# Patient Record
Sex: FEMALE | Race: WHITE | NOT HISPANIC OR LATINO | Employment: OTHER | URBAN - METROPOLITAN AREA
[De-identification: names, ages, dates, MRNs, and addresses within clinical notes are randomized per-mention and may not be internally consistent; named-entity substitution may affect disease eponyms.]

---

## 2017-07-24 ENCOUNTER — TRANSCRIBE ORDERS (OUTPATIENT)
Dept: ADMINISTRATIVE | Facility: HOSPITAL | Age: 62
End: 2017-07-24

## 2017-07-24 DIAGNOSIS — Z12.31 VISIT FOR SCREENING MAMMOGRAM: Primary | ICD-10-CM

## 2017-08-24 ENCOUNTER — APPOINTMENT (OUTPATIENT)
Dept: OTHER | Facility: HOSPITAL | Age: 62
End: 2017-08-24
Attending: FAMILY MEDICINE

## 2017-08-24 ENCOUNTER — HOSPITAL ENCOUNTER (OUTPATIENT)
Dept: MAMMOGRAPHY | Facility: HOSPITAL | Age: 62
Discharge: HOME | End: 2017-08-24
Attending: FAMILY MEDICINE | Admitting: FAMILY MEDICINE

## 2017-08-24 DIAGNOSIS — Z92.89 H/O MAMMOGRAM: ICD-10-CM

## 2017-08-24 DIAGNOSIS — Z12.31 VISIT FOR SCREENING MAMMOGRAM: ICD-10-CM

## 2017-08-24 PROCEDURE — 77063 BREAST TOMOSYNTHESIS BI: CPT

## 2017-08-24 PROCEDURE — 77067 SCR MAMMO BI INCL CAD: CPT | Performed by: RADIOLOGY

## 2017-08-24 PROCEDURE — 77063 BREAST TOMOSYNTHESIS BI: CPT | Performed by: RADIOLOGY

## 2017-08-24 PROCEDURE — G0202 SCR MAMMO BI INCL CAD: HCPCS

## 2017-09-11 ENCOUNTER — HOSPITAL ENCOUNTER (OUTPATIENT)
Dept: MAMMOGRAPHY | Facility: HOSPITAL | Age: 62
Discharge: HOME | End: 2017-09-11
Admitting: FAMILY MEDICINE

## 2017-09-11 ENCOUNTER — HOSPITAL ENCOUNTER (OUTPATIENT)
Dept: ULTRASOUND IMAGING | Facility: HOSPITAL | Age: 62
Discharge: HOME | End: 2017-09-11
Attending: FAMILY MEDICINE

## 2017-09-11 DIAGNOSIS — R92.8 ABNORMAL MAMMOGRAM: ICD-10-CM

## 2017-09-11 PROCEDURE — G0279 TOMOSYNTHESIS, MAMMO: HCPCS

## 2017-09-11 PROCEDURE — 77061 BREAST TOMOSYNTHESIS UNI: CPT | Performed by: RADIOLOGY

## 2017-09-11 PROCEDURE — G0206 DX MAMMO INCL CAD UNI: HCPCS

## 2017-09-11 PROCEDURE — 76642 ULTRASOUND BREAST LIMITED: CPT

## 2017-09-11 PROCEDURE — 77065 DX MAMMO INCL CAD UNI: CPT | Performed by: RADIOLOGY

## 2017-09-11 PROCEDURE — 76642 ULTRASOUND BREAST LIMITED: CPT | Performed by: RADIOLOGY

## 2017-09-21 ENCOUNTER — HOSPITAL ENCOUNTER (OUTPATIENT)
Dept: MAMMOGRAPHY | Facility: HOSPITAL | Age: 62
Discharge: HOME | End: 2017-09-21

## 2017-09-21 ENCOUNTER — HOSPITAL ENCOUNTER (OUTPATIENT)
Dept: ULTRASOUND IMAGING | Facility: HOSPITAL | Age: 62
Discharge: HOME | End: 2017-09-21
Attending: FAMILY MEDICINE | Admitting: FAMILY MEDICINE

## 2017-09-21 DIAGNOSIS — R92.8 ABNORMAL MAMMOGRAM: ICD-10-CM

## 2017-09-21 PROCEDURE — 77065 DX MAMMO INCL CAD UNI: CPT | Performed by: RADIOLOGY

## 2017-09-21 PROCEDURE — A4648 IMPLANTABLE TISSUE MARKER: HCPCS

## 2017-09-21 PROCEDURE — 19083 BX BREAST 1ST LESION US IMAG: CPT | Performed by: RADIOLOGY

## 2017-09-21 PROCEDURE — 88305 TISSUE EXAM BY PATHOLOGIST: CPT | Performed by: RADIOLOGY

## 2017-09-21 RX ORDER — LIDOCAINE HYDROCHLORIDE 10 MG/ML
10 INJECTION, SOLUTION INFILTRATION; PERINEURAL ONCE
Status: COMPLETED | OUTPATIENT
Start: 2017-09-21 | End: 2017-09-21

## 2017-09-21 RX ORDER — LIDOCAINE HYDROCHLORIDE AND EPINEPHRINE 10; 10 MG/ML; UG/ML
20 INJECTION, SOLUTION INFILTRATION; PERINEURAL ONCE
Status: COMPLETED | OUTPATIENT
Start: 2017-09-21 | End: 2017-09-21

## 2017-09-21 RX ADMIN — LIDOCAINE HYDROCHLORIDE AND EPINEPHRINE 3 ML: 10; 10 INJECTION, SOLUTION INFILTRATION; PERINEURAL at 13:35

## 2017-09-21 RX ADMIN — LIDOCAINE HYDROCHLORIDE 5 ML: 10 INJECTION, SOLUTION INFILTRATION; PERINEURAL at 13:30

## 2017-09-22 ENCOUNTER — TELEPHONE (OUTPATIENT)
Dept: MAMMOGRAPHY | Facility: HOSPITAL | Age: 62
End: 2017-09-22

## 2017-09-22 LAB
CYTO UR: NORMAL
LAB AP CASE REPORT: NORMAL
LAB AP CLINICAL INFORMATION: NORMAL
LAB AP DIAGNOSIS COMMENT: NORMAL
Lab: NORMAL
PATH REPORT.FINAL DX SPEC: NORMAL
PATH REPORT.GROSS SPEC: NORMAL

## 2017-09-22 NOTE — TELEPHONE ENCOUNTER
09.22.17 @ 1720: Pt notified of pathology results and recommendations. Verbalizes understanding. Denies discomfort. Denies any signs and symptoms of infection.

## 2017-12-10 ENCOUNTER — ANESTHESIA (OUTPATIENT)
Dept: PERIOP | Facility: HOSPITAL | Age: 62
End: 2017-12-10

## 2017-12-10 ENCOUNTER — ANESTHESIA EVENT (OUTPATIENT)
Dept: PERIOP | Facility: HOSPITAL | Age: 62
End: 2017-12-10

## 2017-12-10 ENCOUNTER — APPOINTMENT (OUTPATIENT)
Dept: GENERAL RADIOLOGY | Facility: HOSPITAL | Age: 62
End: 2017-12-10

## 2017-12-10 ENCOUNTER — HOSPITAL ENCOUNTER (INPATIENT)
Facility: HOSPITAL | Age: 62
LOS: 6 days | Discharge: HOME HEALTH | End: 2017-12-16
Attending: INTERNAL MEDICINE | Admitting: INTERNAL MEDICINE

## 2017-12-10 DIAGNOSIS — Z78.9 IMPAIRED MOBILITY AND ADLS: ICD-10-CM

## 2017-12-10 DIAGNOSIS — Z74.09 IMPAIRED FUNCTIONAL MOBILITY, BALANCE, GAIT, AND ENDURANCE: ICD-10-CM

## 2017-12-10 DIAGNOSIS — A41.9 SEPSIS, DUE TO UNSPECIFIED ORGANISM: Primary | ICD-10-CM

## 2017-12-10 DIAGNOSIS — Z74.09 IMPAIRED MOBILITY AND ADLS: ICD-10-CM

## 2017-12-10 DIAGNOSIS — T84.53XA INFECTION OF TOTAL RIGHT KNEE REPLACEMENT (HCC): ICD-10-CM

## 2017-12-10 LAB
ABO GROUP BLD: NORMAL
ABO GROUP BLD: NORMAL
ANION GAP SERPL CALCULATED.3IONS-SCNC: 6 MMOL/L (ref 3–11)
ANION GAP SERPL CALCULATED.3IONS-SCNC: 9 MMOL/L (ref 3–11)
APTT PPP: 30.6 SECONDS (ref 24–31)
BASOPHILS # BLD AUTO: 0.03 10*3/MM3 (ref 0–0.2)
BASOPHILS NFR BLD AUTO: 0.2 % (ref 0–1)
BLD GP AB SCN SERPL QL: NEGATIVE
BUN BLD-MCNC: 25 MG/DL (ref 9–23)
BUN BLD-MCNC: 30 MG/DL (ref 9–23)
BUN/CREAT SERPL: 17.6 (ref 7–25)
BUN/CREAT SERPL: 22.7 (ref 7–25)
CALCIUM SPEC-SCNC: 7.8 MG/DL (ref 8.7–10.4)
CALCIUM SPEC-SCNC: 8.1 MG/DL (ref 8.7–10.4)
CHLORIDE SERPL-SCNC: 108 MMOL/L (ref 99–109)
CHLORIDE SERPL-SCNC: 110 MMOL/L (ref 99–109)
CO2 SERPL-SCNC: 25 MMOL/L (ref 20–31)
CO2 SERPL-SCNC: 26 MMOL/L (ref 20–31)
CORTIS SERPL-MCNC: 18.8 MCG/DL
CREAT BLD-MCNC: 1.1 MG/DL (ref 0.6–1.3)
CREAT BLD-MCNC: 1.7 MG/DL (ref 0.6–1.3)
D-LACTATE SERPL-SCNC: 0.5 MMOL/L (ref 0.5–2)
DEPRECATED RDW RBC AUTO: 51.3 FL (ref 37–54)
EOSINOPHIL # BLD AUTO: 0.01 10*3/MM3 (ref 0–0.3)
EOSINOPHIL NFR BLD AUTO: 0.1 % (ref 0–3)
ERYTHROCYTE [DISTWIDTH] IN BLOOD BY AUTOMATED COUNT: 14.3 % (ref 11.3–14.5)
GFR SERPL CREATININE-BSD FRML MDRD: 30 ML/MIN/1.73
GFR SERPL CREATININE-BSD FRML MDRD: 50 ML/MIN/1.73
GLUCOSE BLD-MCNC: 105 MG/DL (ref 70–100)
GLUCOSE BLD-MCNC: 123 MG/DL (ref 70–100)
GLUCOSE BLDC GLUCOMTR-MCNC: 89 MG/DL (ref 70–130)
HBA1C MFR BLD: 5.7 % (ref 4.8–5.6)
HCT VFR BLD AUTO: 32.8 % (ref 34.5–44)
HCT VFR BLD AUTO: 33.7 % (ref 34.5–44)
HGB BLD-MCNC: 10.3 G/DL (ref 11.5–15.5)
HGB BLD-MCNC: 10.4 G/DL (ref 11.5–15.5)
IMM GRANULOCYTES # BLD: 0.04 10*3/MM3 (ref 0–0.03)
IMM GRANULOCYTES NFR BLD: 0.2 % (ref 0–0.6)
LYMPHOCYTES # BLD AUTO: 0.79 10*3/MM3 (ref 0.6–4.8)
LYMPHOCYTES NFR BLD AUTO: 4.5 % (ref 24–44)
MAGNESIUM SERPL-MCNC: 2.1 MG/DL (ref 1.3–2.7)
MCH RBC QN AUTO: 30 PG (ref 27–31)
MCHC RBC AUTO-ENTMCNC: 30.9 G/DL (ref 32–36)
MCV RBC AUTO: 97.1 FL (ref 80–99)
MONOCYTES # BLD AUTO: 0.96 10*3/MM3 (ref 0–1)
MONOCYTES NFR BLD AUTO: 5.5 % (ref 0–12)
NEUTROPHILS # BLD AUTO: 15.68 10*3/MM3 (ref 1.5–8.3)
NEUTROPHILS NFR BLD AUTO: 89.5 % (ref 41–71)
PHOSPHATE SERPL-MCNC: 3.3 MG/DL (ref 2.4–5.1)
PLATELET # BLD AUTO: 243 10*3/MM3 (ref 150–450)
PMV BLD AUTO: 9.4 FL (ref 6–12)
POTASSIUM BLD-SCNC: 3.7 MMOL/L (ref 3.5–5.5)
POTASSIUM BLD-SCNC: 3.9 MMOL/L (ref 3.5–5.5)
PROCALCITONIN SERPL-MCNC: 9.86 NG/ML
RBC # BLD AUTO: 3.47 10*6/MM3 (ref 3.89–5.14)
RH BLD: POSITIVE
RH BLD: POSITIVE
SODIUM BLD-SCNC: 142 MMOL/L (ref 132–146)
SODIUM BLD-SCNC: 142 MMOL/L (ref 132–146)
TROPONIN I SERPL-MCNC: 0.01 NG/ML
TSH SERPL DL<=0.05 MIU/L-ACNC: 0.21 MIU/ML (ref 0.35–5.35)
WBC NRBC COR # BLD: 17.51 10*3/MM3 (ref 3.5–10.8)

## 2017-12-10 PROCEDURE — 83735 ASSAY OF MAGNESIUM: CPT | Performed by: INTERNAL MEDICINE

## 2017-12-10 PROCEDURE — C1776 JOINT DEVICE (IMPLANTABLE): HCPCS | Performed by: ORTHOPAEDIC SURGERY

## 2017-12-10 PROCEDURE — 87147 CULTURE TYPE IMMUNOLOGIC: CPT | Performed by: ORTHOPAEDIC SURGERY

## 2017-12-10 PROCEDURE — 25010000002 PROMETHAZINE PER 50 MG: Performed by: ANESTHESIOLOGY

## 2017-12-10 PROCEDURE — 27430 REVISION OF THIGH MUSCLES: CPT | Performed by: ORTHOPAEDIC SURGERY

## 2017-12-10 PROCEDURE — 87077 CULTURE AEROBIC IDENTIFY: CPT | Performed by: ORTHOPAEDIC SURGERY

## 2017-12-10 PROCEDURE — 87116 MYCOBACTERIA CULTURE: CPT | Performed by: ORTHOPAEDIC SURGERY

## 2017-12-10 PROCEDURE — 85014 HEMATOCRIT: CPT | Performed by: ORTHOPAEDIC SURGERY

## 2017-12-10 PROCEDURE — 4A02X4A MEASUREMENT OF CARDIAC ELECTRICAL ACTIVITY, GUIDANCE, EXTERNAL APPROACH: ICD-10-PCS | Performed by: INTERNAL MEDICINE

## 2017-12-10 PROCEDURE — 84145 PROCALCITONIN (PCT): CPT | Performed by: INTERNAL MEDICINE

## 2017-12-10 PROCEDURE — 85025 COMPLETE CBC W/AUTO DIFF WBC: CPT | Performed by: INTERNAL MEDICINE

## 2017-12-10 PROCEDURE — 87206 SMEAR FLUORESCENT/ACID STAI: CPT | Performed by: ORTHOPAEDIC SURGERY

## 2017-12-10 PROCEDURE — 25010000002 HYDROCORTISONE SODIUM SUCCINATE 100 MG RECONSTITUTED SOLUTION: Performed by: INTERNAL MEDICINE

## 2017-12-10 PROCEDURE — 87015 SPECIMEN INFECT AGNT CONCNTJ: CPT | Performed by: ORTHOPAEDIC SURGERY

## 2017-12-10 PROCEDURE — 86901 BLOOD TYPING SEROLOGIC RH(D): CPT | Performed by: ORTHOPAEDIC SURGERY

## 2017-12-10 PROCEDURE — 0KNQ0ZZ RELEASE RIGHT UPPER LEG MUSCLE, OPEN APPROACH: ICD-10-PCS | Performed by: ORTHOPAEDIC SURGERY

## 2017-12-10 PROCEDURE — 82962 GLUCOSE BLOOD TEST: CPT

## 2017-12-10 PROCEDURE — 80048 BASIC METABOLIC PNL TOTAL CA: CPT | Performed by: INTERNAL MEDICINE

## 2017-12-10 PROCEDURE — 73560 X-RAY EXAM OF KNEE 1 OR 2: CPT

## 2017-12-10 PROCEDURE — 85018 HEMOGLOBIN: CPT | Performed by: ORTHOPAEDIC SURGERY

## 2017-12-10 PROCEDURE — 82533 TOTAL CORTISOL: CPT | Performed by: INTERNAL MEDICINE

## 2017-12-10 PROCEDURE — 87040 BLOOD CULTURE FOR BACTERIA: CPT | Performed by: INTERNAL MEDICINE

## 2017-12-10 PROCEDURE — 86901 BLOOD TYPING SEROLOGIC RH(D): CPT

## 2017-12-10 PROCEDURE — 87075 CULTR BACTERIA EXCEPT BLOOD: CPT | Performed by: ORTHOPAEDIC SURGERY

## 2017-12-10 PROCEDURE — 25010000003 CEFAZOLIN IN DEXTROSE 2-4 GM/100ML-% SOLUTION: Performed by: ANESTHESIOLOGY

## 2017-12-10 PROCEDURE — 84443 ASSAY THYROID STIM HORMONE: CPT | Performed by: INTERNAL MEDICINE

## 2017-12-10 PROCEDURE — 25010000002 PROPOFOL 10 MG/ML EMULSION: Performed by: ANESTHESIOLOGY

## 2017-12-10 PROCEDURE — 25010000002 ONDANSETRON PER 1 MG: Performed by: ANESTHESIOLOGY

## 2017-12-10 PROCEDURE — 0SUV09Z SUPPLEMENT RIGHT KNEE JOINT, TIBIAL SURFACE WITH LINER, OPEN APPROACH: ICD-10-PCS | Performed by: ORTHOPAEDIC SURGERY

## 2017-12-10 PROCEDURE — 25010000002 FENTANYL CITRATE (PF) 100 MCG/2ML SOLUTION: Performed by: ANESTHESIOLOGY

## 2017-12-10 PROCEDURE — 71010 HC CHEST PA OR AP: CPT

## 2017-12-10 PROCEDURE — 83036 HEMOGLOBIN GLYCOSYLATED A1C: CPT | Performed by: INTERNAL MEDICINE

## 2017-12-10 PROCEDURE — 25010000002 ALBUMIN HUMAN 5% PER 50 ML: Performed by: ANESTHESIOLOGY

## 2017-12-10 PROCEDURE — 25010000002 DEXAMETHASONE PER 1 MG: Performed by: ANESTHESIOLOGY

## 2017-12-10 PROCEDURE — 85730 THROMBOPLASTIN TIME PARTIAL: CPT | Performed by: INTERNAL MEDICINE

## 2017-12-10 PROCEDURE — 99291 CRITICAL CARE FIRST HOUR: CPT | Performed by: INTERNAL MEDICINE

## 2017-12-10 PROCEDURE — 02HV33Z INSERTION OF INFUSION DEVICE INTO SUPERIOR VENA CAVA, PERCUTANEOUS APPROACH: ICD-10-PCS | Performed by: INTERNAL MEDICINE

## 2017-12-10 PROCEDURE — P9041 ALBUMIN (HUMAN),5%, 50ML: HCPCS | Performed by: ANESTHESIOLOGY

## 2017-12-10 PROCEDURE — 86900 BLOOD TYPING SEROLOGIC ABO: CPT

## 2017-12-10 PROCEDURE — 86900 BLOOD TYPING SEROLOGIC ABO: CPT | Performed by: ORTHOPAEDIC SURGERY

## 2017-12-10 PROCEDURE — 87070 CULTURE OTHR SPECIMN AEROBIC: CPT | Performed by: ORTHOPAEDIC SURGERY

## 2017-12-10 PROCEDURE — 25010000002 HYDROMORPHONE PER 4 MG: Performed by: ORTHOPAEDIC SURGERY

## 2017-12-10 PROCEDURE — 87205 SMEAR GRAM STAIN: CPT | Performed by: ORTHOPAEDIC SURGERY

## 2017-12-10 PROCEDURE — 84100 ASSAY OF PHOSPHORUS: CPT | Performed by: INTERNAL MEDICINE

## 2017-12-10 PROCEDURE — 87102 FUNGUS ISOLATION CULTURE: CPT | Performed by: ORTHOPAEDIC SURGERY

## 2017-12-10 PROCEDURE — 87176 TISSUE HOMOGENIZATION CULTR: CPT | Performed by: ORTHOPAEDIC SURGERY

## 2017-12-10 PROCEDURE — 25010000002 FENTANYL CITRATE (PF) 100 MCG/2ML SOLUTION: Performed by: INTERNAL MEDICINE

## 2017-12-10 PROCEDURE — 80048 BASIC METABOLIC PNL TOTAL CA: CPT | Performed by: ORTHOPAEDIC SURGERY

## 2017-12-10 PROCEDURE — 25010000002 ONDANSETRON PER 1 MG: Performed by: ORTHOPAEDIC SURGERY

## 2017-12-10 PROCEDURE — 87186 SC STD MICRODIL/AGAR DIL: CPT | Performed by: ORTHOPAEDIC SURGERY

## 2017-12-10 PROCEDURE — 25010000002 PHENYLEPHRINE PER 1 ML: Performed by: ANESTHESIOLOGY

## 2017-12-10 PROCEDURE — 83605 ASSAY OF LACTIC ACID: CPT | Performed by: INTERNAL MEDICINE

## 2017-12-10 PROCEDURE — 0SPC09Z REMOVAL OF LINER FROM RIGHT KNEE JOINT, OPEN APPROACH: ICD-10-PCS | Performed by: ORTHOPAEDIC SURGERY

## 2017-12-10 PROCEDURE — 84484 ASSAY OF TROPONIN QUANT: CPT | Performed by: INTERNAL MEDICINE

## 2017-12-10 PROCEDURE — 27486 REVISE/REPLACE KNEE JOINT: CPT | Performed by: ORTHOPAEDIC SURGERY

## 2017-12-10 PROCEDURE — 86850 RBC ANTIBODY SCREEN: CPT | Performed by: ORTHOPAEDIC SURGERY

## 2017-12-10 PROCEDURE — 99254 IP/OBS CNSLTJ NEW/EST MOD 60: CPT | Performed by: ORTHOPAEDIC SURGERY

## 2017-12-10 DEVICE — IMPLANTABLE DEVICE: Type: IMPLANTABLE DEVICE | Site: KNEE | Status: FUNCTIONAL

## 2017-12-10 RX ORDER — BUPRENORPHINE AND NALOXONE 8; 2 MG/1; MG/1
1 FILM, SOLUBLE BUCCAL; SUBLINGUAL 2 TIMES DAILY
COMMUNITY
End: 2018-01-04

## 2017-12-10 RX ORDER — ALPRAZOLAM 1 MG/1
1 TABLET ORAL 3 TIMES DAILY PRN
Status: DISCONTINUED | OUTPATIENT
Start: 2017-12-10 | End: 2017-12-16 | Stop reason: HOSPADM

## 2017-12-10 RX ORDER — MAGNESIUM HYDROXIDE 1200 MG/15ML
LIQUID ORAL AS NEEDED
Status: DISCONTINUED | OUTPATIENT
Start: 2017-12-10 | End: 2017-12-10 | Stop reason: HOSPADM

## 2017-12-10 RX ORDER — PROMETHAZINE HYDROCHLORIDE 25 MG/1
25 SUPPOSITORY RECTAL ONCE AS NEEDED
Status: COMPLETED | OUTPATIENT
Start: 2017-12-10 | End: 2017-12-10

## 2017-12-10 RX ORDER — GABAPENTIN 800 MG/1
800 TABLET ORAL 4 TIMES DAILY
COMMUNITY

## 2017-12-10 RX ORDER — PROMETHAZINE HYDROCHLORIDE 25 MG/ML
6.25 INJECTION, SOLUTION INTRAMUSCULAR; INTRAVENOUS ONCE AS NEEDED
Status: COMPLETED | OUTPATIENT
Start: 2017-12-10 | End: 2017-12-10

## 2017-12-10 RX ORDER — LIDOCAINE HYDROCHLORIDE 10 MG/ML
INJECTION, SOLUTION INFILTRATION; PERINEURAL AS NEEDED
Status: DISCONTINUED | OUTPATIENT
Start: 2017-12-10 | End: 2017-12-10 | Stop reason: SURG

## 2017-12-10 RX ORDER — CHLORZOXAZONE 500 MG/1
500 TABLET ORAL 3 TIMES DAILY PRN
COMMUNITY

## 2017-12-10 RX ORDER — ROCURONIUM BROMIDE 10 MG/ML
INJECTION, SOLUTION INTRAVENOUS AS NEEDED
Status: DISCONTINUED | OUTPATIENT
Start: 2017-12-10 | End: 2017-12-10 | Stop reason: SURG

## 2017-12-10 RX ORDER — SODIUM CHLORIDE 9 MG/ML
100 INJECTION, SOLUTION INTRAVENOUS CONTINUOUS
Status: DISCONTINUED | OUTPATIENT
Start: 2017-12-10 | End: 2017-12-12

## 2017-12-10 RX ORDER — FENTANYL CITRATE 50 UG/ML
25 INJECTION, SOLUTION INTRAMUSCULAR; INTRAVENOUS
Status: DISCONTINUED | OUTPATIENT
Start: 2017-12-10 | End: 2017-12-16 | Stop reason: HOSPADM

## 2017-12-10 RX ORDER — OXYCODONE HYDROCHLORIDE AND ACETAMINOPHEN 5; 325 MG/1; MG/1
2 TABLET ORAL EVERY 4 HOURS PRN
Status: DISCONTINUED | OUTPATIENT
Start: 2017-12-10 | End: 2017-12-16 | Stop reason: HOSPADM

## 2017-12-10 RX ORDER — HYDROMORPHONE HYDROCHLORIDE 1 MG/ML
0.5 INJECTION, SOLUTION INTRAMUSCULAR; INTRAVENOUS; SUBCUTANEOUS
Status: DISCONTINUED | OUTPATIENT
Start: 2017-12-10 | End: 2017-12-10 | Stop reason: HOSPADM

## 2017-12-10 RX ORDER — TRIAMTERENE AND HYDROCHLOROTHIAZIDE 75; 50 MG/1; MG/1
1 TABLET ORAL DAILY
COMMUNITY
End: 2018-11-27

## 2017-12-10 RX ORDER — FENTANYL CITRATE 50 UG/ML
50 INJECTION, SOLUTION INTRAMUSCULAR; INTRAVENOUS
Status: DISCONTINUED | OUTPATIENT
Start: 2017-12-10 | End: 2017-12-10 | Stop reason: HOSPADM

## 2017-12-10 RX ORDER — MELOXICAM 15 MG/1
15 TABLET ORAL DAILY
Status: ON HOLD | COMMUNITY
End: 2018-01-09

## 2017-12-10 RX ORDER — CEFAZOLIN SODIUM 2 G/100ML
INJECTION, SOLUTION INTRAVENOUS AS NEEDED
Status: DISCONTINUED | OUTPATIENT
Start: 2017-12-10 | End: 2017-12-10 | Stop reason: SURG

## 2017-12-10 RX ORDER — ACETAMINOPHEN 325 MG/1
650 TABLET ORAL EVERY 4 HOURS PRN
Status: DISCONTINUED | OUTPATIENT
Start: 2017-12-10 | End: 2017-12-16 | Stop reason: HOSPADM

## 2017-12-10 RX ORDER — LISINOPRIL 20 MG/1
20 TABLET ORAL DAILY
COMMUNITY

## 2017-12-10 RX ORDER — NALOXONE HCL 0.4 MG/ML
0.1 VIAL (ML) INJECTION
Status: DISCONTINUED | OUTPATIENT
Start: 2017-12-10 | End: 2017-12-16 | Stop reason: HOSPADM

## 2017-12-10 RX ORDER — LIDOCAINE HYDROCHLORIDE 10 MG/ML
0.5 INJECTION, SOLUTION EPIDURAL; INFILTRATION; INTRACAUDAL; PERINEURAL ONCE AS NEEDED
Status: CANCELLED | OUTPATIENT
Start: 2017-12-10

## 2017-12-10 RX ORDER — PROPOFOL 10 MG/ML
VIAL (ML) INTRAVENOUS AS NEEDED
Status: DISCONTINUED | OUTPATIENT
Start: 2017-12-10 | End: 2017-12-10 | Stop reason: SURG

## 2017-12-10 RX ORDER — SODIUM CHLORIDE, SODIUM LACTATE, POTASSIUM CHLORIDE, CALCIUM CHLORIDE 600; 310; 30; 20 MG/100ML; MG/100ML; MG/100ML; MG/100ML
9 INJECTION, SOLUTION INTRAVENOUS CONTINUOUS PRN
Status: CANCELLED | OUTPATIENT
Start: 2017-12-10

## 2017-12-10 RX ORDER — BUPROPION HYDROCHLORIDE 150 MG/1
150 TABLET ORAL 2 TIMES DAILY
COMMUNITY
End: 2018-11-27

## 2017-12-10 RX ORDER — ONDANSETRON 2 MG/ML
INJECTION INTRAMUSCULAR; INTRAVENOUS AS NEEDED
Status: DISCONTINUED | OUTPATIENT
Start: 2017-12-10 | End: 2017-12-10 | Stop reason: SURG

## 2017-12-10 RX ORDER — ONDANSETRON 4 MG/1
4 TABLET, FILM COATED ORAL EVERY 6 HOURS PRN
Status: DISCONTINUED | OUTPATIENT
Start: 2017-12-10 | End: 2017-12-16 | Stop reason: HOSPADM

## 2017-12-10 RX ORDER — ALBUMIN, HUMAN INJ 5% 5 %
SOLUTION INTRAVENOUS CONTINUOUS PRN
Status: DISCONTINUED | OUTPATIENT
Start: 2017-12-10 | End: 2017-12-10 | Stop reason: SURG

## 2017-12-10 RX ORDER — DEXAMETHASONE SODIUM PHOSPHATE 4 MG/ML
INJECTION, SOLUTION INTRA-ARTICULAR; INTRALESIONAL; INTRAMUSCULAR; INTRAVENOUS; SOFT TISSUE AS NEEDED
Status: DISCONTINUED | OUTPATIENT
Start: 2017-12-10 | End: 2017-12-10 | Stop reason: SURG

## 2017-12-10 RX ORDER — HYDROMORPHONE HYDROCHLORIDE 1 MG/ML
0.5 INJECTION, SOLUTION INTRAMUSCULAR; INTRAVENOUS; SUBCUTANEOUS
Status: DISCONTINUED | OUTPATIENT
Start: 2017-12-10 | End: 2017-12-16 | Stop reason: HOSPADM

## 2017-12-10 RX ORDER — BISACODYL 5 MG/1
10 TABLET, DELAYED RELEASE ORAL DAILY PRN
Status: DISCONTINUED | OUTPATIENT
Start: 2017-12-10 | End: 2017-12-16 | Stop reason: HOSPADM

## 2017-12-10 RX ORDER — ASPIRIN 325 MG
325 TABLET, DELAYED RELEASE (ENTERIC COATED) ORAL EVERY 12 HOURS SCHEDULED
Status: DISCONTINUED | OUTPATIENT
Start: 2017-12-11 | End: 2017-12-16 | Stop reason: HOSPADM

## 2017-12-10 RX ORDER — SODIUM CHLORIDE, SODIUM LACTATE, POTASSIUM CHLORIDE, CALCIUM CHLORIDE 600; 310; 30; 20 MG/100ML; MG/100ML; MG/100ML; MG/100ML
INJECTION, SOLUTION INTRAVENOUS CONTINUOUS PRN
Status: DISCONTINUED | OUTPATIENT
Start: 2017-12-10 | End: 2017-12-10 | Stop reason: SURG

## 2017-12-10 RX ORDER — BISACODYL 10 MG
10 SUPPOSITORY, RECTAL RECTAL DAILY PRN
Status: DISCONTINUED | OUTPATIENT
Start: 2017-12-10 | End: 2017-12-16 | Stop reason: HOSPADM

## 2017-12-10 RX ORDER — SODIUM HYPOCHLORITE 1.25 MG/ML
SOLUTION TOPICAL AS NEEDED
Status: DISCONTINUED | OUTPATIENT
Start: 2017-12-10 | End: 2017-12-10 | Stop reason: HOSPADM

## 2017-12-10 RX ORDER — SODIUM CHLORIDE 9 MG/ML
50 INJECTION, SOLUTION INTRAVENOUS CONTINUOUS
Status: DISCONTINUED | OUTPATIENT
Start: 2017-12-10 | End: 2017-12-15

## 2017-12-10 RX ORDER — HYDROCODONE BITARTRATE AND ACETAMINOPHEN 5; 325 MG/1; MG/1
1 TABLET ORAL EVERY 6 HOURS PRN
Status: DISCONTINUED | OUTPATIENT
Start: 2017-12-10 | End: 2017-12-14

## 2017-12-10 RX ORDER — PROMETHAZINE HYDROCHLORIDE 25 MG/1
25 TABLET ORAL ONCE AS NEEDED
Status: COMPLETED | OUTPATIENT
Start: 2017-12-10 | End: 2017-12-10

## 2017-12-10 RX ORDER — ONDANSETRON 2 MG/ML
4 INJECTION INTRAMUSCULAR; INTRAVENOUS EVERY 6 HOURS PRN
Status: DISCONTINUED | OUTPATIENT
Start: 2017-12-10 | End: 2017-12-16 | Stop reason: HOSPADM

## 2017-12-10 RX ORDER — SODIUM CHLORIDE 0.9 % (FLUSH) 0.9 %
1-10 SYRINGE (ML) INJECTION AS NEEDED
Status: DISCONTINUED | OUTPATIENT
Start: 2017-12-10 | End: 2017-12-13

## 2017-12-10 RX ORDER — LEVOTHYROXINE SODIUM 0.1 MG/1
100 TABLET ORAL DAILY
COMMUNITY

## 2017-12-10 RX ORDER — ALPRAZOLAM 1 MG/1
1 TABLET ORAL DAILY PRN
COMMUNITY

## 2017-12-10 RX ORDER — OXYCODONE HYDROCHLORIDE AND ACETAMINOPHEN 5; 325 MG/1; MG/1
1 TABLET ORAL EVERY 4 HOURS PRN
Status: DISCONTINUED | OUTPATIENT
Start: 2017-12-10 | End: 2017-12-16 | Stop reason: HOSPADM

## 2017-12-10 RX ORDER — VANCOMYCIN HYDROCHLORIDE 1 G/200ML
15 INJECTION, SOLUTION INTRAVENOUS ONCE
Status: COMPLETED | OUTPATIENT
Start: 2017-12-11 | End: 2017-12-11

## 2017-12-10 RX ORDER — SODIUM CHLORIDE 0.9 % (FLUSH) 0.9 %
1-10 SYRINGE (ML) INJECTION AS NEEDED
Status: CANCELLED | OUTPATIENT
Start: 2017-12-10

## 2017-12-10 RX ORDER — SODIUM CHLORIDE 0.9 % (FLUSH) 0.9 %
1-10 SYRINGE (ML) INJECTION AS NEEDED
Status: DISCONTINUED | OUTPATIENT
Start: 2017-12-10 | End: 2017-12-10

## 2017-12-10 RX ORDER — DOCUSATE SODIUM 100 MG/1
100 CAPSULE, LIQUID FILLED ORAL 2 TIMES DAILY PRN
Status: DISCONTINUED | OUTPATIENT
Start: 2017-12-10 | End: 2017-12-16 | Stop reason: HOSPADM

## 2017-12-10 RX ORDER — CEFTRIAXONE SODIUM 2 G/50ML
2 INJECTION, SOLUTION INTRAVENOUS EVERY 24 HOURS
Status: DISCONTINUED | OUTPATIENT
Start: 2017-12-11 | End: 2017-12-16 | Stop reason: HOSPADM

## 2017-12-10 RX ADMIN — SODIUM CHLORIDE 100 ML/HR: 9 INJECTION, SOLUTION INTRAVENOUS at 16:43

## 2017-12-10 RX ADMIN — FENTANYL CITRATE 50 MCG: 50 INJECTION INTRAMUSCULAR; INTRAVENOUS at 21:07

## 2017-12-10 RX ADMIN — SODIUM CHLORIDE, POTASSIUM CHLORIDE, SODIUM LACTATE AND CALCIUM CHLORIDE: 600; 310; 30; 20 INJECTION, SOLUTION INTRAVENOUS at 19:00

## 2017-12-10 RX ADMIN — ALBUMIN HUMAN: 0.05 INJECTION, SOLUTION INTRAVENOUS at 18:18

## 2017-12-10 RX ADMIN — OXYCODONE AND ACETAMINOPHEN 2 TABLET: 5; 325 TABLET ORAL at 21:53

## 2017-12-10 RX ADMIN — PROMETHAZINE HYDROCHLORIDE 6.25 MG: 25 INJECTION INTRAMUSCULAR; INTRAVENOUS at 20:53

## 2017-12-10 RX ADMIN — FENTANYL CITRATE 50 MCG: 50 INJECTION INTRAMUSCULAR; INTRAVENOUS at 20:47

## 2017-12-10 RX ADMIN — ROCURONIUM BROMIDE 40 MG: 10 INJECTION, SOLUTION INTRAVENOUS at 18:06

## 2017-12-10 RX ADMIN — HYDROMORPHONE HYDROCHLORIDE 0.5 MG: 10 INJECTION INTRAMUSCULAR; INTRAVENOUS; SUBCUTANEOUS at 22:22

## 2017-12-10 RX ADMIN — FENTANYL CITRATE 100 MCG: 50 INJECTION, SOLUTION INTRAMUSCULAR; INTRAVENOUS at 18:06

## 2017-12-10 RX ADMIN — SODIUM CHLORIDE 100 ML/HR: 9 INJECTION, SOLUTION INTRAVENOUS at 21:56

## 2017-12-10 RX ADMIN — FENTANYL CITRATE 100 MCG: 50 INJECTION, SOLUTION INTRAMUSCULAR; INTRAVENOUS at 18:47

## 2017-12-10 RX ADMIN — CEFAZOLIN SODIUM 2 G: 2 INJECTION, SOLUTION INTRAVENOUS at 18:40

## 2017-12-10 RX ADMIN — FENTANYL CITRATE 50 MCG: 50 INJECTION INTRAMUSCULAR; INTRAVENOUS at 20:58

## 2017-12-10 RX ADMIN — SODIUM CHLORIDE: 9 INJECTION, SOLUTION INTRAVENOUS at 18:00

## 2017-12-10 RX ADMIN — PROPOFOL 160 MG: 10 INJECTION, EMULSION INTRAVENOUS at 18:06

## 2017-12-10 RX ADMIN — PHENYLEPHRINE HYDROCHLORIDE 0.8 MCG/KG/MIN: 10 INJECTION INTRAVENOUS at 18:25

## 2017-12-10 RX ADMIN — LIDOCAINE HYDROCHLORIDE 40 MG: 10 INJECTION, SOLUTION INFILTRATION; PERINEURAL at 18:06

## 2017-12-10 RX ADMIN — DEXAMETHASONE SODIUM PHOSPHATE 8 MG: 4 INJECTION, SOLUTION INTRAMUSCULAR; INTRAVENOUS at 18:28

## 2017-12-10 RX ADMIN — HYDROCORTISONE SODIUM SUCCINATE 100 MG: 100 INJECTION, POWDER, FOR SOLUTION INTRAMUSCULAR; INTRAVENOUS at 16:11

## 2017-12-10 RX ADMIN — METOPROLOL TARTRATE 5 MG: 5 INJECTION, SOLUTION INTRAVENOUS at 19:48

## 2017-12-10 RX ADMIN — FENTANYL CITRATE 50 MCG: 50 INJECTION INTRAMUSCULAR; INTRAVENOUS at 21:20

## 2017-12-10 RX ADMIN — FENTANYL CITRATE 100 MCG: 50 INJECTION, SOLUTION INTRAMUSCULAR; INTRAVENOUS at 20:08

## 2017-12-10 RX ADMIN — ONDANSETRON 4 MG: 2 INJECTION INTRAMUSCULAR; INTRAVENOUS at 22:23

## 2017-12-10 RX ADMIN — ONDANSETRON 4 MG: 2 INJECTION INTRAMUSCULAR; INTRAVENOUS at 19:49

## 2017-12-10 NOTE — PLAN OF CARE
Problem: Patient Care Overview (Adult)  Goal: Plan of Care Review  Outcome: Ongoing (interventions implemented as appropriate)    12/10/17 1816   Coping/Psychosocial Response Interventions   Plan Of Care Reviewed With patient;spouse;son   Patient Care Overview   Progress no change   Outcome Evaluation   Outcome Summary/Follow up Plan ortho surgery 12/10 evening arthroscopy, irrigation, and polyethlyene change         Problem: Infection, Risk/Actual (Adult)  Intervention: Manage Suspected/Actual Infection    12/10/17 1600   Safety Interventions   Isolation Precautions protective environment maintained       Intervention: Prevent Infection/Maximize Resistance    12/10/17 1600   Hygiene Care Assistance   Perineal Care cleansed;absorbent pad changed   Oral Care lip lubricant applied;mouth wash rinse;oral care provided;oral rinse provided;oral swabbing   Hygiene Care   Bathing/Skin Care barrier wipes         Goal: Identify Related Risk Factors and Signs and Symptoms  Outcome: Ongoing (interventions implemented as appropriate)    12/10/17 1816   Infection, Risk/Actual   Infection, Risk/Actual: Related Risk Factors age extremes;surgery/procedure   Signs and Symptoms (Infection, Risk/Actual) skin cool/clammy;body temperature changes;lab value changes;pain;weakness       Goal: Infection Prevention/Resolution  Outcome: Ongoing (interventions implemented as appropriate)    12/10/17 1816   Infection, Risk/Actual (Adult)   Infection Prevention/Resolution making progress toward outcome         Problem: Perioperative Period (Adult)  Intervention: Promote Pulmonary Hygiene and Secretion Clearance    12/10/17 1600 12/10/17 1700   Activity   Activity Type --  bedrest   Promote Aggressive Pulmonary Hygiene/Secretion Management   Cough And Deep Breathing done independently per patient --    Positioning   Head Of Bed (HOB) Position --  HOB at 30-45 degrees       Intervention: Monitor/Manage Pain    12/10/17 1816   Safety Interventions    Medication Review/Management medications reviewed       Intervention: Prevent/Manage Post-surgical Infection    12/10/17 1600   Safety Interventions   Infection Prevention barrier precautions utilized;environmental surveillance performed;equipment surfaces disinfected;glycemic control managed;personal protective equipment utilized       Intervention: Prevent/Manage DVT/VTE Risk    12/10/17 1600   Support Surgical/Anesthesia Recovery   Venous Thromboembolism Prevent/Manage left;sequential compression devices on         Goal: Signs and Symptoms of Listed Potential Problems Will be Absent or Manageable (Perioperative Period)  Outcome: Ongoing (interventions implemented as appropriate)    12/10/17 1816   Perioperative Period   Problems Assessed (Perioperative Period) all   Problems Present (Perioperative Period) pain;physiologic stress response         Problem: Skin Integrity Impairment, Risk/Actual (Adult)  Intervention: Promote/Optimize Nutrition    12/10/17 1600   Hygiene Care Assistance   Oral Care lip lubricant applied;mouth wash rinse;oral care provided;oral rinse provided;oral swabbing       Intervention: Prevent/Manage Excess Moisture    12/10/17 1600   Hygiene Care Assistance   Perineal Care cleansed;absorbent pad changed   Hygiene Care   Bathing/Skin Care barrier wipes   Skin Interventions   Skin Protection incontinence pads utilized;skin sealant/moisture barrier applied;skin to skin areas padded       Intervention: Prevent/Minimize Sheer/Friction Injuries    12/10/17 1600 12/10/17 1700   Positioning   Positioning/Transfer Devices --  pillows;in use   Skin Interventions   Pressure Reduction Techniques frequent weight shift encouraged;heels elevated off bed;weight shift assistance provided --    Pressure Reduction Devices heel offloading device utilized;positioning supports utilized;pressure-redistributing mattress utilized;specialty bed utilized --          Goal: Identify Related Risk Factors and Signs and  Symptoms  Outcome: Ongoing (interventions implemented as appropriate)    12/10/17 1816   Skin Integrity Impairment, Risk/Actual   Skin Integrity Impairment, Risk/Actual: Related Risk Factors cognitive impairment;immobility;moisture       Goal: Skin Integrity/Wound Healing  Outcome: Ongoing (interventions implemented as appropriate)    12/10/17 1816   Skin Integrity Impairment, Risk/Actual (Adult)   Skin Integrity/Wound Healing making progress toward outcome

## 2017-12-10 NOTE — ANESTHESIA PROCEDURE NOTES
Airway  Urgency: elective    Date/Time: 12/10/2017 6:08 PM  End Time:12/10/2017 6:08 PM  Airway not difficult    General Information and Staff    Patient location during procedure: OR  Anesthesiologist: SHANTE GROVER    Indications and Patient Condition  Indications for airway management: airway protection    Preoxygenated: yes  MILS not maintained throughout  Mask difficulty assessment: 1 - vent by mask    Final Airway Details  Final airway type: endotracheal airway      Successful airway: ETT  Cuffed: yes   Successful intubation technique: direct laryngoscopy  Endotracheal tube insertion site: oral  Blade: Fer  Blade size: #3  ETT size: 7.5 mm  Cormack-Lehane Classification: grade I - full view of glottis  Placement verified by: chest auscultation and capnometry   Measured from: lips  ETT to lips (cm): 20  Number of attempts at approach: 1    Additional Comments  Negative epigastric sounds, Breath sound equal bilaterally with symmetric chest rise and fall

## 2017-12-10 NOTE — H&P
Intensive Care Admission Note     Sepsis    History of Present Illness     This is a 62-year-old woman who has a history of breast cancer with previous radiation to her chest as well as a right knee replacement and chronic pain who presented to [facility] after 2 days of worsening confusion and forgetfulness. She also intermittently had fevers and had one recorded as high as 103.3°F. She was intensive as low as the 70s systolic. She was complaining of some right knee pain which is the site of the previous total knee replacement performed several years ago and [place]. She denied any trauma to that area. She underwent a CT scan of the head which was negative for any acute changes. Chest x-ray was reviewed and was clear. White blood cell count was checked which showed 22,000 white blood cells with 13% band count. Acetone was elevated at 31. The ER physician performed arthrocentesis of the right knee which showed cloudy synovial fluid with 106,800 white blood cell's with 14,600 red blood cells. Stain and culture were pending at the time. She received a total of 3 L of IV fluid with persistence of her hypotension and she is placed on norepinephrine with good response for blood pressure.     She has received vancomycin as well as Rocephin. She has not had any problems with her breathing. Her confusion has improved though she still is somewhat forgetful.    Apparently her orthopedist Dr. Weaver and [place] is not on call and does not have anyone covering for him per the house officer at [facility] in [place]. The ER physician initially tried to transfer the patient there but was not able to do it because of that. Due to the patient's illness and inability to take care of them at [facility], I was consulted for transfer to our facility for stabilization of her severe sepsis and possibly intervention.    Problem List, Surgical History, Family, Social History, and ROS  "    Past Medical History:   Diagnosis Date   • Arthritis    • Breast cancer 2015    RIGHT   • Disease of thyroid gland    • Hx of radiation therapy 2015    RT BREAST   • Hypertension        Past Surgical History:   Procedure Laterality Date   • BREAST BIOPSY Right 2015   • BREAST LUMPECTOMY Right 2015   • JOINT REPLACEMENT         Allergies   Allergen Reactions   • Bactrim [Sulfamethoxazole-Trimethoprim] Hives     Medication list: The patient appears to be on Neurontin 800 mg by mouth 3 times a day, Xanax 1 mg twice a day, and Suboxone 8 mg, frequency unknown.    Family History   Problem Relation Age of Onset   • Breast cancer Mother      DX AGE UNKNOWN   • Breast cancer Sister      DX AGE UNKNOWN   • Ovarian cancer Neg Hx      Social History   Substance Use Topics   • Smoking status: Current Every Day Smoker     Years: 5.00     Types: Electronic Cigarette   • Smokeless tobacco: Not on file   • Alcohol use No         Review of Systems  ROS: The patient complained of chills, forgetfulness, and some dizziness. She denies shortness of breath. A full review of systems was completed and the remainder are negative except as mentioned expressly in HPI.    Physical Exam and Clinical Information   BP (!) 84/50  Pulse 86  Temp 97.7 °F (36.5 °C) (Axillary)   Ht 162.6 cm (64\")  Wt 74.8 kg (164 lb 12.8 oz)  SpO2 98%  BMI 28.29 kg/m2  Physical Exam   Constitutional: She is oriented to person, place, and time. She appears well-developed and well-nourished. No distress.   HENT:   Head: Normocephalic and atraumatic.   Mouth/Throat: Oropharynx is clear and moist. No oropharyngeal exudate.   Eyes: Conjunctivae and EOM are normal. Pupils are equal, round, and reactive to light. Right eye exhibits no discharge. Left eye exhibits no discharge. No scleral icterus.   Neck: Normal range of motion. Neck supple. No JVD present. No tracheal deviation present. No thyromegaly present.   Cardiovascular: Normal rate, regular rhythm and " normal heart sounds.  Exam reveals no gallop.    No murmur heard.  Pulmonary/Chest: Effort normal. No stridor. No respiratory distress. She has no wheezes. She has no rales. She exhibits no tenderness.   Abdominal: Soft. Bowel sounds are normal. She exhibits no distension.   Musculoskeletal: Normal range of motion.   Right knee is status post well-healed knee surgery. Joint somewhat sore. No edema or erythema.   Lymphadenopathy:     She has no cervical adenopathy.   Neurological: She is alert and oriented to person, place, and time.   Skin: Skin is warm. She is not diaphoretic. No erythema. No pallor.   Psychiatric: She has a normal mood and affect. Her behavior is normal.   Vitals reviewed.        Results from last 7 days  Lab Units 12/10/17  1518   WBC 10*3/mm3 17.51*   HEMOGLOBIN g/dL 10.4*   PLATELETS 10*3/mm3 243     Please see further lab review from the outside facility above in the HPI.    I reviewed the patient's results and images.     Impression     Hospital Problem List     Sepsis, probable right septic knee as source        Plan/Recommendations     Admit to the intensive care unit.  I will discuss the case with our orthopedic surgeon on call though given the patient's history at another facility it may be preferable to arrange transfer to her primary surgeon's facility when available after stabilization of the patient from medical standpoint has been achieved.  We will continue Rocephin and vancomycin.  Course a level has been checked and I will preemptively start hydrocortisone and will plan to discontinue this if her cortisol level was appropriately elevated.  All cultures have been rechecked and we will follow-up on the culture of the synovial fluid.  Wean norepinephrine as tolerated.  Mechanical DVT prophylaxis for now.  Pain control as needed.    This patient is critically ill from sepsis and likely septic shock.    Stephon Carbajal MD, [facility]  Pulmonary and Critical Care Medicine  12/10/17 3:36 PM      Critical Care Time: 45 min (Not including time performing procedures)    CC: Phani Joaquin MD

## 2017-12-10 NOTE — NURSING NOTE
ACC REVIEW REPORT: [facility]        PATIENT NAME: Lesli Leon    PATIENT ID: [id number]    BED:  N240 / 2B    BED TYPE:  ICU    BED GIVEN TO:  ALDAIR HARRINGTON RN    TIME BED GIVEN:  13:13    YOB: 1955    AGE:  62    GENDER:  FEMALE    PREVIOUS ADMIT TO [facility]:  YES    PREVIOUS ADMISSION DATE:      PATIENT CLASS:  OUTPATIENT    TODAY'S DATE: 12/10/2017    TRANSFER DATE:  12/10/2017    ETA:  16:00 - 17:00 due to [facility] availability to transport, they are out on a run at time of report.    TRANSFERRING FACILITY:  [facility]    TRANSFERRING FACILITY PHONE # :  [phone]  EXT 4     TRANSFERRING MD:  DR. IAM TEE    DATE/TIME REQUEST RECEIVED:  12/10/2017 @ 11:40    [facility] RN:   MARISEL BARBA RN    REPORT FROM:  ALDAIR HARRINGTON RN    TIME REPORT TAKEN:  13:13    DIAGNOSIS:   SEPTIC ARTHRITIS, ALTERED MENTAL STATUS    REASON FOR TRANSFER TO [facility]:  HIGHER LEVEL OF CARE    TRANSPORTATION:  [facility]    CLINICAL REASON FOR TRANSFER TO [facility]:  Pt presented to the ED this a m @ 05:28 with c/o increasing altered mental status and ataxia since noon yesterday.  This a m she is slurring her speech on arrival.   Her right knee is painful to touch, she has had surgery on this knee with Dr. Power from [facility] in the past, (longer than 30 days ago). Arthrocentesis on the right knee produced milky fluid, sent for culture.  Pt is hypotensive, placed on Levophed for blood pressure support.  POC;  Pt will transfer to the ICU for Dr. Carbajal.       CLINICAL INFORMATION    HEIGHT:  5'4''     WEIGHT:   77kg    ALLERGIES:  BACTRIM    WHITE:  NO    INFECTIOUS DISEASE:  NONE    ISOLATION:   NONE    1ST VITAL SIGNS:   TIME: 13:13   TEMP: 99.2   PULSE:78    B/P:  111/56   RESP: 20 on 2L/NC  94% sat.    LAST VITAL SIGNS:  TIME:    TEMP:     PULSE:    B/P:    RESP:      LAB INFORMATION:  WBC 22.5, BUN 42, CREATININE 3.1, SED RATE 75,  PRO CALCITONIN  31.32, LABS DRAWN AT  06:20    CULTURE INFORMATION:  ARTHROCENTESIS OF RIGHT KNEE, FLUID SENT TO C&S.     MEDS/IV FLUIDS:  IV ACCESS PER BILATERAL # 20 GA ANGIOCATHS AND A RIGHT IJ.  Patient was given 3 liters of Normal Saline followed by Rocephin 2 grams IV and Vancomycin 1.5 grams iv.  Pt started on LEVOPHED AT 4 mcg/min.        CARDIAC SYSTEM:    CHEST PAIN:  none    RHYTHM:  NSR    Is patient taking or has patient been given any drugs that could increase bleeding?  NO  (Plavix, Brilinta, Effient, Eliquis, Xarelto, Warfarin, Integrilin, Angiomax)    CARDIAC NOTES:  THE PT HAS REMAINED IN NSR, NO ECTOPY HAS BEEN NOTED.  THE NURSE NOTED THE PATIENT HAS NO PREVIOUS CARDIAC PROBLEMS.      RESPIRATORY SYSTEM:    LUNG SOUNDS:     DIMINISHED:  YES     OXYGEN:  YES    O2 SAT:  94%    ADMINISTRATION ROUTE:   2L/NC    RADIOLOGY RESULTS:  CHEST X-RAY WAS DONE ON ARRIVAL AND A SECOND CHEST X-RAY TO CONFIRM PLACEMENT OF THE RIGHT IJ.  COPIES OF BOTH WILL BE SENT WITH THE PATIENT.     RESPIRATORY STATUS:  PT RESPIRATIONS ARE UNLABORED.  SHE DOES NOT USE HOME OXYGEN.      CNS/MUSCULOSKELETAL    ALERT AND ORIENTED:    PERSON: YES   PLACE:   YES  TIME:  YES      GREGOR COMA SCALE:    E:  4  M:  6  V:  5    EXTREMITY WEAKNESS:    LEFT ARM: NO   RIGHT ARM:NO     LEFT LEG: NO   RIGHT LEG:  YES     CNS/MUSCULOSKELETAL NOTES:  THE PATIENT WAS CONFUSED ON ARRIVAL TO THE ED BUT BY FIRST SHIFT SHE WAS ALERT AND ORIENTED.  SHE HAS BEEN ABLE TO ANSWER QUESTIONS AND PROVIDE APPROPRIATE  CONVERSATION.      GI//GY      ABDOMINAL PAIN:   NO    VOMITING:  NO    DIARRHEA:   NO    NAUSEA:  NO    BOWEL SOUNDS:   ACTIVE    OCCULT STOOL:  N/A    VAGINAL BLEEDING:  NONE    HEMATURIA:  NO    GI//GY NOTES:   NPO    PAST MEDICAL HISTORY:  PATIENT HAS PRIOR HISTORY OF ARTHRITIS.                        Tracey Sosa RN  12/10/2017  1:06 PM

## 2017-12-10 NOTE — ANESTHESIA PREPROCEDURE EVALUATION
Anesthesia Evaluation     Patient summary reviewed and Nursing notes reviewed   no history of anesthetic complications:  NPO Solid Status: > 8 hours  NPO Liquid Status: > 2 hours     Airway   Dental      Pulmonary    Cardiovascular   Exercise tolerance: good (4-7 METS)    Rhythm: regular  Rate: normal    (+) hypertension well controlled,       Neuro/Psych  GI/Hepatic/Renal/Endo      Musculoskeletal     Abdominal   (+) obese,     Abdomen: soft.   Substance History      OB/GYN          Other   (+) arthritis   history of cancer                                  Anesthesia Plan    ASA 3 - emergent     general     intravenous induction   Anesthetic plan and risks discussed with patient.

## 2017-12-10 NOTE — PROGRESS NOTES
SEPTIC SHOCK FOCUSED EXAM        Diagnosis: Septic Shock     Vital Signs (Attestation) Reviewed Temp, HR, RR, BP   General resting comfortably, no distress   Respiratory chest non tender, lungs clear, normal breath sounds and no respiratory distress   Cardiac/Chest Within Normal Limits, regular rate, rhythm and no edema   Skin (documentation of skin color is required) pink mucosa   Capillary Refill <3 seconds   Peripheral Pulses Checked                          radial  palpable and dorsal-pedis  palpable       Stephon Carbajal MD  12/10/17  4:09 PM

## 2017-12-10 NOTE — PROGRESS NOTES
"Pharmacy Consult-Vancomycin Dosing  Lesli Leon is a  62 y.o. female receiving vancomycin therapy.     Indication: Septic presentation, possible R knee source   Consulting Provider: Intensivist  ID Consult: No    Current regimen: S/p vancomycin 1500mg this AM at OSH  DOT: Day 1  Goal Trough: 15-20 mcg/mL    Current Antimicrobial Therapy  Pharmacy to dose vancomycin, Day 1  Ceftriaxone 2g q24h, Day 1    Allergies  Allergies as of 12/10/2017 - never reviewed   Allergen Reaction Noted   • Bactrim [sulfamethoxazole-trimethoprim] Hives 12/10/2017     Labs  Results from last 7 days     Lab Units 12/10/17  1517   BUN mg/dL 30*   CREATININE mg/dL 1.70*     Results from last 7 days     Lab Units 12/10/17  1518   WBC 10*3/mm3 17.51*     Evaluation of Dosing  Last Dose Received in the ED/Outside Facility: Vancomycin 1500 mg 12/10 @ 0800    Ht - 162.6 cm (64\")  Wt - 74.8 kg (164 lb 12.8 oz)    Estimated Creatinine Clearance: 34 mL/min (by C-G formula based on Cr of 1.7).    Microbiology and Radiology  Microbiology Results (last 10 days)       ** No results found for the last 240 hours. **          Evaluation of Level    No results found for: HUBERT FELIX VANCORANDOM    AM Random level 12/11    Assessment/Plan:    Vancomycin 20 mg/kg received at OSH this AM ~0800. PT's CARLA improving (Scr @ OSH 3.1 --> 1.7 @ [facility])    1. No additional vancomycin given today  2. Random level 12/11 AM to guide further dosing  3. Pharmacy will follow daily to adjust appropriately     Thank you for this consult,  Sissy Lance, PharmD  12/10/17  4:21 PM  "

## 2017-12-10 NOTE — CONSULTS
Orthopedic Consult      Patient: Lesli Leon    Date of Admission: 12/10/2017  2:37 PM    YOB: 1955    Medical Record Number: [id number]    Attending Physician: Stephon Carbajal MD    Consulting Physician: Stephon Garcia MD      Chief Complaint(s): Sepsis [A41.9]      History of Present Illness: 62 y.o. female admitted to [facility] with Sepsis [A41.9]. I was consulted for further evaluation and treatment of right knee periprosthetic infection.  Patient reportedly had a history of 3 to four-day increasing pain in the right knee.  She woke up yesterday evening with altered mental status and severe pain in the knee with decreased ability to ambulate.  [facility] and [facility] of the knee was performed.  Aspiration demonstrated a 6% neutrophils and over 1000 white blood cells in the knee.  Patient had a total knee done by Dr. Power 2 years ago but, for some reason, the patient was unable to be managed by the orthopedic service at his facility.  Subsequently the patient was transferred to [facility] for further evaluation and treatment.  Patient localizes the pain primarily to the knee.  She rates it a 4/10 on the pain scale currently.  Her pain is worse with movement of the knee.  Immobilization improves the pain.  Patient states that the knee became painful and swollen Friday evening he got progressively worse through Saturday.  Patient did have a recent visit to the dentist on Friday in which she had a dental procedure performed to repair a broken tooth.  Prior to Friday, she states that she had no problems with the total knee and was functioning well without complications.  Patient was transferred to [facility] ICU due to concern for altered mental status and sepsis.  The patient has since stabilized and I was consult at for management of her's infected right total knee arthroplasty.       Allergies   Allergen Reactions   •  Bactrim [Sulfamethoxazole-Trimethoprim] Hives        Home Medications:  No prescriptions prior to admission.       Current Medications:  Scheduled Meds:  [START ON 12/11/2017] ceftriaxone 2 g Intravenous Q24H   hydrocortisone sodium succinate 100 mg Intravenous Q8H     Continuous Infusions:  norepinephrine 0.02-0.3 mcg/kg/min    Pharmacy to dose vancomycin     sodium chloride 100 mL/hr Last Rate: 100 mL/hr (12/10/17 1643)     PRN Meds:.•  ALPRAZolam  •  fentaNYL citrate (PF)  •  HYDROcodone-acetaminophen  •  Pharmacy to dose vancomycin    Past Medical History:   Diagnosis Date   • Arthritis    • Breast cancer 2015    RIGHT   • Disease of thyroid gland    • Hx of radiation therapy 2015    RT BREAST   • Hypertension         Past Surgical History:   Procedure Laterality Date   • BREAST BIOPSY Right 2015   • BREAST LUMPECTOMY Right 2015   • JOINT REPLACEMENT          Social History     Occupational History   • Not on file.     Social History Main Topics   • Smoking status: Current Every Day Smoker     Years: 5.00     Types: Electronic Cigarette   • Smokeless tobacco: Not on file   • Alcohol use No   • Drug use: No   • Sexual activity: Yes    Social History     Social History Narrative        Family History   Problem Relation Age of Onset   • Breast cancer Mother      DX AGE UNKNOWN   • Breast cancer Sister      DX AGE UNKNOWN   • Ovarian cancer Neg Hx        Review of Systems:   General: negative for - chills, fatigue, fever, malaise or night sweats  Psychological: negative for - behavioral disorder, hostility, irritability, mood swings, obsessive thoughts or suicidal ideation  Ophthalmic: negative for - blurry vision, double vision or eye pain  HEENT: negative for - headaches, hearing change, sinus pain, sore throat or visual changes  Hematological and Lymphatic: negative for - bleeding problems, jaundice, night sweats, pallor or swollen lymph nodes  Endocrine: negative for - malaise/lethargy, mood swings,  palpitations, polydipsia/polyuria, skin changes or unexpected weight changes  Respiratory: negative for - cough, shortness of breath or wheezing  Cardiovascular: negative for - chest pain, dyspnea on exertion, palpitations or shortness of breath  Gastrointestinal: negative for - abdominal pain, change in bowel habits, change in stools, constipation, gas/bloating or stool incontinence  Genito-Urinary: negative for - dysuria, genital discharge, nocturia, pelvic pain or scrotal mass/pain  Musculoskeletal: positive for - pain in knee - right  Neurological: negative for - behavioral changes, headaches, speech problems or visual changes  Dermatological: negative for hair changes, lumps, pruritus and skin lesion changes    Physical Exam: 62 y.o. female    GENERAL APPEARANCE: awake, alert & oriented x 3, in no acute distress and well developed, well nourished  Vitals:    12/10/17 1530 12/10/17 1545 12/10/17 1600 12/10/17 1615   BP: (!) 84/50 92/54 (!) 85/49 101/54   BP Location:   Left arm    Patient Position:   Lying    Pulse: 86 85 82 87   Resp:   20    Temp:       TempSrc:       SpO2: 98% 99% 98% 99%   Weight:       Height:         PSYCH: normal affect  HEAD: Normocephalic, without obvious abnormality, atraumatic  EYES: No icterus, corneas clear, PERRLA  CARDIOVASCULAR: pulses palpable and equal bilaterally. Capillary refill less than 2 seconds  LUNGS:  breathing nonlabored  ABDOMEN: Soft, nontender, nondistended  BACK: No C-T- L spine tenderness  EXTREMITIES: no clubbing, cyanosis  MUSCULOSKELETAL:    Left upper extremity: Full painless range of motion of shoulder, elbow, wrist, and digits.  Nontender to palpation throughout the extremity.  Intact EPL, FPL, EDC, FDP, FDS, interosseous, wrist flexion, wrist extension, biceps, triceps, and deltoid. Sensation intact light touch to median, radial, ulnar, and axillary nerves. Palpable radial pulse.  Skin is intact without significant lesions or wounds.    Right upper  extremity: Full painless range of motion of shoulder, elbow, wrist, and digits. Nontender to palpation throughout the extremity.  Intact EPL, FPL, EDC, FDP, FDS, interosseous, wrist flexion, wrist extension, biceps, triceps, and deltoid. Sensation intact light touch to median, radial, ulnar, and axillary nerves. Palpable radial pulse.  Skin is intact without significant lesions or wounds.    Pelvis: Stable to AP and lateral compression.    Left lower extremity: Full, painless range of motion of hip, knee, ankle, toes.  Nontender to palpation throughout the extremity. Intact EHL, FHL, tibialis anterior, and gastrocsoleus. Sensation intact to light touch to deep peroneal, superficial peroneal, sural, saphenous, tibial nerves. Palpable DP and PT pulses. Skin - intact without evidence of breakdown. Edema - none.    Right lower extremity: Full, painless range of motion of hip, ankle, toes. Range of motion is limited at knee secondary to pain.  Patient's knee range of motion is 0-80° of flexion.  She does have a significant knee effusion with slight increased warmth overlying the knee.  No gross erythema or sinus tract detected.  Nontender to palpation throughout the remainder of the extremity Intact EHL, FHL, tibialis anterior, and gastrocsoleus. Sensation intact to light touch to deep peroneal, superficial peroneal, sural, saphenous, tibial nerves. Palpable DP and PT pulses. Skin - intact with well-healed anterior midline knee incision. Edema - mild 1+ edema at ankle.      Diagnostic Tests:    Admission on 12/10/2017   Component Date Value Ref Range Status   • Glucose 12/10/2017 105* 70 - 100 mg/dL Final   • BUN 12/10/2017 30* 9 - 23 mg/dL Final   • Creatinine 12/10/2017 1.70* 0.60 - 1.30 mg/dL Final   • Sodium 12/10/2017 142  132 - 146 mmol/L Final   • Potassium 12/10/2017 3.7  3.5 - 5.5 mmol/L Final   • Chloride 12/10/2017 110* 99 - 109 mmol/L Final   • CO2 12/10/2017 26.0  20.0 - 31.0 mmol/L Final   • Calcium  12/10/2017 8.1* 8.7 - 10.4 mg/dL Final   • eGFR Non African Amer 12/10/2017 30* >60 mL/min/1.73 Final   • BUN/Creatinine Ratio 12/10/2017 17.6  7.0 - 25.0 Final   • Anion Gap 12/10/2017 6.0  3.0 - 11.0 mmol/L Final   • WBC 12/10/2017 17.51* 3.50 - 10.80 10*3/mm3 Final   • RBC 12/10/2017 3.47* 3.89 - 5.14 10*6/mm3 Final   • Hemoglobin 12/10/2017 10.4* 11.5 - 15.5 g/dL Final   • Hematocrit 12/10/2017 33.7* 34.5 - 44.0 % Final   • MCV 12/10/2017 97.1  80.0 - 99.0 fL Final   • MCH 12/10/2017 30.0  27.0 - 31.0 pg Final   • MCHC 12/10/2017 30.9* 32.0 - 36.0 g/dL Final   • RDW 12/10/2017 14.3  11.3 - 14.5 % Final   • RDW-SD 12/10/2017 51.3  37.0 - 54.0 fl Final   • MPV 12/10/2017 9.4  6.0 - 12.0 fL Final   • Platelets 12/10/2017 243  150 - 450 10*3/mm3 Final   • Neutrophil % 12/10/2017 89.5* 41.0 - 71.0 % Final   • Lymphocyte % 12/10/2017 4.5* 24.0 - 44.0 % Final   • Monocyte % 12/10/2017 5.5  0.0 - 12.0 % Final   • Eosinophil % 12/10/2017 0.1  0.0 - 3.0 % Final   • Basophil % 12/10/2017 0.2  0.0 - 1.0 % Final   • Immature Grans % 12/10/2017 0.2  0.0 - 0.6 % Final   • Neutrophils, Absolute 12/10/2017 15.68* 1.50 - 8.30 10*3/mm3 Final   • Lymphocytes, Absolute 12/10/2017 0.79  0.60 - 4.80 10*3/mm3 Final   • Monocytes, Absolute 12/10/2017 0.96  0.00 - 1.00 10*3/mm3 Final   • Eosinophils, Absolute 12/10/2017 0.01  0.00 - 0.30 10*3/mm3 Final   • Basophils, Absolute 12/10/2017 0.03  0.00 - 0.20 10*3/mm3 Final   • Immature Grans, Absolute 12/10/2017 0.04* 0.00 - 0.03 10*3/mm3 Final   • PTT 12/10/2017 30.6  24.0 - 31.0 seconds Final   • Hemoglobin A1C 12/10/2017 5.70* 4.80 - 5.60 % Final   • Troponin I 12/10/2017 0.007  <=0.039 ng/mL Final   • TSH 12/10/2017 0.214* 0.350 - 5.350 mIU/mL Final   • Magnesium 12/10/2017 2.1  1.3 - 2.7 mg/dL Final   • Phosphorus 12/10/2017 3.3  2.4 - 5.1 mg/dL Final   • Procalcitonin 12/10/2017 9.86  ng/mL Final   • Lactate 12/10/2017 0.5  0.5 - 2.0 mmol/L Final    Falsely depressed results may occur  on samples drawn from patients receiving N-Acetylcysteine (NAC) or Metamizole.   • Cortisol 12/10/2017 18.80  mcg/dL Final       Xr Chest 1 View    Result Date: 12/10/2017  Narrative:  EXAMINATION: XR CHEST 1 VW - 12/10/2017  INDICATION: Central line placement.  COMPARISON: None.  FINDINGS: 1. A right IJ catheter has been placed. The tip is perfectly positioned in the SVC at the entrance to the right atrium.  2. There is no detectable pneumothorax.  3. There is mild chronic scarring in the chest but there is no active disease, edema or free fluid.         Impression: Perfect position of the right IJ catheter in the SVC at the entrance to the right atrium.  No pneumothorax and no other acute finding.  DICTATED:     12/10/2017 EDITED:          12/10/2017             Assessment:  Patient Active Problem List   Diagnosis   • Sepsis, probable right septic knee as source     Outside records were personally reviewed.  Knee aspiration performed at [facility] demonstrated greater than 100,000 white blood cells with 86% neutrophils.    X-rays of the right knee were also personally reviewed.  X-rays demonstrate a well fixed total knee construct was satisfactorily aligned components.  There is no evidence of component loosening or malalignment.    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  The infection appears to be acute given that the patient only had a week or less of symptoms of knee pain, swelling, and pain with ambulation.  I had an extensive discussion with the patient/family regarding treatment options for this problem.  taking into account the nature of the diagnosis, the patient's prior level of cognitive and physical function, medical comorbidities, and goals for treatment.  Operative and nonoperative options as well as alternatives were presented and the risks and benefits of each option were discussed.  I recommended operative intervention  for treatment of the infection.  I explained to them 3 treatment options.      The first option would be a irrigation and debridement with polyethylene exchange.  I explained that this treatment option is available for acute infections only.  Given the timeframe for her symptoms, I do believe that this is an acute infection.  I explained that there is roughly a 50-50 chance of this working.  Success of this process would be largely dependent on the type of bacteria responsible for the infection, the duration in which the infection has actually been ongoing, and the status of the soft tissue and bony integrity within the joint.  I explained that this procedure would involve a single surgery in which the infection is irrigated out of the knee and a new polyethylene component is placed.  The patient require prolonged course of IV antibiotics of a proximally 6-8 weeks followed by transition to oral antibiotic for at least a year postop.  I explained that despite this intervention, there is a higher likelihood of needing repeat surgery for persistent infection or other complication.     A second option would be single stage total knee revision with removal of all components, extensive irrigation and debridement, followed by reimplantation of revision total knee components.  I explained that this surgery can have a potential increased likelihood of success compared to the irrigation and debridement option alone.  This treatment will also require 6-8 week course of antibiotics followed by transition to oral antibiotic for prolonged period of time.  However, the surgery is more involved, would likely require increased recovery time compared to the irrigation and debridement, and OR time would be more significant versus the first option.    The third option would entail two-stage revision in which all total knee components would be removed followed by placement of an antibiotic spacer.  Patient would then be treated with a  6-8 week course of IV antibiotics followed by repeat laboratory and clinical evaluation to determine if the infection has been clinically eradicated.  Once the infection appears to been treated adequately, a second surgery would be performed for reimplantation of the total knee construct.  This two-stage exchange would require more surgery, but would also have a higher success rate compared to the single stage irrigation and debridement with polyethylene exchange.    After extensive discussion of these treatment options with the patient /family including the risks and benefits of both options, they elected to proceed with a single stage irrigation and debridement with polyethylene exchange.  I again reiterated that this has a lower success rate in the other 2 options which were offered.  I stated that if this treatment fails, then the two-stage exchange would be recommended.  They again expressed understanding and wished to proceed with the least invasive treatment option at this time.  Therefore we will proceed with irrigation and debridement of the infected right knee arthroplasty with polyethylene exchange with the goal to improve patient function, decrease pain, and restore mobility. The risks, benefits, potential complications, and alternatives were again discussed with the patient in detail. Risks included but were not limited to bleeding, recurrent  infection, anesthesia risks, damage to neurovascular structures, osteolysis, aseptic loosening, instability, dislocation, pain, continued pain, iatrogenic fracture, possible need for future surgery including the potential for amputation, blood clots, myocardial infarction, stroke, and death. Lena-operative blood management and the potential for blood transfusion were discussed with risks and options clearly outlined. Specific details of the surgical procedure, hospitalization, recovery, rehabilitation, and long-term precautions were also presented. Pre-operative  teaching was provided. Implant/prosthesis selection was outlined, and the many options available were explained; the final choice will be made at the time of the procedure to match the anatomy and condition of the bone, ligaments, tendons, and muscles. Given this instruction, the patient elected to proceed with the right knee arthrotomy with irrigation and debridement and polyethylene exchange for acutely infected right knee arthroplasty.     Nothing by mouth  Plan for OR today for irrigation and debridement with polyethylene exchange  Obtain informed consent.      Date: 12/10/2017    Stephon Garcia MD

## 2017-12-11 ENCOUNTER — APPOINTMENT (OUTPATIENT)
Dept: CT IMAGING | Facility: HOSPITAL | Age: 62
End: 2017-12-11

## 2017-12-11 PROBLEM — I10 HYPERTENSION: Status: ACTIVE | Noted: 2017-12-11

## 2017-12-11 PROBLEM — R47.01 EXPRESSIVE APHASIA: Status: ACTIVE | Noted: 2017-12-11

## 2017-12-11 PROBLEM — E07.9 DISEASE OF THYROID GLAND: Status: ACTIVE | Noted: 2017-12-11

## 2017-12-11 PROBLEM — R01.1 HEART MURMUR: Status: ACTIVE | Noted: 2017-12-11

## 2017-12-11 LAB
ANION GAP SERPL CALCULATED.3IONS-SCNC: 9 MMOL/L (ref 3–11)
BASOPHILS # BLD AUTO: 0.01 10*3/MM3 (ref 0–0.2)
BASOPHILS NFR BLD AUTO: 0.1 % (ref 0–1)
BUN BLD-MCNC: 25 MG/DL (ref 9–23)
BUN/CREAT SERPL: 25 (ref 7–25)
CALCIUM SPEC-SCNC: 8.3 MG/DL (ref 8.7–10.4)
CHLORIDE SERPL-SCNC: 106 MMOL/L (ref 99–109)
CO2 SERPL-SCNC: 25 MMOL/L (ref 20–31)
CREAT BLD-MCNC: 1 MG/DL (ref 0.6–1.3)
DEPRECATED RDW RBC AUTO: 50.9 FL (ref 37–54)
EOSINOPHIL # BLD AUTO: 0 10*3/MM3 (ref 0–0.3)
EOSINOPHIL NFR BLD AUTO: 0 % (ref 0–3)
ERYTHROCYTE [DISTWIDTH] IN BLOOD BY AUTOMATED COUNT: 14.1 % (ref 11.3–14.5)
GFR SERPL CREATININE-BSD FRML MDRD: 56 ML/MIN/1.73
GLUCOSE BLD-MCNC: 115 MG/DL (ref 70–100)
HCT VFR BLD AUTO: 31 % (ref 34.5–44)
HGB BLD-MCNC: 9.7 G/DL (ref 11.5–15.5)
IMM GRANULOCYTES # BLD: 0.04 10*3/MM3 (ref 0–0.03)
IMM GRANULOCYTES NFR BLD: 0.3 % (ref 0–0.6)
LYMPHOCYTES # BLD AUTO: 0.48 10*3/MM3 (ref 0.6–4.8)
LYMPHOCYTES NFR BLD AUTO: 3.1 % (ref 24–44)
MAGNESIUM SERPL-MCNC: 2.2 MG/DL (ref 1.3–2.7)
MCH RBC QN AUTO: 30.4 PG (ref 27–31)
MCHC RBC AUTO-ENTMCNC: 31.3 G/DL (ref 32–36)
MCV RBC AUTO: 97.2 FL (ref 80–99)
MONOCYTES # BLD AUTO: 0.32 10*3/MM3 (ref 0–1)
MONOCYTES NFR BLD AUTO: 2 % (ref 0–12)
NEUTROPHILS # BLD AUTO: 14.85 10*3/MM3 (ref 1.5–8.3)
NEUTROPHILS NFR BLD AUTO: 94.5 % (ref 41–71)
PLATELET # BLD AUTO: 221 10*3/MM3 (ref 150–450)
PMV BLD AUTO: 9.4 FL (ref 6–12)
POTASSIUM BLD-SCNC: 3.8 MMOL/L (ref 3.5–5.5)
RBC # BLD AUTO: 3.19 10*6/MM3 (ref 3.89–5.14)
SODIUM BLD-SCNC: 140 MMOL/L (ref 132–146)
VANCOMYCIN SERPL-MCNC: 8.8 MCG/ML (ref 5–40)
WBC NRBC COR # BLD: 15.7 10*3/MM3 (ref 3.5–10.8)

## 2017-12-11 PROCEDURE — 25010000003 CEFTRIAXONE PER 250 MG: Performed by: INTERNAL MEDICINE

## 2017-12-11 PROCEDURE — 99233 SBSQ HOSP IP/OBS HIGH 50: CPT | Performed by: INTERNAL MEDICINE

## 2017-12-11 PROCEDURE — 97116 GAIT TRAINING THERAPY: CPT

## 2017-12-11 PROCEDURE — 99024 POSTOP FOLLOW-UP VISIT: CPT | Performed by: ORTHOPAEDIC SURGERY

## 2017-12-11 PROCEDURE — 97162 PT EVAL MOD COMPLEX 30 MIN: CPT

## 2017-12-11 PROCEDURE — 99254 IP/OBS CNSLTJ NEW/EST MOD 60: CPT | Performed by: PSYCHIATRY & NEUROLOGY

## 2017-12-11 PROCEDURE — 80202 ASSAY OF VANCOMYCIN: CPT

## 2017-12-11 PROCEDURE — 25010000002 VANCOMYCIN PER 500 MG

## 2017-12-11 PROCEDURE — 97110 THERAPEUTIC EXERCISES: CPT

## 2017-12-11 PROCEDURE — 25010000002 VANCOMYCIN PER 500 MG: Performed by: ORTHOPAEDIC SURGERY

## 2017-12-11 PROCEDURE — 97165 OT EVAL LOW COMPLEX 30 MIN: CPT

## 2017-12-11 PROCEDURE — 25010000002 HYDROMORPHONE PER 4 MG: Performed by: ORTHOPAEDIC SURGERY

## 2017-12-11 PROCEDURE — 0 IOPAMIDOL PER 1 ML: Performed by: INTERNAL MEDICINE

## 2017-12-11 PROCEDURE — 94799 UNLISTED PULMONARY SVC/PX: CPT | Performed by: NURSE PRACTITIONER

## 2017-12-11 PROCEDURE — 80048 BASIC METABOLIC PNL TOTAL CA: CPT | Performed by: ORTHOPAEDIC SURGERY

## 2017-12-11 PROCEDURE — 85025 COMPLETE CBC W/AUTO DIFF WBC: CPT | Performed by: INTERNAL MEDICINE

## 2017-12-11 PROCEDURE — 83735 ASSAY OF MAGNESIUM: CPT | Performed by: INTERNAL MEDICINE

## 2017-12-11 PROCEDURE — 25010000002 ONDANSETRON PER 1 MG: Performed by: ORTHOPAEDIC SURGERY

## 2017-12-11 PROCEDURE — 94799 UNLISTED PULMONARY SVC/PX: CPT

## 2017-12-11 PROCEDURE — 70470 CT HEAD/BRAIN W/O & W/DYE: CPT

## 2017-12-11 PROCEDURE — 25010000002 HYDROCORTISONE SODIUM SUCCINATE 100 MG RECONSTITUTED SOLUTION: Performed by: INTERNAL MEDICINE

## 2017-12-11 RX ORDER — SODIUM CHLORIDE 0.9 % (FLUSH) 0.9 %
1-10 SYRINGE (ML) INJECTION AS NEEDED
Status: DISCONTINUED | OUTPATIENT
Start: 2017-12-11 | End: 2017-12-13

## 2017-12-11 RX ORDER — LEVOTHYROXINE SODIUM ANHYDROUS 100 UG/5ML
100 INJECTION, POWDER, LYOPHILIZED, FOR SOLUTION INTRAVENOUS
Status: DISCONTINUED | OUTPATIENT
Start: 2017-12-12 | End: 2017-12-15

## 2017-12-11 RX ORDER — CEFAZOLIN SODIUM 2 G/100ML
2 INJECTION, SOLUTION INTRAVENOUS ONCE
Status: DISCONTINUED | OUTPATIENT
Start: 2017-12-11 | End: 2017-12-11 | Stop reason: SDUPTHER

## 2017-12-11 RX ORDER — SODIUM CHLORIDE 9 MG/ML
100 INJECTION, SOLUTION INTRAVENOUS CONTINUOUS
Status: DISCONTINUED | OUTPATIENT
Start: 2017-12-11 | End: 2017-12-11

## 2017-12-11 RX ADMIN — HYDROCORTISONE SODIUM SUCCINATE 100 MG: 100 INJECTION, POWDER, FOR SOLUTION INTRAMUSCULAR; INTRAVENOUS at 08:01

## 2017-12-11 RX ADMIN — IOPAMIDOL 50 ML: 755 INJECTION, SOLUTION INTRAVENOUS at 15:45

## 2017-12-11 RX ADMIN — ASPIRIN 325 MG: 325 TABLET, DELAYED RELEASE ORAL at 10:12

## 2017-12-11 RX ADMIN — ASPIRIN 325 MG: 325 TABLET, DELAYED RELEASE ORAL at 21:26

## 2017-12-11 RX ADMIN — SODIUM CHLORIDE 100 ML/HR: 9 INJECTION, SOLUTION INTRAVENOUS at 06:05

## 2017-12-11 RX ADMIN — ONDANSETRON 4 MG: 2 INJECTION INTRAMUSCULAR; INTRAVENOUS at 08:01

## 2017-12-11 RX ADMIN — VANCOMYCIN HYDROCHLORIDE 750 MG: 750 INJECTION, SOLUTION INTRAVENOUS at 17:54

## 2017-12-11 RX ADMIN — HYDROCODONE BITARTRATE AND ACETAMINOPHEN 1 TABLET: 5; 325 TABLET ORAL at 15:41

## 2017-12-11 RX ADMIN — VANCOMYCIN HYDROCHLORIDE 1000 MG: 1 INJECTION, SOLUTION INTRAVENOUS at 06:05

## 2017-12-11 RX ADMIN — HYDROMORPHONE HYDROCHLORIDE 0.5 MG: 10 INJECTION INTRAMUSCULAR; INTRAVENOUS; SUBCUTANEOUS at 08:54

## 2017-12-11 RX ADMIN — HYDROCORTISONE SODIUM SUCCINATE 100 MG: 100 INJECTION, POWDER, FOR SOLUTION INTRAMUSCULAR; INTRAVENOUS at 21:26

## 2017-12-11 RX ADMIN — SODIUM CHLORIDE 100 ML/HR: 9 INJECTION, SOLUTION INTRAVENOUS at 16:38

## 2017-12-11 RX ADMIN — HYDROCODONE BITARTRATE AND ACETAMINOPHEN 1 TABLET: 5; 325 TABLET ORAL at 00:16

## 2017-12-11 RX ADMIN — HYDROCORTISONE SODIUM SUCCINATE 100 MG: 100 INJECTION, POWDER, FOR SOLUTION INTRAMUSCULAR; INTRAVENOUS at 00:04

## 2017-12-11 RX ADMIN — CEFTRIAXONE SODIUM 2 G: 2 INJECTION, SOLUTION INTRAVENOUS at 08:01

## 2017-12-11 RX ADMIN — HYDROCORTISONE SODIUM SUCCINATE 100 MG: 100 INJECTION, POWDER, FOR SOLUTION INTRAMUSCULAR; INTRAVENOUS at 17:12

## 2017-12-11 RX ADMIN — ALPRAZOLAM 1 MG: 1 TABLET ORAL at 10:12

## 2017-12-11 NOTE — PROGRESS NOTES
Multidisciplinary Rounds    Time: 20min  Patient Name: Lesli Leon  Date of Encounter: 12/11/17 10:25 AM  MRN: [id number]  Admission date: 12/10/2017      Reason for visit: MDR.     Additional information obtained during MDR:  Pt w/ nausea and confusion this am.    Current diet: Diet Clear Liquid      Intervention:  Follow treatment plan  Care plan reviewed    Follow up:   Rd to follow per protocol      iLn Sanchez RD  10:25 AM

## 2017-12-11 NOTE — PROGRESS NOTES
Discharge Planning Assessment  [facility]     Patient Name: Lesli Leon  MRN: [id number]  Today's Date: 12/11/2017    Admit Date: 12/10/2017          Discharge Needs Assessment       12/11/17 1126    Living Environment    Lives With spouse    Living Arrangements house    Home Accessibility bed and bath on same level;stairs to enter home    Number of Stairs to Enter Home 3    Stair Railings at Home none    Type of Financial/Environmental Concern none    Transportation Available car;family or friend will provide    Living Environment    Provides Primary Care For no one    Quality Of Family Relationships supportive    Able to Return to Prior Living Arrangements yes    Discharge Needs Assessment    Concerns To Be Addressed adjustment to diagnosis/illness concerns;basic needs concerns    Readmission Within The Last 30 Days no previous admission in last 30 days    Equipment Currently Used at Home commode;grab bar;walker, rolling;cane, straight   Has DME at home, but she has not been using it.            Discharge Plan       12/11/17 1133    Case Management/Social Work Plan    Plan Home c HH    Patient/Family In Agreement With Plan yes    Additional Comments Mrs. Leon is sleeping and I talked with her  at .  She lives c her spouse in 1 level home in [facility].  She is independent c ADL's.  She has never used DME, but has a RW, cane at home.  She has never used HH.  Her PCP is Dr. Phani Joaquin.  She get her Rx from [facility] in [place], [place].  Goal is to return home c assist from her family.  May benefit having HH, not sure yet.  CM will follow.        Discharge Placement     No information found                Demographic Summary     None            Functional Status       12/11/17 1125    Functional Status Current    Ambulation 3-->assistive equipment and person    Transferring 3-->assistive equipment and person    Toileting 3-->assistive equipment and person    Bathing 2-->assistive person    Dressing  2-->assistive person    Eating 0-->independent    Communication 0-->understands/communicates without difficulty    Swallowing (if score 2 or more for any item, consult Rehab Services) 0-->swallows foods/liquids without difficulty    Functional Status Prior    Ambulation 0-->independent    Transferring 0-->independent    Toileting 0-->independent    Bathing 0-->independent    Dressing 0-->independent    Eating 0-->independent    Communication 0-->understands/communicates without difficulty    Swallowing 0-->swallows foods/liquids without difficulty    IADL    Medications independent    Meal Preparation independent    Housekeeping independent    Laundry independent    Shopping independent    Oral Care independent            Psychosocial     None            Abuse/Neglect     None            Legal     None            Substance Abuse     None            Patient Forms     None          Lenard Milner RN

## 2017-12-11 NOTE — PLAN OF CARE
Problem: Patient Care Overview (Adult)  Goal: Plan of Care Review  Outcome: Ongoing (interventions implemented as appropriate)    12/11/17 1141   Coping/Psychosocial Response Interventions   Plan Of Care Reviewed With patient   Patient Care Overview   Progress progress toward functional goals as expected   Outcome Evaluation   Outcome Summary/Follow up Plan OT completed a brief chart review relating to presenting physical deficits. Pt Mod Ax2 for t/fs and Min Ax2 for functional mobility. Pt session limited d/t pt confusion and c/o pain/N/V. Recommend pt DC to SNF. Recommend cont skilled IPOT intervention.          Problem: Inpatient Occupational Therapy  Goal: Transfer Training Goal 1 LTG- OT  Outcome: Ongoing (interventions implemented as appropriate)    12/11/17 1141   Transfer Training OT LTG   Transfer Training OT LTG, Date Established 12/11/17   Transfer Training OT LTG, Time to Achieve by discharge   Transfer Training OT LTG, Activity Type toilet;sit to stand/stand to sit   Transfer Training OT LTG, [place] Level minimum assist (75% patient effort)   Transfer Training OT LTG, Additional Goal AAD       Goal: Orientation Goal LTG- OT  Outcome: Ongoing (interventions implemented as appropriate)    12/11/17 1141   Orientation OT LTG   Orientation OT LTG, Date Established 12/11/17   Orientation OT LTG, Time to Achieve by discharge   Orientation OT LTG, Activity Type person;place;time;100% treatment session       Goal: LB Dressing Goal LTG- OT  Outcome: Ongoing (interventions implemented as appropriate)    12/11/17 1141   LB Dressing OT LTG   LB Dressing Goal OT LTG, Date Established 12/11/17   LB Dressing Goal OT LTG, Time to Achieve by discharge   LB Dressing Goal OT LTG, Activity Type Don/doff socks   LB Dressing Goal OT LTG, [place] Level minimum assist (75% patient effort)   LB Dressing Goal OT LTG, Adaptive Equipment shoe horn, long handled;reacher;sock-aid   LB Dressing Goal OT LTG, Additional Goal  AAD       Goal: Activity Tolerance Goal LTG- OT  Outcome: Ongoing (interventions implemented as appropriate)    12/11/17 1141   Activity Tolerance OT LTG   Activity Tolerance Goal OT LTG, Date Established 12/11/17   Activity Tolerance Goal OT LTG, Time to Achieve by discharge   Activity Tolerance Goal OT LTG, Activity Level 15 min activity   Activity Tolerance Goal OT LTG, Additional Goal x1 seated rest break

## 2017-12-11 NOTE — THERAPY EVALUATION
Acute Care - Occupational Therapy Initial Evaluation  [facility]     Patient Name: Lesli Leon  : 1955  MRN: [id number]  Today's Date: 2017  Onset of Illness/Injury or Date of Surgery Date: 12/10/17  Date of Referral to OT: 12/10/17  Referring Physician: MD Jose    Admit Date: 12/10/2017       ICD-10-CM ICD-9-CM   1. Sepsis, due to unspecified organism A41.9 038.9     995.91   2. Infection of total right knee replacement T84.53XA 996.66    Z96.651 V43.65   3. Impaired functional mobility, balance, gait, and endurance Z74.09 V49.89   4. Impaired mobility and ADLs Z74.09 799.89     Patient Active Problem List   Diagnosis   • Sepsis, probable right septic knee as source     Past Medical History:   Diagnosis Date   • Arthritis    • Breast cancer 2015    RIGHT   • Disease of thyroid gland    • Hx of radiation therapy 2015    RT BREAST   • Hypertension      Past Surgical History:   Procedure Laterality Date   • BREAST BIOPSY Right 2015   • BREAST LUMPECTOMY Right 2015   • JOINT REPLACEMENT            OT ASSESSMENT FLOWSHEET (last 72 hours)      OT Evaluation       17 1126 17 1125 17 0805 17 0804 12/10/17 1530    Rehab Evaluation    Document Type   evaluation  -LS evaluation  -CL     Subjective Information   agree to therapy;complains of;pain;nausea/vomiting  -LS agree to therapy;complains of;pain;nausea/vomiting  -CL     Patient Effort, Rehab Treatment   good  -LS good  -CL     Symptoms Noted During/After Treatment    none  -CL     Symptoms Noted Comment   Noted confusion.  -LS      General Information    Patient Profile Review   yes  -LS yes  -CL     Onset of Illness/Injury or Date of Surgery Date   12/10/17  -LS 12/10/17  -CL     Referring Physician   MD Jose  -LS MD Jose  -CL     Pertinent History Of Current Problem   To OSH with increasing R knee pain and confusion x 3-4 days; found to have infected R TKA. Transferred to [facility] for HLOC. S/p I and D of R knee with  single component revision of R knee arthoplasty, quadricepsplasty and extensor mechanism reallignment on 12/10. WB and ROM as tolerated per MD order. Pt diagnosed with sepsis.   -LS Pt presented to OSH w/ increasing confusion. Pt found to be septic 2/2 to an infected R knee TKA. Pt t/f to [facility] for HLOC. Pt s/p I&D of R knee w/ single component revision of R knee arthoplasty w/ quadriceplasty on 12/10. Per MD RLE protected WBAT. PMH: Breast CA  -CL     Precautions/Limitations   fall precautions  -LS fall precautions  -CL     Prior Level of Function   independent:;gait;transfer;ADL's;bathing;dressing  -LS independent:;all household mobility;transfer;ADL's;dressing;bathing  -CL     Equipment Currently Used at Home commode;grab bar;walker, rolling;cane, straight   Has DME at home, but she has not been using it.  -HH  commode;grab bar  -LS commode;shower chair  -CL none  -AP    Plans/Goals Discussed With   patient;agreed upon  -LS patient;agreed upon  -CL     Risks Reviewed   patient:;LOB;dizziness;increased discomfort  - patient:;LOB;nausea/vomiting;dizziness;increased discomfort;change in vital signs;lines disloged  -CL     Benefits Reviewed   patient:;improve function;increase independence;increase strength;increase balance;increase knowledge  -LS patient:;improve function;increase independence;increase strength;decrease pain;increase balance;increase knowledge  -CL     Barriers to Rehab   medically complex;cognitive status  -LS medically complex;cognitive status  -CL     Living Environment    Lives With spouse  -HH  spouse  -LS spouse  -CL spouse  -AP    Living Arrangements house  -HH  house  -LS house  -CL house  -AP    Home Accessibility bed and bath on same level;stairs to enter home  -HH  stairs to enter home  -LS stairs to enter home   walk-in shower  -CL stairs to enter home  -AP    Number of Stairs to Enter Home 3  -HH  3  -LS 3  -CL 3  -AP    Stair Railings at Home none  -HH    none  -AP    Type of  Financial/Environmental Concern none  -HH    none  -AP    Transportation Available car;family or friend will provide  -HH    car  -AP    Living Environment Comment    Pt questionnable historian, no family present.   -CL     Clinical Impression    Date of Referral to OT    12/10/17  -CL     OT Diagnosis    Decreased independence in ADLs.   -CL     Patient/Family Goals Statement    Return to PLOF.   -CL     Criteria for Skilled Therapeutic Interventions Met    yes;treatment indicated  -CL     Rehab Potential    good, to achieve stated therapy goals  -CL     Therapy Frequency    daily  -CL     Anticipated Equipment Needs At Discharge    front wheeled walker   TBA further  -CL     Anticipated Discharge Disposition    skilled nursing facility  -CL     Functional Level Prior    Ambulation  0-->independent  -HH   0-->independent  -AP    Transferring  0-->independent  -HH   0-->independent  -AP    Toileting  0-->independent  -HH   0-->independent  -AP    Bathing  0-->independent  -HH   0-->independent  -AP    Dressing  0-->independent  -HH   0-->independent  -AP    Eating  0-->independent  -HH   0-->independent  -AP    Communication  0-->understands/communicates without difficulty  -HH   0-->understands/communicates without difficulty  -AP    Swallowing  0-->swallows foods/liquids without difficulty  -HH   0-->swallows foods/liquids without difficulty  -AP    Vital Signs    Pre Systolic BP Rehab   115  -  -CL     Pre Treatment Diastolic BP   57  -LS 57  -CL     Post Systolic BP Rehab   110  -  -CL     Post Treatment Diastolic BP   52  -LS 52  -CL     Pretreatment Heart Rate (beats/min)   78  -LS 78  -CL     Posttreatment Heart Rate (beats/min)   69  -LS 69  -CL     Pre SpO2 (%)   96  -LS 96  -CL     O2 Delivery Pre Treatment   supplemental O2   RN gave consent for removal of O2   -LS supplemental O2  -CL     Post SpO2 (%)   97  -LS 97  -CL     O2 Delivery Post Treatment   room air  -LS room air  -CL     Pre  Patient Position   Supine  -LS Supine  -CL     Intra Patient Position   Standing  -LS Standing  -CL     Post Patient Position   Sitting  -LS Sitting  -CL     Pain Assessment    Pain Assessment   Serrano-Vasquez FACES  -LS Serrano-Vasquez FACES  -CL     Serrano-Vasquez FACES Pain Rating   6  -LS 6  -CL     Pain Score   6  -LS 6  -CL     Post Pain Score   6  -LS 6  -CL     Pain Type   Surgical pain  -LS Surgical pain  -CL     Pain Location   Knee  -LS Knee  -CL     Pain Orientation   Right  -LS Right  -CL     Pain Intervention(s)   Repositioned;Ambulation/increased activity  -LS Repositioned;Ambulation/increased activity  -CL     Response to Interventions   tolerated; RN aware  -LS Tolerated, RN aware.   -CL     Cognitive Assessment/Intervention    Current Cognitive/Communication Assessment   impaired  -LS impaired  -CL     Orientation Status   oriented to;person;situation  -LS oriented to;person;situation  -CL     Follows Commands/Answers Questions   able to follow single-step instructions;50% of the time;75% of the time;needs cueing;needs increased time;needs repetition  -LS 50% of the time;75% of the time;needs cueing;needs increased time;needs repetition  -CL     Personal Safety   moderate impairment;decreased awareness, need for assist;decreased awareness, need for safety;decreased insight to deficits  -LS moderate impairment;decreased awareness, need for assist;decreased awareness, need for safety;decreased insight to deficits  -CL     Personal Safety Interventions   fall prevention program maintained;gait belt;nonskid shoes/slippers when out of bed  -LS fall prevention program maintained;gait belt;nonskid shoes/slippers when out of bed  -CL     ROM (Range of Motion)    General ROM   lower extremity range of motion deficits identified  -LS      General ROM Detail    BUE observed grossly WFL.   -CL     MMT (Manual Muscle Testing)    General MMT Assessment   lower extremity strength deficits identified  -LS      General MMT  Assessment Detail    BUE observed grossly WFL.   -CL     Mobility Assessment/Training    Extremity Weight-Bearing Status   right lower extremity  -LS      Right Lower Extremity Weight-Bearing   weight-bearing as tolerated  -LS      Bed Mobility, Assessment/Treatment    Bed Mobility, Assistive Device   bed rails;draw sheet  -LS bed rails;draw sheet  -CL     Bed Mob, Supine to Sit, [place]   maximum assist (25% patient effort);2 person assist required;verbal cues required  -LS maximum assist (25% patient effort);2 person assist required;verbal cues required  -CL     Bed Mobility, Impairments   strength decreased;pain  -LS      Bed Mobility, Comment   VC's for sequencing.   -LS VCs for HP/sequencing.   -CL     Transfer Assessment/Treatment    Transfers, Sit-Stand [place]   moderate assist (50% patient effort);2 person assist required;verbal cues required  -LS moderate assist (50% patient effort);2 person assist required;verbal cues required  -CL     Transfers, Stand-Sit [place]   moderate assist (50% patient effort);2 person assist required;verbal cues required  -LS moderate assist (50% patient effort);2 person assist required;verbal cues required  -CL     Transfers, Sit-Stand-Sit, Assist Device    rolling walker  -CL     Transfer, Impairments   impaired balance;strength decreased  -LS      Transfer, Comment   VC's for hand placement and appropriate sequencing.   -LS VCs for HP.   -CL     Functional Mobility    Functional Mobility- Ind. Level    minimum assist (75% patient effort);2 person assist required;verbal cues required  -CL     Functional Mobility- Device    rolling walker  -CL     Functional Mobility-Distance (Feet)    48  -CL     Functional Mobility- Comment    VCs for RW management and safety as pt would impulsively let go of RW. Pt c/o N/V, chair brought up behind.   -CL     Lower Body Dressing Assessment/Training    LB Dressing Assess/Train, Clothing Type    donning:;socks  -CL     LB  Dressing Assess/Train, Position    supine  -CL     LB Dressing Assess/Train, [place]    dependent (less than 25% patient effort)  -CL     Motor Skills/Interventions    Additional Documentation   Balance Skills Training (Group)  -LS Balance Skills Training (Group)  -CL     Balance Skills Training    Sitting-Level of Assistance   Moderate assistance   progressed to CGA  -LS Moderate assistance   progressed to CGA  -CL     Sitting-Balance Support   Feet supported;Right upper extremity supported;Left upper extremity supported  -LS Right upper extremity supported;Left upper extremity supported;Feet supported  -CL     Standing-Level of Assistance   Minimum assistance;x2  -LS Minimum assistance;x2  -CL     Static Standing Balance Support   assistive device  -LS assistive device  -CL     Standing-Balance Activities    Weight Shift A-P;Weight Shift R-L  -CL     Gait Balance-Level of Assistance   Minimum assistance;x2  -LS Minimum assistance;x2  -CL     Gait Balance Support   assistive device  -LS assistive device  -CL     Therapy Exercises    Bilateral Lower Extremities   AROM:;10 reps;sitting;ankle pumps/circles;quad sets;glut sets  -LS      Sensory Assessment/Intervention    Light Touch   RLE;LLE  -LS      LLE Light Touch   WNL  -LS      RLE Light Touch   mild impairment   lateral aspect of R thigh  -LS      Positioning and Restraints    Pre-Treatment Position   in bed  -LS in bed  -CL     Post Treatment Position   chair  -LS chair  -CL     In Chair   notified nsg;reclined;call light within reach;encouraged to call for assist;exit alarm on;RUE elevated;LUE elevated;RLE elevated;legs elevated  -LS notified nsg;reclined;call light within reach;encouraged to call for assist;exit alarm on;RUE elevated;LUE elevated;with PT;legs elevated  -CL     General LE Assessment    ROM Detail   LLE WFL; RLE limited 50% (by pain)  -LS      Lower Extremity    Lower Ext Manual Muscle Testing Detail   Pt with difficulty in following  commands for formal MMT; bilat ankle DF 4-/5; BLEs grossly 4-/5 as demonstrated functionally  -LS        User Key  (r) = Recorded By, (t) = Taken By, (c) = Cosigned By    Initials Name Effective Dates    LS Dawna Blanchard, PT 06/19/15 -     HH Lenard Milner, RN 03/14/16 -     AP Jazlyn Steiner, RN 06/16/16 -     CL Philly Gray OT 06/08/16 -            Occupational Therapy Education     Title: PT OT SLP Therapies (Active)     Topic: Occupational Therapy (Active)     Point: ADL training (Active)    Description: Instruct learner(s) on proper safety adaptation and remediation techniques during self care or transfers.   Instruct in proper use of assistive devices.    Learning Progress Summary    Learner Readiness Method Response Comment Documented by Status   Patient Acceptance E,D NR Pt educated on appropriate safety precautions, t/f techniques, positioning, and benefits of therapy.  12/11/17 1139 Active               Point: Precautions (Active)    Description: Instruct learner(s) on prescribed precautions during self-care and functional transfers.    Learning Progress Summary    Learner Readiness Method Response Comment Documented by Status   Patient Acceptance E,D NR Pt educated on appropriate safety precautions, t/f techniques, positioning, and benefits of therapy.  12/11/17 1139 Active               Point: Body mechanics (Active)    Description: Instruct learner(s) on proper positioning and spine alignment during self-care, functional mobility activities and/or exercises.    Learning Progress Summary    Learner Readiness Method Response Comment Documented by Status   Patient Acceptance E,D NR Pt educated on appropriate safety precautions, t/f techniques, positioning, and benefits of therapy.  12/11/17 1139 Active                      User Key     Initials Effective Dates Name Provider Type Discipline     06/08/16 -  Philly Gray OT Occupational Therapist OT                  OT Recommendation and  Plan  Anticipated Equipment Needs At Discharge: front wheeled walker (TBA further)  Anticipated Discharge Disposition: skilled nursing facility  Therapy Frequency: daily  Plan of Care Review  Plan Of Care Reviewed With: patient  Progress: progress toward functional goals as expected  Outcome Summary/Follow up Plan: OT completed a brief chart review relating to presenting physical deficits. Pt Mod Ax2 for t/fs and Min Ax2 for functional mobility. Pt session limited d/t pt confusion and c/o pain/N/V. Recommend pt DC to SNF. Recommend cont skilled IPOT intervention.           OT Goals       12/11/17 1141          Transfer Training OT LTG    Transfer Training OT LTG, Date Established 12/11/17  -CL      Transfer Training OT LTG, Time to Achieve by discharge  -CL      Transfer Training OT LTG, Activity Type toilet;sit to stand/stand to sit  -CL      Transfer Training OT LTG, [place] Level minimum assist (75% patient effort)  -CL      Transfer Training OT LTG, Additional Goal AAD  -CL      Orientation OT LTG    Orientation OT LTG, Date Established 12/11/17  -CL      Orientation OT LTG, Time to Achieve by discharge  -CL      Orientation OT LTG, Activity Type person;place;time;100% treatment session  -CL      LB Dressing OT LTG    LB Dressing Goal OT LTG, Date Established 12/11/17  -CL      LB Dressing Goal OT LTG, Time to Achieve by discharge  -CL      LB Dressing Goal OT LTG, Activity Type Don/doff socks  -CL      LB Dressing Goal OT LTG, [place] Level minimum assist (75% patient effort)  -CL      LB Dressing Goal OT LTG, Adaptive Equipment shoe horn, long handled;reacher;sock-aid  -CL      LB Dressing Goal OT LTG, Additional Goal AAD  -CL      Activity Tolerance OT LTG    Activity Tolerance Goal OT LTG, Date Established 12/11/17  -CL      Activity Tolerance Goal OT LTG, Time to Achieve by discharge  -CL      Activity Tolerance Goal OT LTG, Activity Level 15 min activity  -CL      Activity Tolerance Goal OT LTG,  Additional Goal x1 seated rest break  -CL        User Key  (r) = Recorded By, (t) = Taken By, (c) = Cosigned By    Initials Name Provider Type    CL Philly Gray OT Occupational Therapist                Outcome Measures       12/11/17 0805 12/11/17 0804       How much help from another person do you currently need...    Turning from your back to your side while in flat bed without using bedrails? 2  -LS      Moving from lying on back to sitting on the side of a flat bed without bedrails? 2  -LS      Moving to and from a bed to a chair (including a wheelchair)? 3  -LS      Standing up from a chair using your arms (e.g., wheelchair, bedside chair)? 2  -LS      Climbing 3-5 steps with a railing? 2  -LS      To walk in hospital room? 3  -LS      AM-PAC 6 Clicks Score 14  -LS      How much help from another is currently needed...    Putting on and taking off regular lower body clothing?  1  -CL     Bathing (including washing, rinsing, and drying)  2  -CL     Toileting (which includes using toilet bed pan or urinal)  1  -CL     Putting on and taking off regular upper body clothing  2  -CL     Taking care of personal grooming (such as brushing teeth)  3  -CL     Eating meals  4  -CL     Score  13  -CL     Functional Assessment    Outcome Measure Options AM-PAC 6 Clicks Basic Mobility (PT)  -LS AM-PAC 6 Clicks Daily Activity (OT)  -CL       User Key  (r) = Recorded By, (t) = Taken By, (c) = Cosigned By    Initials Name Provider Type    LS Dawna Blanchard, PT Physical Therapist    CL Philly Gray OT Occupational Therapist          Time Calculation:   OT Start Time: 0804    Therapy Charges for Today     Code Description Service Date Service Provider Modifiers Qty    [id number]  OT EVAL LOW COMPLEXITY 4 12/11/2017 Philly Gray OT GO 1               Philly Gray OT  12/11/2017

## 2017-12-11 NOTE — THERAPY TREATMENT NOTE
Acute Care - Physical Therapy Treatment Note  [facility]     Patient Name: Lesli Leon  : 1955  MRN: [id number]  Today's Date: 2017  Onset of Illness/Injury or Date of Surgery Date: 12/10/17  Date of Referral to PT: 12/10/17  Referring Physician: MD Jose    Admit Date: 12/10/2017    Visit Dx:    ICD-10-CM ICD-9-CM   1. Sepsis, due to unspecified organism A41.9 038.9     995.91   2. Infection of total right knee replacement T84.53XA 996.66    Z96.651 V43.65   3. Impaired functional mobility, balance, gait, and endurance Z74.09 V49.89   4. Impaired mobility and ADLs Z74.09 799.89     Patient Active Problem List   Diagnosis   • Sepsis, probable right septic knee as source   • Hypertension   • Disease of thyroid gland   • Expressive aphasia   • Heart murmur               Adult Rehabilitation Note       17 1321          Rehab Assessment/Intervention    Discipline physical therapist  -LS      Document Type therapy note (daily note)  -LS      Subjective Information agree to therapy;no complaints  -LS      Patient Effort, Rehab Treatment good  -LS      Symptoms Noted Comment Confusion persists.   -LS      Precautions/Limitations fall precautions  -LS      Recorded by [LS] Dawna Blanchard, PT      Vital Signs    Pre Systolic BP Rehab 106  -LS      Pre Treatment Diastolic BP 54  -LS      Post Systolic BP Rehab --   in w/c for transport to floor bed  -LS      Pretreatment Heart Rate (beats/min) 82  -LS      Pre SpO2 (%) 98  -LS      O2 Delivery Pre Treatment room air  -LS      O2 Delivery Post Treatment room air  -LS      Pre Patient Position Sitting  -LS      Intra Patient Position Standing  -LS      Post Patient Position Sitting  -LS      Recorded by [LS] Dawna Blanchard, PT      Pain Assessment    Pain Assessment 0-10  -LS      Pain Score 0  -LS      Post Pain Score 8  -LS      Pain Type Surgical pain  -LS      Pain Location Knee  -LS      Pain Orientation Right  -LS      Pain Intervention(s)  Ambulation/increased activity;Repositioned  -LS      Response to Interventions tolerated; notified RN  -LS      Recorded by [LS] Dawna Blanchard, PT      Cognitive Assessment/Intervention    Current Cognitive/Communication Assessment impaired  -LS      Orientation Status oriented to;person;required verbal cueing (specifiy in comments)   cues for location and date  -LS      Follows Commands/Answers Questions able to follow single-step instructions;75% of the time;needs cueing;needs increased time;needs repetition  -LS      Personal Safety moderate impairment;decreased awareness, need for assist;decreased awareness, need for safety;decreased insight to deficits  -LS      Personal Safety Interventions fall prevention program maintained;gait belt;nonskid shoes/slippers when out of bed  -LS      Recorded by [LS] Dawna Blanchard, PT      ROM (Range of Motion)    General ROM Detail R AAROM in sittin deg; R knee AROM extension in sitting: lacking 14 degrees full extension (ace bandage intact during measurements)  -LS      Recorded by [LS] Dawna Blanchard, PT      Mobility Assessment/Training    Extremity Weight-Bearing Status right lower extremity  -LS      Right Lower Extremity Weight-Bearing weight-bearing as tolerated  -LS      Recorded by [LS] Dawna Blanchard, PT      Bed Mobility, Assessment/Treatment    Bed Mobility, Comment UIC upon arrival.   -LS      Recorded by [LS] Dawna Blanchard, PT      Transfer Assessment/Treatment    Transfers, Sit-Stand [place] minimum assist (75% patient effort);verbal cues required  -LS      Transfers, Stand-Sit [place] minimum assist (75% patient effort);verbal cues required  -LS      Transfers, Sit-Stand-Sit, Assist Device rolling walker  -LS      Transfer, Impairments strength decreased;impaired balance  -LS      Transfer, Comment VC's for hand placement and for slight RLE forward prior to transfer.   -LS      Recorded by [LS] Dawna Blanchard, PT      Gait Assessment/Treatment     Gait, [place] Level contact guard assist;1 person + 1 person to manage equipment;verbal cues required  -LS      Gait, Assistive Device rolling walker  -LS      Gait, Distance (Feet) 110  -LS      Gait, Gait Pattern Analysis swing-through gait  -LS      Gait, Gait Deviations [place] decreased;forward flexed posture;right:;antalgic  -LS      Gait, Impairments strength decreased;impaired balance;pain  -LS      Gait, Comment VC's for upright posture, equal step length (R versus L), and for increased R knee flexion to promote normalized swing through phase. Distance limited by fatigue; followed with w/c for safety.   -LS      Recorded by [LS] Dawna Blanchard, PT      Motor Skills/Interventions    Additional Documentation Balance Skills Training (Group)  -LS      Recorded by [LS] Dawna Blanchard, PT      Balance Skills Training    Sitting-Level of Assistance Contact guard  -LS      Sitting-Balance Support Feet supported  -LS      Standing-Level of Assistance Contact guard  -LS      Static Standing Balance Support assistive device  -LS      Gait Balance-Level of Assistance Contact guard  -LS      Gait Balance Support assistive device  -LS      Recorded by [LS] Dawna Blanchard, PT      Therapy Exercises    Exercise Protocols total knee  -LS      Total Knee Exercises bilateral:;10 reps;ankle pumps/circles;quad set;glut set;right:;LAQ;hip abduction/adduction;heel slide stretch;heel slides   issued HEP handout  -LS      Recorded by [LS] Dawna Blanchard, PT      Positioning and Restraints    Pre-Treatment Position sitting in chair/recliner  -LS      Post Treatment Position wheelchair  -LS      In Wheelchair notified nsg;sitting;call light within reach;encouraged to call for assist;with family/caregiver;legs elevated   RN prepping pt for transfer to floor room  -LS      Recorded by [LS] Dawna Blanchard, PT        User Key  (r) = Recorded By, (t) = Taken By, (c) = Cosigned By    Initials Name Effective Dates    LS Dawna Blanchard, PT  06/19/15 -                 IP PT Goals       12/11/17 1448 12/11/17 1002       Bed Mobility PT LTG    Bed Mobility PT LTG, Date Established  12/11/17  -LS     Bed Mobility PT LTG, Time to Achieve  2 wks  -LS     Bed Mobility PT LTG, Activity Type  supine to sit/sit to supine  -LS     Bed Mobility PT LTG, [place] Level  supervision required  -LS     Bed Mobility PT LTG, Outcome goal ongoing  -LS      Transfer Training PT LTG    Transfer Training PT LTG, Date Established  12/11/17  -LS     Transfer Training PT LTG, Time to Achieve  2 wks  -LS     Transfer Training PT LTG, Activity Type  sit to stand/stand to sit  -LS     Transfer Training PT LTG, [place] Level  supervision required  -LS     Transfer Training PT LTG, Assist Device  walker, rolling  -LS     Transfer Training PT LTG, Outcome goal ongoing  -LS      Gait Training PT LTG    Gait Training Goal PT LTG, Date Established  12/11/17  -LS     Gait Training Goal PT LTG, Time to Achieve  2 wks  -LS     Gait Training Goal PT LTG, [place] Level  supervision required  -LS     Gait Training Goal PT LTG, Assist Device  walker, rolling  -LS     Gait Training Goal PT LTG, Distance to Achieve  200  -LS     Gait Training Goal PT LTG, Outcome goal ongoing  -LS      Stair Training PT LTG    Stair Training Goal PT LTG, Date Established  12/11/17  -LS     Stair Training Goal PT LTG, Time to Achieve  2 wks  -LS     Stair Training Goal PT LTG, Number of Steps  3  -LS     Stair Training Goal PT LTG, [place] Level  contact guard assist  -LS     Stair Training Goal PT LTG, Assist Device  1 handrail  -LS     Stair Training Goal PT LTG, Outcome goal ongoing  -LS        User Key  (r) = Recorded By, (t) = Taken By, (c) = Cosigned By    Initials Name Provider Type    LS Dawna Blanchard, PT Physical Therapist          Physical Therapy Education     Title: PT OT SLP Therapies (Active)     Topic: Physical Therapy (Active)     Point: Mobility training (Active)    Learning  Progress Summary    Learner Readiness Method Response Comment Documented by Status   Patient Acceptance E,D,H NR Edu pt/son re: HEP; issued illustrated handout.  12/11/17 1448 Active    Acceptance E,D NR   12/11/17 1002 Active               Point: Home exercise program (Active)    Learning Progress Summary    Learner Readiness Method Response Comment Documented by Status   Patient Acceptance E,D,H NR Edu pt/son re: HEP; issued illustrated handout.  12/11/17 1448 Active    Acceptance E,D NR   12/11/17 1002 Active               Point: Body mechanics (Active)    Learning Progress Summary    Learner Readiness Method Response Comment Documented by Status   Patient Acceptance E,D,H NR Edu pt/son re: HEP; issued illustrated handout.  12/11/17 1448 Active    Acceptance E,D NR   12/11/17 1002 Active               Point: Precautions (Active)    Learning Progress Summary    Learner Readiness Method Response Comment Documented by Status   Patient Acceptance E,D,H NR Edu pt/son re: HEP; issued illustrated handout.  12/11/17 1448 Active    Acceptance E,D NR   12/11/17 1002 Active                      User Key     Initials Effective Dates Name Provider Type Discipline     06/19/15 -  Dawna Blanchard, PT Physical Therapist PT                    PT Recommendation and Plan  Anticipated Discharge Disposition: skilled nursing facility  PT Frequency: 2 times/day  Plan of Care Review  Plan Of Care Reviewed With: patient  Progress: improving  Outcome Summary/Follow up Plan: Pt demonstrated increased indep with STS and gait; progressed forward ambulation distance to 110 total ft with RWx and CGA. Continues to be limited by confusion but improved quality of gait with vc's. Edu pt/son re: HEP; issued handout. Will cont to progress as clinically warranted.            Outcome Measures       12/11/17 1321 12/11/17 0805 12/11/17 0804    How much help from another person do you currently need...    Turning from your back to your  side while in flat bed without using bedrails? 3  -LS 2  -LS     Moving from lying on back to sitting on the side of a flat bed without bedrails? 2  -LS 2  -LS     Moving to and from a bed to a chair (including a wheelchair)? 3  -LS 3  -LS     Standing up from a chair using your arms (e.g., wheelchair, bedside chair)? 3  -LS 2  -LS     Climbing 3-5 steps with a railing? 2  -LS 2  -LS     To walk in hospital room? 3  -LS 3  -LS     AM-PAC 6 Clicks Score 16  -LS 14  -LS     How much help from another is currently needed...    Putting on and taking off regular lower body clothing?   1  -CL    Bathing (including washing, rinsing, and drying)   2  -CL    Toileting (which includes using toilet bed pan or urinal)   1  -CL    Putting on and taking off regular upper body clothing   2  -CL    Taking care of personal grooming (such as brushing teeth)   3  -CL    Eating meals   4  -CL    Score   13  -CL    Functional Assessment    Outcome Measure Options AM-PAC 6 Clicks Basic Mobility (PT)  -LS AM-PAC 6 Clicks Basic Mobility (PT)  -LS AM-PAC 6 Clicks Daily Activity (OT)  -CL      User Key  (r) = Recorded By, (t) = Taken By, (c) = Cosigned By    Initials Name Provider Type    MELANIE Blanchard, PT Physical Therapist    CL Philly Gray, OT Occupational Therapist           Time Calculation:         PT Charges       12/11/17 1452 12/11/17 1011       Time Calculation    Start Time 1321  - 0805  -     PT Received On 12/11/17  - 12/11/17  -     PT Goal Re-Cert Due Date 12/21/17  - 12/21/17  -     Time Calculation- PT    Total Timed Code Minutes- PT 38 minute(s)  -        User Key  (r) = Recorded By, (t) = Taken By, (c) = Cosigned By    Initials Name Provider Type    MELANIE Blanchard, PT Physical Therapist          Therapy Charges for Today     Code Description Service Date Service Provider Modifiers Qty    42631708093 HC PT EVAL MOD COMPLEXITY 4 12/11/2017 Dawna Blanchard, PT GP 1    97770812035 HC GAIT TRAINING EA 15 MIN  12/11/2017 Dawna Blanchard, PT GP 1    50955213355 HC PT THER PROC EA 15 MIN 12/11/2017 Dawna Blanchard, PT GP 2          PT G-Codes  Outcome Measure Options: AM-PAC 6 Clicks Basic Mobility (PT)    Dawna Blanchard, PT  12/11/2017

## 2017-12-11 NOTE — PLAN OF CARE
Problem: Patient Care Overview (Adult)  Goal: Plan of Care Review  Outcome: Ongoing (interventions implemented as appropriate)    12/11/17 1448   Coping/Psychosocial Response Interventions   Plan Of Care Reviewed With patient   Patient Care Overview   Progress improving   Outcome Evaluation   Outcome Summary/Follow up Plan Pt demonstrated increased indep with STS and gait; progressed forward ambulation distance to 110 total ft with RWx and CGA. Continues to be limited by confusion but improved quality of gait with vc's. Edu pt/son re: HEP; issued handout. Will cont to progress as clinically warranted.          Problem: Inpatient Physical Therapy  Goal: Bed Mobility Goal LTG- PT  Outcome: Ongoing (interventions implemented as appropriate)    12/11/17 1002 12/11/17 1448   Bed Mobility PT LTG   Bed Mobility PT LTG, Date Established 12/11/17 --    Bed Mobility PT LTG, Time to Achieve 2 wks --    Bed Mobility PT LTG, Activity Type supine to sit/sit to supine --    Bed Mobility PT LTG, [place] Level supervision required --    Bed Mobility PT LTG, Outcome --  goal ongoing       Goal: Transfer Training Goal 1 LTG- PT  Outcome: Ongoing (interventions implemented as appropriate)    12/11/17 1002 12/11/17 1448   Transfer Training PT LTG   Transfer Training PT LTG, Date Established 12/11/17 --    Transfer Training PT LTG, Time to Achieve 2 wks --    Transfer Training PT LTG, Activity Type sit to stand/stand to sit --    Transfer Training PT LTG, [place] Level supervision required --    Transfer Training PT LTG, Assist Device walker, rolling --    Transfer Training PT LTG, Outcome --  goal ongoing       Goal: Gait Training Goal LTG- PT  Outcome: Ongoing (interventions implemented as appropriate)    12/11/17 1002 12/11/17 1448   Gait Training PT LTG   Gait Training Goal PT LTG, Date Established 12/11/17 --    Gait Training Goal PT LTG, Time to Achieve 2 wks --    Gait Training Goal PT LTG, [place] Level supervision  required --    Gait Training Goal PT LTG, Assist Device walker, rolling --    Gait Training Goal PT LTG, Distance to Achieve 200 --    Gait Training Goal PT LTG, Outcome --  goal ongoing       Goal: Stair Training Goal LTG- PT  Outcome: Ongoing (interventions implemented as appropriate)    12/11/17 1002 12/11/17 1448   Stair Training PT LTG   Stair Training Goal PT LTG, Date Established 12/11/17 --    Stair Training Goal PT LTG, Time to Achieve 2 wks --    Stair Training Goal PT LTG, Number of Steps 3 --    Stair Training Goal PT LTG, [place] Level contact guard assist --    Stair Training Goal PT LTG, Assist Device 1 handrail --    Stair Training Goal PT LTG, Outcome --  goal ongoing

## 2017-12-11 NOTE — PROGRESS NOTES
"  SUBJECTIVE  Patient resting comfortably.  Pain controlled.  No events overnight.    OBJECTIVE  Temp (24hrs), Av.9 °F (36.6 °C), Min:97.4 °F (36.3 °C), Max:98.4 °F (36.9 °C)    Blood pressure 109/56, pulse 95, temperature 98.4 °F (36.9 °C), temperature source Oral, resp. rate 16, height 162.6 cm (64\"), weight 74.8 kg (164 lb 12.8 oz), SpO2 98 %.    Lab Results (last 24 hours)     Procedure Component Value Units Date/Time    CBC Auto Differential [[id number]]  (Abnormal) Collected:  12/10/17 1518    Specimen:  Blood Updated:  12/10/17 1530     WBC 17.51 (H) 10*3/mm3      RBC 3.47 (L) 10*6/mm3      Hemoglobin 10.4 (L) g/dL      Hematocrit 33.7 (L) %      MCV 97.1 fL      MCH 30.0 pg      MCHC 30.9 (L) g/dL      RDW 14.3 %      RDW-SD 51.3 fl      MPV 9.4 fL      Platelets 243 10*3/mm3      Neutrophil % 89.5 (H) %      Lymphocyte % 4.5 (L) %      Monocyte % 5.5 %      Eosinophil % 0.1 %      Basophil % 0.2 %      Immature Grans % 0.2 %      Neutrophils, Absolute 15.68 (H) 10*3/mm3      Lymphocytes, Absolute 0.79 10*3/mm3      Monocytes, Absolute 0.96 10*3/mm3      Eosinophils, Absolute 0.01 10*3/mm3      Basophils, Absolute 0.03 10*3/mm3      Immature Grans, Absolute 0.04 (H) 10*3/mm3     Lactic Acid, Plasma [[id number]]  (Normal) Collected:  12/10/17 1517    Specimen:  Blood Updated:  12/10/17 1540     Lactate 0.5 mmol/L       Falsely depressed results may occur on samples drawn from patients receiving N-Acetylcysteine (NAC) or Metamizole.       aPTT [[id number]]  (Normal) Collected:  12/10/17 1517    Specimen:  Blood Updated:  12/10/17 154     PTT 30.6 seconds     Narrative:       PTT = The equivalent PTT values for the therapeutic range of heparin levels at 0.3 to 0.5 U/ml are 45 to 60 seconds.    Basic Metabolic Panel [[id number]]  (Abnormal) Collected:  12/10/17 1517    Specimen:  Blood Updated:  12/10/17 1554     Glucose 105 (H) mg/dL      BUN 30 (H) mg/dL      Creatinine 1.70 (H) mg/dL      Sodium 142 mmol/L  "     Potassium 3.7 mmol/L      Chloride 110 (H) mmol/L      CO2 26.0 mmol/L      Calcium 8.1 (L) mg/dL      eGFR Non African Amer 30 (L) mL/min/1.73      BUN/Creatinine Ratio 17.6     Anion Gap 6.0 mmol/L     Narrative:       [employer] Guidelines    Stage     Description        GFR  1         Normal or High     90+  2         Mild decrease      60-89  3         Moderate decrease  30-59  4         Severe decrease    15-29  5         Kidney failure     <15    Troponin [[id number]]  (Normal) Collected:  12/10/17 1517    Specimen:  Blood Updated:  12/10/17 1554     Troponin I 0.007 ng/mL     TSH [[id number]]  (Abnormal) Collected:  12/10/17 1517    Specimen:  Blood Updated:  12/10/17 1554     TSH 0.214 (L) mIU/mL     Narrative:       Due to abnormal TSH results, suggest ordering Free T4.    Magnesium [[id number]]  (Normal) Collected:  12/10/17 1517    Specimen:  Blood Updated:  12/10/17 1554     Magnesium 2.1 mg/dL     Phosphorus [[id number]]  (Normal) Collected:  12/10/17 1517    Specimen:  Blood Updated:  12/10/17 1554     Phosphorus 3.3 mg/dL     Procalcitonin [[id number]] Collected:  12/10/17 1517    Specimen:  Blood Updated:  12/10/17 1609     Procalcitonin 9.86 ng/mL     Narrative:       As a Marker for Sepsis (Non-Neonates):   1. <0.5 ng/mL represents a low risk of severe sepsis and/or septic shock.  2. >2 ng/mL represents a high risk of severe sepsis and/or septic shock.    As a Marker for Lower Respiratory Tract Infections that require antibiotic therapy:    PCT on Admission     Antibiotic Therapy       6-12 Hrs later  > 0.5                Strongly Recommended             >0.25 - <0.5         Recommended  0.1 - 0.25           Discouraged              Remeasure/reassess PCT  <0.1                 Strongly Discouraged     Remeasure/reassess PCT                     PCT values of < 0.5 ng/mL do not exclude an infection, because localized infections (without systemic signs) may be associated with such low  concentrations, or a systemic infection in its initial stages (< 6 hours). Furthermore, increased PCT can occur without infection. PCT concentrations between 0.5 and 2.0 ng/mL should be interpreted taking into account the patient's history. It is recommended to retest PCT within 6-24 hours if any concentrations < 2 ng/mL are obtained.    Cortisol [[id number]] Collected:  12/10/17 1535    Specimen:  Blood Updated:  12/10/17 1615     Cortisol 18.80 mcg/dL     Hemoglobin A1c [[id number]]  (Abnormal) Collected:  12/10/17 1518    Specimen:  Blood Updated:  12/10/17 1641     Hemoglobin A1C 5.70 (H) %     Narrative:       The American Diabetes Association recommends maintenance of Hemoglobin A1C at 7.0% or lower. Goals for Hemoglobin A1C reduction may need to be modified if hypoglycemia is a problem.    POC Glucose Fingerstick [[id number]]  (Normal) Collected:  12/10/17 1726    Specimen:  Blood Updated:  12/10/17 1728     Glucose 89 mg/dL     Narrative:       Meter: [id number] : [id number] Valarie Brandon    Anaerobic Culture - Synovial Fluid, Knee, Right [[id number]] Collected:  12/10/17 1915    Specimen:  Synovial Fluid from Knee, Right Updated:  12/10/17 2156    Fungus Culture - Synovial Fluid, Knee, Right [[id number]] Collected:  12/10/17 1915    Specimen:  Synovial Fluid from Knee, Right Updated:  12/10/17 2156    AFB Culture - Synovial Fluid, Knee, Right [[id number]] Collected:  12/10/17 1915    Specimen:  Synovial Fluid from Knee, Right Updated:  12/10/17 2156    Anaerobic Culture - Tissue, Knee, Right [[id number]] Collected:  12/10/17 1921    Specimen:  Tissue from Knee, Right Updated:  12/10/17 2205    Fungus Culture - Tissue, Knee, Right [[id number]] Collected:  12/10/17 1921    Specimen:  Tissue from Knee, Right Updated:  12/10/17 2205    AFB Culture - Tissue, Knee, Right [[id number]] Collected:  12/10/17 1921    Specimen:  Tissue from Knee, Right Updated:  12/10/17 2205    Anaerobic Culture - Tissue, Knee, Right  [[id number]] Collected:  12/10/17 1924    Specimen:  Tissue from Knee, Right Updated:  12/10/17 2215    Fungus Culture - Tissue, Knee, Right [[id number]] Collected:  12/10/17 1924    Specimen:  Tissue from Knee, Right Updated:  12/10/17 2215    AFB Culture - Tissue, Knee, Right [[id number]] Collected:  12/10/17 1924    Specimen:  Tissue from Knee, Right Updated:  12/10/17 2215    Anaerobic Culture - Tissue, Knee, Right [[id number]] Collected:  12/10/17 1925    Specimen:  Tissue from Knee, Right Updated:  12/10/17 2225    Fungus Culture - Tissue, Knee, Right [[id number]] Collected:  12/10/17 1925    Specimen:  Tissue from Knee, Right Updated:  12/10/17 2225    AFB Culture - Tissue, Knee, Right [[id number]] Collected:  12/10/17 1925    Specimen:  Tissue from Knee, Right Updated:  12/10/17 2225    Anaerobic Culture - Tissue, Knee, Right [[id number]] Collected:  12/10/17 1926    Specimen:  Tissue from Knee, Right Updated:  12/10/17 2237    Fungus Culture - Tissue, Knee, Right [[id number]] Collected:  12/10/17 1926    Specimen:  Tissue from Knee, Right Updated:  12/10/17 2237    AFB Culture - Tissue, Knee, Right [[id number]] Collected:  12/10/17 1926    Specimen:  Tissue from Knee, Right Updated:  12/10/17 2237    Hemoglobin & Hematocrit, Blood [[id number]]  (Abnormal) Collected:  12/10/17 2225    Specimen:  Blood Updated:  12/10/17 2301     Hemoglobin 10.3 (L) g/dL      Hematocrit 32.8 (L) %     Basic Metabolic Panel [[id number]]  (Abnormal) Collected:  12/10/17 2226    Specimen:  Blood Updated:  12/10/17 2317     Glucose 123 (H) mg/dL      BUN 25 (H) mg/dL      Creatinine 1.10 mg/dL      Sodium 142 mmol/L      Potassium 3.9 mmol/L      Chloride 108 mmol/L      CO2 25.0 mmol/L      Calcium 7.8 (L) mg/dL      eGFR Non African Amer 50 (L) mL/min/1.73      BUN/Creatinine Ratio 22.7     Anion Gap 9.0 mmol/L     Narrative:       National Kidney Foundation Guidelines    Stage     Description        GFR  1         Normal or High      90+  2         Mild decrease      60-89  3         Moderate decrease  30-59  4         Severe decrease    15-29  5         Kidney failure     <15    Blood Culture - Blood, Blood, Venous Line [[id number]]  (Normal) Collected:  12/10/17 1539    Specimen:  Blood from Blood, Venous Line Updated:  12/11/17 0431     Blood Culture No growth at less than 24 hours    Blood Culture - Blood, Blood, Venous Line [[id number]]  (Normal) Collected:  12/10/17 1535    Specimen:  Blood from Blood, Venous Line Updated:  12/11/17 0431     Blood Culture No growth at less than 24 hours    CBC & Differential [[id number]] Collected:  12/11/17 0439    Specimen:  Blood Updated:  12/11/17 0500    Narrative:       The following orders were created for panel order CBC & Differential.  Procedure                               Abnormality         Status                     ---------                               -----------         ------                     CBC Auto Differential[[id number]]        Abnormal            Final result                 Please view results for these tests on the individual orders.    CBC Auto Differential [[id number]]  (Abnormal) Collected:  12/11/17 0439    Specimen:  Blood Updated:  12/11/17 0500     WBC 15.70 (H) 10*3/mm3      RBC 3.19 (L) 10*6/mm3      Hemoglobin 9.7 (L) g/dL      Hematocrit 31.0 (L) %      MCV 97.2 fL      MCH 30.4 pg      MCHC 31.3 (L) g/dL      RDW 14.1 %      RDW-SD 50.9 fl      MPV 9.4 fL      Platelets 221 10*3/mm3      Neutrophil % 94.5 (H) %      Lymphocyte % 3.1 (L) %      Monocyte % 2.0 %      Eosinophil % 0.0 %      Basophil % 0.1 %      Immature Grans % 0.3 %      Neutrophils, Absolute 14.85 (H) 10*3/mm3      Lymphocytes, Absolute 0.48 (L) 10*3/mm3      Monocytes, Absolute 0.32 10*3/mm3      Eosinophils, Absolute 0.00 10*3/mm3      Basophils, Absolute 0.01 10*3/mm3      Immature Grans, Absolute 0.04 (H) 10*3/mm3     Magnesium [[id number]]  (Normal) Collected:  12/11/17 0439    Specimen:  Blood  Updated:  12/11/17 0544     Magnesium 2.2 mg/dL     Basic Metabolic Panel [[id number]]  (Abnormal) Collected:  12/11/17 0439    Specimen:  Blood Updated:  12/11/17 0544     Glucose 115 (H) mg/dL      BUN 25 (H) mg/dL      Creatinine 1.00 mg/dL      Sodium 140 mmol/L      Potassium 3.8 mmol/L      Chloride 106 mmol/L      CO2 25.0 mmol/L      Calcium 8.3 (L) mg/dL      eGFR Non African Amer 56 (L) mL/min/1.73      BUN/Creatinine Ratio 25.0     Anion Gap 9.0 mmol/L     Narrative:       National Kidney Foundation Guidelines    Stage     Description        GFR  1         Normal or High     90+  2         Mild decrease      60-89  3         Moderate decrease  30-59  4         Severe decrease    15-29  5         Kidney failure     <15    Vancomycin, Random [[id number]]  (Normal) Collected:  12/11/17 0439    Specimen:  Blood Updated:  12/11/17 0546     Vancomycin Random 8.80 mcg/mL     Body Fluid Culture - Synovial Fluid, Knee, Right [[id number]]  (Normal) Collected:  12/10/17 1915    Specimen:  Synovial Fluid from Knee, Right Updated:  12/11/17 0706     BF Culture No growth    Tissue / Bone Culture - Tissue, Knee, Right [[id number]]  (Normal) Collected:  12/10/17 1921    Specimen:  Tissue from Knee, Right Updated:  12/11/17 0707     Tissue Culture No growth    Tissue / Bone Culture - Tissue, Knee, Right [[id number]]  (Normal) Collected:  12/10/17 1924    Specimen:  Tissue from Knee, Right Updated:  12/11/17 0707     Tissue Culture No growth    Tissue / Bone Culture - Tissue, Knee, Right [[id number]]  (Normal) Collected:  12/10/17 1925    Specimen:  Tissue from Knee, Right Updated:  12/11/17 0707     Tissue Culture No growth    Tissue / Bone Culture - Tissue, Knee, Right [[id number]]  (Normal) Collected:  12/10/17 1926    Specimen:  Tissue from Knee, Right Updated:  12/11/17 0707     Tissue Culture No growth            PHYSICAL EXAM  Right lower extremity:Dressing clean, dry and intact.  Patient able to perform straight leg raise  without assistance.  Intact EHL, FHL, tibialis anterior, and gastrocsoleus. Sensation intact to light touch to deep peroneal, superficial peroneal, sural, saphenous, tibial nerves. 2+ palpable DP and PT pulses.       Principal Problem:    Sepsis, probable right septic knee as source  Acute periprosthetic joint infection status post right total knee arthroplasty    PLAN / DISPOSITION:  1 Day Post-Op single component revision right knee arthroplasty with irrigation and debridement and quadricepsplasty    Protected weight bearing as tolerated right lower extremity, knee range of motion as tolerated   Pain control  PT/OT for post op mobilization and medical equipment needs   Continue empiric antibiotic coverage.  Follow-up intraoperative cultures.  Infectious disease consult at for antibiotic recommendations and treatment.   TEDs and SCD's bilateral lower extremities   Aspirin for DVT prophylaxis    Social work for discharge planning.   Dressing to remain in place for 7 days. May remove on POD#7. If no drainage, may shower on POD#10. No submerging wound in water. If drainage is noted, sterile dressing should be placed and wound checked daily. No showering until wound has remained dry for 72 consecutive hours.   Follow up in 3 weeks for re-assessment.      Stephon Garcia MD  12/11/17  7:18 AM

## 2017-12-11 NOTE — DISCHARGE INSTRUCTIONS
"DISCHARGE INSTRUCTIONS   Dr. Garcia     Irrigation and debridement for periprosthetic infection right Total Knee Replacement    Wound Care   1) Keep wound / incision area clean and dry.   2) Dressing to remain in place until post-operative day 7. Upon dressing removal, assess for wound drainage. If no drainage is present, keep wound / incision area open to air as much as possible. If drainage is present, place sterile dressing to cover wound and assess daily. If drainage continues to occur after post-operative day 14, call the office for an urgent appointment. (You should be seen in the clinic within 1-2 days of calling).   3) No baths or swimming until otherwise instructed. The wound must remain dry for 10 days after surgery. After 10 days, you may begin to shower only if no drainage is present. No submerging the wound under standing water until cleared by your physician (no baths, hot tubs, swimming pools, etc). Sponge baths are the best way to perform personal hygiene while at the same time protecting the wound from moisture.   4) Prior to showering, the wound must remain dry for 72 consecutive hours (no drainage whatsoever) prior to showering. If the wound drains or spots, the clock \"resets\" - make sure the wound has been drainage-free for 72 consecutive hours.   5) Once you are allowed to get the wound wet, please use gentle soap to wash the wound area. DO NOT aggressively scrub the wound with a washcloth or bath sponge. Please visually inspect your wound(s) at least once daily. If the wound(s) are in a difficult to see location, please use a mirror or have someone else assist with visual inspection.   6) No scrubbing the wound. You may \"pad dry\" the wound, but do not rub, as this may open up the wound and pre-dispose to wound infection.   7) Do not apply lotions or creams to incision site, unless instructed otherwise.   8) Observe for redness, swelling, or drainage. Please call the clinic immediately if you " "have fevers, chills with warmth/redness surrounding wound site or if you notice pus drainage from the wound site     Activity   No heavy lifting objects greater than 10 pounds.   No driving while on narcotic pain medication.   No submerging wound under standing water (pool, bath tub, etc.) until otherwise instructed.   You may be protected weightbearing as tolerated on your operative (right lower) extremity   Use crutches or a walker for ambulation.   Wean as appropriate per physical therapist's discretion.   Do not sleep with a pillow behind your knee. You may sleep with a pillow behind your Achilles or foot. This will prevent your knee from getting stiff in the flexed (\"bent\") position and will encourage full extension (leg straightening).   Be vigilant in terms of working on full knee extension and flexion. Your goal should be 0 to 90 degrees by 2 weeks post-op - MINIMUM!      Blood Clot Prophylaxis   (Aspirin vs. Lovenox administration is determined by your surgeon and tailored to your specific risk profile. You will be discharged with either of these medications, but not both.) You will need to complete a total 4 week course of enteric coated aspirin 325 mg twice daily, in order to minimize your risk of blood clots following surgery. You will be supplied with a prescription to obtain this. You will need to compete a total 2 week course of Lovenox after surgery, in order to minimize the risk of blood clots following surgery. This requires a single shot in the abdomen, to be taken once daily. You will be supplied with the prescription to obtain this. Prior to your discharge from the hospital, the nursing staff will instruct you on self-administration of the Lovenox, if you will be returning directly home from the hospital.     Discharge Pain Medications   You will be given a prescription for pain medication. You should start taking this the same day after your surgery. Wean off as tolerated. Do not wait to take " "the pain medication until the pain is severe, as it will be difficult to \"catch up\" once this occurs. The pain medication usually reaches its full effect ~1 hour after ingesting. If you have been sent home on Valium, this medication works well for muscle spasms. If you have been sent home on Colace, this medication should be taken until you are off all narcotic (i.e. Norco, Percocet, Oxycodone, etc) pain medications, in order to prevent constipation. Percocet or Norco have Tylenol in their ingredient lists. You must be careful not to exceed 4,000mg (4 grams) of Tylenol, from all sources, within a single 24-hr period. This means that you may not take more than 12 Percocets or Norcos within a 24-hr period. Do NOT take Regular or Extra Strength Tylenol when taking your Percocet or Norco medications. Some common side effects of the narcotic pain medications (Percocet, Oxycodone, Norco, etc) include nausea and itching. Benadryl is a great over the counter medication that helps calm your stomach, decreases your anxiety levels, and minimizes the itching. You can easily purchase this at your local pharmacy as an over-the-counter medication. Please abide by the instructions as printed on the bottle. If your nausea persists, make sure to take small amounts of crackers or other lighter foods.     Antibiotic Instructions   The instructions for antibiotic administration to treat your knee joint infection will be discussed in detail with you by the Infectious Disease team and /discharge planner. You will likely require frequent blood draws to monitor your treatment progress. Duration of antibiotic treatment and any subsequent changes in the antibiotic regimen will be directed by your Infectious Disease doctor. Periodic appointments will also be required with the Infectious Disease doctor to monitor your progress. Any questions or concerns regarding your antibiotic treatment regimen should be directed to your " Infectious Disease specialist.     Follow-Up   Follow-up with Dr. Garcia's office in 3 weeks from the surgery date for a post-operative evaluation. Have the following xrays done upon arrival to the follow-up appointment: AP and lateral of operative knee. Please call Dr. Garcia's office at [phone] for orthopaedic appointments or questions.

## 2017-12-11 NOTE — PROGRESS NOTES
Pulmonary/Critical Care Follow-up     LOS: 1 day   Patient Care Team:  Phani Joaquin MD as PCP - General (Family Medicine)    Chief Complaint / reason: Confusion / sepsis      Subjective    This is a 62-year-old woman who has a history of breast cancer with previous radiation to her chest as well as a right knee replacement and chronic pain who presented to [facility] after 2 days of worsening confusion and forgetfulness. She also intermittently had fevers and had one recorded as high as 103.3°F. She was intensive as low as the 70s systolic. She was complaining of some right knee pain which is the site of the previous total knee replacement performed several years ago and [place]. She denied any trauma to that area. She underwent a CT scan of the head which was negative for any acute changes. Chest x-ray was reviewed and was clear. White blood cell count was checked which showed 22,000 white blood cells with 13% band count. Acetone was elevated at 31. The ER physician performed arthrocentesis of the right knee which showed cloudy synovial fluid with 106,800 white blood cell's with 14,600 red blood cells. Stain and culture were pending at the time. She received a total of 3 L of IV fluid with persistence of her hypotension and she is placed on norepinephrine with good response for blood pressure.     The patient was started on broad-spectrum antibiotics on arrival.  She was seen by Dr. 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  She was taken to the operating room on 12/10/2017 for a revision with the irrigation and debridement for acute infection.  She also had a quadricepsplasty and extensor mechanism realignment.    Interval History:   The patient has been hemodynamically stable.  She still has some mild confusion and some issues with word finding.  She apparently had a negative CT scan of the  head at the outside facility prior to her initial transfer.  She denies pain although she has had some postoperative pain for which she has received pain medication.  Her family is present and reports that she normally has no issues with memory or word finding issues.    History taken from: Patient/family/chart    PMH/FH/Social History were reviewed and updated appropriately in the electronic medical record.     Review of Systems:    Review of 14 systems was completed with positives and pertinent negatives noted in the subjective section.  All other systems reviewed and are negative.     Musculoskeletal: positive for  joint pain  Neurological: positive for  memory deficit  Behavioral/Psych: positive for  anxiety    Objective     Vital Signs  Temp:  [97.4 °F (36.3 °C)-98.4 °F (36.9 °C)] 97.7 °F (36.5 °C)  Heart Rate:  [58-95] 58  Resp:  [14-20] 16  BP: ()/(49-73) 122/55  12/10 0701 - 12/11 0700  In: 1367 [I.V.:1167]  Out: 1530 [Urine:1530]  Body mass index is 28.29 kg/(m^2).     Physical Exam:     Constitutional:    Alert, cooperative, in no acute distress   Head:    Normocephalic, without obvious abnormality, atraumatic   Eyes:            Lids and lashes normal, conjunctivae and sclerae normal, no   icterus, no pallor, corneas clear, PER   ENMT:   Ears appear intact with no abnormalities noted      No oral lesions, no thrush, oral mucosa moist   Neck:   No adenopathy, supple, trachea midline, no thyromegaly, no JVD       Lungs/Resp:     Normal effort, symmetric chest rise, no crepitus, clear to      auscultation bilaterally, no chest wall tenderness                Heart/CV:    Regular rhythm and normal rate, normal S1 and S2, grade 1/6 left upper sternal border systolic murmur.     Abdomen/GI:     Normal bowel sounds, no masses, no organomegaly, soft        non-tender, non-distended   :     Deferred   Extremities/MSK:   No clubbing or cyanosis.  No edema.  Normal tone.  Right lower extremity dressing in  place.    Pulses:   Pulses palpable and equal bilaterally   Skin:   No bleeding, bruising or rash   Heme/Lymph:   No cervical or supraclavicular adenopathy.    Neurologic:    Psychiatric:       Moves all extremities with no obvious focal motor deficit.  Cranial nerves 2 - 12 grossly intact, some word finding difficulty.   Oriented x 3, normal affect.             Results Review:     I reviewed the patient's new clinical results.     Results from last 7 days  Lab Units 12/11/17  0439 12/10/17  2226 12/10/17  1517   SODIUM mmol/L 140 142 142   POTASSIUM mmol/L 3.8 3.9 3.7   CHLORIDE mmol/L 106 108 110*   CO2 mmol/L 25.0 25.0 26.0   BUN mg/dL 25* 25* 30*   CREATININE mg/dL 1.00 1.10 1.70*   CALCIUM mg/dL 8.3* 7.8* 8.1*   GLUCOSE mg/dL 115* 123* 105*       Results from last 7 days  Lab Units 12/11/17  0439 12/10/17  2225 12/10/17  1518   WBC 10*3/mm3 15.70*  --  17.51*   HEMOGLOBIN g/dL 9.7* 10.3* 10.4*   HEMATOCRIT % 31.0* 32.8* 33.7*   PLATELETS 10*3/mm3 221  --  243           Results from last 7 days  Lab Units 12/11/17  0439 12/10/17  1517   MAGNESIUM mg/dL 2.2 2.1   PHOSPHORUS mg/dL  --  3.3       I reviewed the patient's new imaging including images and reports.  Chest x-ray from 12/10/2017 shows right internal jugular catheter in place with no pulmonary infiltrate.    Medication Review:     [START ON 12/13/2017] Pharmacy Consult - Pharmacy to dose  Does not apply Once   aspirin 325 mg Oral Q12H   ceftriaxone 2 g Intravenous Q24H   hydrocortisone sodium succinate 100 mg Intravenous Q8H   vancomycin 750 mg Intravenous Q12H       norepinephrine 0.02-0.3 mcg/kg/min Last Rate: Stopped (12/10/17 1630)   Pharmacy to dose vancomycin     sodium chloride 100 mL/hr Last Rate: 100 mL/hr (12/10/17 1643)   sodium chloride 100 mL/hr Last Rate: 100 mL/hr (12/11/17 0605)       Assessment/Plan     Principal Problem:    Sepsis, probable right septic knee as source  Active Problems:    Hypertension    Disease of thyroid gland     Expressive aphasia    Heart murmur    62-year-old female with sepsis secondary to a septic prosthetic total knee arthroplasty.  She is now status post revision arthroplasty with washout by Dr. Garcia.  She is hemodynamically stable.  She has had some issues with ongoing confusion but also seems to have some expressive aphasia.  She had a mild heart murmur as well.  I'm going to obtain an echo as well as a neurology consultation for further evaluation.  She is stable from her sepsis and okay to transfer to telemetry.    Plan:  1.  Continue current antibiotics.  2.  Infectious disease to see.  3.  Neurology consultation.  4.  Obtain transthoracic echocardiogram.  5.  Follow-up cultures.  6.  Restart synthroid.   7.  PT/OT.       Collin Rosado MD  12/11/17  12:44 PM        *. Please note that portions of this note were completed with a voice recognition program. Efforts were made to edit the dictations, but occasionally words are mistranscribed.

## 2017-12-11 NOTE — PLAN OF CARE
Problem: Patient Care Overview (Adult)  Goal: Plan of Care Review  Outcome: Ongoing (interventions implemented as appropriate)    12/11/17 1002   Coping/Psychosocial Response Interventions   Plan Of Care Reviewed With patient   Outcome Evaluation   Outcome Summary/Follow up Plan PT evaluation completed. Pt demonstrates decreased indep and safety re: functional mobility with evolving signs and symptoms, warranting further skilled PT services to promote PLOF. Limited today by confusion and decreased safety awareness. Recommend SNF placement based upon current level of function; will cont to monitor appropriate d/c needs pending functional and cognitive progression. Able to ambulate 48 ft with RWx and min x2 A this AM.          Problem: Inpatient Physical Therapy  Goal: Bed Mobility Goal LTG- PT  Outcome: Ongoing (interventions implemented as appropriate)    12/11/17 1002   Bed Mobility PT LTG   Bed Mobility PT LTG, Date Established 12/11/17   Bed Mobility PT LTG, Time to Achieve 2 wks   Bed Mobility PT LTG, Activity Type supine to sit/sit to supine   Bed Mobility PT LTG, [place] Level supervision required       Goal: Transfer Training Goal 1 LTG- PT  Outcome: Ongoing (interventions implemented as appropriate)    12/11/17 1002   Transfer Training PT LTG   Transfer Training PT LTG, Date Established 12/11/17   Transfer Training PT LTG, Time to Achieve 2 wks   Transfer Training PT LTG, Activity Type sit to stand/stand to sit   Transfer Training PT LTG, [place] Level supervision required   Transfer Training PT LTG, Assist Device walker, rolling       Goal: Gait Training Goal LTG- PT  Outcome: Ongoing (interventions implemented as appropriate)    12/11/17 1002   Gait Training PT LTG   Gait Training Goal PT LTG, Date Established 12/11/17   Gait Training Goal PT LTG, Time to Achieve 2 wks   Gait Training Goal PT LTG, [place] Level supervision required   Gait Training Goal PT LTG, Assist Device walker, rolling    Gait Training Goal PT LTG, Distance to Achieve 200       Goal: Stair Training Goal LTG- PT  Outcome: Ongoing (interventions implemented as appropriate)    12/11/17 1002   Stair Training PT LTG   Stair Training Goal PT LTG, Date Established 12/11/17   Stair Training Goal PT LTG, Time to Achieve 2 wks   Stair Training Goal PT LTG, Number of Steps 3   Stair Training Goal PT LTG, [place] Level contact guard assist   Stair Training Goal PT LTG, Assist Device 1 handrail

## 2017-12-11 NOTE — PROGRESS NOTES
"Pharmacy Consult-Vancomycin Dosing  Lesli Leon is a  62 y.o. female receiving vancomycin therapy.     Indication: Septic presentation, possible R knee source   Consulting Provider: Intensivist  ID Consult: No    Current regimen: S/p vancomycin 1500mg this AM at OSH  DOT: Day 1  Goal Trough: 15-20 mcg/mL    Current Antimicrobial Therapy  Pharmacy to dose vancomycin, Day 1  Ceftriaxone 2g q24h, Day 1    Allergies  Allergies as of 12/10/2017 - Dom as Reviewed 12/10/2017   Allergen Reaction Noted   • Bactrim [sulfamethoxazole-trimethoprim] Hives 12/10/2017     LabsResults from last 7 days   Lab Units 12/11/17  0439 12/10/17  2226 12/10/17  1517   BUN mg/dL 25* 25* 30*   CREATININE mg/dL 1.00 1.10 1.70*   Results from last 7 days   Lab Units 12/11/17  0439 12/10/17  1518   WBC 10*3/mm3 15.70* 17.51*     Evaluation of Dosing  Last Dose Received in the ED/Outside Facility: Vancomycin 1500 mg 12/10 @ 0800    Ht - 162.6 cm (64\")  Wt - 74.8 kg (164 lb 12.8 oz)    Estimated Creatinine Clearance: 57.7 mL/min (by C-G formula based on Cr of 1).    Microbiology and Radiology  Microbiology Results (last 10 days)       Procedure Component Value - Date/Time      Tissue / Bone Culture - Tissue, Knee, Right [[id number]]  (Normal) Collected:  12/10/17 1926    Lab Status:  Preliminary result Specimen:  Tissue from Knee, Right Updated:  12/11/17 0901     Tissue Culture No growth     Gram Stain Result Many (4+) WBCs seen      No organisms seen    Tissue / Bone Culture - Tissue, Knee, Right [[id number]]  (Normal) Collected:  12/10/17 1925    Lab Status:  Preliminary result Specimen:  Tissue from Knee, Right Updated:  12/11/17 0859     Tissue Culture No growth     Gram Stain Result Occasional WBCs seen      No organisms seen    Tissue / Bone Culture - Tissue, Knee, Right [[id number]]  (Normal) Collected:  12/10/17 1924    Lab Status:  Preliminary result Specimen:  Tissue from Knee, Right Updated:  12/11/17 0856     Tissue Culture No " growth     Gram Stain Result Occasional WBCs seen      No organisms seen    Tissue / Bone Culture - Tissue, Knee, Right [[id number]]  (Normal) Collected:  12/10/17 1921    Lab Status:  Preliminary result Specimen:  Tissue from Knee, Right Updated:  12/11/17 0858     Tissue Culture No growth     Gram Stain Result No WBCs or organisms seen    Body Fluid Culture - Synovial Fluid, Knee, Right [[id number]]  (Normal) Collected:  12/10/17 1915    Lab Status:  Preliminary result Specimen:  Synovial Fluid from Knee, Right Updated:  12/11/17 0901     BF Culture No growth     Gram Stain Result Moderate (3+) WBCs seen      No organisms seen    Blood Culture - Blood, Blood, Venous Line [[id number]]  (Normal) Collected:  12/10/17 1539    Lab Status:  Preliminary result Specimen:  Blood from Blood, Venous Line Updated:  12/11/17 0431     Blood Culture No growth at less than 24 hours    Blood Culture - Blood, Blood, Venous Line [[id number]]  (Normal) Collected:  12/10/17 1535    Lab Status:  Preliminary result Specimen:  Blood from Blood, Venous Line Updated:  12/11/17 0431     Blood Culture No growth at less than 24 hours          Evaluation of Level    Lab Results   Component Value Date    VANCORANDOM 8.80 12/11/2017       Assessment/Plan:    Patient received vancomycin 1gm this morning perioperatively.  We will begin 750mg IV q12hr for now with a level before the am dose on 12/13.  We will continue to follow closely and adjust as necessary.    Thank you,  Spike Borjas, PharmD, BCPS

## 2017-12-11 NOTE — THERAPY EVALUATION
Acute Care - Physical Therapy Initial Evaluation  [facility]     Patient Name: Lesli Leon  : 1955  MRN: [id number]  Today's Date: 2017   Onset of Illness/Injury or Date of Surgery Date: 12/10/17  Date of Referral to PT: 12/10/17  Referring Physician: MD Jose      Admit Date: 12/10/2017     Visit Dx:    ICD-10-CM ICD-9-CM   1. Sepsis, due to unspecified organism A41.9 038.9     995.91   2. Infection of total right knee replacement T84.53XA 996.66    Z96.651 V43.65   3. Impaired functional mobility, balance, gait, and endurance Z74.09 V49.89     Patient Active Problem List   Diagnosis   • Sepsis, probable right septic knee as source     Past Medical History:   Diagnosis Date   • Arthritis    • Breast cancer 2015    RIGHT   • Disease of thyroid gland    • Hx of radiation therapy 2015    RT BREAST   • Hypertension      Past Surgical History:   Procedure Laterality Date   • BREAST BIOPSY Right 2015   • BREAST LUMPECTOMY Right 2015   • JOINT REPLACEMENT            PT ASSESSMENT (last 72 hours)      PT Evaluation       17 0805 12/10/17 1530    Rehab Evaluation    Document Type evaluation  -LS     Subjective Information agree to therapy;complains of;pain;nausea/vomiting  -LS     Patient Effort, Rehab Treatment good  -LS     Symptoms Noted Comment Noted confusion.  -LS     General Information    Patient Profile Review yes  -LS     Onset of Illness/Injury or Date of Surgery Date 12/10/17  -LS     Referring Physician MD Jose  -LS     Pertinent History Of Current Problem To OSH with increasing R knee pain and confusion x 3-4 days; found to have infected R TKA. Transferred to [facility] for HLOC. S/p I and D of R knee with single component revision of R knee arthoplasty, quadricepsplasty and extensor mechanism reallignment on 12/10. WB and ROM as tolerated per MD order. Pt diagnosed with sepsis.   -LS     Precautions/Limitations fall precautions  -LS     Prior Level of Function  independent:;gait;transfer;ADL's;bathing;dressing  -LS     Equipment Currently Used at Home commode;grab bar  -LS none  -AP    Plans/Goals Discussed With patient;agreed upon  -LS     Risks Reviewed patient:;LOB;dizziness;increased discomfort  -LS     Benefits Reviewed patient:;improve function;increase independence;increase strength;increase balance;increase knowledge  -LS     Barriers to Rehab medically complex;cognitive status  -LS     Living Environment    Lives With spouse  -LS spouse  -AP    Living Arrangements house  -LS house  -AP    Home Accessibility stairs to enter home  -LS stairs to enter home  -AP    Number of Stairs to Enter Home 3  -LS 3  -AP    Stair Railings at Home  none  -AP    Type of Financial/Environmental Concern  none  -AP    Transportation Available  car  -AP    Clinical Impression    Date of Referral to PT 12/10/17  -LS     PT Diagnosis impaired functional mobility, balance, gait  -LS     Criteria for Skilled Therapeutic Interventions Met yes;treatment indicated  -LS     Rehab Potential good, to achieve stated therapy goals  -LS     Vital Signs    Pre Systolic BP Rehab 115  -LS     Pre Treatment Diastolic BP 57  -LS     Post Systolic BP Rehab 110  -LS     Post Treatment Diastolic BP 52  -LS     Pretreatment Heart Rate (beats/min) 78  -LS     Posttreatment Heart Rate (beats/min) 69  -LS     Pre SpO2 (%) 96  -LS     O2 Delivery Pre Treatment supplemental O2   RN gave consent for removal of O2   -LS     Post SpO2 (%) 97  -LS     O2 Delivery Post Treatment room air  -LS     Pre Patient Position Supine  -LS     Intra Patient Position Standing  -LS     Post Patient Position Sitting  -LS     Pain Assessment    Pain Assessment Serrano-Vasquez FACES  -LS     Serrano-Vasquez FACES Pain Rating 6  -LS     Pain Score 6  -LS     Post Pain Score 6  -LS     Pain Type Surgical pain  -LS     Pain Location Knee  -LS     Pain Orientation Right  -LS     Pain Intervention(s) Repositioned;Ambulation/increased activity  -LS      Response to Interventions tolerated; RN aware  -LS     Cognitive Assessment/Intervention    Current Cognitive/Communication Assessment impaired  -LS     Orientation Status oriented to;person;situation  -LS     Follows Commands/Answers Questions able to follow single-step instructions;50% of the time;75% of the time;needs cueing;needs increased time;needs repetition  -LS     Personal Safety moderate impairment;decreased awareness, need for assist;decreased awareness, need for safety;decreased insight to deficits  -LS     Personal Safety Interventions fall prevention program maintained;gait belt;nonskid shoes/slippers when out of bed  -LS     ROM (Range of Motion)    General ROM lower extremity range of motion deficits identified  -LS     General LE Assessment    ROM Detail LLE WFL; RLE limited 50% (by pain)  -LS     MMT (Manual Muscle Testing)    General MMT Assessment lower extremity strength deficits identified  -LS     Lower Extremity    Lower Ext Manual Muscle Testing Detail Pt with difficulty in following commands for formal MMT; bilat ankle DF 4-/5; BLEs grossly 4-/5 as demonstrated functionally  -LS     Mobility Assessment/Training    Extremity Weight-Bearing Status right lower extremity  -LS     Right Lower Extremity Weight-Bearing weight-bearing as tolerated  -LS     Bed Mobility, Assessment/Treatment    Bed Mobility, Assistive Device bed rails;draw sheet  -LS     Bed Mob, Supine to Sit, [place] maximum assist (25% patient effort);2 person assist required;verbal cues required  -LS     Bed Mobility, Impairments strength decreased;pain  -LS     Bed Mobility, Comment VC's for sequencing.   -LS     Transfer Assessment/Treatment    Transfers, Sit-Stand [place] moderate assist (50% patient effort);2 person assist required;verbal cues required  -LS     Transfers, Stand-Sit [place] moderate assist (50% patient effort);2 person assist required;verbal cues required  -LS     Transfer, Impairments  impaired balance;strength decreased  -LS     Transfer, Comment VC's for hand placement and appropriate sequencing.   -LS     Gait Assessment/Treatment    Gait, [place] Level minimum assist (75% patient effort);2 person assist required;verbal cues required  -LS     Gait, Assistive Device rolling walker  -LS     Gait, Distance (Feet) 48  -LS     Gait, Gait Pattern Analysis swing-through gait  -LS     Gait, Gait Deviations franklin decreased;step length decreased;right:;antalgic  -LS     Gait, Impairments strength decreased;impaired balance;pain  -LS     Gait, Comment VC's for posture and increasing step length within RWx.   -LS     Motor Skills/Interventions    Additional Documentation Balance Skills Training (Group)  -LS     Balance Skills Training    Sitting-Level of Assistance Moderate assistance   progressed to CGA  -LS     Sitting-Balance Support Feet supported;Right upper extremity supported;Left upper extremity supported  -LS     Standing-Level of Assistance Minimum assistance;x2  -LS     Static Standing Balance Support assistive device  -LS     Gait Balance-Level of Assistance Minimum assistance;x2  -LS     Gait Balance Support assistive device  -LS     Therapy Exercises    Bilateral Lower Extremities AROM:;10 reps;sitting;ankle pumps/circles;quad sets;glut sets  -LS     Sensory Assessment/Intervention    Light Touch RLE;LLE  -LS     LLE Light Touch WNL  -LS     RLE Light Touch mild impairment   lateral aspect of R thigh  -LS     Positioning and Restraints    Pre-Treatment Position in bed  -LS     Post Treatment Position chair  -LS     In Chair notified nsg;reclined;call light within reach;encouraged to call for assist;exit alarm on;RUE elevated;LUE elevated;RLE elevated;legs elevated  -LS       User Key  (r) = Recorded By, (t) = Taken By, (c) = Cosigned By    Initials Name Provider Type    LS Dawna Blanchard, PT Physical Therapist    AP Jazlyn Steiner, RN Registered Nurse          Physical Therapy Education      Title: PT OT SLP Therapies (Active)     Topic: Physical Therapy (Active)     Point: Mobility training (Active)    Learning Progress Summary    Learner Readiness Method Response Comment Documented by Status   Patient Acceptance E,D NR  LS 12/11/17 1002 Active               Point: Home exercise program (Active)    Learning Progress Summary    Learner Readiness Method Response Comment Documented by Status   Patient Acceptance E,D NR  LS 12/11/17 1002 Active               Point: Body mechanics (Active)    Learning Progress Summary    Learner Readiness Method Response Comment Documented by Status   Patient Acceptance E,D NR  LS 12/11/17 1002 Active               Point: Precautions (Active)    Learning Progress Summary    Learner Readiness Method Response Comment Documented by Status   Patient Acceptance E,D NR  LS 12/11/17 1002 Active                      User Key     Initials Effective Dates Name Provider Type Discipline     06/19/15 -  Dawna Blanchard, PT Physical Therapist PT                PT Recommendation and Plan  Anticipated Discharge Disposition: skilled nursing facility  PT Frequency: 2 times/day  Plan of Care Review  Plan Of Care Reviewed With: patient  Outcome Summary/Follow up Plan: PT evaluation completed. Pt demonstrates decreased indep and safety re: functional mobility with evolving sings and symptoms, warranting further skilled PT services to promote PLOF. Limited today by confusion and decreased safety awareness. Recommend SNF placement based upon current level of function; will cont to monitor appropriate d/c needs pending functional and cognitive progression. Able to ambulate 48 ft with RWx and min x2 A this AM.           IP PT Goals       12/11/17 1002          Bed Mobility PT LTG    Bed Mobility PT LTG, Date Established 12/11/17  -LS      Bed Mobility PT LTG, Time to Achieve 2 wks  -LS      Bed Mobility PT LTG, Activity Type supine to sit/sit to supine  -LS      Bed Mobility PT LTG,  [place] Level supervision required  -LS      Transfer Training PT LTG    Transfer Training PT LTG, Date Established 12/11/17  -LS      Transfer Training PT LTG, Time to Achieve 2 wks  -LS      Transfer Training PT LTG, Activity Type sit to stand/stand to sit  -LS      Transfer Training PT LTG, [place] Level supervision required  -LS      Transfer Training PT LTG, Assist Device walker, rolling  -LS      Gait Training PT LTG    Gait Training Goal PT LTG, Date Established 12/11/17  -LS      Gait Training Goal PT LTG, Time to Achieve 2 wks  -LS      Gait Training Goal PT LTG, [place] Level supervision required  -LS      Gait Training Goal PT LTG, Assist Device walker, rolling  -LS      Gait Training Goal PT LTG, Distance to Achieve 200  -LS      Stair Training PT LTG    Stair Training Goal PT LTG, Date Established 12/11/17  -LS      Stair Training Goal PT LTG, Time to Achieve 2 wks  -LS      Stair Training Goal PT LTG, Number of Steps 3  -LS      Stair Training Goal PT LTG, [place] Level contact guard assist  -LS      Stair Training Goal PT LTG, Assist Device 1 handrail  -LS        User Key  (r) = Recorded By, (t) = Taken By, (c) = Cosigned By    Initials Name Provider Type    LS Dawna Blanchard, PT Physical Therapist                Outcome Measures       12/11/17 0805          How much help from another person do you currently need...    Turning from your back to your side while in flat bed without using bedrails? 2  -LS      Moving from lying on back to sitting on the side of a flat bed without bedrails? 2  -LS      Moving to and from a bed to a chair (including a wheelchair)? 3  -LS      Standing up from a chair using your arms (e.g., wheelchair, bedside chair)? 2  -LS      Climbing 3-5 steps with a railing? 2  -LS      To walk in hospital room? 3  -LS      AM-PAC 6 Clicks Score 14  -LS      Functional Assessment    Outcome Measure Options AM-PAC 6 Clicks Basic Mobility (PT)  -LS        User Key   (r) = Recorded By, (t) = Taken By, (c) = Cosigned By    Initials Name Provider Type    MELANIE Blanchard, PT Physical Therapist           Time Calculation:         PT Charges       12/11/17 1011          Time Calculation    Start Time 0805  -      PT Received On 12/11/17  -      PT Goal Re-Cert Due Date 12/21/17  -        User Key  (r) = Recorded By, (t) = Taken By, (c) = Cosigned By    Initials Name Provider Type    MELANIE Blanchard, PT Physical Therapist          Therapy Charges for Today     Code Description Service Date Service Provider Modifiers Qty    [id number] HC PT EVAL MOD COMPLEXITY 4 12/11/2017 Dawna Blanchard, PT GP 1          PT G-Codes  Outcome Measure Options: AM-PAC 6 Clicks Basic Mobility (PT)      Dawna Blanchard, PT  12/11/2017

## 2017-12-11 NOTE — CONSULTS
INFECTIOUS DISEASE CONSULT/INITIAL HOSPITAL VISIT    Lesli Leon  1955  [id number]    Date of Consult: 12/11/2017    Admission Date: 12/10/2017      Requesting Provider: Stephon Carbajal MD  Evaluating Physician: Dom Landrum MD    Reason for Consultation: right prosthetic knee infection     History of present illness:    Patient is a 62 y.o. female who was transferred to [facility] from [facility] after presenting there with increasing right knee pain, swelling and confusion which started on Thursday of last week.   The knee was aspirated, and she was then referred to [facility] for admission. She underwent incision and drainage with poly exchange yesterday. Admitting labs with a leukocytosis of 17, 500, acute kidney injury with SCr of 1.7.  Synovial fluid with moderate WBCs, no organisms and culture no growth so far.  The gram stain at [facility] with no organisms, culture is pending.        Past Medical History:   Diagnosis Date   • Arthritis    • Breast cancer 2015    RIGHT   • Disease of thyroid gland    • Hx of radiation therapy 2015    RT BREAST   • Hypertension        Past Surgical History:   Procedure Laterality Date   • BREAST BIOPSY Right 2015   • BREAST LUMPECTOMY Right 2015   • JOINT REPLACEMENT         Family History   Problem Relation Age of Onset   • Breast cancer Mother      DX AGE UNKNOWN   • Breast cancer Sister      DX AGE UNKNOWN   • Ovarian cancer Neg Hx        Social History     Social History   • Marital status:      Spouse name: N/A   • Number of children: N/A   • Years of education: N/A     Occupational History   • Not on file.     Social History Main Topics   • Smoking status: Current Every Day Smoker     Years: 5.00     Types: Electronic Cigarette   • Smokeless tobacco: Not on file   • Alcohol use No   • Drug use: No   • Sexual activity: Yes     Other Topics Concern   • Not on file     Social History Narrative       Allergies   Allergen Reactions   • Bactrim  [Sulfamethoxazole-Trimethoprim] Hives         Medication:    Current Facility-Administered Medications:   •  [START ON 12/13/2017] !Vanc trough 12/13 at 0500; hold for level >20, , Does not apply, Once, Avery Borjas [facility]  •  acetaminophen (TYLENOL) tablet 650 mg, 650 mg, Oral, Q4H PRN, Stephon Garcia MD  •  ALPRAZolam (XANAX) tablet 1 mg, 1 mg, Oral, TID PRN, Stephon Carbajal MD, 1 mg at 12/11/17 1012  •  aspirin EC tablet 325 mg, 325 mg, Oral, Q12H, Stephon Garcia MD, 325 mg at 12/11/17 1012  •  bisacodyl (DULCOLAX) EC tablet 10 mg, 10 mg, Oral, Daily PRN, Stephon Garcia MD  •  bisacodyl (DULCOLAX) suppository 10 mg, 10 mg, Rectal, Daily PRN, Stephon Garcia MD  •  cefTRIAXone (ROCEPHIN) IVPB 2 g, 2 g, Intravenous, Q24H, Stephon Carbajal MD, Last Rate: 100 mL/hr at 12/11/17 0801, 2 g at 12/11/17 0801  •  docusate sodium (COLACE) capsule 100 mg, 100 mg, Oral, BID PRN, Stephon Garcia MD  •  fentaNYL citrate (PF) (SUBLIMAZE) injection 25 mcg, 25 mcg, Intravenous, Q1H PRN, Stephon Carbajal MD, 100 mcg at 12/10/17 2008  •  HYDROcodone-acetaminophen (NORCO) 5-325 MG per tablet 1 tablet, 1 tablet, Oral, Q6H PRN, Stephon Carbajal MD, 1 tablet at 12/11/17 0016  •  hydrocortisone sodium succinate (Solu-CORTEF) injection 100 mg, 100 mg, Intravenous, Q8H, Stephon Carbajal MD, 100 mg at 12/11/17 0801  •  HYDROmorphone (DILAUDID) injection 0.5 mg, 0.5 mg, Intravenous, Q2H PRN, 0.5 mg at 12/11/17 0854 **AND** naloxone (NARCAN) injection 0.1 mg, 0.1 mg, Intravenous, Q5 Min PRN, Stephon Garcia MD  •  [COMPLETED] iopamidol (ISOVUE-370) 76 % injection 100 mL, 100 mL, Intravenous, Once in imaging, Stephon Carbajal MD, 50 mL at 12/11/17 1545  •  [START ON 12/12/2017] levothyroxine sodium injection 100 mcg, 100 mcg, Intravenous, Q AM, Collin Rosado MD  •  norepinephrine (LEVOPHED) 8 mg/250 mL (32 mcg/mL) in sodium chloride 0.9% infusion (premix), 0.02-0.3 mcg/kg/min, Intravenous,  Titrated, Amanda Martinez, APRN, Stopped at 12/10/17 1630  •  ondansetron (ZOFRAN) tablet 4 mg, 4 mg, Oral, Q6H PRN **OR** ondansetron (ZOFRAN) injection 4 mg, 4 mg, Intravenous, Q6H PRN, Stephon Garcia MD, 4 mg at 12/11/17 0801  •  oxyCODONE-acetaminophen (PERCOCET) 5-325 MG per tablet 1 tablet, 1 tablet, Oral, Q4H PRN, Stephon Garcia MD  •  oxyCODONE-acetaminophen (PERCOCET) 5-325 MG per tablet 2 tablet, 2 tablet, Oral, Q4H PRN, Stephon Garcia MD, 2 tablet at 12/10/17 2153  •  Pharmacy to dose vancomycin, , Does not apply, Continuous PRN, Stephon Carbajal MD  •  sodium chloride 0.9 % flush 1-10 mL, 1-10 mL, Intravenous, PRN, Stephon Garcia MD  •  sodium chloride 0.9 % infusion, 100 mL/hr, Intravenous, Continuous, Stephon Carbajal MD, Last Rate: 100 mL/hr at 12/10/17 1643  •  sodium chloride 0.9 % infusion, 100 mL/hr, Intravenous, Continuous, Stephon Garcia MD, Last Rate: 100 mL/hr at 12/11/17 0605, 100 mL/hr at 12/11/17 0605  •  vancomycin in dextrose 5% 150 mL (VANCOCIN) IVPB 750 mg, 750 mg, Intravenous, Q12H, Avery Borjas RPH    Antibiotics:  Anti-Infectives     Ordered     Dose/Rate Route Frequency Start Stop    12/11/17 1130  vancomycin in dextrose 5% 150 mL (VANCOCIN) IVPB 750 mg     Ordering Provider:  Avery Borjas RPH    750 mg Intravenous Every 12 Hours 12/11/17 1800 12/16/17 1759    12/10/17 1523  cefTRIAXone (ROCEPHIN) IVPB 2 g     Ordering Provider:  Stephon Carbajal MD    2 g  100 mL/hr over 30 Minutes Intravenous Every 24 Hours 12/11/17 0800 12/23/17 0759    12/10/17 2138  vancomycin (VANCOCIN) in iso-osmotic dextrose IVPB 1 g (premix) 200 mL     Ordering Provider:  Stephon Garcia MD    15 mg/kg × 74.8 kg  over 60 Minutes Intravenous Once 12/11/17 0600 12/11/17 0705    12/10/17 1523  Pharmacy to dose vancomycin     Ordering Provider:  Stephon Carbajal MD     Does not apply Continuous PRN 12/10/17 1522 12/22/17 1521            Review of  Systems:  Constitutional--+  Fever, chills or sweats.  Appetite good, and no malaise. No fatigue.  HEENT-- No new vision, hearing or throat complaints.  No epistaxis or oral sores.  Denies odynophagia or dysphagia. No headache, photophobia or neck stiffness.  CV-- No chest pain, palpitation or syncope  Resp-- No SOB/cough/Hemoptysis  GI- No nausea, vomiting, or diarrhea.  No hematochezia, melena, or hematemesis. Denies jaundice or chronic liver disease.  -- No dysuria, hematuria, or flank pain.  Denies hesitancy, urgency or flank pain.  Lymph- no swollen lymph nodes in neck/axilla or groin.   Heme- No active bruising or bleeding; no Hx of DVT or PE.  MS-- + redness, and swelling of her right knee with Increasing pain   Neuro-- No acute focal weakness or numbness in the arms or legs.  No seizures.  Skin--No rashes or lesions      Physical Exam:   Vital Signs  Temp (24hrs), Av.9 °F (36.6 °C), Min:97.4 °F (36.3 °C), Max:98.4 °F (36.9 °C)    Temp  Min: 97.4 °F (36.3 °C)  Max: 98.4 °F (36.9 °C)  BP  Min: 84/50  Max: 133/67  Pulse  Min: 58  Max: 95  Resp  Min: 14  Max: 20  SpO2  Min: 94 %  Max: 100 %    GENERAL: Awake and alert, in no acute distress.   HEENT: Normocephalic, atraumatic.  PERRL. EOMI. No conjunctival injection. No icterus. Oropharynx clear without evidence of thrush or exudate. No evidence of peridontal disease.    NECK: Supple without nuchal rigidity. No mass.  LYMPH: No cervical, axillary or inguinal lymphadenopathy.  HEART: RRR; No murmur, rubs, gallops.   LUNGS: Clear to auscultation bilaterally without wheezing, rales, rhonchi. Normal respiratory effort. Nonlabored. No dullness.  ABDOMEN: Soft, nontender, nondistended. Positive bowel sounds. No rebound or guarding. NO mass or HSM.  EXT:  No cyanosis, clubbing or edema. No cord.  : Genitalia generally unremarkable.  Without Jimenez catheter.  MSK: right knee dressing in place    SKIN: Warm and dry without cutaneous eruptions on  Inspection/palpation.    NEURO: Oriented to PPT. No focal deficits on motor/sensory exam at arms/legs.  PSYCHIATRIC: Normal insight and judgement. Cooperative with PE  Right IJ site without redness   Laboratory Data      Results from last 7 days  Lab Units 12/11/17  0439 12/10/17  2225 12/10/17  1518   WBC 10*3/mm3 15.70*  --  17.51*   HEMOGLOBIN g/dL 9.7* 10.3* 10.4*   HEMATOCRIT % 31.0* 32.8* 33.7*   PLATELETS 10*3/mm3 221  --  243       Results from last 7 days  Lab Units 12/11/17  0439   SODIUM mmol/L 140   POTASSIUM mmol/L 3.8   CHLORIDE mmol/L 106   CO2 mmol/L 25.0   BUN mg/dL 25*   CREATININE mg/dL 1.00   GLUCOSE mg/dL 115*   CALCIUM mg/dL 8.3*                   Results from last 7 days  Lab Units 12/10/17  1517   LACTATE mmol/L 0.5           Results from last 7 days  Lab Units 12/11/17  0439   VANCOMYCIN RM mcg/mL 8.80     Estimated Creatinine Clearance: 57.7 mL/min (by C-G formula based on Cr of 1).      Microbiology:  Blood Culture   Date Value Ref Range Status   12/10/2017 No growth at less than 24 hours  Preliminary         12/9:  Synovial fluid ( [facility]) gms nos, no cells, culture pending                        Radiology:  Imaging Results (last 72 hours)     Procedure Component Value Units Date/Time    XR Chest 1 View [[id number]] Collected:  12/10/17 1520     Updated:  12/10/17 1730    Narrative:          EXAMINATION: XR CHEST 1 VW - 12/10/2017     INDICATION: Central line placement.     COMPARISON: None.     FINDINGS:   1. A right IJ catheter has been placed. The tip is perfectly positioned  in the SVC at the entrance to the right atrium.     2. There is no detectable pneumothorax.     3. There is mild chronic scarring in the chest but there is no active  disease, edema or free fluid.           Impression:       Perfect position of the right IJ catheter in the SVC at the  entrance to the right atrium.     No pneumothorax and no other acute finding.     DICTATED:     12/10/2017  EDITED:           12/10/2017        This report was finalized on 12/10/2017 5:28 PM by Dr. Rigo Urena MD.       XR Knee 1 or 2 View Right [[id number]] Collected:  12/10/17 1810     Updated:  12/10/17 1810    Narrative:          EXAMINATION: XR KNEE 1 OR 2 VIEWS RIGHT - 12/10/2017      INDICATION: Painful TKA.     COMPARISON: None.     FINDINGS:   1. The total right knee arthroplasty appears to be well-positioned. The  hardware appears to be intact without evidence of overt signs of  loosening.     2. There is a transverse defect in the proximal right tibia, likely  previous surgery and this should be correlated.     3. There is no evidence of fracture of the native bone structures and  there is no malalignment.           Impression:       Chronic findings in the right knee as described, however,  the total knee arthroplasty hardware appears well positioned. There is  no abnormality or fracture of the native bone structures. There is a  transverse lucent defect in the proximal third of the right tibia which  is likely a screw tract and should be correlated with the patient's  previous history.     DICTATED:     12/10/2017  EDITED:          12/10/2017                XR Knee 1 or 2 View Right [[id number]] Collected:  12/11/17 0830     Updated:  12/11/17 0830    Narrative:       EXAMINATION: XR KNEE 1 OR 2 VW RIGHT-      INDICATION: Postop knee arthoplasty; A41.9-Sepsis, unspecified organism;  T84.53XA-Infection and inflammatory reaction due to internal right knee  prosthesis, initial encounter; Z96.651-Presence of right artificial knee  joint.      COMPARISON: Radiographs 12/10/2017 earlier same day     FINDINGS: Status post right total knee arthroplasty. No evidence of  hardware complication with components in satisfactory alignment.  Expected postsurgical changes including subcutaneous emphysema are  noted.       Impression:       Status post right total knee arthroplasty without evidence  of complication in satisfactory  alignment.     D:  2017  E:  2017               Impression:   1.  Septic shock- -secondary to a right total knee arthroplasty infection  2.  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  She appears to be dramatically improved.  We are awaiting culture data.  She will require a prolonged 6-8 week course of intravenous antibiotic therapy.  [facility] will provide her intravenous antibiotics.    PLAN/RECOMMENDATIONS:   Thank you for asking us to see Lesli Leon, I recommend the followin.  Right knee debridement/poly-exchange-done  2.  Blood cultures-pending  3.  Right knee cultures-pending  4.  Continue intravenous vancomycin/ceftriaxone  5.  PICC line placement-we can do this in the next 1-2 days    Dr. Landrum has obtained the history, performed the physical exam and formulated the above treatment plan.        Dom Landrum MD  2017  3:19 PM

## 2017-12-11 NOTE — OP NOTE
"OPERATIVE REPORT     DATE OF PROCEDURE: 12/10/17     SURGEON: Stephon Garcia M.D.     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 Irrigation and debridement of acute infection. Quadricepsplasty and extensor mechanism realignment.     SURGICAL DETAILS:     APPROACH: Medial parapatellar     ANESTHESIA: General     PREOPERATIVE ANTIBIOTICS: Ancef 2 g IV     TRANEXAMIC ACID: None     TOURNIQUET TIME: 90 min @ 300 mmHg     ESTIMATED BLOOD LOSS: 100 cc     SPECIMENS: Synovial fluid and tissue for culture.  Explanted polyethylene.    IMPLANTS:   /Brand: [place] [employer]   Tibial polyethylene insert: 22 mm  Type: PS     DRAINS: None     LOCAL INJECTION: none     MODIFIER(S): None     COMPLICATIONS: None apparent     INDICATIONS FOR PROCEDURE: This patient previously underwent right total knee arthroplasty nearly 2 years ago. Approximately 3 days ago the patient developed acute onset of pain, swelling, and altered mental status and presented to an outside ER for evaluation of the knee.  Per the patient's report, she had no complications or problems from the total knee prior to 3 days ago.  She did state that her knee range of motion was diminished postoperatively but has been so since surgery.  Her knee range of motion is roughly 0-90°.  She also had some \"wobbliness of the knee\".  An aspiration at the outside facility was performed which revealed a synovial white blood cell count of 100,000 white blood cells and 86% neutrophils.  Given the acute onset of swelling, pain, altered mental status, and " elevated serum white count, it was suggestive that infection was present.  Acute versus chronic prosthetic infection of the joint was discussed, and management options were outlined. Based on the laboratory evidence and the brevity of patient symptoms, an acute infectious process was favored. Treatment options were outlined to the patient, including risks, benefits, and alternatives. The risks included but were not limited to fracture, continued infection, bleeding, anesthesia risks, nerve injury, vessel injury, instability/dislocation, pain, blood clots, possible need for blood transfusion, possible need for further procedures, myocardial infarction, stroke, and death. After this discussion, the patient opted for irrigation and debridement with polyethylene exchange in an attempt to salvage the joint. An explicit discussion was conducted with regards to the odds of successful infection eradication with this treatment course and the possibility of treatment failure was addressed. The operative treatment will depend on the appearance of the tissues, implant fixation, implant-bone fixation interfaces, and the culture identification of the bacteria if available. If cultures are positive for an antibiotic resistant bacteria, or there is evidence for chronic infection demonstrated intra-operatively, appropriate treatment will involve removing all the arthroplasty implants then debriding the bone, joint, and synovial lining. An antibiotic fusion spacer will then be placed in the joint space to treat the infection for at least 8-12 weeks, followed by joint reconstruction if/when there is adequate clinical verification the infection has been eradicated. Understanding of all topics was conveyed to me by the patient pre-operatively, and patient consent was given to proceed with the planned revision knee arthroplasty procedure with extensive irrigation and debridement of the infected joint tissues.     INTRAOPERATIVE FINDINGS:  Cloudy appearing synovial fluid, instability of the right total knee construct, stiffness of the knee with flexion to approximately 90°, extensive scar tissue formation.  No focal necrotic tissue visualized.     PROCEDURE: The patient was identified in the preoperative holding area. The operative site was confirmed and marked. REGGIE hose and a sequential compression device were placed on the nonoperative leg. The risks, benefits, and alternatives to surgery were again confirmed with the patient and the patient wished to proceed. The patient was brought to the operating room and placed on the operating room table in the supine position. All bony prominences were padded. A huddle was performed with the patient and all vital surgical team members to confirm the correct operative site, procedure, anesthesia type, and operative plan with the patient. After anesthesia was performed, a tourniquet was applied to the upper thigh of the operative leg. A full knee exam was performed once anesthesia was in full effect.  The knee was extremely stiff with flexion to only approximately 90°.  Intravenous antibiotic prophylaxis was given and confirmed with the anesthesia team.     The operative leg was prepped and draped in the usual sterile fashion. A surgical time out was performed immediately preceding the incision with all personnel in the operating room to confirm patient identity, the correct operative site and extremity, correct radiographic studies, availability of appropriate surgical equipment and agreement on the planned procedure. The operative knee was elevated and exsanguinated using gravity and the tourniquet was inflated. The knee was exposed using the previous skin incision, extending proximally and distally for exposure. Dissection was carried down through skin and subcutaneous tissue with care taken to preserve well vascularized skin flaps medially and laterally. The extensor mechanism was identified and scar tissue  was cleared both medial and lateral. A medial parapatellar arthrotomy was made to enter the knee space. Upon opening the joint space, purulent fluid was obtained and samples were sent for culture. The synovium was thickened, hypertrophic, and inflamed. A complete synovectomy was performed for exposure, and the medial and lateral gutters were cleared of scar and synovial reflections. Culture tissue samples were sent from areas next to all of the implants.     Upon exposure, the extensor mechanism was intact, and the bulk of the quad and patellar tendon were poorly-mobile and adherent to the joint capsule and underlying muscle and bone. The scarred quadriceps tendon was released from the supra-patellar region, and then freed from its adhesions to the distal femur.  Despite this, the knee remained extremely stiff with significant tension on the tibial tubercle.  Adequate exposure to remove the polyethylene could not be performed without risk of avulsing the tibial tubercle.  The quadriceps tendon was then cut obliquely medial to lateral starting 4cm above the superior pole of the patella. The snip was carried into the vastus lateralis muscle to the point that all significant tension on the distal portion of the extensor mechanism and tibial tubercle had been relieved.Wide detailed dissection around the implants and capsule was done. Wide exposure of the tibia was required with a large medial sleeve dissection. The implants were then evaluated. The polyethylene component on the patella was well fixed. There were no areas of scalloping to suggest loosening. On the tibial and femoral sides, there was no evidence of scalloping or erosion into the implant, bone, or cement interfaces. Fixation, rotational positioning, position of the joint-line, and varus-valgus alignment of both components was evaluated. The femoral component remained fixed with reasonable alignment and no evidence of micromotion or loosening. The tibial  tray was found to be well fixed.  The knee, however, was globally loose in both full extension, 30° of flexion, and 90° of flexion indicating instability.  The instability appeared to be symmetric and appeared to be able to be remedied with a thicker polyethylene construct.  Based on the overall knee construct and clinical ligament stability, the decision was made at that point to preserve all the implants except the polyethylene insert and debride the infection. The tibial polyethylene was removed at that time. The joint itself was still incredibly stiff, and the posterior recesses were scarred down completely. An extensive posterior capsular release was performed off the distal femur and around the proximal posterior portion of the tibial tray. Motion improved, and stress on the collateral ligaments decreased, and exposure was considerably improved. Debridement of the soft tissues and bony interfaces was then completed. The entire knee joint space was then was copiously lavaged with 6 L of pulsatile sterile saline. Dilute betadine solution and quarter strength Dakins solution were also used intermittently between pulse lavage to clean the wound. A dilute hibiclens solution was used to scrub the retained femoral, tibial, and patellar components to mechanically debride any bacteria and biofilm formation. All bony surfaces were cleared and cleaned.     With the wound cleansed, new clean drapes were placed, clean instruments were obtained, and all surgical personnel donned clean gowns and gloves. Trial reductions were then done. The poly size was increased by 6 mm, with full stability achieved with medial to lateral stress.  The 6 mm increase in polyethylene thickness resolved the joint instability that was present prior to the surgery.  The knee achieved full extension with excellent stability and range of motion, no lift-off or asymmetry in deep flexion, or tendency toward mid flexion instability with varus-valgus  stress. The knee achieved full extension and flexion to at least 90 degrees. The patella tracked midline with a provisional extensor mechanism realignment. All trials were then removed. The knee space was further irrigated with pulsatile sterile saline. The posterior recesses of the knee and areas of known bleeding were treated with electrocautery to reduce post-operative bleeding. The tourniquet was released and no excessive bleeding was encountered. Synovial bleeding was further treated with electrocautery. The wound was again irrigated and the final polyethylene was impacted into place.     Extensor mechanism alignment and re-approximation of the quadricepsplasty snip was then addressed. The released portions of the quadriceps tendon were provisionally aligned and clamped in place with towel clips. The knee was ranged from 0 to 90 degrees to assess the positioning. A #2 Prolene suture was used to close the snip 4 cm above the superior pole of the patella using a [place] locking suture repair technique in the proximal portion of the tendon. A #2 FiberWire suture was also placed across the medial and lateral portions of the quadriceps tendon at the level where the snip started.The extensor mechanism and capsule was anatomically closed with interrupted #1 PDS suture. Knee stability with the capsule closed was acceptable. Deep and superficial subcutaneous tissue was closed with interrupted 2-0 PDS suture. 3-0 Nylon suture was used to re-approximate the skin edges. A silver impregnated dressing was then placed, followed by REGGIE hose and a sequential compression device to the operative limb. The patient was sufficiently recovered from anesthesia, transferred to a hospital bed and taken to the PACU in stable condition.     No apparent complications occurred during the procedure Instrument, sponge and needle counts were correct x 2.     The patient underwent risk stratification preoperatively and aspirin was chosen for DVT  prophylaxis. Delay in starting chemical prophylaxis for 23 hours from surgical incision was over concerns for hematoma formation and wound related issues.     POSTOPERATIVE PLAN:   Weight bearing as tolerated with knee range of motion as tolerated   Pain control with PO/IV meds   Keep silver dressing in place for 7 days post op. Change dressing only if saturated.   REGGIE hose and SCD's to bilateral lower extremities   Infectious Disease consult to manage IV/PO antibiotics based on intraoperative cultures.   PT/OT for mobilization and medical equipment needs   Social work for discharge planning needs   Follow up in 2-3 weeks for post-operative wound check with XR AP and lateral of operative knee.

## 2017-12-11 NOTE — ANESTHESIA POSTPROCEDURE EVALUATION
Patient: Lesli Leon    Procedure Summary     Date Anesthesia Start Anesthesia Stop Room / Location    12/10/17 1800  [facility]       Procedure Diagnosis Surgeon Provider    INCISION AND DRAINAGE, POLY EXCHANGE RIGHT KNEE (Right Knee) No diagnosis on file. MD Kwabena Godfrey MD          Anesthesia Type: general  Last vitals  BP   104/57 (12/10/17 1745)   Temp   97.6 °F (36.4 °C) (12/10/17 1600)   Pulse   81 (12/10/17 1745)   Resp   20 (12/10/17 1600)     SpO2   98 % (12/10/17 1745)     Post Anesthesia Care and Evaluation    Patient location during evaluation: PACU  Patient participation: complete - patient participated  Level of consciousness: awake and alert  Pain score: 0  Pain management: adequate  Airway patency: patent  Anesthetic complications: No anesthetic complications  PONV Status: none  Cardiovascular status: hemodynamically stable and acceptable  Respiratory status: nonlabored ventilation, acceptable and nasal cannula  Hydration status: acceptable

## 2017-12-11 NOTE — PLAN OF CARE
Problem: Patient Care Overview (Adult)  Goal: Plan of Care Review  Outcome: Ongoing (interventions implemented as appropriate)    12/11/17 1326   Coping/Psychosocial Response Interventions   Plan Of Care Reviewed With patient;family   Patient Care Overview   Progress no change   Outcome Evaluation   Outcome Summary/Follow up Plan S/p irrigation R knee arthro and polyethyenerevision. Patient is tolerating pain well, ambulating in bal. RA lungs are clear. Patient continues to have AMS with confusion and expressive dysphagis - neuro consult made, no other sepsis indicators present. Will transfer to neuro floor         Problem: Perioperative Period (Adult)  Intervention: Promote Pulmonary Hygiene and Secretion Clearance    12/11/17 1000 12/11/17 1200   Activity   Activity Type --  ambulated in bal;up in chair   Promote Aggressive Pulmonary Hygiene/Secretion Management   Cough And Deep Breathing --  done independently per patient   Incentive Spirometer   Administration (Incentive Spirometer) done with encouragement --    Positioning   Head Of Bed (HOB) Position --  HOB at 60-90 degrees       Intervention: Promote Effective Elimination    12/11/17 1000   Genitourinary () Interventions   Urinary Elimination Promotion catheter patency maintained       Intervention: Monitor/Manage Pain    12/11/17 0600 12/11/17 0800   Safety Interventions   Medication Review/Management medications reviewed;high risk medications identified --    Manage Acute Burn Pain   Pain Management Interventions --  cold applied;medication offered but refused;painful stimuli minimized;relaxation techniques promoted;pillow support       Intervention: Prevent/Manage Post-surgical Infection    12/11/17 1200   Safety Interventions   Infection Prevention barrier precautions utilized;environmental surveillance performed;equipment surfaces disinfected;glycemic control managed;personal protective equipment utilized;visitors restricted/screened        Intervention: Prevent Lena-procedural Injury    12/11/17 1200   Positioning   Positioning high Fowlers   Head Of Bed (HOB) Position HOB at 60-90 degrees       Intervention: Prevent/Manage DVT/VTE Risk    12/11/17 0800   Support Surgical/Anesthesia Recovery   Venous Thromboembolism Prevent/Manage bilateral;foot pump device on;intravenous hydration;plantar flexion performed;ROM (active) performed       Intervention: Promote Normothermia    12/10/17 1915   Cardiac Interventions   Warming Thermoregulation Maintenance forced warm air;head covering maintained;warm blankets applied;warm fluids administered;skin exposure reduced;skin exposure time minimized         Goal: Signs and Symptoms of Listed Potential Problems Will be Absent or Manageable (Perioperative Period)  Outcome: Ongoing (interventions implemented as appropriate)    12/11/17 0521   Perioperative Period   Problems Assessed (Perioperative Period) all   Problems Present (Perioperative Period) pain         Problem: Skin Integrity Impairment, Risk/Actual (Adult)  Intervention: Promote/Optimize Nutrition    12/11/17 0800   Hygiene Care Assistance   Oral Care teeth brushed       Intervention: Prevent/Manage Excess Moisture    12/11/17 0800 12/11/17 1100 12/11/17 1200   Hygiene Care Assistance   Perineal Care cleansed;absorbent pad changed;skin sealant/barrier applied --  --    Hygiene Care   Bathing/Skin Care --  linen changed --    Skin Interventions   Skin Protection --  --  incontinence pads utilized;skin sealant/moisture barrier applied;transparent dressing maintained       Intervention: Prevent/Minimize Sheer/Friction Injuries    12/11/17 1200   Positioning   Positioning/Transfer Devices pillows;in use   Skin Interventions   Pressure Reduction Techniques frequent weight shift encouraged;heels elevated off bed;positioned off wounds;weight shift assistance provided   Pressure Reduction Devices heel offloading device utilized;positioning supports  utilized;pressure-redistributing mattress utilized;specialty bed utilized         Goal: Identify Related Risk Factors and Signs and Symptoms  Outcome: Ongoing (interventions implemented as appropriate)    12/11/17 0521   Skin Integrity Impairment, Risk/Actual   Skin Integrity Impairment, Risk/Actual: Related Risk Factors infection/disease process;medication effects;surgery/procedure       Goal: Skin Integrity/Wound Healing  Outcome: Ongoing (interventions implemented as appropriate)    12/11/17 1326   Skin Integrity Impairment, Risk/Actual (Adult)   Skin Integrity/Wound Healing making progress toward outcome

## 2017-12-11 NOTE — PLAN OF CARE
Problem: Patient Care Overview (Adult)  Goal: Plan of Care Review  Outcome: Ongoing (interventions implemented as appropriate)    12/11/17 0521   Coping/Psychosocial Response Interventions   Plan Of Care Reviewed With patient;spouse;son   Patient Care Overview   Progress improving       Goal: Adult Individualization and Mutuality  Outcome: Ongoing (interventions implemented as appropriate)    Problem: Infection, Risk/Actual (Adult)  Goal: Identify Related Risk Factors and Signs and Symptoms  Outcome: Ongoing (interventions implemented as appropriate)  Goal: Infection Prevention/Resolution  Outcome: Ongoing (interventions implemented as appropriate)    Problem: Perioperative Period (Adult)  Goal: Signs and Symptoms of Listed Potential Problems Will be Absent or Manageable (Perioperative Period)  Outcome: Ongoing (interventions implemented as appropriate)    Problem: Skin Integrity Impairment, Risk/Actual (Adult)  Goal: Identify Related Risk Factors and Signs and Symptoms  Outcome: Ongoing (interventions implemented as appropriate)  Goal: Skin Integrity/Wound Healing  Outcome: Ongoing (interventions implemented as appropriate)

## 2017-12-11 NOTE — BRIEF OP NOTE
KNEE ARTHROTOMY  Progress Note    Lesli Leon  12/10/2017    Pre-op Diagnosis:   right knee periprosthetic joint infection       Post-Op Diagnosis Codes:     * Infection of total knee replacement [T84.59XA, Z96.659]     * Instability of prosthetic knee [T84.028A, Z96.659]    Procedure/CPT® Codes:  RI REVISE KNEE JOINT REPLACE,1 PART [52648]  RI REVISION QUADRICEPS [90139]    Procedure(s):  Revision right knee arthroplasty for instability and prosthetic joint infection, single component  Quadricepsplasty  Irrigation and debridement of periprosthetic joint infection     Surgeon(s):  Stephon Garcia MD    Anesthesia: General    Staff:   Circulator: Loli Hermosillo RN  Scrub Person: Rachael Brito; Brina Sánchez  Vendor Representative: Pino Cook  Assistant: Miquel Handley PA-C    Estimated Blood Loss: 100ml    Urine Voided: * No values recorded between 12/10/2017  6:00 PM and 12/10/2017  8:31 PM *    Specimens:                  ID Type Source Tests Collected by Time Destination   1 : synovial fluid right knee  Synovial Fluid Knee, Right ANAEROBIC CULTURE, FUNGAL CULTURE, GRAM STAIN, BODY FLUID CULTURE, AFB CULTURE Stephon Garcia MD 12/10/2017 1915    2 : synovial tissue right knee Tissue Knee, Right ANAEROBIC CULTURE, FUNGAL CULTURE, TISSUE / BONE CULTURE, AFB CULTURE Stephon Garcia MD 12/10/2017 1921    3 : synovial tissue right knee Tissue Knee, Right ANAEROBIC CULTURE, FUNGAL CULTURE, TISSUE / BONE CULTURE, AFB CULTURE Stephon Garcia MD 12/10/2017 1924    4 : synovial tissue right knee Tissue Knee, Right ANAEROBIC CULTURE, FUNGAL CULTURE, TISSUE / BONE CULTURE, AFB CULTURE Stephon Garcia MD 12/10/2017 1925    5 : posterior capsule right knee Tissue Knee, Right ANAEROBIC CULTURE, FUNGAL CULTURE, TISSUE / BONE CULTURE, AFB CULTURE Stephon Garcia MD 12/10/2017 1926          Drains:   Urethral Catheter 12/10/17 100% silicone (Active)           Findings: Cloudy synovial fluid with  abundant scar tissue and signs and symptoms consistent with instability of the knee joint with likely acute periprosthetic infection    Complications: None apparent      Stephon Garcia MD     Date: 12/10/2017  Time: 8:45 PM

## 2017-12-11 NOTE — CONSULTS
Neurology    Referring provider:   Stephon Carbajal MD  [address] [facility]  [address]  [place] [address]    Reason for Consultation: Aphasia    Chief complaint: Right knee pain    History of present illness:  This 62-year-old woman is seems request of Dr. Rosado for evaluation of confusion and the difficulty with word finding.    This is gotten somewhat better as the septic prosthetic knee joint    She's never had a stroke in the past.    Preoperative CT scan of the brain is negative for acute change.    She has no focal weakness in her extremities other than her right leg.  She does not have facial droop or slurring of her speech.    She has no visual changes headache or stiff neck.        Review of Systems: She quit smoking 3 years ago.    All other systems reviewed and are negative.        Home meds:   Prescriptions Prior to Admission   Medication Sig Dispense Refill Last Dose   • ALPRAZolam (XANAX) 1 MG tablet Take 1 mg by mouth 2 (Two) Times a Day.      • buprenorphine-naloxone (SUBOXONE) 8-2 MG film film Place 1 film under the tongue 2 (Two) Times a Day.      • buPROPion XL (WELLBUTRIN XL) 150 MG 24 hr tablet Take 150 mg by mouth 2 (Two) Times a Day.      • chlorzoxazone (PARAFON FORTE) 500 MG tablet Take 500 mg by mouth 3 (Three) Times a Day As Needed for Muscle Spasms.      • gabapentin (NEURONTIN) 800 MG tablet Take 800 mg by mouth 4 (Four) Times a Day.      • levothyroxine (SYNTHROID, LEVOTHROID) 100 MCG tablet Take 100 mcg by mouth Daily. Brand name.      • linaclotide (LINZESS) 290 MCG capsule capsule Take 290 mcg by mouth Daily.      • lisinopril (PRINIVIL,ZESTRIL) 20 MG tablet Take 20 mg by mouth Daily.      • meloxicam (MOBIC) 15 MG tablet Take 15 mg by mouth Daily.      • triamterene-hydrochlorothiazide (MAXZIDE) 75-50 MG per tablet Take 1 tablet by mouth Daily.          History  Past Medical History:   Diagnosis Date   • Arthritis    • Breast cancer 2015    RIGHT   • Disease of thyroid gland   "  • Hx of radiation therapy 2015    RT BREAST   • Hypertension    ,   Past Surgical History:   Procedure Laterality Date   • BREAST BIOPSY Right 2015   • BREAST LUMPECTOMY Right 2015   • JOINT REPLACEMENT     ,   Family History   Problem Relation Age of Onset   • Breast cancer Mother      DX AGE UNKNOWN   • Breast cancer Sister      DX AGE UNKNOWN   • Ovarian cancer Neg Hx    ,   Social History   Substance Use Topics   • Smoking status: Current Every Day Smoker     Years: 5.00     Types: Electronic Cigarette   • Smokeless tobacco: Not on file   • Alcohol use No    and Allergies:  Bactrim [sulfamethoxazole-trimethoprim],    Vital Signs   Blood pressure 106/54, pulse 86, temperature 97.5 °F (36.4 °C), temperature source Axillary, resp. rate 20, height 162.6 cm (64\"), weight 74.8 kg (164 lb 12.8 oz), SpO2 97 %.  Body mass index is 28.29 kg/(m^2).    Physical Exam:   General: Pleasant white female in no distress              Neck: No bruits on the left.  She has a IV line in her right neck.              Resp: Normal breath sounds              Cor: Regular rhythm              Extr: No edema              Skin: Warm and dry               Neuro: Mentally alert and oriented person place.    Speech is articulate with no word finding problems she follows complex commands.    Coordination shows normal fine finger movement and finger to nose testing bilaterally the upper extremities.    Cranial nerves show benign optic fundi equal pupils full eye movements no ptosis.    Facial movement and sensation are normal.    Palate elevates normally tongue protrudes normally.    Reflexes are 2+ and equal bilaterally.    Motor testing shows symmetrical  both hands.  Muscle tone is normal.    Sensory testing is normal.        Results Review: Reviewed    Labs:  Lab Results (last 72 hours)     Procedure Component Value Units Date/Time    CBC Auto Differential [[id number]]  (Abnormal) Collected:  12/10/17 1518    Specimen:  Blood Updated:  " 12/10/17 1530     WBC 17.51 (H) 10*3/mm3      RBC 3.47 (L) 10*6/mm3      Hemoglobin 10.4 (L) g/dL      Hematocrit 33.7 (L) %      MCV 97.1 fL      MCH 30.0 pg      MCHC 30.9 (L) g/dL      RDW 14.3 %      RDW-SD 51.3 fl      MPV 9.4 fL      Platelets 243 10*3/mm3      Neutrophil % 89.5 (H) %      Lymphocyte % 4.5 (L) %      Monocyte % 5.5 %      Eosinophil % 0.1 %      Basophil % 0.2 %      Immature Grans % 0.2 %      Neutrophils, Absolute 15.68 (H) 10*3/mm3      Lymphocytes, Absolute 0.79 10*3/mm3      Monocytes, Absolute 0.96 10*3/mm3      Eosinophils, Absolute 0.01 10*3/mm3      Basophils, Absolute 0.03 10*3/mm3      Immature Grans, Absolute 0.04 (H) 10*3/mm3     Lactic Acid, Plasma [[id number]]  (Normal) Collected:  12/10/17 1517    Specimen:  Blood Updated:  12/10/17 1540     Lactate 0.5 mmol/L       Falsely depressed results may occur on samples drawn from patients receiving N-Acetylcysteine (NAC) or Metamizole.       aPTT [[id number]]  (Normal) Collected:  12/10/17 1517    Specimen:  Blood Updated:  12/10/17 1545     PTT 30.6 seconds     Narrative:       PTT = The equivalent PTT values for the therapeutic range of heparin levels at 0.3 to 0.5 U/ml are 45 to 60 seconds.    Basic Metabolic Panel [[id number]]  (Abnormal) Collected:  12/10/17 1517    Specimen:  Blood Updated:  12/10/17 1554     Glucose 105 (H) mg/dL      BUN 30 (H) mg/dL      Creatinine 1.70 (H) mg/dL      Sodium 142 mmol/L      Potassium 3.7 mmol/L      Chloride 110 (H) mmol/L      CO2 26.0 mmol/L      Calcium 8.1 (L) mg/dL      eGFR Non African Amer 30 (L) mL/min/1.73      BUN/Creatinine Ratio 17.6     Anion Gap 6.0 mmol/L     Narrative:       National Kidney Foundation Guidelines    Stage     Description        GFR  1         Normal or High     90+  2         Mild decrease      60-89  3         Moderate decrease  30-59  4         Severe decrease    15-29  5         Kidney failure     <15    Troponin [[id number]]  (Normal) Collected:  12/10/17 8430     Specimen:  Blood Updated:  12/10/17 1554     Troponin I 0.007 ng/mL     TSH [[id number]]  (Abnormal) Collected:  12/10/17 1517    Specimen:  Blood Updated:  12/10/17 1554     TSH 0.214 (L) mIU/mL     Narrative:       Due to abnormal TSH results, suggest ordering Free T4.    Magnesium [[id number]]  (Normal) Collected:  12/10/17 1517    Specimen:  Blood Updated:  12/10/17 1554     Magnesium 2.1 mg/dL     Phosphorus [[id number]]  (Normal) Collected:  12/10/17 1517    Specimen:  Blood Updated:  12/10/17 1554     Phosphorus 3.3 mg/dL     Procalcitonin [[id number]] Collected:  12/10/17 1517    Specimen:  Blood Updated:  12/10/17 1609     Procalcitonin 9.86 ng/mL     Narrative:       As a Marker for Sepsis (Non-Neonates):   1. <0.5 ng/mL represents a low risk of severe sepsis and/or septic shock.  2. >2 ng/mL represents a high risk of severe sepsis and/or septic shock.    As a Marker for Lower Respiratory Tract Infections that require antibiotic therapy:    PCT on Admission     Antibiotic Therapy       6-12 Hrs later  > 0.5                Strongly Recommended             >0.25 - <0.5         Recommended  0.1 - 0.25           Discouraged              Remeasure/reassess PCT  <0.1                 Strongly Discouraged     Remeasure/reassess PCT                     PCT values of < 0.5 ng/mL do not exclude an infection, because localized infections (without systemic signs) may be associated with such low concentrations, or a systemic infection in its initial stages (< 6 hours). Furthermore, increased PCT can occur without infection. PCT concentrations between 0.5 and 2.0 ng/mL should be interpreted taking into account the patient's history. It is recommended to retest PCT within 6-24 hours if any concentrations < 2 ng/mL are obtained.    Cortisol [[id number]] Collected:  12/10/17 1535    Specimen:  Blood Updated:  12/10/17 1615     Cortisol 18.80 mcg/dL     Hemoglobin A1c [[id number]]  (Abnormal) Collected:  12/10/17 1518     Specimen:  Blood Updated:  12/10/17 1641     Hemoglobin A1C 5.70 (H) %     Narrative:       The American Diabetes Association recommends maintenance of Hemoglobin A1C at 7.0% or lower. Goals for Hemoglobin A1C reduction may need to be modified if hypoglycemia is a problem.    POC Glucose Fingerstick [[id number]]  (Normal) Collected:  12/10/17 1726    Specimen:  Blood Updated:  12/10/17 1728     Glucose 89 mg/dL     Narrative:       Meter: [id number] : [id number] Valarie Brandon    Fungus Culture - Synovial Fluid, Knee, Right [[id number]] Collected:  12/10/17 1915    Specimen:  Synovial Fluid from Knee, Right Updated:  12/10/17 2156    Fungus Culture - Tissue, Knee, Right [[id number]] Collected:  12/10/17 1921    Specimen:  Tissue from Knee, Right Updated:  12/10/17 2205    Fungus Culture - Tissue, Knee, Right [[id number]] Collected:  12/10/17 1924    Specimen:  Tissue from Knee, Right Updated:  12/10/17 2215    Fungus Culture - Tissue, Knee, Right [[id number]] Collected:  12/10/17 1925    Specimen:  Tissue from Knee, Right Updated:  12/10/17 2225    Fungus Culture - Tissue, Knee, Right [[id number]] Collected:  12/10/17 1926    Specimen:  Tissue from Knee, Right Updated:  12/10/17 2237    Hemoglobin & Hematocrit, Blood [[id number]]  (Abnormal) Collected:  12/10/17 2225    Specimen:  Blood Updated:  12/10/17 2301     Hemoglobin 10.3 (L) g/dL      Hematocrit 32.8 (L) %     Basic Metabolic Panel [[id number]]  (Abnormal) Collected:  12/10/17 2226    Specimen:  Blood Updated:  12/10/17 2317     Glucose 123 (H) mg/dL      BUN 25 (H) mg/dL      Creatinine 1.10 mg/dL      Sodium 142 mmol/L      Potassium 3.9 mmol/L      Chloride 108 mmol/L      CO2 25.0 mmol/L      Calcium 7.8 (L) mg/dL      eGFR Non African Amer 50 (L) mL/min/1.73      BUN/Creatinine Ratio 22.7     Anion Gap 9.0 mmol/L     Narrative:       National Kidney Foundation Guidelines    Stage     Description        GFR  1         Normal or High     90+  2          Mild decrease      60-89  3         Moderate decrease  30-59  4         Severe decrease    15-29  5         Kidney failure     <15    Blood Culture - Blood, Blood, Venous Line [[id number]]  (Normal) Collected:  12/10/17 1539    Specimen:  Blood from Blood, Venous Line Updated:  12/11/17 0431     Blood Culture No growth at less than 24 hours    Blood Culture - Blood, Blood, Venous Line [[id number]]  (Normal) Collected:  12/10/17 1535    Specimen:  Blood from Blood, Venous Line Updated:  12/11/17 0431     Blood Culture No growth at less than 24 hours    CBC & Differential [[id number]] Collected:  12/11/17 0439    Specimen:  Blood Updated:  12/11/17 0500    Narrative:       The following orders were created for panel order CBC & Differential.  Procedure                               Abnormality         Status                     ---------                               -----------         ------                     CBC Auto Differential[[id number]]        Abnormal            Final result                 Please view results for these tests on the individual orders.    CBC Auto Differential [[id number]]  (Abnormal) Collected:  12/11/17 0439    Specimen:  Blood Updated:  12/11/17 0500     WBC 15.70 (H) 10*3/mm3      RBC 3.19 (L) 10*6/mm3      Hemoglobin 9.7 (L) g/dL      Hematocrit 31.0 (L) %      MCV 97.2 fL      MCH 30.4 pg      MCHC 31.3 (L) g/dL      RDW 14.1 %      RDW-SD 50.9 fl      MPV 9.4 fL      Platelets 221 10*3/mm3      Neutrophil % 94.5 (H) %      Lymphocyte % 3.1 (L) %      Monocyte % 2.0 %      Eosinophil % 0.0 %      Basophil % 0.1 %      Immature Grans % 0.3 %      Neutrophils, Absolute 14.85 (H) 10*3/mm3      Lymphocytes, Absolute 0.48 (L) 10*3/mm3      Monocytes, Absolute 0.32 10*3/mm3      Eosinophils, Absolute 0.00 10*3/mm3      Basophils, Absolute 0.01 10*3/mm3      Immature Grans, Absolute 0.04 (H) 10*3/mm3     Magnesium [[id number]]  (Normal) Collected:  12/11/17 0439    Specimen:  Blood Updated:   12/11/17 0544     Magnesium 2.2 mg/dL     Basic Metabolic Panel [[id number]]  (Abnormal) Collected:  12/11/17 0439    Specimen:  Blood Updated:  12/11/17 0544     Glucose 115 (H) mg/dL      BUN 25 (H) mg/dL      Creatinine 1.00 mg/dL      Sodium 140 mmol/L      Potassium 3.8 mmol/L      Chloride 106 mmol/L      CO2 25.0 mmol/L      Calcium 8.3 (L) mg/dL      eGFR Non African Amer 56 (L) mL/min/1.73      BUN/Creatinine Ratio 25.0     Anion Gap 9.0 mmol/L     Narrative:       National Kidney Foundation Guidelines    Stage     Description        GFR  1         Normal or High     90+  2         Mild decrease      60-89  3         Moderate decrease  30-59  4         Severe decrease    15-29  5         Kidney failure     <15    Vancomycin, Random [[id number]]  (Normal) Collected:  12/11/17 0439    Specimen:  Blood Updated:  12/11/17 0546     Vancomycin Random 8.80 mcg/mL     Tissue / Bone Culture - Tissue, Knee, Right [[id number]]  (Normal) Collected:  12/10/17 1924    Specimen:  Tissue from Knee, Right Updated:  12/11/17 0856     Tissue Culture No growth     Gram Stain Result Occasional WBCs seen      No organisms seen    Tissue / Bone Culture - Tissue, Knee, Right [[id number]]  (Normal) Collected:  12/10/17 1921    Specimen:  Tissue from Knee, Right Updated:  12/11/17 0858     Tissue Culture No growth     Gram Stain Result No WBCs or organisms seen    Tissue / Bone Culture - Tissue, Knee, Right [[id number]]  (Normal) Collected:  12/10/17 1925    Specimen:  Tissue from Knee, Right Updated:  12/11/17 0859     Tissue Culture No growth     Gram Stain Result Occasional WBCs seen      No organisms seen    Body Fluid Culture - Synovial Fluid, Knee, Right [[id number]]  (Normal) Collected:  12/10/17 1915    Specimen:  Synovial Fluid from Knee, Right Updated:  12/11/17 0901     BF Culture No growth     Gram Stain Result Moderate (3+) WBCs seen      No organisms seen    Tissue / Bone Culture - Tissue, Knee, Right [[id number]]  (Normal)  Collected:  12/10/17 1926    Specimen:  Tissue from Knee, Right Updated:  12/11/17 0901     Tissue Culture No growth     Gram Stain Result Many (4+) WBCs seen      No organisms seen    AFB Culture - Synovial Fluid, Knee, Right [[id number]] Collected:  12/10/17 1915    Specimen:  Synovial Fluid from Knee, Right Updated:  12/11/17 1316     AFB Stain No acid fast bacilli seen on concentrated smear    AFB Culture - Tissue, Knee, Right [[id number]] Collected:  12/10/17 1921    Specimen:  Tissue from Knee, Right Updated:  12/11/17 1316     AFB Stain No acid fast bacilli seen on concentrated smear    AFB Culture - Tissue, Knee, Right [[id number]] Collected:  12/10/17 1924    Specimen:  Tissue from Knee, Right Updated:  12/11/17 1316     AFB Stain No acid fast bacilli seen on concentrated smear    AFB Culture - Tissue, Knee, Right [[id number]] Collected:  12/10/17 1925    Specimen:  Tissue from Knee, Right Updated:  12/11/17 1316     AFB Stain No acid fast bacilli seen on concentrated smear    AFB Culture - Tissue, Knee, Right [[id number]] Collected:  12/10/17 1926    Specimen:  Tissue from Knee, Right Updated:  12/11/17 1316     AFB Stain No acid fast bacilli seen on concentrated smear    Anaerobic Culture - Tissue, Knee, Right [[id number]]  (Normal) Collected:  12/10/17 1926    Specimen:  Tissue from Knee, Right Updated:  12/11/17 1343     Culture No anaerobes isolated    Anaerobic Culture - Tissue, Knee, Right [[id number]]  (Normal) Collected:  12/10/17 1925    Specimen:  Tissue from Knee, Right Updated:  12/11/17 1343     Culture No anaerobes isolated    Anaerobic Culture - Tissue, Knee, Right [[id number]]  (Normal) Collected:  12/10/17 1924    Specimen:  Tissue from Knee, Right Updated:  12/11/17 1344     Culture No anaerobes isolated    Anaerobic Culture - Tissue, Knee, Right [[id number]]  (Normal) Collected:  12/10/17 1921    Specimen:  Tissue from Knee, Right Updated:  12/11/17 1345     Culture No anaerobes isolated     Anaerobic Culture - Synovial Fluid, Knee, Right [[id number]]  (Normal) Collected:  12/10/17 1915    Specimen:  Synovial Fluid from Knee, Right Updated:  12/11/17 1347     Culture No anaerobes isolated          Rads:  Imaging Results (last 72 hours)     Procedure Component Value Units Date/Time    XR Chest 1 View [[id number]] Collected:  12/10/17 1520     Updated:  12/10/17 1730    Narrative:          EXAMINATION: XR CHEST 1 VW - 12/10/2017     INDICATION: Central line placement.     COMPARISON: None.     FINDINGS:   1. A right IJ catheter has been placed. The tip is perfectly positioned  in the SVC at the entrance to the right atrium.     2. There is no detectable pneumothorax.     3. There is mild chronic scarring in the chest but there is no active  disease, edema or free fluid.           Impression:       Perfect position of the right IJ catheter in the SVC at the  entrance to the right atrium.     No pneumothorax and no other acute finding.     DICTATED:     12/10/2017  EDITED:          12/10/2017        This report was finalized on 12/10/2017 5:28 PM by Dr. Rigo Urena MD.       XR Knee 1 or 2 View Right [[id number]] Collected:  12/11/17 0830     Updated:  12/11/17 0830    Narrative:       EXAMINATION: XR KNEE 1 OR 2 VW RIGHT-      INDICATION: Postop knee arthoplasty; A41.9-Sepsis, unspecified organism;  T84.53XA-Infection and inflammatory reaction due to internal right knee  prosthesis, initial encounter; Z96.651-Presence of right artificial knee  joint.      COMPARISON: Radiographs 12/10/2017 earlier same day     FINDINGS: Status post right total knee arthroplasty. No evidence of  hardware complication with components in satisfactory alignment.  Expected postsurgical changes including subcutaneous emphysema are  noted.       Impression:       Status post right total knee arthroplasty without evidence  of complication in satisfactory alignment.     D:  12/11/2017  E:  12/11/2017       XR Knee 1 or 2 View Right  [[id number]] Collected:  12/10/17 1810     Updated:  12/11/17 1041    Narrative:          EXAMINATION: XR KNEE 1 OR 2 VIEWS RIGHT - 12/10/2017      INDICATION: Painful TKA.     COMPARISON: None.     FINDINGS:   1. The total right knee arthroplasty appears to be well-positioned. The  hardware appears to be intact without evidence of overt signs of  loosening.     2. There is a transverse defect in the proximal right tibia, likely  previous surgery and this should be correlated.     3. There is no evidence of fracture of the native bone structures and  there is no malalignment.           Impression:       Chronic findings in the right knee as described, however,  the total knee arthroplasty hardware appears well positioned. There is  no abnormality or fracture of the native bone structures. There is a  transverse lucent defect in the proximal third of the right tibia which  is likely a screw tract and should be correlated with the patient's  previous history.     DICTATED:     12/10/2017  EDITED:          12/10/2017        This report was finalized on 12/11/2017 10:39 AM by Dr. Rigo Urena MD.               Assessment: Speech difficulty secondary to septic encephalopathy       Plan:    CT of the head with and without infusion    Comment:   Stroke is unlikely.    I discussed the patients findings and my recommendations with patient, family and nursing staff      Fady Cervantes MD  12/11/17  2:24 PM

## 2017-12-12 ENCOUNTER — APPOINTMENT (OUTPATIENT)
Dept: CARDIOLOGY | Facility: HOSPITAL | Age: 62
End: 2017-12-12
Attending: INTERNAL MEDICINE

## 2017-12-12 LAB
ANION GAP SERPL CALCULATED.3IONS-SCNC: 10 MMOL/L (ref 3–11)
BASOPHILS # BLD AUTO: 0 10*3/MM3 (ref 0–0.2)
BASOPHILS NFR BLD AUTO: 0 % (ref 0–1)
BH CV ECHO MEAS - AI DEC SLOPE: 141 CM/SEC^2
BH CV ECHO MEAS - AI MAX PG: 45.7 MMHG
BH CV ECHO MEAS - AI MAX VEL: 338 CM/SEC
BH CV ECHO MEAS - AI P1/2T: 702.1 MSEC
BH CV ECHO MEAS - AO ROOT AREA (BSA CORRECTED): 1.6
BH CV ECHO MEAS - AO ROOT AREA: 6.6 CM^2
BH CV ECHO MEAS - AO ROOT DIAM: 2.9 CM
BH CV ECHO MEAS - BSA(HAYCOCK): 1.9 M^2
BH CV ECHO MEAS - BSA: 1.8 M^2
BH CV ECHO MEAS - BZI_BMI: 28.2 KILOGRAMS/M^2
BH CV ECHO MEAS - BZI_METRIC_HEIGHT: 162.6 CM
BH CV ECHO MEAS - BZI_METRIC_WEIGHT: 74.4 KG
BH CV ECHO MEAS - EDV(CUBED): 77.3 ML
BH CV ECHO MEAS - EDV(TEICH): 81.3 ML
BH CV ECHO MEAS - EF(CUBED): 64.4 %
BH CV ECHO MEAS - EF(TEICH): 56.2 %
BH CV ECHO MEAS - ESV(CUBED): 27.5 ML
BH CV ECHO MEAS - ESV(TEICH): 35.6 ML
BH CV ECHO MEAS - FS: 29.1 %
BH CV ECHO MEAS - IVS/LVPW: 1
BH CV ECHO MEAS - IVSD: 1.1 CM
BH CV ECHO MEAS - LA DIMENSION: 3.3 CM
BH CV ECHO MEAS - LA/AO: 1.1
BH CV ECHO MEAS - LV MASS(C)D: 162.7 GRAMS
BH CV ECHO MEAS - LV MASS(C)DI: 90.5 GRAMS/M^2
BH CV ECHO MEAS - LVIDD: 4.3 CM
BH CV ECHO MEAS - LVIDS: 3 CM
BH CV ECHO MEAS - LVPWD: 1.1 CM
BH CV ECHO MEAS - MV A MAX VEL: 96.7 CM/SEC
BH CV ECHO MEAS - MV DEC SLOPE: 587.5 CM/SEC^2
BH CV ECHO MEAS - MV DEC TIME: 0.19 SEC
BH CV ECHO MEAS - MV E MAX VEL: 121 CM/SEC
BH CV ECHO MEAS - MV E/A: 1.3
BH CV ECHO MEAS - MV P1/2T MAX VEL: 124 CM/SEC
BH CV ECHO MEAS - MV P1/2T: 61.8 MSEC
BH CV ECHO MEAS - MVA P1/2T LCG: 1.8 CM^2
BH CV ECHO MEAS - MVA(P1/2T): 3.6 CM^2
BH CV ECHO MEAS - PA ACC SLOPE: 546 CM/SEC^2
BH CV ECHO MEAS - PA ACC TIME: 0.13 SEC
BH CV ECHO MEAS - PA PR(ACCEL): 20.5 MMHG
BH CV ECHO MEAS - RAP SYSTOLE: 3 MMHG
BH CV ECHO MEAS - RV MAX PG: 0.82 MMHG
BH CV ECHO MEAS - RV V1 MAX: 45.4 CM/SEC
BH CV ECHO MEAS - RVSP: 35 MMHG
BH CV ECHO MEAS - SI(CUBED): 27.7 ML/M^2
BH CV ECHO MEAS - SI(TEICH): 25.4 ML/M^2
BH CV ECHO MEAS - SV(CUBED): 49.8 ML
BH CV ECHO MEAS - SV(TEICH): 45.7 ML
BH CV ECHO MEAS - TAPSE (>1.6): 1.9 CM2
BH CV ECHO MEAS - TR MAX VEL: 282.8 CM/SEC
BH CV XLRA - RV BASE: 3.1 CM
BH CV XLRA - RV LENGTH: 6.5 CM
BH CV XLRA - RV MID: 2.7 CM
BH CV XLRA - TDI S': 11.1 CM/SEC
BUN BLD-MCNC: 33 MG/DL (ref 9–23)
BUN/CREAT SERPL: 36.7 (ref 7–25)
CALCIUM SPEC-SCNC: 8.4 MG/DL (ref 8.7–10.4)
CHLORIDE SERPL-SCNC: 106 MMOL/L (ref 99–109)
CO2 SERPL-SCNC: 26 MMOL/L (ref 20–31)
CREAT BLD-MCNC: 0.9 MG/DL (ref 0.6–1.3)
DEPRECATED RDW RBC AUTO: 49.8 FL (ref 37–54)
EOSINOPHIL # BLD AUTO: 0 10*3/MM3 (ref 0–0.3)
EOSINOPHIL NFR BLD AUTO: 0 % (ref 0–3)
ERYTHROCYTE [DISTWIDTH] IN BLOOD BY AUTOMATED COUNT: 13.9 % (ref 11.3–14.5)
GFR SERPL CREATININE-BSD FRML MDRD: 63 ML/MIN/1.73
GLUCOSE BLD-MCNC: 125 MG/DL (ref 70–100)
HCT VFR BLD AUTO: 32.2 % (ref 34.5–44)
HGB BLD-MCNC: 10 G/DL (ref 11.5–15.5)
IMM GRANULOCYTES # BLD: 0.03 10*3/MM3 (ref 0–0.03)
IMM GRANULOCYTES NFR BLD: 0.2 % (ref 0–0.6)
LEFT ATRIUM VOLUME INDEX: 21.1 ML/M2
LEFT ATRIUM VOLUME: 38 CM3
LYMPHOCYTES # BLD AUTO: 1.18 10*3/MM3 (ref 0.6–4.8)
LYMPHOCYTES NFR BLD AUTO: 7.6 % (ref 24–44)
MAGNESIUM SERPL-MCNC: 2.4 MG/DL (ref 1.3–2.7)
MAXIMAL PREDICTED HEART RATE: 158 BPM
MCH RBC QN AUTO: 30.3 PG (ref 27–31)
MCHC RBC AUTO-ENTMCNC: 31.1 G/DL (ref 32–36)
MCV RBC AUTO: 97.6 FL (ref 80–99)
MONOCYTES # BLD AUTO: 0.73 10*3/MM3 (ref 0–1)
MONOCYTES NFR BLD AUTO: 4.7 % (ref 0–12)
NEUTROPHILS # BLD AUTO: 13.68 10*3/MM3 (ref 1.5–8.3)
NEUTROPHILS NFR BLD AUTO: 87.5 % (ref 41–71)
PHOSPHATE SERPL-MCNC: 2.8 MG/DL (ref 2.4–5.1)
PLATELET # BLD AUTO: 277 10*3/MM3 (ref 150–450)
PMV BLD AUTO: 10.2 FL (ref 6–12)
POTASSIUM BLD-SCNC: 4.3 MMOL/L (ref 3.5–5.5)
RBC # BLD AUTO: 3.3 10*6/MM3 (ref 3.89–5.14)
SODIUM BLD-SCNC: 142 MMOL/L (ref 132–146)
STRESS TARGET HR: 134 BPM
WBC NRBC COR # BLD: 15.62 10*3/MM3 (ref 3.5–10.8)

## 2017-12-12 PROCEDURE — 85025 COMPLETE CBC W/AUTO DIFF WBC: CPT | Performed by: INTERNAL MEDICINE

## 2017-12-12 PROCEDURE — 97116 GAIT TRAINING THERAPY: CPT

## 2017-12-12 PROCEDURE — 25010000002 HYDROCORTISONE SODIUM SUCCINATE 100 MG RECONSTITUTED SOLUTION: Performed by: INTERNAL MEDICINE

## 2017-12-12 PROCEDURE — 25010000002 VANCOMYCIN PER 500 MG

## 2017-12-12 PROCEDURE — 84100 ASSAY OF PHOSPHORUS: CPT | Performed by: INTERNAL MEDICINE

## 2017-12-12 PROCEDURE — 93306 TTE W/DOPPLER COMPLETE: CPT

## 2017-12-12 PROCEDURE — 25010000002 ONDANSETRON PER 1 MG: Performed by: ORTHOPAEDIC SURGERY

## 2017-12-12 PROCEDURE — 93306 TTE W/DOPPLER COMPLETE: CPT | Performed by: INTERNAL MEDICINE

## 2017-12-12 PROCEDURE — 25010000003 CEFTRIAXONE PER 250 MG: Performed by: INTERNAL MEDICINE

## 2017-12-12 PROCEDURE — 99231 SBSQ HOSP IP/OBS SF/LOW 25: CPT | Performed by: PSYCHIATRY & NEUROLOGY

## 2017-12-12 PROCEDURE — 80048 BASIC METABOLIC PNL TOTAL CA: CPT | Performed by: ORTHOPAEDIC SURGERY

## 2017-12-12 PROCEDURE — 99233 SBSQ HOSP IP/OBS HIGH 50: CPT | Performed by: INTERNAL MEDICINE

## 2017-12-12 PROCEDURE — 83735 ASSAY OF MAGNESIUM: CPT | Performed by: INTERNAL MEDICINE

## 2017-12-12 PROCEDURE — 97110 THERAPEUTIC EXERCISES: CPT

## 2017-12-12 PROCEDURE — 99024 POSTOP FOLLOW-UP VISIT: CPT | Performed by: ORTHOPAEDIC SURGERY

## 2017-12-12 RX ORDER — DULOXETIN HYDROCHLORIDE 30 MG/1
30 CAPSULE, DELAYED RELEASE ORAL 3 TIMES DAILY
COMMUNITY

## 2017-12-12 RX ADMIN — CEFTRIAXONE SODIUM 2 G: 2 INJECTION, SOLUTION INTRAVENOUS at 09:03

## 2017-12-12 RX ADMIN — ASPIRIN 325 MG: 325 TABLET, DELAYED RELEASE ORAL at 20:27

## 2017-12-12 RX ADMIN — LEVOTHYROXINE SODIUM ANHYDROUS 100 MCG: 100 INJECTION, POWDER, LYOPHILIZED, FOR SOLUTION INTRAVENOUS at 12:27

## 2017-12-12 RX ADMIN — VANCOMYCIN HYDROCHLORIDE 750 MG: 750 INJECTION, SOLUTION INTRAVENOUS at 18:01

## 2017-12-12 RX ADMIN — SODIUM CHLORIDE 50 ML/HR: 9 INJECTION, SOLUTION INTRAVENOUS at 18:01

## 2017-12-12 RX ADMIN — ASPIRIN 325 MG: 325 TABLET, DELAYED RELEASE ORAL at 09:02

## 2017-12-12 RX ADMIN — VANCOMYCIN HYDROCHLORIDE 750 MG: 750 INJECTION, SOLUTION INTRAVENOUS at 06:01

## 2017-12-12 RX ADMIN — HYDROCORTISONE SODIUM SUCCINATE 100 MG: 100 INJECTION, POWDER, FOR SOLUTION INTRAMUSCULAR; INTRAVENOUS at 09:02

## 2017-12-12 RX ADMIN — ONDANSETRON 4 MG: 2 INJECTION INTRAMUSCULAR; INTRAVENOUS at 08:16

## 2017-12-12 RX ADMIN — OXYCODONE AND ACETAMINOPHEN 1 TABLET: 5; 325 TABLET ORAL at 15:42

## 2017-12-12 RX ADMIN — OXYCODONE AND ACETAMINOPHEN 1 TABLET: 5; 325 TABLET ORAL at 09:02

## 2017-12-12 NOTE — PROGRESS NOTES
[facility] Progress Note    Admission Date: 12/10/2017    Lesli Leon  1955  [id number]    Date: 2017    Meds:    Anti-Infectives     Ordered     Dose/Rate Route Frequency Start Stop    17 1130  vancomycin in dextrose 5% 150 mL (VANCOCIN) IVPB 750 mg     Ordering Provider:  Avery Borjas RPH    750 mg Intravenous Every 12 Hours 17 1800 17 1759    12/10/17 1523  cefTRIAXone (ROCEPHIN) IVPB 2 g     Ordering Provider:  Stephon Carbajal MD    2 g  100 mL/hr over 30 Minutes Intravenous Every 24 Hours 17 0800 17 0759    12/10/17 2138  vancomycin (VANCOCIN) in iso-osmotic dextrose IVPB 1 g (premix) 200 mL     Ordering Provider:  Stephon Garcia MD    15 mg/kg × 74.8 kg  over 60 Minutes Intravenous Once 17 0600 17 0705    12/10/17 1523  Pharmacy to dose vancomycin     Ordering Provider:  Stephon Carbajal MD     Does not apply Continuous PRN 12/10/17 1522 17 1521          CC:  Right prosthetic knee infection     SUBJECTIVE: 17: Patient is a 62 y.o. female who was transferred to [facility] from [facility] after presenting there with increasing right knee pain, swelling and confusion which started on Thursday of last week.   The knee was aspirated, and she was then referred to [facility] for admission. She underwent incision and drainage with poly exchange yesterday. Admitting labs with a leukocytosis of 17, 500, acute kidney injury with SCr of 1.7.  Synovial fluid with moderate WBCs, no organisms and culture no growth so far.  The gram stain at [facility] with no organisms, culture is pending.    17:  She feels much better.  She has decreased knee pain.  She has remained afebrile.  She denies nausea, vomiting, and diarrhea.    ROS:  No f/c/s. No n/v/d. No rash. No new ADR to Abx.     PE:   Vital Signs  Temp (24hrs), Av.8 °F (36.6 °C), Min:97.5 °F (36.4 °C), Max:98.2 °F (36.8 °C)    Temp  Min: 97.5 °F (36.4 °C)  Max: 98.2 °F (36.8 °C)  BP  Min:  116/61  Max: 148/71  Pulse  Min: 50  Max: 67  Resp  Min: 16  Max: 18  SpO2  Min: 96 %  Max: 98 %    GENERAL: Awake and alert, in no acute distress.   HEENT:  No conjunctival injection. No icterus. Oropharynx clear without evidence of thrush or exudate.  There is a right IJ deep line in place with no exit site erythema  NECK: Supple, no JVD     HEART: RRR; No murmur, rubs, gallops.   LUNGS: Clear to auscultation bilaterally without wheezing, rales, rhonchi. Normal respiratory effort. Nonlabored. No dullness.  ABDOMEN: Soft, nontender, nondistended. Positive bowel sounds. No rebound or guarding. NO mass or HSM.  EXT:  The right knee is dressed in a postoperative dressing  : Genitalia generally unremarkable.    SKIN: Warm and dry without cutaneous eruptions on Inspection/palpation.    NEURO: Alert and oriented x 3, Motor 5/5 bilaterally    Laboratory Data      Results from last 7 days  Lab Units 12/12/17  0527 12/11/17  0439 12/10/17  2225 12/10/17  1518   WBC 10*3/mm3 15.62* 15.70*  --  17.51*   HEMOGLOBIN g/dL 10.0* 9.7* 10.3* 10.4*   HEMATOCRIT % 32.2* 31.0* 32.8* 33.7*   PLATELETS 10*3/mm3 277 221  --  243       Results from last 7 days  Lab Units 12/12/17  0527   SODIUM mmol/L 142   POTASSIUM mmol/L 4.3   CHLORIDE mmol/L 106   CO2 mmol/L 26.0   BUN mg/dL 33*   CREATININE mg/dL 0.90   GLUCOSE mg/dL 125*   CALCIUM mg/dL 8.4*                   Results from last 7 days  Lab Units 12/10/17  1517   LACTATE mmol/L 0.5           Results from last 7 days  Lab Units 12/11/17  0439   VANCOMYCIN RM mcg/mL 8.80     Estimated Creatinine Clearance: 64.2 mL/min (by C-G formula based on Cr of 0.9).    Microbiology:  Blood Culture   Date Value Ref Range Status   12/10/2017 No growth at 24 hours  Preliminary         We are calling to obtain culture results from [facility]                   Radiology:  Imaging Results (last 72 hours)     Procedure Component Value Units Date/Time    XR Chest 1 View [[id number]]  Collected:  12/10/17 1520     Updated:  12/10/17 1730    Narrative:          EXAMINATION: XR CHEST 1 VW - 12/10/2017     INDICATION: Central line placement.     COMPARISON: None.     FINDINGS:   1. A right IJ catheter has been placed. The tip is perfectly positioned  in the SVC at the entrance to the right atrium.     2. There is no detectable pneumothorax.     3. There is mild chronic scarring in the chest but there is no active  disease, edema or free fluid.           Impression:       Perfect position of the right IJ catheter in the SVC at the  entrance to the right atrium.     No pneumothorax and no other acute finding.     DICTATED:     12/10/2017  EDITED:          12/10/2017        This report was finalized on 12/10/2017 5:28 PM by Dr. Rigo Urena MD.       XR Knee 1 or 2 View Right [[id number]] Collected:  12/10/17 1810     Updated:  12/11/17 1041    Narrative:          EXAMINATION: XR KNEE 1 OR 2 VIEWS RIGHT - 12/10/2017      INDICATION: Painful TKA.     COMPARISON: None.     FINDINGS:   1. The total right knee arthroplasty appears to be well-positioned. The  hardware appears to be intact without evidence of overt signs of  loosening.     2. There is a transverse defect in the proximal right tibia, likely  previous surgery and this should be correlated.     3. There is no evidence of fracture of the native bone structures and  there is no malalignment.           Impression:       Chronic findings in the right knee as described, however,  the total knee arthroplasty hardware appears well positioned. There is  no abnormality or fracture of the native bone structures. There is a  transverse lucent defect in the proximal third of the right tibia which  is likely a screw tract and should be correlated with the patient's  previous history.     DICTATED:     12/10/2017  EDITED:          12/10/2017        This report was finalized on 12/11/2017 10:39 AM by Dr. Rigo Urena MD.       CT Head With & Without  Contrast [[id number]] Collected:  12/11/17 1630     Updated:  12/11/17 1630    Narrative:       EXAMINATION: CT HEAD W WO CONTRAST- 12/11/2017     INDICATION: Aphasia; A41.9-Sepsis, unspecified organism;  T84.53XA-Infection and inflammatory reaction due to internal right knee  prosthesis, initial encounter; Z96.651-Presence of right artificial knee  joint; Z74.09-Other reduced mobility; difficulty speaking     TECHNIQUE: Multiple axial CT imaging was obtained of the head from skull  base to skull vertex pre and post administration of intravenous  contrast.     The radiation dose reduction device was turned on for each scan per the  ALARA (As Low as Reasonably Achievable) protocol.     COMPARISON: None.     FINDINGS: The parenchyma is grossly unremarkable in appearance with some  low-density area seen in the periventricular white matter suggesting  chronic vessel ischemic change. Tiny area of low density within the  right basal ganglia suggesting possible prior vascular malformation.  There is no mass, mass effect, or midline shift. No abnormal extra-axial  fluid collection is identified. The bony structures reveal no evidence  of osseous abnormality. The visualized paranasal sinuses are clear. The  mastoid air cells are patent.       Impression:       No acute intracranial abnormality is identified. Vascularity  is unremarkable in appearance.     D:  12/11/2017  E:  12/11/2017       XR Knee 1 or 2 View Right [[id number]] Collected:  12/11/17 0830     Updated:  12/11/17 1644    Narrative:       EXAMINATION: XR KNEE 1 OR 2 VW RIGHT-      INDICATION: Postop knee arthoplasty; A41.9-Sepsis, unspecified organism;  T84.53XA-Infection and inflammatory reaction due to internal right knee  prosthesis, initial encounter; Z96.651-Presence of right artificial knee  joint.      COMPARISON: Radiographs 12/10/2017 earlier same day     FINDINGS: Status post right total knee arthroplasty. No evidence of  hardware complication with  components in satisfactory alignment.  Expected postsurgical changes including subcutaneous emphysema are  noted.       Impression:       Status post right total knee arthroplasty without evidence  of complication in satisfactory alignment.     D:  12/11/2017  E:  12/11/2017     This report was finalized on 12/11/2017 4:42 PM by Dr. Wisam Galarza.               IMPRESSION:  Impression:   1.  Septic shock -secondary to a right total knee arthroplasty infection  2.  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  She appears to be dramatically improved.  We are awaiting culture data.  She will require a prolonged 6-8 week course of intravenous antibiotic therapy.  [facility] will provide her intravenous antibiotics.  3.  Acute kidney injury/ATN-improved     PLAN/RECOMMENDATIONS:   1.  Blood cultures-pending  2.  Right knee cultures-pending  3  Continue intravenous vancomycin/ceftriaxone  4.  PICC line placement    UM/NATALIE: I may be able to discharge her in the next 1-2 days on outpatient intravenous antibiotic therapy.  She continues to clinically improved.  I discussed her disposition with her and her  today.    Dom Landrum MD  12/12/2017  7:48 PM

## 2017-12-12 NOTE — PROGRESS NOTES
[facility] Medicine Services  PROGRESS NOTE    Patient Name: Lesli Leon  : 1955  MRN: [id number]    Date of Admission: 12/10/2017  Length of Stay: 2  Primary Care Physician: Phani Joaquin MD    Subjective   Subjective     CC: f/u for ICU stay for septic right knee    HPI: No acute events overnight, patient is awake and alert, son and spouse are at bedside. Confusion is improved    Review of Systems  Gen- No fevers, chills  CV- No chest pain, palpitations  Resp- No cough, dyspnea  GI- No N/V/D, abd pain  Otherwise ROS is negative except as mentioned in the HPI.    Objective   Objective     Vital Signs:   Temp:  [97.5 °F (36.4 °C)-98.6 °F (37 °C)] 97.7 °F (36.5 °C)  Heart Rate:  [52-86] 52  Resp:  [16-18] 16  BP: (106-143)/(54-78) 143/78      Physical Exam:  Constitutional: No acute distress, awake, alert, awake seated in bed eating lunch  HENT: NCAT, mucous membranes moist  Respiratory: Clear to auscultation bilaterally, respiratory effort normal   Cardiovascular: reg rhythm, bradycardic, no murmurs, rubs, or gallops, palpable pedal pulses bilaterally  Gastrointestinal: Positive bowel sounds, soft, nontender, nondistended  Musculoskeletal: right knee in dressing, no bilateral ankle edema  Psychiatric: Appropriate affect, cooperative  Neurologic: Oriented x 3, strength symmetric in all extremities, Cranial Nerves grossly intact to confrontation, speech clear  Skin: No rashes    Results Reviewed:  I have personally reviewed current lab, radiology, and data and agree.      Results from last 7 days  Lab Units 12/12/17  0527 12/11/17  0439 12/10/17  2225 12/10/17  1518   WBC 10*3/mm3 15.62* 15.70*  --  17.51*   HEMOGLOBIN g/dL 10.0* 9.7* 10.3* 10.4*   HEMATOCRIT % 32.2* 31.0* 32.8* 33.7*   PLATELETS 10*3/mm3 277 221  --  243       Results from last 7 days  Lab Units 12/12/17  0527 12/11/17  0439 12/10/17  2226 12/10/17  1517   SODIUM mmol/L 142 140 142 142   POTASSIUM mmol/L 4.3  3.8 3.9 3.7   CHLORIDE mmol/L 106 106 108 110*   CO2 mmol/L 26.0 25.0 25.0 26.0   BUN mg/dL 33* 25* 25* 30*   CREATININE mg/dL 0.90 1.00 1.10 1.70*   GLUCOSE mg/dL 125* 115* 123* 105*   CALCIUM mg/dL 8.4* 8.3* 7.8* 8.1*   TROPONIN I ng/mL  --   --   --  0.007     No results found for: BNP  No results found for: PHART    Microbiology Results Abnormal     Procedure Component Value - Date/Time    Body Fluid Culture - Synovial Fluid, Knee, Right [[id number]]  (Normal) Collected:  12/10/17 1915    Lab Status:  Preliminary result Specimen:  Synovial Fluid from Knee, Right Updated:  12/12/17 1005     BF Culture No growth at 2 days     Gram Stain Result Moderate (3+) WBCs seen      No organisms seen    Tissue / Bone Culture - Tissue, Knee, Right [[id number]]  (Normal) Collected:  12/10/17 1921    Lab Status:  Preliminary result Specimen:  Tissue from Knee, Right Updated:  12/12/17 0709     Tissue Culture No growth at 2 days     Gram Stain Result No WBCs or organisms seen    Tissue / Bone Culture - Tissue, Knee, Right [[id number]]  (Normal) Collected:  12/10/17 1924    Lab Status:  Preliminary result Specimen:  Tissue from Knee, Right Updated:  12/12/17 0709     Tissue Culture No growth at 2 days     Gram Stain Result Occasional WBCs seen      No organisms seen    Tissue / Bone Culture - Tissue, Knee, Right [[id number]]  (Normal) Collected:  12/10/17 1925    Lab Status:  Preliminary result Specimen:  Tissue from Knee, Right Updated:  12/12/17 0709     Tissue Culture No growth at 2 days     Gram Stain Result Occasional WBCs seen      No organisms seen    Tissue / Bone Culture - Tissue, Knee, Right [[id number]]  (Normal) Collected:  12/10/17 1926    Lab Status:  Preliminary result Specimen:  Tissue from Knee, Right Updated:  12/12/17 0709     Tissue Culture No growth at 2 days     Gram Stain Result Many (4+) WBCs seen      No organisms seen    Blood Culture - Blood, Blood, Venous Line [[id number]]  (Normal) Collected:  12/10/17 1536     Lab Status:  Preliminary result Specimen:  Blood from Blood, Venous Line Updated:  12/11/17 1631     Blood Culture No growth at 24 hours    Blood Culture - Blood, Blood, Venous Line [[id number]]  (Normal) Collected:  12/10/17 1535    Lab Status:  Preliminary result Specimen:  Blood from Blood, Venous Line Updated:  12/11/17 1631     Blood Culture No growth at 24 hours    Anaerobic Culture - Synovial Fluid, Knee, Right [[id number]]  (Normal) Collected:  12/10/17 1915    Lab Status:  Preliminary result Specimen:  Synovial Fluid from Knee, Right Updated:  12/11/17 1347     Culture No anaerobes isolated    Anaerobic Culture - Tissue, Knee, Right [[id number]]  (Normal) Collected:  12/10/17 1921    Lab Status:  Preliminary result Specimen:  Tissue from Knee, Right Updated:  12/11/17 1345     Culture No anaerobes isolated    Anaerobic Culture - Tissue, Knee, Right [[id number]]  (Normal) Collected:  12/10/17 1924    Lab Status:  Preliminary result Specimen:  Tissue from Knee, Right Updated:  12/11/17 1344     Culture No anaerobes isolated    Anaerobic Culture - Tissue, Knee, Right [[id number]]  (Normal) Collected:  12/10/17 1925    Lab Status:  Preliminary result Specimen:  Tissue from Knee, Right Updated:  12/11/17 1343     Culture No anaerobes isolated    Anaerobic Culture - Tissue, Knee, Right [[id number]]  (Normal) Collected:  12/10/17 1926    Lab Status:  Preliminary result Specimen:  Tissue from Knee, Right Updated:  12/11/17 1343     Culture No anaerobes isolated    AFB Culture - Synovial Fluid, Knee, Right [[id number]] Collected:  12/10/17 1915    Lab Status:  Preliminary result Specimen:  Synovial Fluid from Knee, Right Updated:  12/11/17 1316     AFB Stain No acid fast bacilli seen on concentrated smear    AFB Culture - Tissue, Knee, Right [[id number]] Collected:  12/10/17 1921    Lab Status:  Preliminary result Specimen:  Tissue from Knee, Right Updated:  12/11/17 1316     AFB Stain No acid fast bacilli seen on  concentrated smear    AFB Culture - Tissue, Knee, Right [[id number]] Collected:  12/10/17 1924    Lab Status:  Preliminary result Specimen:  Tissue from Knee, Right Updated:  12/11/17 1316     AFB Stain No acid fast bacilli seen on concentrated smear    AFB Culture - Tissue, Knee, Right [[id number]] Collected:  12/10/17 1925    Lab Status:  Preliminary result Specimen:  Tissue from Knee, Right Updated:  12/11/17 1316     AFB Stain No acid fast bacilli seen on concentrated smear    AFB Culture - Tissue, Knee, Right [[id number]] Collected:  12/10/17 1926    Lab Status:  Preliminary result Specimen:  Tissue from Knee, Right Updated:  12/11/17 1316     AFB Stain No acid fast bacilli seen on concentrated smear          Imaging Results (last 24 hours)     Procedure Component Value Units Date/Time    CT Head With & Without Contrast [[id number]] Collected:  12/11/17 1630     Updated:  12/11/17 1630    Narrative:       EXAMINATION: CT HEAD W WO CONTRAST- 12/11/2017     INDICATION: Aphasia; A41.9-Sepsis, unspecified organism;  T84.53XA-Infection and inflammatory reaction due to internal right knee  prosthesis, initial encounter; Z96.651-Presence of right artificial knee  joint; Z74.09-Other reduced mobility; difficulty speaking     TECHNIQUE: Multiple axial CT imaging was obtained of the head from skull  base to skull vertex pre and post administration of intravenous  contrast.     The radiation dose reduction device was turned on for each scan per the  ALARA (As Low as Reasonably Achievable) protocol.     COMPARISON: None.     FINDINGS: The parenchyma is grossly unremarkable in appearance with some  low-density area seen in the periventricular white matter suggesting  chronic vessel ischemic change. Tiny area of low density within the  right basal ganglia suggesting possible prior vascular malformation.  There is no mass, mass effect, or midline shift. No abnormal extra-axial  fluid collection is identified. The bony structures  reveal no evidence  of osseous abnormality. The visualized paranasal sinuses are clear. The  mastoid air cells are patent.       Impression:       No acute intracranial abnormality is identified. Vascularity  is unremarkable in appearance.     D:  12/11/2017  E:  12/11/2017       XR Knee 1 or 2 View Right [[id number]] Collected:  12/11/17 0830     Updated:  12/11/17 1644    Narrative:       EXAMINATION: XR KNEE 1 OR 2 VW RIGHT-      INDICATION: Postop knee arthoplasty; A41.9-Sepsis, unspecified organism;  T84.53XA-Infection and inflammatory reaction due to internal right knee  prosthesis, initial encounter; Z96.651-Presence of right artificial knee  joint.      COMPARISON: Radiographs 12/10/2017 earlier same day     FINDINGS: Status post right total knee arthroplasty. No evidence of  hardware complication with components in satisfactory alignment.  Expected postsurgical changes including subcutaneous emphysema are  noted.       Impression:       Status post right total knee arthroplasty without evidence  of complication in satisfactory alignment.     D:  12/11/2017  E:  12/11/2017     This report was finalized on 12/11/2017 4:42 PM by Dr. Wisam Galarza.           Results for orders placed during the hospital encounter of 12/10/17   Adult Transthoracic Echo Complete W/ Cont if Necessary Per Protocol    Narrative · Left ventricular systolic function is normal.  · Estimated EF appears to be in the range of 61 - 65%  · Left ventricular wall thickness is consistent with borderline concentric   hypertrophy.  · Left ventricular diastolic dysfunction (grade II) consistent with   pseudonormalization.  · Left atrial cavity size is mildly dilated.  · Mild aortic valve regurgitation is present.  · Estimated right ventricular systolic pressure from tricuspid   regurgitation is mildly elevated (35-45 mmHg).  · Mild tricuspid valve regurgitation is present.          I have reviewed the medications.    Assessment/Plan   Assessment /  Plan     Hospital Problem List     * (Principal)Sepsis, probable right septic knee as source    Hypertension    Disease of thyroid gland    Expressive aphasia    Heart murmur        Brief Hospital Course to date:  Lesli Leon is a 62 y.o. female with hx of breast CA s/p radiation, recent right knee replacements, she presented 12/10 with 2 days of right knee pain , worsening confusion, fevers and hypotension. Subsequently admitted to the ICU for septic right knee. She was started on abx, vanc and zosyn per ID. Patient alose developed worsening confusion, neurology was consulted. Patient was deemed stable for transfer to the floor 12/12 for continued care.    Assessment & Plan:  - Septic right knee, s/p single component revision of right knee arthroplasty with irrigation and debridement and quadricepsplasty per ortho. Continue abx, per ID.  - Confusion and word finding difficulties likely septic encephalopathy resolving-neurology consulted, doubt CVA, f/u CT head w/wo unrevealing  - Sinus bradycardia HR 40S, asymptomatic, f/u echo  - continue home rx for hypothyroidism.    DVT Prophylaxis:  ASA    CODE STATUS: Full Code    Disposition: anticipate d/c in a few days    Xavier Barrera MD  12/12/17  12:08 PM

## 2017-12-12 NOTE — PLAN OF CARE
Problem: Patient Care Overview (Adult)  Goal: Plan of Care Review  Outcome: Outcome(s) achieved Date Met:  12/12/17 12/12/17 1640   Coping/Psychosocial Response Interventions   Plan Of Care Reviewed With patient   Patient Care Overview   Progress progress toward functional goals is gradual   Outcome Evaluation   Outcome Summary/Follow up Plan Patient continues to be very slow to process instructions. Dr Cervantes's consult noted. Needs much encouragement to move. Walking 80 feet in hallway.         Problem: Inpatient Physical Therapy  Goal: Bed Mobility Goal LTG- PT    12/11/17 1002 12/12/17 0948 12/12/17 1640   Bed Mobility PT LTG   Bed Mobility PT LTG, Date Established 12/11/17 --  --    Bed Mobility PT LTG, Time to Achieve 2 wks --  --    Bed Mobility PT LTG, Activity Type supine to sit/sit to supine --  --    Bed Mobility PT LTG, [place] Level supervision required --  --    Bed Mobility PT LTG, Date Goal Reviewed --  12/12/17 --    Bed Mobility PT LTG, Outcome --  --  goal ongoing       Goal: Transfer Training Goal 1 LTG- PT    12/11/17 1002 12/12/17 0948 12/12/17 1640   Transfer Training PT LTG   Transfer Training PT LTG, Date Established 12/11/17 --  --    Transfer Training PT LTG, Time to Achieve 2 wks --  --    Transfer Training PT LTG, Activity Type sit to stand/stand to sit --  --    Transfer Training PT LTG, [place] Level supervision required --  --    Transfer Training PT LTG, Assist Device walker, rolling --  --    Transfer Training PT LTG, Date Goal Reviewed --  12/12/17 --    Transfer Training PT LTG, Outcome --  --  goal ongoing       Goal: Gait Training Goal LTG- PT  Outcome: Ongoing (interventions implemented as appropriate)    12/11/17 1002 12/12/17 0948 12/12/17 1640   Gait Training PT LTG   Gait Training Goal PT LTG, Date Established 12/11/17 --  --    Gait Training Goal PT LTG, Time to Achieve 2 wks --  --    Gait Training Goal PT LTG, [place] Level supervision required --  --     Gait Training Goal PT LTG, Assist Device walker, rolling --  --    Gait Training Goal PT LTG, Distance to Achieve 200 --  --    Gait Training Goal PT LTG, Date Goal Reviewed --  12/12/17 --    Gait Training Goal PT LTG, Outcome --  --  goal ongoing       Goal: Stair Training Goal LTG- PT  Outcome: Ongoing (interventions implemented as appropriate)    12/11/17 1002 12/12/17 0948 12/12/17 1640   Stair Training PT LTG   Stair Training Goal PT LTG, Date Established 12/11/17 --  --    Stair Training Goal PT LTG, Time to Achieve 2 wks --  --    Stair Training Goal PT LTG, Number of Steps 3 --  --    Stair Training Goal PT LTG, [place] Level contact guard assist --  --    Stair Training Goal PT LTG, Assist Device 1 handrail --  --    Stair Training Goal PT LTG, Date Goal Reviewed --  12/12/17 --    Stair Training Goal PT LTG, Outcome --  --  goal ongoing

## 2017-12-12 NOTE — THERAPY TREATMENT NOTE
Acute Care - Physical Therapy Treatment Note  [facility]     Patient Name: Lesli Leon  : 1955  MRN: [id number]  Today's Date: 2017  Onset of Illness/Injury or Date of Surgery Date: 12/10/17  Date of Referral to PT: 12/10/17  Referring Physician: MD Jose    Admit Date: 12/10/2017    Visit Dx:    ICD-10-CM ICD-9-CM   1. Sepsis, due to unspecified organism A41.9 038.9     995.91   2. Infection of total right knee replacement T84.53XA 996.66    Z96.651 V43.65   3. Impaired functional mobility, balance, gait, and endurance Z74.09 V49.89   4. Impaired mobility and ADLs Z74.09 799.89     Patient Active Problem List   Diagnosis   • Sepsis, probable right septic knee as source   • Hypertension   • Disease of thyroid gland   • Expressive aphasia   • Heart murmur               Adult Rehabilitation Note       17 0915 17 1321       Rehab Assessment/Intervention    Discipline physical therapy assistant  -AS physical therapist  -LS     Document Type therapy note (daily note)  -AS therapy note (daily note)  -LS     Subjective Information  agree to therapy;no complaints  -LS     Patient Effort, Rehab Treatment  good  -LS     Symptoms Noted Comment  Confusion persists.   -LS     Precautions/Limitations  fall precautions  -LS     Recorded by [AS] Juliann Irving PTA [LS] Dawna Blanchard, PT     Vital Signs    Pre Systolic BP Rehab  106  -LS     Pre Treatment Diastolic BP  54  -LS     Post Systolic BP Rehab  --   in w/c for transport to floor bed  -LS     Pretreatment Heart Rate (beats/min)  82  -LS     Pre SpO2 (%)  98  -LS     O2 Delivery Pre Treatment  room air  -LS     O2 Delivery Post Treatment  room air  -LS     Pre Patient Position  Sitting  -LS     Intra Patient Position  Standing  -LS     Post Patient Position  Sitting  -LS     Recorded by  [LS] Dawna Blanchard, PT     Pain Assessment    Pain Assessment 0-10  -AS 0-10  -LS     Pain Score 5  -AS 0  -LS     Post Pain Score 5  -AS 8  -LS      Pain Type Acute pain;Surgical pain  -AS Surgical pain  -LS     Pain Location Knee  -AS Knee  -LS     Pain Orientation Right  -AS Right  -LS     Patient's Stated Pain Goal No pain  -AS      Pain Intervention(s) Repositioned;Ambulation/increased activity  -AS Ambulation/increased activity;Repositioned  -LS     Response to Interventions tolerated  -AS tolerated; notified RN  -LS     Recorded by [AS] Juliann Irving PTA [LS] Dawna Blanchard, PT     Cognitive Assessment/Intervention    Current Cognitive/Communication Assessment functional  -AS impaired  -LS     Orientation Status oriented x 4  -AS oriented to;person;required verbal cueing (specifiy in comments)   cues for location and date  -LS     Follows Commands/Answers Questions 75% of the time;able to follow single-step instructions;needs cueing  -AS able to follow single-step instructions;75% of the time;needs cueing;needs increased time;needs repetition  -LS     Personal Safety mild impairment  -AS moderate impairment;decreased awareness, need for assist;decreased awareness, need for safety;decreased insight to deficits  -LS     Personal Safety Interventions fall prevention program maintained;gait belt;nonskid shoes/slippers when out of bed;other (see comments)   exit alarm  -AS fall prevention program maintained;gait belt;nonskid shoes/slippers when out of bed  -LS     Recorded by [AS] Juliann Irving PTA [LS] Dawna Blanchard, PT     ROM (Range of Motion)    General ROM Detail  R AAROM in sittin deg; R knee AROM extension in sitting: lacking 14 degrees full extension (ace bandage intact during measurements)  -LS     Recorded by  [LS] Dawna Blanchard, PT     Mobility Assessment/Training    Extremity Weight-Bearing Status  right lower extremity  -LS     Right Lower Extremity Weight-Bearing  weight-bearing as tolerated  -LS     Recorded by  [LS] Dawna Blanchard, PT     Bed Mobility, Assessment/Treatment    Bed Mob, Supine to Sit, [place] verbal cues  required;minimum assist (75% patient effort)  -AS      Bed Mob, Sit to Supine, [place] not tested  -AS      Bed Mobility, Impairments strength decreased;pain  -AS      Bed Mobility, Comment assist to move R LE to EOB   -AS [facility] upon arrival.   -LS     Recorded by [AS] Juliann Irving PTA [LS] Dawna Blanchard, PT     Transfer Assessment/Treatment    Transfers, Sit-Stand [place] verbal cues required;minimum assist (75% patient effort);2 person assist required  -AS minimum assist (75% patient effort);verbal cues required  -LS     Transfers, Stand-Sit [place] verbal cues required;minimum assist (75% patient effort);2 person assist required  -AS minimum assist (75% patient effort);verbal cues required  -LS     Transfers, Sit-Stand-Sit, Assist Device rolling walker  -AS rolling walker  -LS     Transfer, Safety Issues weight-shifting ability decreased;step length decreased  -AS      Transfer, Impairments strength decreased;impaired balance;pain  -AS strength decreased;impaired balance  -LS     Transfer, Comment verbal cues to reach back when sitting, patient sat without reaching back and without chair locked despite cueing.  -AS VC's for hand placement and for slight RLE forward prior to transfer.   -LS     Recorded by [AS] Juliann Irving PTA [LS] Dawna Blanchard, PT     Gait Assessment/Treatment    Gait, [place] Level verbal cues required;minimum assist (75% patient effort);1 person + 1 person to manage equipment  -AS contact guard assist;1 person + 1 person to manage equipment;verbal cues required  -LS     Gait, Assistive Device rolling walker  -AS rolling walker  -LS     Gait, Distance (Feet) 100  -  -LS     Gait, Gait Pattern Analysis  swing-through gait  -LS     Gait, Gait Deviations [place] decreased;decreased heel strike;right:;forward flexed posture;weight-shifting ability decreased;step length decreased  -AS [place] decreased;forward flexed posture;right:;antalgic  -LS     Gait,  Safety Issues step length decreased;weight-shifting ability decreased;balance decreased during turns;sequencing ability decreased  -AS      Gait, Impairments strength decreased;impaired balance;ROM decreased;pain  -AS strength decreased;impaired balance;pain  -LS     Gait, Comment patient ambulateed with step to gait pattern, 1 seated resr break due to fatigue and dizziness, follow with chair for safety. Verbal cues to increase right heel strike and for walker placement  -AS VC's for upright posture, equal step length (R versus L), and for increased R knee flexion to promote normalized swing through phase. Distance limited by fatigue; followed with w/c for safety.   -LS     Recorded by [AS] Juliann Irving PTA [LS] Dawna Blanchard, PT     Motor Skills/Interventions    Additional Documentation  Balance Skills Training (Group)  -LS     Recorded by  [LS] Dawna Blanchard, PT     Balance Skills Training    Sitting-Level of Assistance  Contact guard  -LS     Sitting-Balance Support  Feet supported  -LS     Standing-Level of Assistance  Contact guard  -LS     Static Standing Balance Support  assistive device  -LS     Gait Balance-Level of Assistance  Contact guard  -LS     Gait Balance Support  assistive device  -LS     Recorded by  [LS] Dawna Blanchard, PT     Therapy Exercises    Bilateral Lower Extremities AROM:;10 reps;sitting;ankle pumps/circles;glut sets;quad sets   feet elevated in recliner to perform exercises  -AS      Exercise Protocols  total knee  -LS     Total Knee Exercises  bilateral:;10 reps;ankle pumps/circles;quad set;glut set;right:;LAQ;hip abduction/adduction;heel slide stretch;heel slides   issued HEP handout  -LS     Recorded by [AS] Juliann Irving, HEMALATHA [LS] Dawna Blanchard, PT     Positioning and Restraints    Pre-Treatment Position in bed  -AS sitting in chair/recliner  -LS     Post Treatment Position chair  -AS wheelchair  -LS     In Chair reclined;call light within reach;encouraged to call for  assist;exit alarm on;with family/caregiver  -AS      In Wheelchair  notified nsg;sitting;call light within reach;encouraged to call for assist;with family/caregiver;legs elevated   RN prepping pt for transfer to floor room  -LS     Recorded by [AS] Juliann Irving, HEMALATHA [LS] Dawna Blanchard, PT       User Key  (r) = Recorded By, (t) = Taken By, (c) = Cosigned By    Initials Name Effective Dates    AS Juliann Irving, PTA 06/22/15 -     LS Dawna Blanchard, PT 06/19/15 -                 IP PT Goals       12/12/17 0948 12/11/17 1448 12/11/17 1002    Bed Mobility PT LTG    Bed Mobility PT LTG, Date Established   12/11/17  -LS    Bed Mobility PT LTG, Time to Achieve   2 wks  -LS    Bed Mobility PT LTG, Activity Type   supine to sit/sit to supine  -LS    Bed Mobility PT LTG, [place] Level   supervision required  -LS    Bed Mobility PT LTG, Date Goal Reviewed 12/12/17  -AS      Bed Mobility PT LTG, Outcome goal ongoing  -AS goal ongoing  -LS     Transfer Training PT LTG    Transfer Training PT LTG, Date Established   12/11/17  -LS    Transfer Training PT LTG, Time to Achieve   2 wks  -LS    Transfer Training PT LTG, Activity Type   sit to stand/stand to sit  -LS    Transfer Training PT LTG, [place] Level   supervision required  -LS    Transfer Training PT LTG, Assist Device   walker, rolling  -LS    Transfer Training PT  LTG, Date Goal Reviewed 12/12/17  -AS      Transfer Training PT LTG, Outcome goal ongoing  -AS goal ongoing  -LS     Gait Training PT LTG    Gait Training Goal PT LTG, Date Established   12/11/17  -LS    Gait Training Goal PT LTG, Time to Achieve   2 wks  -LS    Gait Training Goal PT LTG, [place] Level   supervision required  -LS    Gait Training Goal PT LTG, Assist Device   walker, rolling  -LS    Gait Training Goal PT LTG, Distance to Achieve   200  -LS    Gait Training Goal PT LTG, Date Goal Reviewed 12/12/17  -AS      Gait Training Goal PT LTG, Outcome goal ongoing  -AS goal ongoing   -LS     Stair Training PT LTG    Stair Training Goal PT LTG, Date Established   12/11/17  -LS    Stair Training Goal PT LTG, Time to Achieve   2 wks  -LS    Stair Training Goal PT LTG, Number of Steps   3  -LS    Stair Training Goal PT LTG, [place] Level   contact guard assist  -LS    Stair Training Goal PT LTG, Assist Device   1 handrail  -LS    Stair Training Goal PT LTG, Date Goal Reviewed 12/12/17  -AS      Stair Training Goal PT LTG, Outcome goal ongoing  -AS goal ongoing  -LS       User Key  (r) = Recorded By, (t) = Taken By, (c) = Cosigned By    Initials Name Provider Type    AS Juliann Irving, PTA Physical Therapy Assistant    LS Dawna Blanchard, PT Physical Therapist          Physical Therapy Education     Title: PT OT SLP Therapies (Active)     Topic: Physical Therapy (Active)     Point: Mobility training (Active)    Learning Progress Summary    Learner Readiness Method Response Comment Documented by Status   Patient Acceptance E NR  AS 12/12/17 0948 Active    Acceptance E,D,H NR Edu pt/son re: HEP; issued illustrated handout.  12/11/17 1448 Active    Acceptance E,D NR   12/11/17 1002 Active   Family Acceptance E NR  AS 12/12/17 0948 Active               Point: Home exercise program (Active)    Learning Progress Summary    Learner Readiness Method Response Comment Documented by Status   Patient Acceptance E NR  AS 12/12/17 0948 Active    Acceptance E,D,H NR Edu pt/son re: HEP; issued illustrated handout.  12/11/17 1448 Active    Acceptance E,D NR   12/11/17 1002 Active   Family Acceptance E NR  AS 12/12/17 0948 Active               Point: Body mechanics (Active)    Learning Progress Summary    Learner Readiness Method Response Comment Documented by Status   Patient Acceptance E NR  AS 12/12/17 0948 Active    Acceptance E,D,H NR Edu pt/son re: HEP; issued illustrated handout.  12/11/17 1448 Active    Acceptance E,D NR   12/11/17 1002 Active   Family Acceptance E NR  AS 12/12/17 0948  Active               Point: Precautions (Active)    Learning Progress Summary    Learner Readiness Method Response Comment Documented by Status   Patient Acceptance E NR  AS 12/12/17 0948 Active    Acceptance E,D,H NR Edu pt/son re: HEP; issued illustrated handout.  12/11/17 1448 Active    Acceptance E,D NR   12/11/17 1002 Active   Family Acceptance E NR  AS 12/12/17 0948 Active                      User Key     Initials Effective Dates Name Provider Type Discipline    AS 06/22/15 -  Juliann Irving, PTA Physical Therapy Assistant PT     06/19/15 -  Dawna Blanchard, PT Physical Therapist PT                    PT Recommendation and Plan  Anticipated Discharge Disposition: skilled nursing facility  PT Frequency: 2 times/day  Plan of Care Review  Plan Of Care Reviewed With: patient  Progress: progress toward functional goals as expected  Outcome Summary/Follow up Plan: patient with increased pain and weakness this session limiting tolerance to all activity. Decreased safety awareness with transfers and gait. Follow with chair for safety.          Outcome Measures       12/12/17 0915 12/11/17 1321 12/11/17 0805    How much help from another person do you currently need...    Turning from your back to your side while in flat bed without using bedrails? 3  -AS 3  -LS 2  -LS    Moving from lying on back to sitting on the side of a flat bed without bedrails? 3  -AS 2  -LS 2  -LS    Moving to and from a bed to a chair (including a wheelchair)? 3  -AS 3  -LS 3  -LS    Standing up from a chair using your arms (e.g., wheelchair, bedside chair)? 3  -AS 3  -LS 2  -LS    Climbing 3-5 steps with a railing? 2  -AS 2  -LS 2  -LS    To walk in hospital room? 3  -AS 3  -LS 3  -LS    AM-PAC 6 Clicks Score 17  -AS 16  -LS 14  -LS    Functional Assessment    Outcome Measure Options AM-PAC 6 Clicks Basic Mobility (PT)  -AS AM-PAC 6 Clicks Basic Mobility (PT)  -LS AM-PAC 6 Clicks Basic Mobility (PT)  -      12/11/17 0804           How much help from another is currently needed...    Putting on and taking off regular lower body clothing? 1  -CL      Bathing (including washing, rinsing, and drying) 2  -CL      Toileting (which includes using toilet bed pan or urinal) 1  -CL      Putting on and taking off regular upper body clothing 2  -CL      Taking care of personal grooming (such as brushing teeth) 3  -CL      Eating meals 4  -CL      Score 13  -CL      Functional Assessment    Outcome Measure Options AM-PAC 6 Clicks Daily Activity (OT)  -CL        User Key  (r) = Recorded By, (t) = Taken By, (c) = Cosigned By    Initials Name Provider Type    AS Juliann Irving PTA Physical Therapy Assistant    LS Dawna Blanchard, PT Physical Therapist    CL Philly Gray, OT Occupational Therapist           Time Calculation:         PT Charges       12/12/17 0954          Time Calculation    Start Time 0915  -AS      PT Received On 12/12/17  -AS      PT Goal Re-Cert Due Date 12/21/17  -AS      Time Calculation- PT    Total Timed Code Minutes- PT 23 minute(s)  -AS        User Key  (r) = Recorded By, (t) = Taken By, (c) = Cosigned By    Initials Name Provider Type    AS Juliann Irving PTA Physical Therapy Assistant          Therapy Charges for Today     Code Description Service Date Service Provider Modifiers Qty    58045928340 HC GAIT TRAINING EA 15 MIN 12/12/2017 Juliann Irving PTA GP 1    43928409286 HC PT THER PROC EA 15 MIN 12/12/2017 Juliann Irving PTA GP 1    98532699639 HC PT THER SUPP EA 15 MIN 12/12/2017 Juliann Irving PTA GP 2          PT G-Codes  Outcome Measure Options: AM-PAC 6 Clicks Basic Mobility (PT)    Juliann Irving PTA  12/12/2017

## 2017-12-12 NOTE — PLAN OF CARE
Problem: Patient Care Overview (Adult)  Goal: Plan of Care Review  Outcome: Ongoing (interventions implemented as appropriate)    Problem: Infection, Risk/Actual (Adult)  Goal: Identify Related Risk Factors and Signs and Symptoms  Outcome: Ongoing (interventions implemented as appropriate)    Problem: Perioperative Period (Adult)  Goal: Signs and Symptoms of Listed Potential Problems Will be Absent or Manageable (Perioperative Period)  Outcome: Ongoing (interventions implemented as appropriate)    Problem: Skin Integrity Impairment, Risk/Actual (Adult)  Goal: Identify Related Risk Factors and Signs and Symptoms  Outcome: Ongoing (interventions implemented as appropriate)    12/12/17 0532   Skin Integrity Impairment, Risk/Actual   Skin Integrity Impairment, Risk/Actual: Related Risk Factors age extremes

## 2017-12-12 NOTE — PLAN OF CARE
Problem: Patient Care Overview (Adult)  Goal: Plan of Care Review  Outcome: Ongoing (interventions implemented as appropriate)    12/12/17 0948   Coping/Psychosocial Response Interventions   Plan Of Care Reviewed With patient   Patient Care Overview   Progress progress toward functional goals as expected   Outcome Evaluation   Outcome Summary/Follow up Plan patient with increased pain and weakness this session limiting tolerance to all activity. Decreased safety awareness with transfers and gait. Follow with chair for safety.         Problem: Inpatient Physical Therapy  Goal: Bed Mobility Goal LTG- PT  Outcome: Ongoing (interventions implemented as appropriate)    12/11/17 1002 12/12/17 0948   Bed Mobility PT LTG   Bed Mobility PT LTG, Date Established 12/11/17 --    Bed Mobility PT LTG, Time to Achieve 2 wks --    Bed Mobility PT LTG, Activity Type supine to sit/sit to supine --    Bed Mobility PT LTG, [place] Level supervision required --    Bed Mobility PT LTG, Date Goal Reviewed --  12/12/17   Bed Mobility PT LTG, Outcome --  goal ongoing       Goal: Transfer Training Goal 1 LTG- PT  Outcome: Ongoing (interventions implemented as appropriate)    12/11/17 1002 12/12/17 0948   Transfer Training PT LTG   Transfer Training PT LTG, Date Established 12/11/17 --    Transfer Training PT LTG, Time to Achieve 2 wks --    Transfer Training PT LTG, Activity Type sit to stand/stand to sit --    Transfer Training PT LTG, [place] Level supervision required --    Transfer Training PT LTG, Assist Device walker, rolling --    Transfer Training PT LTG, Date Goal Reviewed --  12/12/17   Transfer Training PT LTG, Outcome --  goal ongoing       Goal: Gait Training Goal LTG- PT  Outcome: Ongoing (interventions implemented as appropriate)    12/11/17 1002 12/12/17 0948   Gait Training PT LTG   Gait Training Goal PT LTG, Date Established 12/11/17 --    Gait Training Goal PT LTG, Time to Achieve 2 wks --    Gait Training Goal  PT LTG, [place] Level supervision required --    Gait Training Goal PT LTG, Assist Device walker, rolling --    Gait Training Goal PT LTG, Distance to Achieve 200 --    Gait Training Goal PT LTG, Date Goal Reviewed --  12/12/17   Gait Training Goal PT LTG, Outcome --  goal ongoing       Goal: Stair Training Goal LTG- PT  Outcome: Ongoing (interventions implemented as appropriate)    12/11/17 1002 12/12/17 0948   Stair Training PT LTG   Stair Training Goal PT LTG, Date Established 12/11/17 --    Stair Training Goal PT LTG, Time to Achieve 2 wks --    Stair Training Goal PT LTG, Number of Steps 3 --    Stair Training Goal PT LTG, [place] Level contact guard assist --    Stair Training Goal PT LTG, Assist Device 1 handrail --    Stair Training Goal PT LTG, Date Goal Reviewed --  12/12/17   Stair Training Goal PT LTG, Outcome --  goal ongoing

## 2017-12-12 NOTE — THERAPY TREATMENT NOTE
Acute Care - Physical Therapy Treatment Note  [facility]     Patient Name: Lesli Leon  : 1955  MRN: [id number]  Today's Date: 2017  Onset of Illness/Injury or Date of Surgery Date: 12/10/17  Date of Referral to PT: 12/10/17  Referring Physician: MD Jose    Admit Date: 12/10/2017    Visit Dx:    ICD-10-CM ICD-9-CM   1. Sepsis, due to unspecified organism A41.9 038.9     995.91   2. Infection of total right knee replacement T84.53XA 996.66    Z96.651 V43.65   3. Impaired functional mobility, balance, gait, and endurance Z74.09 V49.89   4. Impaired mobility and ADLs Z74.09 799.89     Patient Active Problem List   Diagnosis   • Sepsis, probable right septic knee as source   • Hypertension   • Disease of thyroid gland   • Expressive aphasia   • Heart murmur               Adult Rehabilitation Note       17 1444 17 0915 17 1321    Rehab Assessment/Intervention    Discipline physical therapist  -SC physical therapy assistant  -AS physical therapist  -LS    Document Type therapy note (daily note)  -SC therapy note (daily note)  -AS therapy note (daily note)  -LS    Subjective Information agree to therapy  -SC  agree to therapy;no complaints  -LS    Patient Effort, Rehab Treatment adequate  -SC  good  -LS    Symptoms Noted During/After Treatment fatigue;other (see comments)   slow to respond to any commands  -SC      Symptoms Noted Comment   Confusion persists.   -LS    Precautions/Limitations fall precautions  -SC  fall precautions  -LS    Specific Treatment Considerations followed by chair  -SC      Recorded by [SC] Ananda Bro, PT [AS] Juliann Irving, PTA [LS] Dawna Blanchard, PT    Vital Signs    Pre Systolic BP Rehab   106  -LS    Pre Treatment Diastolic BP   54  -LS    Post Systolic BP Rehab   --   in w/c for transport to floor bed  -LS    Pretreatment Heart Rate (beats/min)   82  -LS    Pre SpO2 (%)   98  -LS    O2 Delivery Pre Treatment   room air  -LS    O2 Delivery  Post Treatment   room air  -LS    Pre Patient Position   Sitting  -LS    Intra Patient Position   Standing  -LS    Post Patient Position   Sitting  -LS    Recorded by   [LS] Dawna Blanchard, PT    Pain Assessment    Pain Assessment Serrano-Vasquez FACES  -SC 0-10  -AS 0-10  -LS    Serrano-Vasquez FACES Pain Rating 6  -SC      Pain Score  5  -AS 0  -LS    Post Pain Score 6  -SC 5  -AS 8  -LS    Pain Type Acute pain  -SC Acute pain;Surgical pain  -AS Surgical pain  -LS    Pain Location Knee  -SC Knee  -AS Knee  -LS    Pain Orientation Right  -SC Right  -AS Right  -LS    Patient's Stated Pain Goal  No pain  -AS     Pain Intervention(s) Repositioned  -SC Repositioned;Ambulation/increased activity  -AS Ambulation/increased activity;Repositioned  -LS    Response to Interventions  tolerated  -AS tolerated; notified RN  -LS    Recorded by [SC] Ananda Bro, OLIVA [AS] Juliann Irving PTA [LS] Dawna Blanchard, PT    Cognitive Assessment/Intervention    Current Cognitive/Communication Assessment  functional  -AS impaired  -    Orientation Status oriented to;person;place;time  -SC oriented x 4  -AS oriented to;person;required verbal cueing (specifiy in comments)   cues for location and date  -    Follows Commands/Answers Questions 75% of the time;needs increased time;needs repetition;needs cueing  -SC 75% of the time;able to follow single-step instructions;needs cueing  -AS able to follow single-step instructions;75% of the time;needs cueing;needs increased time;needs repetition  -    Personal Safety mild impairment;decreased awareness, need for assist;decreased awareness, need for safety;decreased insight to deficits  -SC mild impairment  -AS moderate impairment;decreased awareness, need for assist;decreased awareness, need for safety;decreased insight to deficits  -    Personal Safety Interventions gait belt;fall prevention program maintained  -SC fall prevention program maintained;gait belt;nonskid shoes/slippers when out of  bed;other (see comments)   exit alarm  -AS fall prevention program maintained;gait belt;nonskid shoes/slippers when out of bed  -LS    Recorded by [SC] Ananda Bro, PT [AS] Juliann Irving, PTA [LS] Dawna Blanchard PT    ROM (Range of Motion)    General ROM Detail   R AAROM in sittin deg; R knee AROM extension in sitting: lacking 14 degrees full extension (ace bandage intact during measurements)  -LS    Recorded by   [LS] Dawna Blanchard PT    Mobility Assessment/Training    Extremity Weight-Bearing Status   right lower extremity  -LS    Right Lower Extremity Weight-Bearing   weight-bearing as tolerated  -LS    Recorded by   [LS] Dawna Blanchard PT    Bed Mobility, Assessment/Treatment    Bed Mobility, Assistive Device bed rails;head of bed elevated  -SC      Bed Mob, Supine to Sit, Coffee verbal cues required;minimum assist (75% patient effort)  -SC verbal cues required;minimum assist (75% patient effort)  -AS     Bed Mob, Sit to Supine, Coffee  not tested  -AS     Bed Mobility, Safety Issues decreased use of legs for bridging/pushing;other (see comments)   sequencing difficulty  -SC      Bed Mobility, Impairments pain;coordination impaired;motor control impaired;strength decreased  -SC strength decreased;pain  -AS     Bed Mobility, Comment up to edge of bed very slowly with difficulty sequencing  moverments  -SC assist to move R LE to EOB   -AS [facility] upon arrival.   -LS    Recorded by [SC] Ananda Bro, PT [AS] Juliann Irving, PTA [LS] Dawna Blanchard, PT    Transfer Assessment/Treatment    Transfers, Sit-Stand Coffee contact guard assist;verbal cues required  -SC verbal cues required;minimum assist (75% patient effort);2 person assist required  -AS minimum assist (75% patient effort);verbal cues required  -    Transfers, Stand-Sit Coffee verbal cues required;contact guard assist  -SC verbal cues required;minimum assist (75% patient effort);2 person assist required  -AS minimum  assist (75% patient effort);verbal cues required  -LS    Transfers, Sit-Stand-Sit, Assist Device rolling walker  -SC rolling walker  -AS rolling walker  -LS    Transfer, Safety Issues other (see comments)   difficulty sequencing  -SC weight-shifting ability decreased;step length decreased  -AS     Transfer, Impairments impaired balance;motor control impaired;strength decreased  -SC strength decreased;impaired balance;pain  -AS strength decreased;impaired balance  -LS    Transfer, Comment repeted cues for sequencing standing--patient very slow  -SC verbal cues to reach back when sitting, patient sat without reaching back and without chair locked despite cueing.  -AS VC's for hand placement and for slight RLE forward prior to transfer.   -LS    Recorded by [SC] Ananda Bro, PT [AS] Juliann Irving PTA [LS] Dawna Blanchard PT    Gait Assessment/Treatment    Gait, [place] Level contact guard assist  -SC verbal cues required;minimum assist (75% patient effort);1 person + 1 person to manage equipment  -AS contact guard assist;1 person + 1 person to manage equipment;verbal cues required  -LS    Gait, Assistive Device rolling walker  -SC rolling walker  -AS rolling walker  -LS    Gait, Distance (Feet) 80   followed by chair  -  -  -LS    Gait, Gait Pattern Analysis   swing-through gait  -LS    Gait, Gait Deviations  franklin decreased;decreased heel strike;right:;forward flexed posture;weight-shifting ability decreased;step length decreased  -AS franklin decreased;forward flexed posture;right:;antalgic  -LS    Gait, Safety Issues  step length decreased;weight-shifting ability decreased;balance decreased during turns;sequencing ability decreased  -AS     Gait, Impairments strength decreased;motor control impaired;pain  -SC strength decreased;impaired balance;ROM decreased;pain  -AS strength decreased;impaired balance;pain  -LS    Gait, Comment cues to stay close to walker and some assist to steer walker.  Had to take one sit down rest. Needed much encouragement to keep going.  -SC patient ambulateed with step to gait pattern, 1 seated resr break due to fatigue and dizziness, follow with chair for safety. Verbal cues to increase right heel strike and for walker placement  -AS VC's for upright posture, equal step length (R versus L), and for increased R knee flexion to promote normalized swing through phase. Distance limited by fatigue; followed with w/c for safety.   -LS    Recorded by [SC] Ananda Bro, PT [AS] Juliann Irving, PTA [LS] Dawna Blanchard, PT    Motor Skills/Interventions    Additional Documentation   Balance Skills Training (Group)  -LS    Recorded by   [LS] Dawna Blanchard PT    Balance Skills Training    Sitting-Level of Assistance   Contact guard  -    Sitting-Balance Support   Feet supported  -    Standing-Level of Assistance   Contact guard  -LS    Static Standing Balance Support   assistive device  -    Gait Balance-Level of Assistance Minimum assistance;x2  -SC  Contact guard  -LS    Gait Balance Support assistive device  -SC  assistive device  -LS    Recorded by [SC] Ananda Bro, PT  [LS] Dawna Blanchard, PT    Therapy Exercises    Right Lower Extremity 10 reps;AROM:;supine;ankle pumps/circles;quad sets;heel slides;SLR   needs repeted cues  -SC      Bilateral Lower Extremities  AROM:;10 reps;sitting;ankle pumps/circles;glut sets;quad sets   feet elevated in recliner to perform exercises  -AS     Exercise Protocols   total knee  -LS    Total Knee Exercises   bilateral:;10 reps;ankle pumps/circles;quad set;glut set;right:;LAQ;hip abduction/adduction;heel slide stretch;heel slides   issued HEP handout  -LS    Recorded by [SC] Ananda Bro, PT [AS] Juliann Irving, PTA [LS] Dawna Blanchard, PT    Positioning and Restraints    Pre-Treatment Position in bed  -SC in bed  -AS sitting in chair/recliner  -LS    Post Treatment Position chair  -SC chair  -AS wheelchair  -    In Chair  sitting;reclined;notified nsg;call light within reach;encouraged to call for assist;exit alarm on  -SC reclined;call light within reach;encouraged to call for assist;exit alarm on;with family/caregiver  -AS     In Wheelchair   notified nsg;sitting;call light within reach;encouraged to call for assist;with family/caregiver;legs elevated   RN prepping pt for transfer to floor room  -LS    Recorded by [SC] Ananda Bro, PT [AS] Juliann Irving, PTA [LS] Dawna Blanchard, PT      User Key  (r) = Recorded By, (t) = Taken By, (c) = Cosigned By    Initials Name Effective Dates    SC Ananda Bro, PT 06/19/15 -     AS Juliann Irving, PTA 06/22/15 -     LS Dawna Blanchard, PT 06/19/15 -                 IP PT Goals       12/12/17 1640 12/12/17 0948 12/11/17 1448    Bed Mobility PT LTG    Bed Mobility PT LTG, Date Goal Reviewed  12/12/17  -AS     Bed Mobility PT LTG, Outcome goal ongoing  -SC goal ongoing  -AS goal ongoing  -LS    Transfer Training PT LTG    Transfer Training PT  LTG, Date Goal Reviewed  12/12/17  -AS     Transfer Training PT LTG, Outcome goal ongoing  -SC goal ongoing  -AS goal ongoing  -LS    Gait Training PT LTG    Gait Training Goal PT LTG, Date Goal Reviewed  12/12/17  -AS     Gait Training Goal PT LTG, Outcome goal ongoing  -SC goal ongoing  -AS goal ongoing  -LS    Stair Training PT LTG    Stair Training Goal PT LTG, Date Goal Reviewed  12/12/17  -AS     Stair Training Goal PT LTG, Outcome goal ongoing  -SC goal ongoing  -AS goal ongoing  -LS      12/11/17 1002          Bed Mobility PT LTG    Bed Mobility PT LTG, Date Established 12/11/17  -LS      Bed Mobility PT LTG, Time to Achieve 2 wks  -LS      Bed Mobility PT LTG, Activity Type supine to sit/sit to supine  -LS      Bed Mobility PT LTG, [place] Level supervision required  -LS      Transfer Training PT LTG    Transfer Training PT LTG, Date Established 12/11/17  -LS      Transfer Training PT LTG, Time to Achieve 2 wks  -LS      Transfer  Training PT LTG, Activity Type sit to stand/stand to sit  -LS      Transfer Training PT LTG, [place] Level supervision required  -LS      Transfer Training PT LTG, Assist Device walker, rolling  -LS      Gait Training PT LTG    Gait Training Goal PT LTG, Date Established 12/11/17  -LS      Gait Training Goal PT LTG, Time to Achieve 2 wks  -LS      Gait Training Goal PT LTG, [place] Level supervision required  -LS      Gait Training Goal PT LTG, Assist Device walker, rolling  -LS      Gait Training Goal PT LTG, Distance to Achieve 200  -LS      Stair Training PT LTG    Stair Training Goal PT LTG, Date Established 12/11/17  -LS      Stair Training Goal PT LTG, Time to Achieve 2 wks  -LS      Stair Training Goal PT LTG, Number of Steps 3  -LS      Stair Training Goal PT LTG, [place] Level contact guard assist  -LS      Stair Training Goal PT LTG, Assist Device 1 handrail  -LS        User Key  (r) = Recorded By, (t) = Taken By, (c) = Cosigned By    Initials Name Provider Type    SC Ananda Bro, PT Physical Therapist    AS Juliann Ivring, PTA Physical Therapy Assistant     Dawna Blanchard, PT Physical Therapist          Physical Therapy Education     Title: PT OT SLP Therapies (Active)     Topic: Physical Therapy (Active)     Point: Mobility training (Active)    Learning Progress Summary    Learner Readiness Method Response Comment Documented by Status   Patient Acceptance E,D NR reviewed safety with mobility SC 12/12/17 1640 Active    Acceptance E NR  AS 12/12/17 0948 Active    Acceptance E,D,H NR Edu pt/son re: HEP; issued illustrated handout.  12/11/17 1448 Active    Acceptance E,D NR   12/11/17 1002 Active   Family Acceptance E NR  AS 12/12/17 0948 Active               Point: Home exercise program (Active)    Learning Progress Summary    Learner Readiness Method Response Comment Documented by Status   Patient Acceptance E,D NR reviewed safety with mobility SC 12/12/17 1640 Active     Acceptance E NR  AS 12/12/17 0948 Active    Acceptance E,D,H NR Edu pt/son re: HEP; issued illustrated handout.  12/11/17 1448 Active    Acceptance E,D NR   12/11/17 1002 Active   Family Acceptance E NR  AS 12/12/17 0948 Active               Point: Body mechanics (Active)    Learning Progress Summary    Learner Readiness Method Response Comment Documented by Status   Patient Acceptance E,D NR reviewed safety with mobility SC 12/12/17 1640 Active    Acceptance E NR  AS 12/12/17 0948 Active    Acceptance E,D,H NR Edu pt/son re: HEP; issued illustrated handout.  12/11/17 1448 Active    Acceptance E,D NR   12/11/17 1002 Active   Family Acceptance E NR  AS 12/12/17 0948 Active               Point: Precautions (Active)    Learning Progress Summary    Learner Readiness Method Response Comment Documented by Status   Patient Acceptance E,D NR reviewed safety with mobility SC 12/12/17 1640 Active    Acceptance E NR  AS 12/12/17 0948 Active    Acceptance E,D,H NR Edu pt/son re: HEP; issued illustrated handout.  12/11/17 1448 Active    Acceptance E,D NR   12/11/17 1002 Active   Family Acceptance E NR  AS 12/12/17 0948 Active                      User Key     Initials Effective Dates Name Provider Type Discipline    SC 06/19/15 -  Ananda Bro, PT Physical Therapist PT    AS 06/22/15 -  Juliann Irving, PTA Physical Therapy Assistant PT     06/19/15 -  Dawna Blanchard, PT Physical Therapist PT                    PT Recommendation and Plan  Anticipated Discharge Disposition: skilled nursing facility  PT Frequency: 2 times/day  Plan of Care Review  Plan Of Care Reviewed With: patient  Progress: progress toward functional goals is gradual  Outcome Summary/Follow up Plan: Patient continues to be very slow to process instructions. Dr Cervantse's consult noted.  Needs much encouracement to move. Walking 80 feet in hallway.          Outcome Measures       12/12/17 1444 12/12/17 0915 12/11/17 1321    How much help from  another person do you currently need...    Turning from your back to your side while in flat bed without using bedrails? 3  -SC 3  -AS 3  -LS    Moving from lying on back to sitting on the side of a flat bed without bedrails? 3  -SC 3  -AS 2  -LS    Moving to and from a bed to a chair (including a wheelchair)? 3  -SC 3  -AS 3  -LS    Standing up from a chair using your arms (e.g., wheelchair, bedside chair)? 3  -SC 3  -AS 3  -LS    Climbing 3-5 steps with a railing? 2  -SC 2  -AS 2  -LS    To walk in hospital room? 3  -SC 3  -AS 3  -LS    AM-PAC 6 Clicks Score 17  -SC 17  -AS 16  -LS    Functional Assessment    Outcome Measure Options AM-PAC 6 Clicks Basic Mobility (PT)  -SC AM-PAC 6 Clicks Basic Mobility (PT)  -AS AM-PAC 6 Clicks Basic Mobility (PT)  -LS      12/11/17 0805 12/11/17 0804       How much help from another person do you currently need...    Turning from your back to your side while in flat bed without using bedrails? 2  -LS      Moving from lying on back to sitting on the side of a flat bed without bedrails? 2  -LS      Moving to and from a bed to a chair (including a wheelchair)? 3  -LS      Standing up from a chair using your arms (e.g., wheelchair, bedside chair)? 2  -LS      Climbing 3-5 steps with a railing? 2  -LS      To walk in hospital room? 3  -LS      AM-PAC 6 Clicks Score 14  -LS      How much help from another is currently needed...    Putting on and taking off regular lower body clothing?  1  -CL     Bathing (including washing, rinsing, and drying)  2  -CL     Toileting (which includes using toilet bed pan or urinal)  1  -CL     Putting on and taking off regular upper body clothing  2  -CL     Taking care of personal grooming (such as brushing teeth)  3  -CL     Eating meals  4  -CL     Score  13  -CL     Functional Assessment    Outcome Measure Options AM-PAC 6 Clicks Basic Mobility (PT)  -LS AM-PAC 6 Clicks Daily Activity (OT)  -CL       User Key  (r) = Recorded By, (t) = Taken By, (c)  = Cosigned By    Initials Name Provider Type    SC Ananda Bro, PT Physical Therapist    AS Juliann Irving, PTA Physical Therapy Assistant    LS Dawna Blanchard, PT Physical Therapist    CL Philly Gray, OT Occupational Therapist           Time Calculation:         PT Charges       12/12/17 1644 12/12/17 0954       Time Calculation    Start Time 1444  -SC 0915  -AS     PT Received On 12/12/17  -SC 12/12/17  -AS     PT Goal Re-Cert Due Date 12/21/17  -SC 12/21/17  -AS     Time Calculation- PT    Total Timed Code Minutes- PT 30 minute(s)  -SC 23 minute(s)  -AS       User Key  (r) = Recorded By, (t) = Taken By, (c) = Cosigned By    Initials Name Provider Type    SC Ananda Bro, PT Physical Therapist    AS Juliann Irving, PTA Physical Therapy Assistant          Therapy Charges for Today     Code Description Service Date Service Provider Modifiers Qty    29600960201 HC GAIT TRAINING EA 15 MIN 12/12/2017 Ananda CIFUENTES Dieudonne, PT GP 1    02225014508 HC PT THER PROC EA 15 MIN 12/12/2017 Shearon A Dieudonne, PT GP 1    67195479197 HC PT THER SUPP EA 15 MIN 12/12/2017 Shearon A Dieudonne, PT GP 2          PT G-Codes  Outcome Measure Options: AM-PAC 6 Clicks Basic Mobility (PT)    Ananda Bro, PT  12/12/2017

## 2017-12-12 NOTE — PROGRESS NOTES
"  SUBJECTIVE  Patient resting comfortably.  Pain controlled.  No complaints this morning    OBJECTIVE  Temp (24hrs), Av °F (36.7 °C), Min:97.5 °F (36.4 °C), Max:98.6 °F (37 °C)    Blood pressure 143/78, pulse 52, temperature 97.7 °F (36.5 °C), resp. rate 16, height 162.6 cm (64\"), weight 74.8 kg (164 lb 12.8 oz), SpO2 96 %.    Lab Results (last 24 hours)     Procedure Component Value Units Date/Time    AFB Culture - Synovial Fluid, Knee, Right [[id number]] Collected:  12/10/17 1915    Specimen:  Synovial Fluid from Knee, Right Updated:  17 1316     AFB Stain No acid fast bacilli seen on concentrated smear    AFB Culture - Tissue, Knee, Right [[id number]] Collected:  12/10/17 1921    Specimen:  Tissue from Knee, Right Updated:  17 1316     AFB Stain No acid fast bacilli seen on concentrated smear    AFB Culture - Tissue, Knee, Right [[id number]] Collected:  12/10/17 1924    Specimen:  Tissue from Knee, Right Updated:  17 1316     AFB Stain No acid fast bacilli seen on concentrated smear    AFB Culture - Tissue, Knee, Right [[id number]] Collected:  12/10/17 1925    Specimen:  Tissue from Knee, Right Updated:  17 1316     AFB Stain No acid fast bacilli seen on concentrated smear    AFB Culture - Tissue, Knee, Right [[id number]] Collected:  12/10/17 1926    Specimen:  Tissue from Knee, Right Updated:  17 1316     AFB Stain No acid fast bacilli seen on concentrated smear    Anaerobic Culture - Tissue, Knee, Right [[id number]]  (Normal) Collected:  12/10/17 1926    Specimen:  Tissue from Knee, Right Updated:  17 1343     Culture No anaerobes isolated    Anaerobic Culture - Tissue, Knee, Right [[id number]]  (Normal) Collected:  12/10/17 1925    Specimen:  Tissue from Knee, Right Updated:  17 1343     Culture No anaerobes isolated    Anaerobic Culture - Tissue, Knee, Right [[id number]]  (Normal) Collected:  12/10/17 1924    Specimen:  Tissue from Knee, Right Updated:  17 1344     " Culture No anaerobes isolated    Anaerobic Culture - Tissue, Knee, Right [[id number]]  (Normal) Collected:  12/10/17 1921    Specimen:  Tissue from Knee, Right Updated:  12/11/17 1345     Culture No anaerobes isolated    Anaerobic Culture - Synovial Fluid, Knee, Right [[id number]]  (Normal) Collected:  12/10/17 1915    Specimen:  Synovial Fluid from Knee, Right Updated:  12/11/17 1347     Culture No anaerobes isolated    Blood Culture - Blood, Blood, Venous Line [[id number]]  (Normal) Collected:  12/10/17 1539    Specimen:  Blood from Blood, Venous Line Updated:  12/11/17 1631     Blood Culture No growth at 24 hours    Blood Culture - Blood, Blood, Venous Line [[id number]]  (Normal) Collected:  12/10/17 1535    Specimen:  Blood from Blood, Venous Line Updated:  12/11/17 1631     Blood Culture No growth at 24 hours    Basic Metabolic Panel [[id number]]  (Abnormal) Collected:  12/12/17 0527    Specimen:  Blood Updated:  12/12/17 0648     Glucose 125 (H) mg/dL      BUN 33 (H) mg/dL      Creatinine 0.90 mg/dL      Sodium 142 mmol/L      Potassium 4.3 mmol/L      Chloride 106 mmol/L      CO2 26.0 mmol/L      Calcium 8.4 (L) mg/dL      eGFR Non African Amer 63 mL/min/1.73      BUN/Creatinine Ratio 36.7 (H)     Anion Gap 10.0 mmol/L     Narrative:       [employer] Guidelines    Stage     Description        GFR  1         Normal or High     90+  2         Mild decrease      60-89  3         Moderate decrease  30-59  4         Severe decrease    15-29  5         Kidney failure     <15    Magnesium [[id number]]  (Normal) Collected:  12/12/17 0527    Specimen:  Blood Updated:  12/12/17 0648     Magnesium 2.4 mg/dL     Phosphorus [[id number]]  (Normal) Collected:  12/12/17 0527    Specimen:  Blood Updated:  12/12/17 0648     Phosphorus 2.8 mg/dL     CBC & Differential [[id number]] Collected:  12/12/17 0527    Specimen:  Blood Updated:  12/12/17 0705    Narrative:       The following orders were created for panel order  CBC & Differential.  Procedure                               Abnormality         Status                     ---------                               -----------         ------                     CBC Auto Differential[[id number]]        Abnormal            Final result                 Please view results for these tests on the individual orders.    CBC Auto Differential [[id number]]  (Abnormal) Collected:  12/12/17 0527    Specimen:  Blood Updated:  12/12/17 0705     WBC 15.62 (H) 10*3/mm3      RBC 3.30 (L) 10*6/mm3      Hemoglobin 10.0 (L) g/dL      Hematocrit 32.2 (L) %      MCV 97.6 fL      MCH 30.3 pg      MCHC 31.1 (L) g/dL      RDW 13.9 %      RDW-SD 49.8 fl      MPV 10.2 fL      Platelets 277 10*3/mm3      Neutrophil % 87.5 (H) %      Lymphocyte % 7.6 (L) %      Monocyte % 4.7 %      Eosinophil % 0.0 %      Basophil % 0.0 %      Immature Grans % 0.2 %      Neutrophils, Absolute 13.68 (H) 10*3/mm3      Lymphocytes, Absolute 1.18 10*3/mm3      Monocytes, Absolute 0.73 10*3/mm3      Eosinophils, Absolute 0.00 10*3/mm3      Basophils, Absolute 0.00 10*3/mm3      Immature Grans, Absolute 0.03 10*3/mm3     Tissue / Bone Culture - Tissue, Knee, Right [[id number]]  (Normal) Collected:  12/10/17 1921    Specimen:  Tissue from Knee, Right Updated:  12/12/17 0709     Tissue Culture No growth at 2 days     Gram Stain Result No WBCs or organisms seen    Tissue / Bone Culture - Tissue, Knee, Right [[id number]]  (Normal) Collected:  12/10/17 1924    Specimen:  Tissue from Knee, Right Updated:  12/12/17 0709     Tissue Culture No growth at 2 days     Gram Stain Result Occasional WBCs seen      No organisms seen    Tissue / Bone Culture - Tissue, Knee, Right [[id number]]  (Normal) Collected:  12/10/17 1925    Specimen:  Tissue from Knee, Right Updated:  12/12/17 0709     Tissue Culture No growth at 2 days     Gram Stain Result Occasional WBCs seen      No organisms seen    Tissue / Bone Culture - Tissue, Knee, Right [[id number]]   (Normal) Collected:  12/10/17 1926    Specimen:  Tissue from Knee, Right Updated:  12/12/17 0709     Tissue Culture No growth at 2 days     Gram Stain Result Many (4+) WBCs seen      No organisms seen    Body Fluid Culture - Synovial Fluid, Knee, Right [[id number]]  (Normal) Collected:  12/10/17 1915    Specimen:  Synovial Fluid from Knee, Right Updated:  12/12/17 1005     BF Culture No growth at 2 days     Gram Stain Result Moderate (3+) WBCs seen      No organisms seen            PHYSICAL EXAM  Right lower extremity:Dressing clean, dry and intact.  Patient able to perform straight leg raise without assistance.  Intact EHL, FHL, tibialis anterior, and gastrocsoleus. Sensation intact to light touch to deep peroneal, superficial peroneal, sural, saphenous, tibial nerves. 2+ palpable DP and PT pulses.       Principal Problem:    Sepsis, probable right septic knee as source  Active Problems:    Hypertension    Disease of thyroid gland    Expressive aphasia    Heart murmur  Acute periprosthetic joint infection status post right total knee arthroplasty    PLAN / DISPOSITION:  2 Day Post-Op single component revision right knee arthroplasty with irrigation and debridement and quadricepsplasty    Protected weight bearing as tolerated right lower extremity, knee range of motion as tolerated   Pain control  PT/OT for post op mobilization and medical equipment needs   Continue empiric antibiotic coverage.  Follow-up intraoperative cultures-No growth to date  Infectious disease consult at for antibiotic recommendations and treatment.   TEDs and SCD's bilateral lower extremities   Aspirin for DVT prophylaxis    Social work for discharge planning.   Dressing to remain in place for 7 days. May remove on POD#7. If no drainage, may shower on POD#10. No submerging wound in water. If drainage is noted, sterile dressing should be placed and wound checked daily. No showering until wound has remained dry for 72 consecutive hours.   Follow  up in 3 weeks for re-assessment.      Stephon Garcia MD  12/12/17  12:39 PM

## 2017-12-12 NOTE — PROGRESS NOTES
Neurology       Patient Care Team:  Phani Joaquin MD as PCP - General (Family Medicine)    Chief complaint: Speech difficulty    History:  Patient is somewhat sleepy today.    The family indicates that she is continuing to improve and is almost back to normal mentally and  Past Medical History:   Diagnosis Date   • Arthritis    • Breast cancer 2015    RIGHT   • Disease of thyroid gland    • Hx of radiation therapy 2015    RT BREAST   • Hypertension        Vital Signs   Vitals:    12/11/17 2004 12/12/17 0310 12/12/17 0500 12/12/17 0854   BP: 116/61 139/69  143/78   BP Location: Left arm Left arm     Patient Position: Sitting Lying     Pulse: 67  67 52   Resp: 18 16     Temp: 98.2 °F (36.8 °C) 97.5 °F (36.4 °C)  97.7 °F (36.5 °C)   TempSrc: Oral Oral     SpO2: 98% 96%     Weight:       Height:           Physical Exam:   General: Sleepy              Neuro: Wakes and answers questions and follows complex commands.    No evidence of expressive or receptive aphasia    Results Review:  CT of the head with and without infusion is negative.    Results from last 7 days  Lab Units 12/12/17  0527   WBC 10*3/mm3 15.62*   HEMOGLOBIN g/dL 10.0*   HEMATOCRIT % 32.2*   PLATELETS 10*3/mm3 277       Results from last 7 days  Lab Units 12/12/17  0527 12/11/17  0439 12/10/17  2226   SODIUM mmol/L 142 140 142   POTASSIUM mmol/L 4.3 3.8 3.9   CHLORIDE mmol/L 106 106 108   CO2 mmol/L 26.0 25.0 25.0   BUN mg/dL 33* 25* 25*   CREATININE mg/dL 0.90 1.00 1.10   CALCIUM mg/dL 8.4* 8.3* 7.8*   GLUCOSE mg/dL 125* 115* 123*       Imaging Results (last 24 hours)     Procedure Component Value Units Date/Time    CT Head With & Without Contrast [[id number]] Collected:  12/11/17 1630     Updated:  12/11/17 1630    Narrative:       EXAMINATION: CT HEAD W WO CONTRAST- 12/11/2017     INDICATION: Aphasia; A41.9-Sepsis, unspecified organism;  T84.53XA-Infection and inflammatory reaction due to internal right knee  prosthesis, initial encounter;  Z96.651-Presence of right artificial knee  joint; Z74.09-Other reduced mobility; difficulty speaking     TECHNIQUE: Multiple axial CT imaging was obtained of the head from skull  base to skull vertex pre and post administration of intravenous  contrast.     The radiation dose reduction device was turned on for each scan per the  ALARA (As Low as Reasonably Achievable) protocol.     COMPARISON: None.     FINDINGS: The parenchyma is grossly unremarkable in appearance with some  low-density area seen in the periventricular white matter suggesting  chronic vessel ischemic change. Tiny area of low density within the  right basal ganglia suggesting possible prior vascular malformation.  There is no mass, mass effect, or midline shift. No abnormal extra-axial  fluid collection is identified. The bony structures reveal no evidence  of osseous abnormality. The visualized paranasal sinuses are clear. The  mastoid air cells are patent.       Impression:       No acute intracranial abnormality is identified. Vascularity  is unremarkable in appearance.     D:  12/11/2017  E:  12/11/2017       XR Knee 1 or 2 View Right [[id number]] Collected:  12/11/17 0830     Updated:  12/11/17 1644    Narrative:       EXAMINATION: XR KNEE 1 OR 2 VW RIGHT-      INDICATION: Postop knee arthoplasty; A41.9-Sepsis, unspecified organism;  T84.53XA-Infection and inflammatory reaction due to internal right knee  prosthesis, initial encounter; Z96.651-Presence of right artificial knee  joint.      COMPARISON: Radiographs 12/10/2017 earlier same day     FINDINGS: Status post right total knee arthroplasty. No evidence of  hardware complication with components in satisfactory alignment.  Expected postsurgical changes including subcutaneous emphysema are  noted.       Impression:       Status post right total knee arthroplasty without evidence  of complication in satisfactory alignment.     D:  12/11/2017  E:  12/11/2017     This report was finalized on  12/11/2017 4:42 PM by Dr. Wisam Galarza.             Assessment:  Septic encephalopathy, resolving    Plan:  Patient should continue to prosper.    Comment:  Prognosis is out of her underlying infection.         I discussed the patients findings and my recommendations with patient and family    Fady Cervantes MD  12/12/17  11:19 AM

## 2017-12-13 LAB — VANCOMYCIN TROUGH SERPL-MCNC: 21.2 MCG/ML (ref 10–20)

## 2017-12-13 PROCEDURE — 94799 UNLISTED PULMONARY SVC/PX: CPT

## 2017-12-13 PROCEDURE — 99233 SBSQ HOSP IP/OBS HIGH 50: CPT | Performed by: INTERNAL MEDICINE

## 2017-12-13 PROCEDURE — 97110 THERAPEUTIC EXERCISES: CPT

## 2017-12-13 PROCEDURE — 25010000003 CEFTRIAXONE PER 250 MG: Performed by: INTERNAL MEDICINE

## 2017-12-13 PROCEDURE — 99024 POSTOP FOLLOW-UP VISIT: CPT | Performed by: ORTHOPAEDIC SURGERY

## 2017-12-13 PROCEDURE — C1894 INTRO/SHEATH, NON-LASER: HCPCS

## 2017-12-13 PROCEDURE — 97116 GAIT TRAINING THERAPY: CPT

## 2017-12-13 PROCEDURE — 25010000002 ONDANSETRON PER 1 MG: Performed by: ORTHOPAEDIC SURGERY

## 2017-12-13 PROCEDURE — 80202 ASSAY OF VANCOMYCIN: CPT

## 2017-12-13 PROCEDURE — 99231 SBSQ HOSP IP/OBS SF/LOW 25: CPT | Performed by: PSYCHIATRY & NEUROLOGY

## 2017-12-13 PROCEDURE — 25010000002 VANCOMYCIN 10 G RECONSTITUTED SOLUTION

## 2017-12-13 PROCEDURE — C1751 CATH, INF, PER/CENT/MIDLINE: HCPCS

## 2017-12-13 RX ORDER — VANCOMYCIN/0.9 % SOD CHLORIDE 1.5G/250ML
1500 PLASTIC BAG, INJECTION (ML) INTRAVENOUS EVERY 24 HOURS
Status: DISCONTINUED | OUTPATIENT
Start: 2017-12-13 | End: 2017-12-15

## 2017-12-13 RX ORDER — SODIUM CHLORIDE 0.9 % (FLUSH) 0.9 %
10 SYRINGE (ML) INJECTION AS NEEDED
Status: DISCONTINUED | OUTPATIENT
Start: 2017-12-13 | End: 2017-12-13

## 2017-12-13 RX ADMIN — BISACODYL 10 MG: 5 TABLET, COATED ORAL at 16:14

## 2017-12-13 RX ADMIN — VANCOMYCIN HCL-SODIUM CHLORIDE IV SOLN 1.5 GM/250ML-0.9% 1500 MG: 1.5-0.9/25 SOLUTION at 11:38

## 2017-12-13 RX ADMIN — OXYCODONE AND ACETAMINOPHEN 2 TABLET: 5; 325 TABLET ORAL at 16:06

## 2017-12-13 RX ADMIN — LEVOTHYROXINE SODIUM ANHYDROUS 100 MCG: 100 INJECTION, POWDER, LYOPHILIZED, FOR SOLUTION INTRAVENOUS at 11:38

## 2017-12-13 RX ADMIN — ASPIRIN 325 MG: 325 TABLET, DELAYED RELEASE ORAL at 20:07

## 2017-12-13 RX ADMIN — OXYCODONE AND ACETAMINOPHEN 1 TABLET: 5; 325 TABLET ORAL at 12:03

## 2017-12-13 RX ADMIN — OXYCODONE AND ACETAMINOPHEN 1 TABLET: 5; 325 TABLET ORAL at 08:43

## 2017-12-13 RX ADMIN — OXYCODONE AND ACETAMINOPHEN 1 TABLET: 5; 325 TABLET ORAL at 00:21

## 2017-12-13 RX ADMIN — ASPIRIN 325 MG: 325 TABLET, DELAYED RELEASE ORAL at 08:43

## 2017-12-13 RX ADMIN — CEFTRIAXONE SODIUM 2 G: 2 INJECTION, SOLUTION INTRAVENOUS at 08:48

## 2017-12-13 RX ADMIN — ONDANSETRON 4 MG: 2 INJECTION INTRAMUSCULAR; INTRAVENOUS at 11:38

## 2017-12-13 RX ADMIN — OXYCODONE AND ACETAMINOPHEN 2 TABLET: 5; 325 TABLET ORAL at 20:30

## 2017-12-13 NOTE — THERAPY TREATMENT NOTE
Acute Care - Physical Therapy Treatment Note  [facility]     Patient Name: Lesli Leon  : 1955  MRN: [id number]  Today's Date: 2017  Onset of Illness/Injury or Date of Surgery Date: 12/10/17  Date of Referral to PT: 12/10/17  Referring Physician: MD Jose    Admit Date: 12/10/2017    Visit Dx:    ICD-10-CM ICD-9-CM   1. Sepsis, due to unspecified organism A41.9 038.9     995.91   2. Infection of total right knee replacement T84.53XA 996.66    Z96.651 V43.65   3. Impaired functional mobility, balance, gait, and endurance Z74.09 V49.89   4. Impaired mobility and ADLs Z74.09 799.89     Patient Active Problem List   Diagnosis   • Sepsis, probable right septic knee as source   • Hypertension   • Disease of thyroid gland   • Expressive aphasia   • Heart murmur               Adult Rehabilitation Note       17 1116 17 1444 17 0915    Rehab Assessment/Intervention    Discipline physical therapist  -EH physical therapist  -SC physical therapy assistant  -AS    Document Type therapy note (daily note)  -EH therapy note (daily note)  -SC therapy note (daily note)  -AS    Subjective Information agree to therapy  - agree to therapy  -SC     Patient Effort, Rehab Treatment adequate  - adequate  -SC     Symptoms Noted During/After Treatment fatigue;other (see comments)  - fatigue;other (see comments)   slow to respond to any commands  -SC     Symptoms Noted Comment nausea; pt cued for PLB in sitting with improvement in nausea. Increased nausea with standing. Pt assisted to t/f to chair instead of ambulation. At that time PICC line RN presents in room.  -      Precautions/Limitations fall precautions  - fall precautions  -SC     Specific Treatment Considerations  followed by chair  -SC     Recorded by [EH] Ally Azevedo, PT [SC] Ananda Bro PT [AS] Juliann Irving PTA    Pain Assessment    Pain Assessment Serrano-Vasquez FACES  - Serrano-Baker FACES  -SC 0-10  -AS     Serrano-Baker FACES Pain Rating 4  - 6  -SC     Pain Score   5  -AS    Post Pain Score  6  -SC 5  -AS    Pain Type  Acute pain  -SC Acute pain;Surgical pain  -AS    Pain Location  Knee  -SC Knee  -AS    Pain Orientation  Right  -SC Right  -AS    Patient's Stated Pain Goal   No pain  -AS    Pain Intervention(s)  Repositioned  -SC Repositioned;Ambulation/increased activity  -AS    Response to Interventions   tolerated  -AS    Recorded by [] Ally Azevedo, PT [SC] Ananda Bro, PT [AS] Juliann Irving PTA    Cognitive Assessment/Intervention    Current Cognitive/Communication Assessment   functional  -AS    Orientation Status oriented to;person;place;time  - oriented to;person;place;time  -SC oriented x 4  -AS    Follows Commands/Answers Questions 75% of the time;able to follow single-step instructions;needs cueing;needs increased time  - 75% of the time;needs increased time;needs repetition;needs cueing  -SC 75% of the time;able to follow single-step instructions;needs cueing  -AS    Personal Safety mild impairment;decreased awareness, need for assist;decreased awareness, need for safety  - mild impairment;decreased awareness, need for assist;decreased awareness, need for safety;decreased insight to deficits  -SC mild impairment  -AS    Personal Safety Interventions fall prevention program maintained;gait belt;nonskid shoes/slippers when out of bed  - gait belt;fall prevention program maintained  -SC fall prevention program maintained;gait belt;nonskid shoes/slippers when out of bed;other (see comments)   exit alarm  -AS    Recorded by [] Ally Azevedo, PT [SC] Ananda Bro, PT [AS] Juliann Irving PTA    Bed Mobility, Assessment/Treatment    Bed Mobility, Assistive Device bed rails;head of bed elevated  - bed rails;head of bed elevated  -SC     Bed Mob, Supine to Sit, [place] verbal cues required;minimum assist (75% patient effort)  - verbal cues required;minimum assist (75%  patient effort)  -SC verbal cues required;minimum assist (75% patient effort)  -AS    Bed Mob, Sit to Supine, [place]   not tested  -AS    Bed Mobility, Safety Issues decreased use of legs for bridging/pushing;other (see comments)   sequencing difficulty  -EH decreased use of legs for bridging/pushing;other (see comments)   sequencing difficulty  -SC     Bed Mobility, Impairments pain;coordination impaired;motor control impaired;strength decreased  - pain;coordination impaired;motor control impaired;strength decreased  -SC strength decreased;pain  -AS    Bed Mobility, Comment  up to edge of bed very slowly with difficulty sequencing  moverments  -SC assist to move R LE to EOB   -AS    Recorded by [EH] Ally Azevedo, PT [SC] Ananda Bro, PT [AS] Juliann Irving, PTA    Transfer Assessment/Treatment    Transfers, Bed-Chair [place] contact guard assist;2 person assist required;verbal cues required  -EH      Transfers, Chair-Bed [place] contact guard assist;2 person assist required;verbal cues required  -EH      Transfers, Sit-Stand [place] contact guard assist;verbal cues required  - contact guard assist;verbal cues required  -SC verbal cues required;minimum assist (75% patient effort);2 person assist required  -AS    Transfers, Stand-Sit [place] verbal cues required;contact guard assist  - verbal cues required;contact guard assist  -SC verbal cues required;minimum assist (75% patient effort);2 person assist required  -AS    Transfers, Sit-Stand-Sit, Assist Device rolling walker  - rolling walker  -SC rolling walker  -AS    Transfer, Safety Issues other (see comments)   difficulty sequencing  -EH other (see comments)   difficulty sequencing  -SC weight-shifting ability decreased;step length decreased  -AS    Transfer, Impairments impaired balance;motor control impaired;strength decreased  - impaired balance;motor control impaired;strength decreased  -SC strength  decreased;impaired balance;pain  -AS    Transfer, Comment  repeted cues for sequencing standing--patient very slow  -SC verbal cues to reach back when sitting, patient sat without reaching back and without chair locked despite cueing.  -AS    Recorded by [] Ally Azevedo, PT [SC] Ananda Bro, PT [AS] Juliann Irving, HEMALATHA    Gait Assessment/Treatment    Gait, [place] Level contact guard assist  - contact guard assist  -SC verbal cues required;minimum assist (75% patient effort);1 person + 1 person to manage equipment  -AS    Gait, Assistive Device rolling walker  - rolling walker  -SC rolling walker  -AS    Gait, Distance (Feet) 4  - 80   followed by chair  -  -AS    Gait, Gait Deviations   franklin decreased;decreased heel strike;right:;forward flexed posture;weight-shifting ability decreased;step length decreased  -AS    Gait, Safety Issues   step length decreased;weight-shifting ability decreased;balance decreased during turns;sequencing ability decreased  -AS    Gait, Impairments strength decreased;motor control impaired;pain  -EH strength decreased;motor control impaired;pain  -SC strength decreased;impaired balance;ROM decreased;pain  -AS    Gait, Comment  cues to stay close to walker and some assist to steer walker. Had to take one sit down rest. Needed much encouragement to keep going.  -SC patient ambulateed with step to gait pattern, 1 seated resr break due to fatigue and dizziness, follow with chair for safety. Verbal cues to increase right heel strike and for walker placement  -AS    Recorded by [EH] Ally Azevedo, PT [SC] Ananda Bro, PT [AS] Juliann Irving, HEMALATHA    Balance Skills Training    Gait Balance-Level of Assistance Minimum assistance;x2  -EH Minimum assistance;x2  -SC     Gait Balance Support assistive device  - assistive device  -SC     Recorded by [] Ally Azevedo, PT [SC] Ananda Bro, PT     Therapy Exercises    Right Lower Extremity --   - 10 reps;AROM:;supine;ankle pumps/circles;quad sets;heel slides;SLR   needs repeted cues  -SC     Bilateral Lower Extremities   AROM:;10 reps;sitting;ankle pumps/circles;glut sets;quad sets   feet elevated in recliner to perform exercises  -AS    Recorded by [] Ally Azevedo, PT [SC] Ananda Bro, PT [AS] Juliann Irving, PTA    Positioning and Restraints    Pre-Treatment Position in bed  - in bed  -SC in bed  -AS    Post Treatment Position bed  - chair  -SC chair  -AS    In Bed supine;call light within reach;encouraged to call for assist;notified nsg;with other staff;with family/caregiver  -      In Chair --  - sitting;reclined;notified nsg;call light within reach;encouraged to call for assist;exit alarm on  -SC reclined;call light within reach;encouraged to call for assist;exit alarm on;with family/caregiver  -AS    Recorded by [EH] Ally Azevedo, PT [SC] Ananda Bro, PT [AS] Juliann Irving, PTA      12/11/17 1321          Rehab Assessment/Intervention    Discipline physical therapist  -      Document Type therapy note (daily note)  -LS      Subjective Information agree to therapy;no complaints  -LS      Patient Effort, Rehab Treatment good  -LS      Symptoms Noted Comment Confusion persists.   -LS      Precautions/Limitations fall precautions  -LS      Recorded by [LS] Dawna Blanchard, PT      Vital Signs    Pre Systolic BP Rehab 106  -LS      Pre Treatment Diastolic BP 54  -LS      Post Systolic BP Rehab --   in w/c for transport to floor bed  -LS      Pretreatment Heart Rate (beats/min) 82  -LS      Pre SpO2 (%) 98  -LS      O2 Delivery Pre Treatment room air  -LS      O2 Delivery Post Treatment room air  -LS      Pre Patient Position Sitting  -LS      Intra Patient Position Standing  -LS      Post Patient Position Sitting  -LS      Recorded by [LS] Dawna Blanchard, PT      Pain Assessment    Pain Assessment 0-10  -LS      Pain Score 0  -LS      Post Pain Score 8  -LS      Pain  Type Surgical pain  -LS      Pain Location Knee  -LS      Pain Orientation Right  -LS      Pain Intervention(s) Ambulation/increased activity;Repositioned  -LS      Response to Interventions tolerated; notified RN  -LS      Recorded by [LS] Dawna Blanchard, PT      Cognitive Assessment/Intervention    Current Cognitive/Communication Assessment impaired  -LS      Orientation Status oriented to;person;required verbal cueing (specifiy in comments)   cues for location and date  -LS      Follows Commands/Answers Questions able to follow single-step instructions;75% of the time;needs cueing;needs increased time;needs repetition  -LS      Personal Safety moderate impairment;decreased awareness, need for assist;decreased awareness, need for safety;decreased insight to deficits  -LS      Personal Safety Interventions fall prevention program maintained;gait belt;nonskid shoes/slippers when out of bed  -LS      Recorded by [LS] Dawna Blanchard, PT      ROM (Range of Motion)    General ROM Detail R AAROM in sittin deg; R knee AROM extension in sitting: lacking 14 degrees full extension (ace bandage intact during measurements)  -LS      Recorded by [LS] Dawna Blanchard, PT      Mobility Assessment/Training    Extremity Weight-Bearing Status right lower extremity  -LS      Right Lower Extremity Weight-Bearing weight-bearing as tolerated  -LS      Recorded by [LS] Dawna Blanchard, PT      Bed Mobility, Assessment/Treatment    Bed Mobility, Comment [facility] upon arrival.   -LS      Recorded by [LS] Dawna Blanchard, PT      Transfer Assessment/Treatment    Transfers, Sit-Stand [place] minimum assist (75% patient effort);verbal cues required  -LS      Transfers, Stand-Sit [place] minimum assist (75% patient effort);verbal cues required  -LS      Transfers, Sit-Stand-Sit, Assist Device rolling walker  -LS      Transfer, Impairments strength decreased;impaired balance  -LS      Transfer, Comment VC's for hand placement and for slight  RLE forward prior to transfer.   -LS      Recorded by [LS] Dawna Blanchard, PT      Gait Assessment/Treatment    Gait, [place] Level contact guard assist;1 person + 1 person to manage equipment;verbal cues required  -LS      Gait, Assistive Device rolling walker  -LS      Gait, Distance (Feet) 110  -LS      Gait, Gait Pattern Analysis swing-through gait  -LS      Gait, Gait Deviations franklin decreased;forward flexed posture;right:;antalgic  -LS      Gait, Impairments strength decreased;impaired balance;pain  -LS      Gait, Comment VC's for upright posture, equal step length (R versus L), and for increased R knee flexion to promote normalized swing through phase. Distance limited by fatigue; followed with w/c for safety.   -LS      Recorded by [LS] Dawna Blanchard, PT      Motor Skills/Interventions    Additional Documentation Balance Skills Training (Group)  -LS      Recorded by [LS] Dawna Blanchard, PT      Balance Skills Training    Sitting-Level of Assistance Contact guard  -LS      Sitting-Balance Support Feet supported  -LS      Standing-Level of Assistance Contact guard  -LS      Static Standing Balance Support assistive device  -LS      Gait Balance-Level of Assistance Contact guard  -LS      Gait Balance Support assistive device  -LS      Recorded by [LS] Dawna Blanchard, PT      Therapy Exercises    Exercise Protocols total knee  -LS      Total Knee Exercises bilateral:;10 reps;ankle pumps/circles;quad set;glut set;right:;LAQ;hip abduction/adduction;heel slide stretch;heel slides   issued HEP handout  -LS      Recorded by [LS] Dawna Blanchard, PT      Positioning and Restraints    Pre-Treatment Position sitting in chair/recliner  -LS      Post Treatment Position wheelchair  -LS      In Wheelchair notified nsg;sitting;call light within reach;encouraged to call for assist;with family/caregiver;legs elevated   RN prepping pt for transfer to floor room  -LS      Recorded by [LS] Dawna Blanchard, PT        User Galarza   (r) = Recorded By, (t) = Taken By, (c) = Cosigned By    Initials Name Effective Dates    SC Ananda Bro, PT 06/19/15 -     EH Ally VANE Azevedo, PT 06/19/15 -     AS Juliann Irving, PTA 06/22/15 -     LS Dawna Blanchard, PT 06/19/15 -                 IP PT Goals       12/13/17 1259 12/12/17 1640 12/12/17 0948    Bed Mobility PT LTG    Bed Mobility PT LTG, Date Goal Reviewed   12/12/17  -AS    Bed Mobility PT LTG, Outcome goal ongoing  -EH goal ongoing  -SC goal ongoing  -AS    Transfer Training PT LTG    Transfer Training PT  LTG, Date Goal Reviewed   12/12/17  -AS    Transfer Training PT LTG, Outcome goal ongoing  -EH goal ongoing  -SC goal ongoing  -AS    Gait Training PT LTG    Gait Training Goal PT LTG, Date Goal Reviewed   12/12/17  -AS    Gait Training Goal PT LTG, Outcome goal ongoing  -EH goal ongoing  -SC goal ongoing  -AS    Stair Training PT LTG    Stair Training Goal PT LTG, Date Goal Reviewed   12/12/17  -AS    Stair Training Goal PT LTG, Outcome goal ongoing  -EH goal ongoing  -SC goal ongoing  -AS      12/11/17 1448 12/11/17 1002       Bed Mobility PT LTG    Bed Mobility PT LTG, Date Established  12/11/17  -LS     Bed Mobility PT LTG, Time to Achieve  2 wks  -LS     Bed Mobility PT LTG, Activity Type  supine to sit/sit to supine  -LS     Bed Mobility PT LTG, [place] Level  supervision required  -LS     Bed Mobility PT LTG, Outcome goal ongoing  -LS      Transfer Training PT LTG    Transfer Training PT LTG, Date Established  12/11/17  -LS     Transfer Training PT LTG, Time to Achieve  2 wks  -LS     Transfer Training PT LTG, Activity Type  sit to stand/stand to sit  -LS     Transfer Training PT LTG, [place] Level  supervision required  -LS     Transfer Training PT LTG, Assist Device  walker, rolling  -LS     Transfer Training PT LTG, Outcome goal ongoing  -LS      Gait Training PT LTG    Gait Training Goal PT LTG, Date Established  12/11/17  -LS     Gait Training Goal PT LTG, Time to  Achieve  2 wks  -LS     Gait Training Goal PT LTG, [place] Level  supervision required  -LS     Gait Training Goal PT LTG, Assist Device  walker, rolling  -LS     Gait Training Goal PT LTG, Distance to Achieve  200  -LS     Gait Training Goal PT LTG, Outcome goal ongoing  -LS      Stair Training PT LTG    Stair Training Goal PT LTG, Date Established  12/11/17  -LS     Stair Training Goal PT LTG, Time to Achieve  2 wks  -LS     Stair Training Goal PT LTG, Number of Steps  3  -LS     Stair Training Goal PT LTG, [place] Level  contact guard assist  -LS     Stair Training Goal PT LTG, Assist Device  1 handrail  -LS     Stair Training Goal PT LTG, Outcome goal ongoing  -LS        User Key  (r) = Recorded By, (t) = Taken By, (c) = Cosigned By    Initials Name Provider Type    SC Ananda Bro, PT Physical Therapist     Ally Azevedo, PT Physical Therapist    AS Juliann Irving, PTA Physical Therapy Assistant     Dawna Blanchard, PT Physical Therapist          Physical Therapy Education     Title: PT OT SLP Therapies (Active)     Topic: Physical Therapy (Active)     Point: Mobility training (Active)    Learning Progress Summary    Learner Readiness Method Response Comment Documented by Status   Patient Acceptance E NR   12/13/17 1258 Active    Acceptance E,D NR reviewed safety with mobility SC 12/12/17 1640 Active    Acceptance E NR  AS 12/12/17 0948 Active    Acceptance E,D,H NR [facility] pt/son re: HEP; issued illustrated handout.  12/11/17 1448 Active    Acceptance E,D NR   12/11/17 1002 Active   Family Acceptance E NR  AS 12/12/17 0948 Active   Significant Other Acceptance E NR   12/13/17 1258 Active               Point: Home exercise program (Active)    Learning Progress Summary    Learner Readiness Method Response Comment Documented by Status   Patient Acceptance E NR   12/13/17 1258 Active    Acceptance E,D NR reviewed safety with mobility SC 12/12/17 1640 Active    Acceptance E NR  AS  12/12/17 0948 Active    Acceptance E,D,H NR Edu pt/son re: HEP; issued illustrated handout.  12/11/17 1448 Active    Acceptance E,D NR   12/11/17 1002 Active   Family Acceptance E NR  AS 12/12/17 0948 Active   Significant Other Acceptance E NR   12/13/17 1258 Active               Point: Body mechanics (Active)    Learning Progress Summary    Learner Readiness Method Response Comment Documented by Status   Patient Acceptance E NR   12/13/17 1258 Active    Acceptance E,D NR reviewed safety with mobility SC 12/12/17 1640 Active    Acceptance E NR  AS 12/12/17 0948 Active    Acceptance E,D,H NR Edu pt/son re: HEP; issued illustrated handout.  12/11/17 1448 Active    Acceptance E,D NR   12/11/17 1002 Active   Family Acceptance E NR  AS 12/12/17 0948 Active   Significant Other Acceptance E NR   12/13/17 1258 Active               Point: Precautions (Active)    Learning Progress Summary    Learner Readiness Method Response Comment Documented by Status   Patient Acceptance E NR   12/13/17 1258 Active    Acceptance E,D NR reviewed safety with mobility SC 12/12/17 1640 Active    Acceptance E NR  AS 12/12/17 0948 Active    Acceptance E,D,H NR Edu pt/son re: HEP; issued illustrated handout.  12/11/17 1448 Active    Acceptance E,D NR   12/11/17 1002 Active   Family Acceptance E NR  AS 12/12/17 0948 Active   Significant Other Acceptance E NR   12/13/17 1258 Active                      User Key     Initials Effective Dates Name Provider Type Discipline    SC 06/19/15 -  Ananda Bro, PT Physical Therapist PT     06/19/15 -  Ally Azevedo, PT Physical Therapist PT    AS 06/22/15 -  Juliann Irving, PTA Physical Therapy Assistant PT     06/19/15 -  Dawna Blanchard, PT Physical Therapist PT                    PT Recommendation and Plan  Anticipated Discharge Disposition: skilled nursing facility  PT Frequency: 2 times/day  Plan of Care Review  Plan Of Care Reviewed With: patient  Progress: progress  toward functional goals is gradual  Outcome Summary/Follow up Plan: Pt assisted to t/f to and from chair. PT limited 2/2 PICC line placement directly after tx.          Outcome Measures       12/13/17 1116 12/12/17 1444 12/12/17 0915    How much help from another person do you currently need...    Turning from your back to your side while in flat bed without using bedrails? 3  - 3  -SC 3  -AS    Moving from lying on back to sitting on the side of a flat bed without bedrails? 3  - 3  -SC 3  -AS    Moving to and from a bed to a chair (including a wheelchair)? 3  -EH 3  -SC 3  -AS    Standing up from a chair using your arms (e.g., wheelchair, bedside chair)? 3  -EH 3  -SC 3  -AS    Climbing 3-5 steps with a railing? 2  -EH 2  -SC 2  -AS    To walk in hospital room? 3  - 3  -SC 3  -AS    AM-PAC 6 Clicks Score 17  -EH 17  -SC 17  -AS    Functional Assessment    Outcome Measure Options AM-PAC 6 Clicks Basic Mobility (PT)  - AM-PAC 6 Clicks Basic Mobility (PT)  -SC AM-PAC 6 Clicks Basic Mobility (PT)  -AS      12/11/17 1321 12/11/17 0805 12/11/17 0804    How much help from another person do you currently need...    Turning from your back to your side while in flat bed without using bedrails? 3  -LS 2  -LS     Moving from lying on back to sitting on the side of a flat bed without bedrails? 2  -LS 2  -LS     Moving to and from a bed to a chair (including a wheelchair)? 3  -LS 3  -LS     Standing up from a chair using your arms (e.g., wheelchair, bedside chair)? 3  -LS 2  -LS     Climbing 3-5 steps with a railing? 2  -LS 2  -LS     To walk in hospital room? 3  -LS 3  -LS     AM-PAC 6 Clicks Score 16  -LS 14  -LS     How much help from another is currently needed...    Putting on and taking off regular lower body clothing?   1  -CL    Bathing (including washing, rinsing, and drying)   2  -CL    Toileting (which includes using toilet bed pan or urinal)   1  -CL    Putting on and taking off regular upper body clothing    2  -CL    Taking care of personal grooming (such as brushing teeth)   3  -CL    Eating meals   4  -CL    Score   13  -CL    Functional Assessment    Outcome Measure Options AM-PAC 6 Clicks Basic Mobility (PT)  -LS AM-PAC 6 Clicks Basic Mobility (PT)  -LS AM-PAC 6 Clicks Daily Activity (OT)  -CL      User Key  (r) = Recorded By, (t) = Taken By, (c) = Cosigned By    Initials Name Provider Type    SC Ananda Bro, PT Physical Therapist    EH Ally Azevedo, PT Physical Therapist    AS Juliann Irving, PTA Physical Therapy Assistant    LS Dawna Blanchard, PT Physical Therapist    CL Philly Gray, OT Occupational Therapist           Time Calculation:         PT Charges       12/13/17 1301          Time Calculation    Start Time 1116  -      PT Received On 12/13/17  -      PT Goal Re-Cert Due Date 12/21/17  -      Time Calculation- PT    Total Timed Code Minutes- PT 15 minute(s)  -        User Key  (r) = Recorded By, (t) = Taken By, (c) = Cosigned By    Initials Name Provider Type     Ally Azevedo, PT Physical Therapist          Therapy Charges for Today     Code Description Service Date Service Provider Modifiers Qty    08340305419 HC PT THER PROC EA 15 MIN 12/13/2017 Ally Azevedo, PT GP 1    97016953922 HC PT THER SUPP EA 15 MIN 12/13/2017 Ally Azevedo, PT GP 1          PT G-Codes  Outcome Measure Options: AM-PAC 6 Clicks Basic Mobility (PT)    Ally Azevedo, PT  12/13/2017

## 2017-12-13 NOTE — PROGRESS NOTES
Continued Stay Note  [facility]     Patient Name: Lesli Leon  MRN: [id number]  Today's Date: 12/13/2017    Admit Date: 12/10/2017          Discharge Plan       12/13/17 8223    Case Management/Social Work Plan    Plan STR    Patient/Family In Agreement With Plan yes    Additional Comments Spoke with patient and  at bedside. Per therapy recs patient would benefit from short term rehab. Initially she and her  were not agreeable to this and were requesting HH/PT. However, per ID notes patient is likely to need 6-8 weeks of daily IV abx to be provided at the [facility] office. Patient is not able to receive both outpatient abx and home health r/t ins. Outpatient PT would also be challenging for her at this time. After discussing all of the options with patient and her  they are now agreeable to a referral for STR. Per their request referrals have been made with Mer[facility] at [facility] and [facility] with [facility]. If accepted an insurance precert will be initiated once patient is closer to being medically ready. CM will follow.                Discharge Codes     None            Araceli Pastor RN

## 2017-12-13 NOTE — PROGRESS NOTES
[facility] Medicine Services  PROGRESS NOTE    Patient Name: Lesli Leon  : 1955  MRN: [id number]    Date of Admission: 12/10/2017  Length of Stay: 3  Primary Care Physician: Phani Joaquin MD    Subjective   Subjective     CC:f/u for septic right knee    HPI: No acute events overnight, patient states that she had an ok night, denies any pain, mentions she ha a good visit with family last evening, mentation is clearer    Review of Systems  Gen- No fevers, chills  CV- No chest pain, palpitations  Resp- No cough, dyspnea  GI- No N/V/D, abd pain    Otherwise ROS is negative except as mentioned in the HPI.    Objective   Objective     Vital Signs:   Temp:  [97.6 °F (36.4 °C)-98.1 °F (36.7 °C)] 98.1 °F (36.7 °C)  Heart Rate:  [50-58] 58  Resp:  [16] 16  BP: (124-148)/(63-81) 124/63      Physical Exam:  Constitutional: No acute distress, awake, alert, laying in bed, comfortable  HENT: NCAT, mucous membranes moist  Respiratory: Clear to auscultation bilaterally, respiratory effort normal   Cardiovascular: RRR, no murmurs, rubs, or gallops, palpable pedal pulses bilaterally  Gastrointestinal: Positive bowel sounds, soft, nontender, nondistended  Musculoskeletal:  Right leg in brace and wraps, no bilateral ankle edema  Psychiatric: Appropriate affect, cooperative  Neurologic: Oriented x 3, strength symmetric in all extremities, Cranial Nerves grossly intact to confrontation, speech clear  Skin: No rashes    Results Reviewed:  I have personally reviewed current lab, radiology, and data and agree.      Results from last 7 days  Lab Units 12/12/17  0527 12/11/17  0439 12/10/17  2225 12/10/17  1518   WBC 10*3/mm3 15.62* 15.70*  --  17.51*   HEMOGLOBIN g/dL 10.0* 9.7* 10.3* 10.4*   HEMATOCRIT % 32.2* 31.0* 32.8* 33.7*   PLATELETS 10*3/mm3 277 221  --  243       Results from last 7 days  Lab Units 12/12/17  0527 12/11/17  0439 12/10/17  2226 12/10/17  1517   SODIUM mmol/L 142 140 142 142    POTASSIUM mmol/L 4.3 3.8 3.9 3.7   CHLORIDE mmol/L 106 106 108 110*   CO2 mmol/L 26.0 25.0 25.0 26.0   BUN mg/dL 33* 25* 25* 30*   CREATININE mg/dL 0.90 1.00 1.10 1.70*   GLUCOSE mg/dL 125* 115* 123* 105*   CALCIUM mg/dL 8.4* 8.3* 7.8* 8.1*   TROPONIN I ng/mL  --   --   --  0.007     No results found for: BNP  No results found for: PHART    Microbiology Results Abnormal     Procedure Component Value - Date/Time    Tissue / Bone Culture - Tissue, Knee, Right [[id number]]  (Normal) Collected:  12/10/17 1921    Lab Status:  Preliminary result Specimen:  Tissue from Knee, Right Updated:  12/13/17 0642     Tissue Culture No growth at 3 days     Gram Stain Result No WBCs or organisms seen    Tissue / Bone Culture - Tissue, Knee, Right [[id number]]  (Normal) Collected:  12/10/17 1925    Lab Status:  Preliminary result Specimen:  Tissue from Knee, Right Updated:  12/13/17 0642     Tissue Culture No growth at 3 days     Gram Stain Result Occasional WBCs seen      No organisms seen    Tissue / Bone Culture - Tissue, Knee, Right [[id number]]  (Normal) Collected:  12/10/17 1924    Lab Status:  Preliminary result Specimen:  Tissue from Knee, Right Updated:  12/13/17 0641     Tissue Culture No growth at 3 days     Gram Stain Result Occasional WBCs seen      No organisms seen    Tissue / Bone Culture - Tissue, Knee, Right [[id number]]  (Normal) Collected:  12/10/17 1926    Lab Status:  Preliminary result Specimen:  Tissue from Knee, Right Updated:  12/13/17 0641     Tissue Culture Culture in progress     Gram Stain Result Many (4+) WBCs seen      No organisms seen    Body Fluid Culture - Synovial Fluid, Knee, Right [[id number]]  (Normal) Collected:  12/10/17 1915    Lab Status:  Preliminary result Specimen:  Synovial Fluid from Knee, Right Updated:  12/13/17 0640     BF Culture No growth at 3 days     Gram Stain Result Moderate (3+) WBCs seen      No organisms seen    Blood Culture - Blood, Blood, Venous Line [[id number]]  (Normal)  Collected:  12/10/17 1539    Lab Status:  Preliminary result Specimen:  Blood from Blood, Venous Line Updated:  12/12/17 1631     Blood Culture No growth at 2 days    Blood Culture - Blood, Blood, Venous Line [[id number]]  (Normal) Collected:  12/10/17 1535    Lab Status:  Preliminary result Specimen:  Blood from Blood, Venous Line Updated:  12/12/17 1631     Blood Culture No growth at 2 days    Anaerobic Culture - Synovial Fluid, Knee, Right [[id number]]  (Normal) Collected:  12/10/17 1915    Lab Status:  Preliminary result Specimen:  Synovial Fluid from Knee, Right Updated:  12/11/17 1347     Culture No anaerobes isolated    Anaerobic Culture - Tissue, Knee, Right [[id number]]  (Normal) Collected:  12/10/17 1921    Lab Status:  Preliminary result Specimen:  Tissue from Knee, Right Updated:  12/11/17 1345     Culture No anaerobes isolated    Anaerobic Culture - Tissue, Knee, Right [[id number]]  (Normal) Collected:  12/10/17 1924    Lab Status:  Preliminary result Specimen:  Tissue from Knee, Right Updated:  12/11/17 1344     Culture No anaerobes isolated    Anaerobic Culture - Tissue, Knee, Right [[id number]]  (Normal) Collected:  12/10/17 1925    Lab Status:  Preliminary result Specimen:  Tissue from Knee, Right Updated:  12/11/17 1343     Culture No anaerobes isolated    Anaerobic Culture - Tissue, Knee, Right [[id number]]  (Normal) Collected:  12/10/17 1926    Lab Status:  Preliminary result Specimen:  Tissue from Knee, Right Updated:  12/11/17 1343     Culture No anaerobes isolated    AFB Culture - Synovial Fluid, Knee, Right [[id number]] Collected:  12/10/17 1915    Lab Status:  Preliminary result Specimen:  Synovial Fluid from Knee, Right Updated:  12/11/17 1316     AFB Stain No acid fast bacilli seen on concentrated smear    AFB Culture - Tissue, Knee, Right [[id number]] Collected:  12/10/17 1921    Lab Status:  Preliminary result Specimen:  Tissue from Knee, Right Updated:  12/11/17 1316     AFB Stain No acid fast  bacilli seen on concentrated smear    AFB Culture - Tissue, Knee, Right [[id number]] Collected:  12/10/17 1924    Lab Status:  Preliminary result Specimen:  Tissue from Knee, Right Updated:  12/11/17 1316     AFB Stain No acid fast bacilli seen on concentrated smear    AFB Culture - Tissue, Knee, Right [[id number]] Collected:  12/10/17 1925    Lab Status:  Preliminary result Specimen:  Tissue from Knee, Right Updated:  12/11/17 1316     AFB Stain No acid fast bacilli seen on concentrated smear    AFB Culture - Tissue, Knee, Right [[id number]] Collected:  12/10/17 1926    Lab Status:  Preliminary result Specimen:  Tissue from Knee, Right Updated:  12/11/17 1316     AFB Stain No acid fast bacilli seen on concentrated smear        Results for orders placed during the hospital encounter of 12/10/17   Adult Transthoracic Echo Complete W/ Cont if Necessary Per Protocol    Narrative · Left ventricular systolic function is normal.  · Estimated EF appears to be in the range of 61 - 65%  · Left ventricular wall thickness is consistent with borderline concentric   hypertrophy.  · Left ventricular diastolic dysfunction (grade II) consistent with   pseudonormalization.  · Left atrial cavity size is mildly dilated.  · Mild aortic valve regurgitation is present.  · Estimated right ventricular systolic pressure from tricuspid   regurgitation is mildly elevated (35-45 mmHg).  · Mild tricuspid valve regurgitation is present.          I have reviewed the medications.    Assessment/Plan   Assessment / Plan     Hospital Problem List     * (Principal)Sepsis, probable right septic knee as source    Hypertension    Disease of thyroid gland    Expressive aphasia    Heart murmur         Brief Hospital Course to date:  Lesli Leon is a 62 y.o. female with hx of breast CA s/p radiation, recent right knee replacements, she presented 12/10 with 2 days of right knee pain , worsening confusion, fevers and hypotension. Subsequently admitted to the  ICU for septic right knee. She was started on abx, vanc and zosyn per ID. Patient alose developed worsening confusion, neurology was consulted. Patient was deemed stable for transfer to the floor 12/12 for continued care.     Assessment & Plan:  - Septic right knee, s/p single component revision of right knee arthroplasty with irrigation and debridement and quadricepsplasty per ortho. Continue abx, per ID blood and right knee cultures pending, anticipate longer abx course, PICC line ordered.  - Confusion and word finding difficulties likely septic encephalopathy resolving-neurology consulted, doubt CVA, f/u CT head w/wo unrevealing  - Sinus bradycardia on tele, HR was in the low 30s high 40s, asymptomatic, echo wnl, get EKG  - continue home rx for hypothyroidism.     DVT Prophylaxis:  ASA     CODE STATUS: Full Code     Disposition: anticipate d/c in a few days    Xavier Barrera MD  12/13/17  6:56 AM

## 2017-12-13 NOTE — CONSULTS
Placed by Merline Brady RN [facility] - confirmed with 3cg technology.  RUE single lumen PiCC with 40cm total and 1cm exposed.

## 2017-12-13 NOTE — PLAN OF CARE
Problem: Patient Care Overview (Adult)  Goal: Plan of Care Review  Outcome: Ongoing (interventions implemented as appropriate)    12/13/17 1259   Coping/Psychosocial Response Interventions   Plan Of Care Reviewed With patient   Patient Care Overview   Progress progress toward functional goals is gradual   Outcome Evaluation   Outcome Summary/Follow up Plan Pt assisted to t/f to and from chair. PT limited 2/2 PICC line placement directly after tx.         Problem: Inpatient Physical Therapy  Goal: Bed Mobility Goal LTG- PT  Outcome: Ongoing (interventions implemented as appropriate)    12/11/17 1002 12/12/17 0948 12/13/17 1259   Bed Mobility PT LTG   Bed Mobility PT LTG, Date Established 12/11/17 --  --    Bed Mobility PT LTG, Time to Achieve 2 wks --  --    Bed Mobility PT LTG, Activity Type supine to sit/sit to supine --  --    Bed Mobility PT LTG, [place] Level supervision required --  --    Bed Mobility PT LTG, Date Goal Reviewed --  12/12/17 --    Bed Mobility PT LTG, Outcome --  --  goal ongoing       Goal: Transfer Training Goal 1 LTG- PT  Outcome: Ongoing (interventions implemented as appropriate)    12/13/17 1259   Transfer Training PT LTG   Transfer Training PT LTG, Outcome goal ongoing       Goal: Gait Training Goal LTG- PT  Outcome: Ongoing (interventions implemented as appropriate)    12/11/17 1002 12/12/17 0948 12/13/17 1259   Gait Training PT LTG   Gait Training Goal PT LTG, Date Established 12/11/17 --  --    Gait Training Goal PT LTG, Time to Achieve 2 wks --  --    Gait Training Goal PT LTG, [place] Level supervision required --  --    Gait Training Goal PT LTG, Assist Device walker, rolling --  --    Gait Training Goal PT LTG, Distance to Achieve 200 --  --    Gait Training Goal PT LTG, Date Goal Reviewed --  12/12/17 --    Gait Training Goal PT LTG, Outcome --  --  goal ongoing       Goal: Stair Training Goal LTG- PT  Outcome: Ongoing (interventions implemented as appropriate)     12/11/17 1002 12/12/17 0948 12/13/17 1259   Stair Training PT LTG   Stair Training Goal PT LTG, Date Established 12/11/17 --  --    Stair Training Goal PT LTG, Time to Achieve 2 wks --  --    Stair Training Goal PT LTG, Number of Steps 3 --  --    Stair Training Goal PT LTG, [place] Level contact guard assist --  --    Stair Training Goal PT LTG, Assist Device 1 handrail --  --    Stair Training Goal PT LTG, Date Goal Reviewed --  12/12/17 --    Stair Training Goal PT LTG, Outcome --  --  goal ongoing

## 2017-12-13 NOTE — PLAN OF CARE
Problem: Perioperative Period (Adult)  Goal: Signs and Symptoms of Listed Potential Problems Will be Absent or Manageable (Perioperative Period)  Outcome: Ongoing (interventions implemented as appropriate)    Problem: Skin Integrity Impairment, Risk/Actual (Adult)  Goal: Identify Related Risk Factors and Signs and Symptoms  Outcome: Ongoing (interventions implemented as appropriate)

## 2017-12-13 NOTE — PLAN OF CARE
Problem: Patient Care Overview (Adult)  Goal: Plan of Care Review  Outcome: Ongoing (interventions implemented as appropriate)    12/13/17 1551   Coping/Psychosocial Response Interventions   Plan Of Care Reviewed With patient   Patient Care Overview   Progress progress toward functional goals is gradual   Outcome Evaluation   Outcome Summary/Follow up Plan Pt ambulates in bal with CGA. Gait pattern improving. Tolerates 10 reps of ther ex.         Problem: Inpatient Physical Therapy  Goal: Bed Mobility Goal LTG- PT  Outcome: Ongoing (interventions implemented as appropriate)    12/11/17 1002 12/12/17 0948 12/13/17 1551   Bed Mobility PT LTG   Bed Mobility PT LTG, Date Established 12/11/17 --  --    Bed Mobility PT LTG, Time to Achieve 2 wks --  --    Bed Mobility PT LTG, Activity Type supine to sit/sit to supine --  --    Bed Mobility PT LTG, [place] Level supervision required --  --    Bed Mobility PT LTG, Date Goal Reviewed --  12/12/17 --    Bed Mobility PT LTG, Outcome --  --  goal ongoing       Goal: Transfer Training Goal 1 LTG- PT  Outcome: Ongoing (interventions implemented as appropriate)    12/11/17 1002 12/12/17 0948 12/13/17 1551   Transfer Training PT LTG   Transfer Training PT LTG, Date Established 12/11/17 --  --    Transfer Training PT LTG, Time to Achieve 2 wks --  --    Transfer Training PT LTG, Activity Type sit to stand/stand to sit --  --    Transfer Training PT LTG, [place] Level supervision required --  --    Transfer Training PT LTG, Assist Device walker, rolling --  --    Transfer Training PT LTG, Date Goal Reviewed --  12/12/17 --    Transfer Training PT LTG, Outcome --  --  goal ongoing       Goal: Gait Training Goal LTG- PT  Outcome: Ongoing (interventions implemented as appropriate)    12/11/17 1002 12/12/17 0948 12/13/17 1551   Gait Training PT LTG   Gait Training Goal PT LTG, Date Established 12/11/17 --  --    Gait Training Goal PT LTG, Time to Achieve 2 wks --  --    Gait  Training Goal PT LTG, [place] Level supervision required --  --    Gait Training Goal PT LTG, Assist Device walker, rolling --  --    Gait Training Goal PT LTG, Distance to Achieve 200 --  --    Gait Training Goal PT LTG, Date Goal Reviewed --  12/12/17 --    Gait Training Goal PT LTG, Outcome --  --  goal ongoing       Goal: Stair Training Goal LTG- PT  Outcome: Ongoing (interventions implemented as appropriate)    12/11/17 1002 12/12/17 0948 12/13/17 1551   Stair Training PT LTG   Stair Training Goal PT LTG, Date Established 12/11/17 --  --    Stair Training Goal PT LTG, Time to Achieve 2 wks --  --    Stair Training Goal PT LTG, Number of Steps 3 --  --    Stair Training Goal PT LTG, [place] Level contact guard assist --  --    Stair Training Goal PT LTG, Assist Device 1 handrail --  --    Stair Training Goal PT LTG, Date Goal Reviewed --  12/12/17 --    Stair Training Goal PT LTG, Outcome --  --  goal ongoing

## 2017-12-13 NOTE — PROGRESS NOTES
"Pharmacy Consult-Vancomycin Dosing  Lesli Leon is a  62 y.o. female receiving vancomycin therapy.     Indication: Septic presentation, R knee source   Consulting Provider: Intensivist  ID Consult: No    Current regimen: Vancomycin 750mg q12h  DOT: Day 4  Goal Trough: 15-20 mcg/mL    Current Antimicrobial Therapy  Pharmacy to dose vancomycin, Day 4  Ceftriaxone 2g q24h, Day 4    Allergies  Allergies as of 12/10/2017 - Dom as Reviewed 12/10/2017   Allergen Reaction Noted   • Bactrim [sulfamethoxazole-trimethoprim] Hives 12/10/2017     Labs  Results from last 7 days   Lab Units 12/12/17  0527 12/11/17 0439 12/10/17  2226   BUN mg/dL 33* 25* 25*   CREATININE mg/dL 0.90 1.00 1.10   Results from last 7 days   Lab Units 12/12/17 0527 12/11/17 0439 12/10/17  1518   WBC 10*3/mm3 15.62* 15.70* 17.51*     Evaluation of Dosing    Ht - 162.6 cm (64\")  Wt - 74.8 kg (164 lb 12.8 oz)    Estimated Creatinine Clearance: 64.2 mL/min (by C-G formula based on Cr of 0.9).    Microbiology and Radiology  Microbiology Results (last 10 days)       Procedure Component Value - Date/Time      Anaerobic Culture - Tissue, Knee, Right [[id number]]  (Normal) Collected:  12/10/17 1926    Lab Status:  Preliminary result Specimen:  Tissue from Knee, Right Updated:  12/11/17 1343     Culture No anaerobes isolated    Tissue / Bone Culture - Tissue, Knee, Right [[id number]]  (Normal) Collected:  12/10/17 1926    Lab Status:  Preliminary result Specimen:  Tissue from Knee, Right Updated:  12/13/17 0641     Tissue Culture Culture in progress     Gram Stain Result Many (4+) WBCs seen      No organisms seen    AFB Culture - Tissue, Knee, Right [[id number]] Collected:  12/10/17 1926    Lab Status:  Preliminary result Specimen:  Tissue from Knee, Right Updated:  12/11/17 1316     AFB Stain No acid fast bacilli seen on concentrated smear    Anaerobic Culture - Tissue, Knee, Right [[id number]]  (Normal) Collected:  12/10/17 1925    Lab Status:  Preliminary " result Specimen:  Tissue from Knee, Right Updated:  12/11/17 1343     Culture No anaerobes isolated    Tissue / Bone Culture - Tissue, Knee, Right [[id number]]  (Normal) Collected:  12/10/17 1925    Lab Status:  Preliminary result Specimen:  Tissue from Knee, Right Updated:  12/13/17 0642     Tissue Culture No growth at 3 days     Gram Stain Result Occasional WBCs seen      No organisms seen    AFB Culture - Tissue, Knee, Right [[id number]] Collected:  12/10/17 1925    Lab Status:  Preliminary result Specimen:  Tissue from Knee, Right Updated:  12/11/17 1316     AFB Stain No acid fast bacilli seen on concentrated smear    Anaerobic Culture - Tissue, Knee, Right [[id number]]  (Normal) Collected:  12/10/17 1924    Lab Status:  Preliminary result Specimen:  Tissue from Knee, Right Updated:  12/11/17 1344     Culture No anaerobes isolated    Tissue / Bone Culture - Tissue, Knee, Right [[id number]]  (Normal) Collected:  12/10/17 1924    Lab Status:  Preliminary result Specimen:  Tissue from Knee, Right Updated:  12/13/17 0641     Tissue Culture No growth at 3 days     Gram Stain Result Occasional WBCs seen      No organisms seen    AFB Culture - Tissue, Knee, Right [[id number]] Collected:  12/10/17 1924    Lab Status:  Preliminary result Specimen:  Tissue from Knee, Right Updated:  12/11/17 1316     AFB Stain No acid fast bacilli seen on concentrated smear    Anaerobic Culture - Tissue, Knee, Right [[id number]]  (Normal) Collected:  12/10/17 1921    Lab Status:  Preliminary result Specimen:  Tissue from Knee, Right Updated:  12/11/17 1345     Culture No anaerobes isolated    Tissue / Bone Culture - Tissue, Knee, Right [[id number]]  (Normal) Collected:  12/10/17 1921    Lab Status:  Preliminary result Specimen:  Tissue from Knee, Right Updated:  12/13/17 0642     Tissue Culture No growth at 3 days     Gram Stain Result No WBCs or organisms seen    AFB Culture - Tissue, Knee, Right [[id number]] Collected:  12/10/17 1921    Lab  Status:  Preliminary result Specimen:  Tissue from Knee, Right Updated:  12/11/17 1316     AFB Stain No acid fast bacilli seen on concentrated smear    Anaerobic Culture - Synovial Fluid, Knee, Right [[id number]]  (Normal) Collected:  12/10/17 1915    Lab Status:  Preliminary result Specimen:  Synovial Fluid from Knee, Right Updated:  12/11/17 1347     Culture No anaerobes isolated    Body Fluid Culture - Synovial Fluid, Knee, Right [[id number]]  (Normal) Collected:  12/10/17 1915    Lab Status:  Preliminary result Specimen:  Synovial Fluid from Knee, Right Updated:  12/13/17 0640     BF Culture No growth at 3 days     Gram Stain Result Moderate (3+) WBCs seen      No organisms seen    AFB Culture - Synovial Fluid, Knee, Right [[id number]] Collected:  12/10/17 1915    Lab Status:  Preliminary result Specimen:  Synovial Fluid from Knee, Right Updated:  12/11/17 1316     AFB Stain No acid fast bacilli seen on concentrated smear    Blood Culture - Blood, Blood, Venous Line [[id number]]  (Normal) Collected:  12/10/17 1539    Lab Status:  Preliminary result Specimen:  Blood from Blood, Venous Line Updated:  12/12/17 1631     Blood Culture No growth at 2 days    Blood Culture - Blood, Blood, Venous Line [[id number]]  (Normal) Collected:  12/10/17 1535    Lab Status:  Preliminary result Specimen:  Blood from Blood, Venous Line Updated:  12/12/17 1631     Blood Culture No growth at 2 days          Evaluation of Level    Lab Results   Component Value Date    [facility] 21.20 (H) 12/13/2017    VANCORANDOM 8.80 12/11/2017       Assessment/Plan:  Trough this AM supratherapeutic for indication on 750 mg q12h. All cx NGTD    1. Vancomycin 1500 mg q24h starting this PM  2. Will check follow up level approaching steady-state on 12/15 if patient still in hospital  3. Will follow daily to make adjustments as appropriate      Thank you for this consult,  Sissy Lance, Stephanie  12/13/17  9:01 AM

## 2017-12-13 NOTE — PROGRESS NOTES
Neurology       Patient Care Team:  Phani Joaquin MD as PCP - General (Family Medicine)    Chief complaint: Aphasia    History:  Patient is back to baseline.  He is having no trouble with her speech and is ambulating with physical therapy and a walker.      Past Medical History:   Diagnosis Date   • Arthritis    • Breast cancer 2015    RIGHT   • Disease of thyroid gland    • Hx of radiation therapy 2015    RT BREAST   • Hypertension        Vital Signs   Vitals:    12/12/17 1952 12/13/17 0002 12/13/17 0414 12/13/17 0820   BP: 146/70 129/81 124/63 162/74   BP Location: Right arm Right arm Right arm    Patient Position: Sitting Lying Lying    Pulse:  58     Resp: 16 16 16    Temp: 97.6 °F (36.4 °C) 98 °F (36.7 °C) 98.1 °F (36.7 °C) 97.4 °F (36.3 °C)   TempSrc: Temporal Artery  Temporal Artery  Temporal Artery     SpO2:  98%     Weight:       Height:           Physical Exam:   General: Alert and oriented              Neuro: Normal memory attention and concentration.    Speech is articulate.    She moves all extremities well.    Results Review:  Reviewed    Results from last 7 days  Lab Units 12/12/17  0527   WBC 10*3/mm3 15.62*   HEMOGLOBIN g/dL 10.0*   HEMATOCRIT % 32.2*   PLATELETS 10*3/mm3 277       Results from last 7 days  Lab Units 12/12/17  0527 12/11/17  0439 12/10/17  2226   SODIUM mmol/L 142 140 142   POTASSIUM mmol/L 4.3 3.8 3.9   CHLORIDE mmol/L 106 106 108   CO2 mmol/L 26.0 25.0 25.0   BUN mg/dL 33* 25* 25*   CREATININE mg/dL 0.90 1.00 1.10   CALCIUM mg/dL 8.4* 8.3* 7.8*   GLUCOSE mg/dL 125* 115* 123*       Imaging Results (last 24 hours)     Procedure Component Value Units Date/Time    CT Head With & Without Contrast [[id number]] Collected:  12/11/17 1630     Updated:  12/13/17 0920    Narrative:       EXAMINATION: CT HEAD W WO CONTRAST- 12/11/2017     INDICATION: Aphasia; A41.9-Sepsis, unspecified organism;  T84.53XA-Infection and inflammatory reaction due to internal right knee  prosthesis, initial  encounter; Z96.651-Presence of right artificial knee  joint; Z74.09-Other reduced mobility; difficulty speaking     TECHNIQUE: Multiple axial CT imaging was obtained of the head from skull  base to skull vertex pre and post administration of intravenous  contrast.     The radiation dose reduction device was turned on for each scan per the  ALARA (As Low as Reasonably Achievable) protocol.     COMPARISON: None.     FINDINGS: The parenchyma is grossly unremarkable in appearance with some  low-density area seen in the periventricular white matter suggesting  chronic vessel ischemic change. Tiny area of low density within the  right basal ganglia suggesting possible prior vascular malformation.  There is no mass, mass effect, or midline shift. No abnormal extra-axial  fluid collection is identified. The bony structures reveal no evidence  of osseous abnormality. The visualized paranasal sinuses are clear. The  mastoid air cells are patent.       Impression:       No acute intracranial abnormality is identified. Vascularity  is unremarkable in appearance.     D:  12/11/2017  E:  12/11/2017     This report was finalized on 12/13/2017 9:18 AM by Dr. Kerri Fu MD.             Assessment:  Septic encephalopathy resolved    Plan:  We'll see the patient on request    Comment:  Progressing nicely         I discussed the patients findings and my recommendations with patient, family and primary care team    Fady Cervantes MD  12/13/17  10:15 AM

## 2017-12-13 NOTE — PROGRESS NOTES
[facility] Progress Note    Admission Date: 12/10/2017    Lesli Leon  1955  [id number]    Date: 12/13/2017    Meds:    Anti-Infectives     Ordered     Dose/Rate Route Frequency Start Stop    12/13/17 0909  vancomycin IVPB 1500 mg in 0.9% NaCl (Premix) 500 mL     Ordering Provider:  QUINTEN Lance, RPH    1,500 mg  over 90 Minutes Intravenous Every 24 Hours 12/13/17 1100 01/20/18 1059    12/10/17 1523  cefTRIAXone (ROCEPHIN) IVPB 2 g     Ordering Provider:  Stephon Carbajal MD    2 g  100 mL/hr over 30 Minutes Intravenous Every 24 Hours 12/11/17 0800 12/23/17 0759    12/10/17 2138  vancomycin (VANCOCIN) in iso-osmotic dextrose IVPB 1 g (premix) 200 mL     Ordering Provider:  Stephon Garcia MD    15 mg/kg × 74.8 kg  over 60 Minutes Intravenous Once 12/11/17 0600 12/11/17 0705    12/10/17 1523  Pharmacy to dose vancomycin     Ordering Provider:  Stephon Carbajal MD     Does not apply Continuous PRN 12/10/17 1522 12/22/17 1521          CC:  Right prosthetic knee infection     SUBJECTIVE: 12/11/17: Patient is a 62 y.o. female who was transferred to [facility] from [facility] after presenting there with increasing right knee pain, swelling and confusion which started on Thursday of last week.   The knee was aspirated, and she was then referred to [facility] for admission. She underwent incision and drainage with poly exchange yesterday. Admitting labs with a leukocytosis of 17, 500, acute kidney injury with SCr of 1.7.  Synovial fluid with moderate WBCs, no organisms and culture no growth so far.  The gram stain at [facility] with no organisms, culture is pending.    12/12/17:  She feels much better.  She has decreased knee pain.  She has remained afebrile.  She denies nausea, vomiting, and diarrhea.  12/13/17:  She has decreased right knee pain and her pain is now controlled with oral contacts.  She has been able to ambulate approximately 150 feet.  She has remained afebrile.  PICC line has now been  place.    ROS:  No f/c/s. No n/v/d. No rash. No new ADR to Abx.     PE:   Vital Signs  Temp (24hrs), Av.8 °F (36.6 °C), Min:97.4 °F (36.3 °C), Max:98.1 °F (36.7 °C)    Temp  Min: 97.4 °F (36.3 °C)  Max: 98.1 °F (36.7 °C)  BP  Min: 124/63  Max: 162/74  Pulse  Min: 58  Max: 58  Resp  Min: 16  Max: 16  SpO2  Min: 97 %  Max: 98 %    GENERAL: Awake and alert, in no acute distress.   HEENT:  No conjunctival injection. No icterus. Oropharynx clear without evidence of thrush or exudate.  There is a right IJ deep line in place with no exit site erythema  NECK: Supple, no JVD     HEART: RRR; No murmur, rubs, gallops.   LUNGS: Clear to auscultation bilaterally without wheezing, rales, rhonchi. Normal respiratory effort. Nonlabored. No dullness.  ABDOMEN: Soft, nontender, nondistended. Positive bowel sounds. No rebound or guarding. NO mass or HSM.  EXT:  The right knee is dressed in a postoperative dressing  : Genitalia generally unremarkable.    SKIN: Warm and dry without cutaneous eruptions on Inspection/palpation.    NEURO: Alert and oriented x 3, Motor 5/5 bilaterally  Right arm: There is a PICC line in place with no erythema.    Laboratory Data      Results from last 7 days  Lab Units 17  0527 17  0439 12/10/17  2225 12/10/17  1518   WBC 10*3/mm3 15.62* 15.70*  --  17.51*   HEMOGLOBIN g/dL 10.0* 9.7* 10.3* 10.4*   HEMATOCRIT % 32.2* 31.0* 32.8* 33.7*   PLATELETS 10*3/mm3 277 221  --  243       Results from last 7 days  Lab Units 17  0527   SODIUM mmol/L 142   POTASSIUM mmol/L 4.3   CHLORIDE mmol/L 106   CO2 mmol/L 26.0   BUN mg/dL 33*   CREATININE mg/dL 0.90   GLUCOSE mg/dL 125*   CALCIUM mg/dL 8.4*               :  vanc trough:  21.2    Results from last 7 days  Lab Units 12/10/17  1517   LACTATE mmol/L 0.5           Results from last 7 days  Lab Units 17  0529 17  0439   VANCOMYCIN TR mcg/mL 21.20*  --    VANCOMYCIN RM mcg/mL  --  8.80     Estimated Creatinine Clearance: 64.2  mL/min (by C-G formula based on Cr of 0.9).    Microbiology:  Blood Culture   Date Value Ref Range Status   12/10/2017 No growth at 24 hours  Preliminary       culture results from [facility]-no growth - called                    Radiology:  Imaging Results (last 72 hours)     Procedure Component Value Units Date/Time    XR Chest 1 View [[id number]] Collected:  12/10/17 1520     Updated:  12/10/17 1730    Narrative:          EXAMINATION: XR CHEST 1 VW - 12/10/2017     INDICATION: Central line placement.     COMPARISON: None.     FINDINGS:   1. A right IJ catheter has been placed. The tip is perfectly positioned  in the SVC at the entrance to the right atrium.     2. There is no detectable pneumothorax.     3. There is mild chronic scarring in the chest but there is no active  disease, edema or free fluid.           Impression:       Perfect position of the right IJ catheter in the SVC at the  entrance to the right atrium.     No pneumothorax and no other acute finding.     DICTATED:     12/10/2017  EDITED:          12/10/2017        This report was finalized on 12/10/2017 5:28 PM by Dr. Rigo Urena MD.       XR Knee 1 or 2 View Right [[id number]] Collected:  12/10/17 1810     Updated:  12/11/17 1041    Narrative:          EXAMINATION: XR KNEE 1 OR 2 VIEWS RIGHT - 12/10/2017      INDICATION: Painful TKA.     COMPARISON: None.     FINDINGS:   1. The total right knee arthroplasty appears to be well-positioned. The  hardware appears to be intact without evidence of overt signs of  loosening.     2. There is a transverse defect in the proximal right tibia, likely  previous surgery and this should be correlated.     3. There is no evidence of fracture of the native bone structures and  there is no malalignment.           Impression:       Chronic findings in the right knee as described, however,  the total knee arthroplasty hardware appears well positioned. There is  no abnormality or fracture of the  native bone structures. There is a  transverse lucent defect in the proximal third of the right tibia which  is likely a screw tract and should be correlated with the patient's  previous history.     DICTATED:     12/10/2017  EDITED:          12/10/2017        This report was finalized on 12/11/2017 10:39 AM by Dr. Rigo Urena MD.       CT Head With & Without Contrast [[id number]] Collected:  12/11/17 1630     Updated:  12/11/17 1630    Narrative:       EXAMINATION: CT HEAD W WO CONTRAST- 12/11/2017     INDICATION: Aphasia; A41.9-Sepsis, unspecified organism;  T84.53XA-Infection and inflammatory reaction due to internal right knee  prosthesis, initial encounter; Z96.651-Presence of right artificial knee  joint; Z74.09-Other reduced mobility; difficulty speaking     TECHNIQUE: Multiple axial CT imaging was obtained of the head from skull  base to skull vertex pre and post administration of intravenous  contrast.     The radiation dose reduction device was turned on for each scan per the  ALARA (As Low as Reasonably Achievable) protocol.     COMPARISON: None.     FINDINGS: The parenchyma is grossly unremarkable in appearance with some  low-density area seen in the periventricular white matter suggesting  chronic vessel ischemic change. Tiny area of low density within the  right basal ganglia suggesting possible prior vascular malformation.  There is no mass, mass effect, or midline shift. No abnormal extra-axial  fluid collection is identified. The bony structures reveal no evidence  of osseous abnormality. The visualized paranasal sinuses are clear. The  mastoid air cells are patent.       Impression:       No acute intracranial abnormality is identified. Vascularity  is unremarkable in appearance.     D:  12/11/2017  E:  12/11/2017       XR Knee 1 or 2 View Right [[id number]] Collected:  12/11/17 0830     Updated:  12/11/17 1644    Narrative:       EXAMINATION: XR KNEE 1 OR 2 VW RIGHT-      INDICATION: Postop knee  arthoplasty; A41.9-Sepsis, unspecified organism;  T84.53XA-Infection and inflammatory reaction due to internal right knee  prosthesis, initial encounter; Z96.651-Presence of right artificial knee  joint.      COMPARISON: Radiographs 12/10/2017 earlier same day     FINDINGS: Status post right total knee arthroplasty. No evidence of  hardware complication with components in satisfactory alignment.  Expected postsurgical changes including subcutaneous emphysema are  noted.       Impression:       Status post right total knee arthroplasty without evidence  of complication in satisfactory alignment.     D:  12/11/2017  E:  12/11/2017     This report was finalized on 12/11/2017 4:42 PM by Dr. Wisam Galarza.               IMPRESSION:  Impression:   1.  Septic shock -secondary to a right total knee arthroplasty infection.  This is dramatically improved.  2.  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  She appears to be dramatically improved.  We are awaiting culture data.  She will require a prolonged 6-8 week course of intravenous antibiotic therapy.  [facility] will provide her intravenous antibiotics.  Her cultures at [facility] are reportedly negative.  The cultures may potentially be antibiotic modified.  She does appear to be clinically improving and is starting to ambulate.  She may be able to discharge to home.  3.  Acute kidney injury/ATN-improved     PLAN/RECOMMENDATIONS:  1.  Remove the central line  2.  Continue intravenous vancomycin/ceftriaxone  3.  Possible discharge to home in 2 days      I discussed her potential disposition in detail with the patient and her son this evening.  I will discuss her disposition with case management.      .Dom Landrum MD  12/13/2017  6:55 PM

## 2017-12-13 NOTE — THERAPY TREATMENT NOTE
Acute Care - Physical Therapy Treatment Note  [facility]     Patient Name: Lesli Leon  : 1955  MRN: [id number]  Today's Date: 2017  Onset of Illness/Injury or Date of Surgery Date: 12/10/17  Date of Referral to PT: 12/10/17  Referring Physician: MD Jose    Admit Date: 12/10/2017    Visit Dx:    ICD-10-CM ICD-9-CM   1. Sepsis, due to unspecified organism A41.9 038.9     995.91   2. Infection of total right knee replacement T84.53XA 996.66    Z96.651 V43.65   3. Impaired functional mobility, balance, gait, and endurance Z74.09 V49.89   4. Impaired mobility and ADLs Z74.09 799.89     Patient Active Problem List   Diagnosis   • Sepsis, probable right septic knee as source   • Hypertension   • Disease of thyroid gland   • Expressive aphasia   • Heart murmur               Adult Rehabilitation Note       17 1512 17 1116 17 1444    Rehab Assessment/Intervention    Discipline physical therapist  -EH physical therapist  - physical therapist  -SC    Document Type therapy note (daily note)  - therapy note (daily note)  - therapy note (daily note)  -SC    Subjective Information agree to therapy  - agree to therapy  - agree to therapy  -SC    Patient Effort, Rehab Treatment adequate  - adequate  - adequate  -SC    Symptoms Noted During/After Treatment fatigue  -EH fatigue;other (see comments)  -EH fatigue;other (see comments)   slow to respond to any commands  -SC    Symptoms Noted Comment  nausea; pt cued for PLB in sitting with improvement in nausea. Increased nausea with standing. Pt assisted to t/f to chair instead of ambulation. At that time PICC line RN presents in room.  -EH     Precautions/Limitations fall precautions  -EH fall precautions  -EH fall precautions  -SC    Specific Treatment Considerations   followed by chair  -SC    Recorded by [EH] Ally Azevedo, PT [EH] Ally Azevedo, PT [SC] Ananda Bro, PT    Pain Assessment    Pain Assessment  Serrano-Vasquez FACES  - Serrano-Vasquez FACES  -EH Serrano-Baker FACES  -SC    Serrano-Vasquez FACES Pain Rating 4  -EH 4  -EH 6  -SC    Post Pain Score   6  -SC    Pain Type Acute pain  -  Acute pain  -SC    Pain Location Knee  -  Knee  -SC    Pain Orientation Right  -  Right  -SC    Patient's Stated Pain Goal No pain  -      Pain Intervention(s) Repositioned;Ambulation/increased activity  -  Repositioned  -SC    Response to Interventions tolerated  -      Recorded by [] Ally Azevedo, PT [] Ally Azevedo, PT [SC] Ananda Bro, PT    Cognitive Assessment/Intervention    Current Cognitive/Communication Assessment functional  -      Orientation Status oriented x 4  - oriented to;person;place;time  - oriented to;person;place;time  -SC    Follows Commands/Answers Questions 100% of the time;able to follow single-step instructions  - 75% of the time;able to follow single-step instructions;needs cueing;needs increased time  - 75% of the time;needs increased time;needs repetition;needs cueing  -SC    Personal Safety mild impairment;decreased awareness, need for assist;decreased awareness, need for safety  - mild impairment;decreased awareness, need for assist;decreased awareness, need for safety  - mild impairment;decreased awareness, need for assist;decreased awareness, need for safety;decreased insight to deficits  -SC    Personal Safety Interventions fall prevention program maintained;gait belt;nonskid shoes/slippers when out of bed  - fall prevention program maintained;gait belt;nonskid shoes/slippers when out of bed  - gait belt;fall prevention program maintained  -SC    Recorded by [] Ally Azevedo, PT [] Ally Azevedo, PT [SC] Ananda Bro, PT    Bed Mobility, Assessment/Treatment    Bed Mobility, Assistive Device bed rails;head of bed elevated  - bed rails;head of bed elevated  - bed rails;head of bed elevated  -SC    Bed Mob, Supine to Sit, [place]  supervision required  - verbal cues required;minimum assist (75% patient effort)  - verbal cues required;minimum assist (75% patient effort)  -SC    Bed Mobility, Safety Issues decreased use of legs for bridging/pushing;decreased use of arms for pushing/pulling  - decreased use of legs for bridging/pushing;other (see comments)   sequencing difficulty  -EH decreased use of legs for bridging/pushing;other (see comments)   sequencing difficulty  -SC    Bed Mobility, Impairments pain;coordination impaired;motor control impaired;strength decreased  -EH pain;coordination impaired;motor control impaired;strength decreased  -EH pain;coordination impaired;motor control impaired;strength decreased  -SC    Bed Mobility, Comment   up to edge of bed very slowly with difficulty sequencing  moverments  -SC    Recorded by [EH] Ally Azevedo, PT [EH] Ally Azevedo, PT [SC] Ananda Bro PT    Transfer Assessment/Treatment    Transfers, Bed-Chair [place]  contact guard assist;2 person assist required;verbal cues required  -     Transfers, Chair-Bed [place]  contact guard assist;2 person assist required;verbal cues required  -     Transfers, Sit-Stand [place] contact guard assist;2 person assist required;verbal cues required  - contact guard assist;verbal cues required  - contact guard assist;verbal cues required  -SC    Transfers, Stand-Sit [place] contact guard assist;verbal cues required  - verbal cues required;contact guard assist  - verbal cues required;contact guard assist  -SC    Transfers, Sit-Stand-Sit, Assist Device rolling walker  - rolling walker  -EH rolling walker  -SC    Transfer, Safety Issues other (see comments)  - other (see comments)   difficulty sequencing  -EH other (see comments)   difficulty sequencing  -SC    Transfer, Impairments impaired balance;motor control impaired;strength decreased  - impaired balance;motor control impaired;strength decreased  -EH  impaired balance;motor control impaired;strength decreased  -SC    Transfer, Comment extended time needed  -  repeted cues for sequencing standing--patient very slow  -SC    Recorded by [] Ally Azevedo, PT [] Ally Azevedo, PT [SC] Ananda Bro, PT    Gait Assessment/Treatment    Gait, [place] Level contact guard assist  - contact guard assist  - contact guard assist  -SC    Gait, Assistive Device rolling walker  - rolling walker  - rolling walker  -SC    Gait, Distance (Feet) 90  - 4  -EH 80   followed by chair  -SC    Gait, Gait Pattern Analysis swing-through gait  -      Gait, Gait Deviations [place] decreased;decreased heel strike;forward flexed posture;weight-shifting ability decreased;step length decreased  -      Gait, Safety Issues step length decreased;weight-shifting ability decreased;balance decreased during turns;sequencing ability decreased  -      Gait, Impairments strength decreased;pain;motor control impaired  - strength decreased;motor control impaired;pain  - strength decreased;motor control impaired;pain  -SC    Gait, Comment pt progressing with step through gait pattern with symmetrical step length. Pt improves performance with son's presence during ambulaiton.  -  cues to stay close to walker and some assist to steer walker. Had to take one sit down rest. Needed much encouragement to keep going.  -SC    Recorded by [] Ally Azevedo, PT [] Ally Azevedo, PT [SC] Ananda Bro, PT    Balance Skills Training    Gait Balance-Level of Assistance Contact guard  - Minimum assistance;x2  -EH Minimum assistance;x2  -SC    Gait Balance Support assistive device  - assistive device  - assistive device  -SC    Recorded by [] Ally Azevedo, PT [] Ally Azevedo, PT [SC] Ananda Bro, PT    Therapy Exercises    Right Lower Extremity  --  - 10 reps;AROM:;supine;ankle pumps/circles;quad sets;heel slides;SLR   needs repeted cues   -SC    Bilateral Lower Extremities AROM:;10 reps;ankle pumps/circles;glut sets;quad sets;knee flexion;heel slides;LAQ;SLR   assist with heel slides. encouragement for SLR  -      Recorded by [] Ally Azevedo, PT [] Ally Azevedo, PT [SC] Ananda Bro, PT    Positioning and Restraints    Pre-Treatment Position in bed  - in bed  - in bed  -SC    Post Treatment Position chair  - bed  - chair  -SC    In Bed  supine;call light within reach;encouraged to call for assist;notified nsg;with other staff;with family/caregiver  -     In Chair reclined;call light within reach;encouraged to call for assist;with family/caregiver;with other staff  - --  - sitting;reclined;notified nsg;call light within reach;encouraged to call for assist;exit alarm on  -SC    Recorded by [EH] Ally Azevedo PT [] Ally Azevedo, PT [SC] Ananda Bro PT      12/12/17 0915 12/11/17 1321       Rehab Assessment/Intervention    Discipline physical therapy assistant  -AS physical therapist  -     Document Type therapy note (daily note)  -AS therapy note (daily note)  -LS     Subjective Information  agree to therapy;no complaints  -LS     Patient Effort, Rehab Treatment  good  -LS     Symptoms Noted Comment  Confusion persists.   -LS     Precautions/Limitations  fall precautions  -LS     Recorded by [AS] Juliann Irving PTA [LS] Dawna Blanchard, PT     Vital Signs    Pre Systolic BP Rehab  106  -LS     Pre Treatment Diastolic BP  54  -LS     Post Systolic BP Rehab  --   in w/c for transport to floor bed  -LS     Pretreatment Heart Rate (beats/min)  82  -LS     Pre SpO2 (%)  98  -LS     O2 Delivery Pre Treatment  room air  -LS     O2 Delivery Post Treatment  room air  -LS     Pre Patient Position  Sitting  -LS     Intra Patient Position  Standing  -LS     Post Patient Position  Sitting  -LS     Recorded by  [LS] Dawna Blanchard, PT     Pain Assessment    Pain Assessment 0-10  -AS 0-10  -LS     Pain Score 5   -AS 0  -LS     Post Pain Score 5  -AS 8  -LS     Pain Type Acute pain;Surgical pain  -AS Surgical pain  -LS     Pain Location Knee  -AS Knee  -LS     Pain Orientation Right  -AS Right  -LS     Patient's Stated Pain Goal No pain  -AS      Pain Intervention(s) Repositioned;Ambulation/increased activity  -AS Ambulation/increased activity;Repositioned  -LS     Response to Interventions tolerated  -AS tolerated; notified RN  -LS     Recorded by [AS] Juliann Irving PTA [LS] Dawna Blanchard, PT     Cognitive Assessment/Intervention    Current Cognitive/Communication Assessment functional  -AS impaired  -LS     Orientation Status oriented x 4  -AS oriented to;person;required verbal cueing (specifiy in comments)   cues for location and date  -LS     Follows Commands/Answers Questions 75% of the time;able to follow single-step instructions;needs cueing  -AS able to follow single-step instructions;75% of the time;needs cueing;needs increased time;needs repetition  -LS     Personal Safety mild impairment  -AS moderate impairment;decreased awareness, need for assist;decreased awareness, need for safety;decreased insight to deficits  -LS     Personal Safety Interventions fall prevention program maintained;gait belt;nonskid shoes/slippers when out of bed;other (see comments)   exit alarm  -AS fall prevention program maintained;gait belt;nonskid shoes/slippers when out of bed  -LS     Recorded by [AS] Juliann Irving PTA [LS] Dawna Blanchard, PT     ROM (Range of Motion)    General ROM Detail  R AAROM in sittin deg; R knee AROM extension in sitting: lacking 14 degrees full extension (ace bandage intact during measurements)  -LS     Recorded by  [LS] Dawna Blanchard, PT     Mobility Assessment/Training    Extremity Weight-Bearing Status  right lower extremity  -LS     Right Lower Extremity Weight-Bearing  weight-bearing as tolerated  -LS     Recorded by  [LS] Dawna Blanchard, PT     Bed Mobility, Assessment/Treatment    Bed  Mob, Supine to Sit, [place] verbal cues required;minimum assist (75% patient effort)  -AS      Bed Mob, Sit to Supine, [place] not tested  -AS      Bed Mobility, Impairments strength decreased;pain  -AS      Bed Mobility, Comment assist to move R LE to EOB   -AS [facility] upon arrival.   -LS     Recorded by [AS] Juliann Irving PTA [LS] Dawna Blanchard, PT     Transfer Assessment/Treatment    Transfers, Sit-Stand [place] verbal cues required;minimum assist (75% patient effort);2 person assist required  -AS minimum assist (75% patient effort);verbal cues required  -LS     Transfers, Stand-Sit [place] verbal cues required;minimum assist (75% patient effort);2 person assist required  -AS minimum assist (75% patient effort);verbal cues required  -LS     Transfers, Sit-Stand-Sit, Assist Device rolling walker  -AS rolling walker  -LS     Transfer, Safety Issues weight-shifting ability decreased;step length decreased  -AS      Transfer, Impairments strength decreased;impaired balance;pain  -AS strength decreased;impaired balance  -LS     Transfer, Comment verbal cues to reach back when sitting, patient sat without reaching back and without chair locked despite cueing.  -AS VC's for hand placement and for slight RLE forward prior to transfer.   -LS     Recorded by [AS] Juliann Irving PTA [LS] Dawna Blanchard, PT     Gait Assessment/Treatment    Gait, [place] Level verbal cues required;minimum assist (75% patient effort);1 person + 1 person to manage equipment  -AS contact guard assist;1 person + 1 person to manage equipment;verbal cues required  -LS     Gait, Assistive Device rolling walker  -AS rolling walker  -LS     Gait, Distance (Feet) 100  -  -LS     Gait, Gait Pattern Analysis  swing-through gait  -LS     Gait, Gait Deviations [place] decreased;decreased heel strike;right:;forward flexed posture;weight-shifting ability decreased;step length decreased  -AS [place] decreased;forward flexed  posture;right:;antalgic  -LS     Gait, Safety Issues step length decreased;weight-shifting ability decreased;balance decreased during turns;sequencing ability decreased  -AS      Gait, Impairments strength decreased;impaired balance;ROM decreased;pain  -AS strength decreased;impaired balance;pain  -LS     Gait, Comment patient ambulateed with step to gait pattern, 1 seated resr break due to fatigue and dizziness, follow with chair for safety. Verbal cues to increase right heel strike and for walker placement  -AS VC's for upright posture, equal step length (R versus L), and for increased R knee flexion to promote normalized swing through phase. Distance limited by fatigue; followed with w/c for safety.   -LS     Recorded by [AS] Juliann Irving PTA [LS] Dawna Blanchard, PT     Motor Skills/Interventions    Additional Documentation  Balance Skills Training (Group)  -LS     Recorded by  [LS] Dawna Blanchard, PT     Balance Skills Training    Sitting-Level of Assistance  Contact guard  -LS     Sitting-Balance Support  Feet supported  -LS     Standing-Level of Assistance  Contact guard  -LS     Static Standing Balance Support  assistive device  -LS     Gait Balance-Level of Assistance  Contact guard  -LS     Gait Balance Support  assistive device  -LS     Recorded by  [LS] Dawna Blanchard, PT     Therapy Exercises    Bilateral Lower Extremities AROM:;10 reps;sitting;ankle pumps/circles;glut sets;quad sets   feet elevated in recliner to perform exercises  -AS      Exercise Protocols  total knee  -LS     Total Knee Exercises  bilateral:;10 reps;ankle pumps/circles;quad set;glut set;right:;LAQ;hip abduction/adduction;heel slide stretch;heel slides   issued HEP handout  -LS     Recorded by [AS] Juliann Irving PTA [LS] Dawna Blanchard, PT     Positioning and Restraints    Pre-Treatment Position in bed  -AS sitting in chair/recliner  -LS     Post Treatment Position chair  -AS wheelchair  -LS     In Chair reclined;call light  within reach;encouraged to call for assist;exit alarm on;with family/caregiver  -AS      In Wheelchair  notified nsg;sitting;call light within reach;encouraged to call for assist;with family/caregiver;legs elevated   RN prepping pt for transfer to floor room  -LS     Recorded by [AS] Juliann Irving, PTA [LS] Dawna Blanchard, PT       User Key  (r) = Recorded By, (t) = Taken By, (c) = Cosigned By    Initials Name Effective Dates    SC Ananda VANE Bro, PT 06/19/15 -     EH Ally Azevedo, PT 06/19/15 -     AS Juliann Irving, PTA 06/22/15 -     LS Dawna Blanchard, PT 06/19/15 -                 IP PT Goals       12/13/17 1551 12/13/17 1259 12/12/17 1640    Bed Mobility PT LTG    Bed Mobility PT LTG, Outcome goal ongoing  -EH goal ongoing  -EH goal ongoing  -SC    Transfer Training PT LTG    Transfer Training PT LTG, Outcome goal ongoing  -EH goal ongoing  -EH goal ongoing  -SC    Gait Training PT LTG    Gait Training Goal PT LTG, Outcome goal ongoing  -EH goal ongoing  -EH goal ongoing  -SC    Stair Training PT LTG    Stair Training Goal PT LTG, Outcome goal ongoing  -EH goal ongoing  -EH goal ongoing  -SC      12/12/17 0948 12/11/17 1448 12/11/17 1002    Bed Mobility PT LTG    Bed Mobility PT LTG, Date Established   12/11/17  -LS    Bed Mobility PT LTG, Time to Achieve   2 wks  -LS    Bed Mobility PT LTG, Activity Type   supine to sit/sit to supine  -LS    Bed Mobility PT LTG, [place] Level   supervision required  -LS    Bed Mobility PT LTG, Date Goal Reviewed 12/12/17  -AS      Bed Mobility PT LTG, Outcome goal ongoing  -AS goal ongoing  -LS     Transfer Training PT LTG    Transfer Training PT LTG, Date Established   12/11/17  -LS    Transfer Training PT LTG, Time to Achieve   2 wks  -LS    Transfer Training PT LTG, Activity Type   sit to stand/stand to sit  -LS    Transfer Training PT LTG, [place] Level   supervision required  -LS    Transfer Training PT LTG, Assist Device   walker, rolling  -LS     Transfer Training PT  LTG, Date Goal Reviewed 12/12/17  -AS      Transfer Training PT LTG, Outcome goal ongoing  -AS goal ongoing  -LS     Gait Training PT LTG    Gait Training Goal PT LTG, Date Established   12/11/17  -LS    Gait Training Goal PT LTG, Time to Achieve   2 wks  -LS    Gait Training Goal PT LTG, [place] Level   supervision required  -LS    Gait Training Goal PT LTG, Assist Device   walker, rolling  -LS    Gait Training Goal PT LTG, Distance to Achieve   200  -LS    Gait Training Goal PT LTG, Date Goal Reviewed 12/12/17  -AS      Gait Training Goal PT LTG, Outcome goal ongoing  -AS goal ongoing  -LS     Stair Training PT LTG    Stair Training Goal PT LTG, Date Established   12/11/17  -LS    Stair Training Goal PT LTG, Time to Achieve   2 wks  -LS    Stair Training Goal PT LTG, Number of Steps   3  -LS    Stair Training Goal PT LTG, [place] Level   contact guard assist  -LS    Stair Training Goal PT LTG, Assist Device   1 handrail  -LS    Stair Training Goal PT LTG, Date Goal Reviewed 12/12/17  -AS      Stair Training Goal PT LTG, Outcome goal ongoing  -AS goal ongoing  -LS       User Key  (r) = Recorded By, (t) = Taken By, (c) = Cosigned By    Initials Name Provider Type    SC Ananda Bro, PT Physical Therapist     Ally Azevedo, PT Physical Therapist    AS Juliann Irving, PTA Physical Therapy Assistant    LS Dawna Blanchard, PT Physical Therapist          Physical Therapy Education     Title: PT OT SLP Therapies (Active)     Topic: Physical Therapy (Active)     Point: Mobility training (Active)    Learning Progress Summary    Learner Readiness Method Response Comment Documented by Status   Patient Acceptance E NR   12/13/17 1550 Active    Acceptance E NR   12/13/17 1258 Active    Acceptance E,D NR reviewed safety with mobility SC 12/12/17 1640 Active    Acceptance E NR  AS 12/12/17 0948 Active    Acceptance E,D,H NR [facility] pt/son re: HEP; issued illustrated handout. LS 12/11/17  1448 Active    Acceptance E,D NR  LS 12/11/17 1002 Active   Family Acceptance E NR  EH 12/13/17 1550 Active    Acceptance E NR  AS 12/12/17 0948 Active   Significant Other Acceptance E NR  EH 12/13/17 1258 Active               Point: Home exercise program (Active)    Learning Progress Summary    Learner Readiness Method Response Comment Documented by Status   Patient Acceptance E NR  EH 12/13/17 1550 Active    Acceptance E NR  EH 12/13/17 1258 Active    Acceptance E,D NR reviewed safety with mobility SC 12/12/17 1640 Active    Acceptance E NR  AS 12/12/17 0948 Active    Acceptance E,D,H NR Edu pt/son re: HEP; issued illustrated handout.  12/11/17 1448 Active    Acceptance E,D NR   12/11/17 1002 Active   Family Acceptance E NR  EH 12/13/17 1550 Active    Acceptance E NR  AS 12/12/17 0948 Active   Significant Other Acceptance E NR  EH 12/13/17 1258 Active               Point: Body mechanics (Active)    Learning Progress Summary    Learner Readiness Method Response Comment Documented by Status   Patient Acceptance E NR   12/13/17 1550 Active    Acceptance E NR  EH 12/13/17 1258 Active    Acceptance E,D NR reviewed safety with mobility SC 12/12/17 1640 Active    Acceptance E NR  AS 12/12/17 0948 Active    Acceptance E,D,H NR Edu pt/son re: HEP; issued illustrated handout.  12/11/17 1448 Active    Acceptance E,D NR   12/11/17 1002 Active   Family Acceptance E NR  EH 12/13/17 1550 Active    Acceptance E NR  AS 12/12/17 0948 Active   Significant Other Acceptance E NR  EH 12/13/17 1258 Active               Point: Precautions (Active)    Learning Progress Summary    Learner Readiness Method Response Comment Documented by Status   Patient Acceptance E NR   12/13/17 1550 Active    Acceptance E NR  EH 12/13/17 1258 Active    Acceptance E,D NR reviewed safety with mobility SC 12/12/17 1640 Active    Acceptance E NR  AS 12/12/17 0948 Active    Acceptance E,D,H NR Edu pt/son re: HEP; issued illustrated handout.   12/11/17 1448 Active    Acceptance E,D NR   12/11/17 1002 Active   Family Acceptance E NR   12/13/17 1550 Active    Acceptance E NR  AS 12/12/17 0948 Active   Significant Other Acceptance E NR   12/13/17 1258 Active                      User Key     Initials Effective Dates Name Provider Type Discipline    SC 06/19/15 -  Ananda Bro, PT Physical Therapist PT     06/19/15 -  Ally Azevedo, PT Physical Therapist PT    AS 06/22/15 -  Juliann Irving, PTA Physical Therapy Assistant PT     06/19/15 -  Dawna Blanchard, PT Physical Therapist PT                    PT Recommendation and Plan  Anticipated Discharge Disposition: skilled nursing facility  PT Frequency: 2 times/day  Plan of Care Review  Plan Of Care Reviewed With: patient  Progress: progress toward functional goals is gradual  Outcome Summary/Follow up Plan: Pt ambulates in bal with CGA. Gait pattern improving. Tolerates 10 reps of ther ex.          Outcome Measures       12/13/17 1512 12/13/17 1116 12/12/17 1444    How much help from another person do you currently need...    Turning from your back to your side while in flat bed without using bedrails? 4  - 3  -EH 3  -SC    Moving from lying on back to sitting on the side of a flat bed without bedrails? 4  - 3  -EH 3  -SC    Moving to and from a bed to a chair (including a wheelchair)? 3  -EH 3  -EH 3  -SC    Standing up from a chair using your arms (e.g., wheelchair, bedside chair)? 3  - 3  -EH 3  -SC    Climbing 3-5 steps with a railing? 3  - 2  -EH 2  -SC    To walk in hospital room? 3  -EH 3  -EH 3  -SC    AM-PAC 6 Clicks Score 20  -EH 17  -EH 17  -SC    Functional Assessment    Outcome Measure Options AM-PAC 6 Clicks Basic Mobility (PT)  - AM-PAC 6 Clicks Basic Mobility (PT)  - AM-PAC 6 Clicks Basic Mobility (PT)  -SC      12/12/17 0915 12/11/17 1321 12/11/17 0805    How much help from another person do you currently need...    Turning from your back to your side while in  flat bed without using bedrails? 3  -AS 3  -LS 2  -LS    Moving from lying on back to sitting on the side of a flat bed without bedrails? 3  -AS 2  -LS 2  -LS    Moving to and from a bed to a chair (including a wheelchair)? 3  -AS 3  -LS 3  -LS    Standing up from a chair using your arms (e.g., wheelchair, bedside chair)? 3  -AS 3  -LS 2  -LS    Climbing 3-5 steps with a railing? 2  -AS 2  -LS 2  -LS    To walk in hospital room? 3  -AS 3  -LS 3  -LS    AM-PAC 6 Clicks Score 17  -AS 16  -LS 14  -LS    Functional Assessment    Outcome Measure Options AM-PAC 6 Clicks Basic Mobility (PT)  -AS AM-PAC 6 Clicks Basic Mobility (PT)  -LS AM-PAC 6 Clicks Basic Mobility (PT)  -      12/11/17 0804          How much help from another is currently needed...    Putting on and taking off regular lower body clothing? 1  -CL      Bathing (including washing, rinsing, and drying) 2  -CL      Toileting (which includes using toilet bed pan or urinal) 1  -CL      Putting on and taking off regular upper body clothing 2  -CL      Taking care of personal grooming (such as brushing teeth) 3  -CL      Eating meals 4  -CL      Score 13  -CL      Functional Assessment    Outcome Measure Options AM-PAC 6 Clicks Daily Activity (OT)  -CL        User Key  (r) = Recorded By, (t) = Taken By, (c) = Cosigned By    Initials Name Provider Type    BASILIO Bro, PT Physical Therapist     Ally Azevedo, PT Physical Therapist    AS Juliann Irving, PTA Physical Therapy Assistant    LS Dawna Blanchard, PT Physical Therapist    CL Philly Gray, OT Occupational Therapist           Time Calculation:         PT Charges       12/13/17 1604 12/13/17 1301       Time Calculation    Start Time 1512  - 1116  -     PT Received On 12/13/17  - 12/13/17  -     PT Goal Re-Cert Due Date 12/21/17  - 12/21/17  -     Time Calculation- PT    Total Timed Code Minutes- PT 25 minute(s)  -EH 15 minute(s)  -       User Key  (r) = Recorded By, (t) = Taken  By, (c) = Cosigned By    Initials Name Provider Type     Ally Azevedo, PT Physical Therapist          Therapy Charges for Today     Code Description Service Date Service Provider Modifiers Qty    81892041303 HC PT THER PROC EA 15 MIN 12/13/2017 Ally Azevedo, PT GP 1    92472755182 HC PT THER SUPP EA 15 MIN 12/13/2017 Ally Azevedo, PT GP 1    72554542451 HC GAIT TRAINING EA 15 MIN 12/13/2017 Ally Azevedo, PT GP 1    67982345703 HC PT THER PROC EA 15 MIN 12/13/2017 Ally Azevedo, PT GP 1    50428058251 HC PT THER SUPP EA 15 MIN 12/13/2017 Ally Azevedo, PT GP 2          PT G-Codes  Outcome Measure Options: AM-PAC 6 Clicks Basic Mobility (PT)    Ally Azevedo, PT  12/13/2017

## 2017-12-13 NOTE — PROGRESS NOTES
"  SUBJECTIVE  Patient resting comfortably.  Pain controlled.  Cognitive status improved    OBJECTIVE  Temp (24hrs), Av.8 °F (36.6 °C), Min:97.6 °F (36.4 °C), Max:98.1 °F (36.7 °C)    Blood pressure 124/63, pulse 58, temperature 98.1 °F (36.7 °C), temperature source Temporal Artery , resp. rate 16, height 162.6 cm (64\"), weight 74.8 kg (164 lb 12.8 oz), SpO2 98 %.    Lab Results (last 24 hours)     Procedure Component Value Units Date/Time    Basic Metabolic Panel [[id number]]  (Abnormal) Collected:  17    Specimen:  Blood Updated:  17     Glucose 125 (H) mg/dL      BUN 33 (H) mg/dL      Creatinine 0.90 mg/dL      Sodium 142 mmol/L      Potassium 4.3 mmol/L      Chloride 106 mmol/L      CO2 26.0 mmol/L      Calcium 8.4 (L) mg/dL      eGFR Non African Amer 63 mL/min/1.73      BUN/Creatinine Ratio 36.7 (H)     Anion Gap 10.0 mmol/L     Narrative:       [employer] Guidelines    Stage     Description        GFR  1         Normal or High     90+  2         Mild decrease      60-89  3         Moderate decrease  30-59  4         Severe decrease    15-29  5         Kidney failure     <15    Magnesium [[id number]]  (Normal) Collected:  17    Specimen:  Blood Updated:  17     Magnesium 2.4 mg/dL     Phosphorus [[id number]]  (Normal) Collected:  17    Specimen:  Blood Updated:  17     Phosphorus 2.8 mg/dL     CBC & Differential [[id number]] Collected:  17    Specimen:  Blood Updated:  17 07    Narrative:       The following orders were created for panel order CBC & Differential.  Procedure                               Abnormality         Status                     ---------                               -----------         ------                     CBC Auto Differential[[id number]]        Abnormal            Final result                 Please view results for these tests on the individual orders.    CBC Auto Differential " [[id number]]  (Abnormal) Collected:  12/12/17 0527    Specimen:  Blood Updated:  12/12/17 0705     WBC 15.62 (H) 10*3/mm3      RBC 3.30 (L) 10*6/mm3      Hemoglobin 10.0 (L) g/dL      Hematocrit 32.2 (L) %      MCV 97.6 fL      MCH 30.3 pg      MCHC 31.1 (L) g/dL      RDW 13.9 %      RDW-SD 49.8 fl      MPV 10.2 fL      Platelets 277 10*3/mm3      Neutrophil % 87.5 (H) %      Lymphocyte % 7.6 (L) %      Monocyte % 4.7 %      Eosinophil % 0.0 %      Basophil % 0.0 %      Immature Grans % 0.2 %      Neutrophils, Absolute 13.68 (H) 10*3/mm3      Lymphocytes, Absolute 1.18 10*3/mm3      Monocytes, Absolute 0.73 10*3/mm3      Eosinophils, Absolute 0.00 10*3/mm3      Basophils, Absolute 0.00 10*3/mm3      Immature Grans, Absolute 0.03 10*3/mm3     Tissue / Bone Culture - Tissue, Knee, Right [[id number]]  (Normal) Collected:  12/10/17 1921    Specimen:  Tissue from Knee, Right Updated:  12/12/17 0709     Tissue Culture No growth at 2 days     Gram Stain Result No WBCs or organisms seen    Tissue / Bone Culture - Tissue, Knee, Right [[id number]]  (Normal) Collected:  12/10/17 1924    Specimen:  Tissue from Knee, Right Updated:  12/12/17 0709     Tissue Culture No growth at 2 days     Gram Stain Result Occasional WBCs seen      No organisms seen    Tissue / Bone Culture - Tissue, Knee, Right [[id number]]  (Normal) Collected:  12/10/17 1925    Specimen:  Tissue from Knee, Right Updated:  12/12/17 0709     Tissue Culture No growth at 2 days     Gram Stain Result Occasional WBCs seen      No organisms seen    Tissue / Bone Culture - Tissue, Knee, Right [[id number]]  (Normal) Collected:  12/10/17 1926    Specimen:  Tissue from Knee, Right Updated:  12/12/17 0709     Tissue Culture No growth at 2 days     Gram Stain Result Many (4+) WBCs seen      No organisms seen    Body Fluid Culture - Synovial Fluid, Knee, Right [[id number]]  (Normal) Collected:  12/10/17 1915    Specimen:  Synovial Fluid from Knee, Right Updated:  12/12/17 1007      BF Culture No growth at 2 days     Gram Stain Result Moderate (3+) WBCs seen      No organisms seen    Blood Culture - Blood, Blood, Venous Line [[id number]]  (Normal) Collected:  12/10/17 1539    Specimen:  Blood from Blood, Venous Line Updated:  12/12/17 1631     Blood Culture No growth at 2 days    Blood Culture - Blood, Blood, Venous Line [[id number]]  (Normal) Collected:  12/10/17 1535    Specimen:  Blood from Blood, Venous Line Updated:  12/12/17 1631     Blood Culture No growth at 2 days    Vancomycin, Trough Within 1 hour prior to 0600 dose [[id number]] Collected:  12/13/17 0529    Specimen:  Blood Updated:  12/13/17 0607            PHYSICAL EXAM  Right lower extremity:Dressing clean, dry and intact.  Patient able to perform straight leg raise without assistance.  Intact EHL, FHL, tibialis anterior, and gastrocsoleus. Sensation intact to light touch to deep peroneal, superficial peroneal, sural, saphenous, tibial nerves. 2+ palpable DP and PT pulses.       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  Follow-up intraoperative cultures-No growth to date.   Infectious disease consulted for antibiotic recommendations and treatment.   TEDs and SCD's bilateral lower extremities   Aspirin for DVT prophylaxis    Social work for discharge planning.   Dressing to remain in place for 7 days. May remove on POD#7. If no drainage, may shower on POD#10. No submerging wound in  water. If drainage is noted, sterile dressing should be placed and wound checked daily. No showering until wound has remained dry for 72 consecutive hours.   Follow up in 3 weeks for re-assessment.      Stephon Garcia MD  12/13/17  6:31 AM

## 2017-12-14 LAB
BACTERIA FLD CULT: NORMAL
BACTERIA SPEC AEROBE CULT: NORMAL
GRAM STN SPEC: NORMAL

## 2017-12-14 PROCEDURE — 99232 SBSQ HOSP IP/OBS MODERATE 35: CPT | Performed by: INTERNAL MEDICINE

## 2017-12-14 PROCEDURE — 97530 THERAPEUTIC ACTIVITIES: CPT | Performed by: OCCUPATIONAL THERAPIST

## 2017-12-14 PROCEDURE — 97116 GAIT TRAINING THERAPY: CPT

## 2017-12-14 PROCEDURE — 99024 POSTOP FOLLOW-UP VISIT: CPT | Performed by: ORTHOPAEDIC SURGERY

## 2017-12-14 PROCEDURE — 94799 UNLISTED PULMONARY SVC/PX: CPT

## 2017-12-14 PROCEDURE — 97110 THERAPEUTIC EXERCISES: CPT

## 2017-12-14 PROCEDURE — 25010000002 VANCOMYCIN HCL IN NACL 1.5-0.9 GM/500ML-% SOLUTION

## 2017-12-14 PROCEDURE — 25010000003 CEFTRIAXONE PER 250 MG: Performed by: INTERNAL MEDICINE

## 2017-12-14 RX ADMIN — OXYCODONE AND ACETAMINOPHEN 2 TABLET: 5; 325 TABLET ORAL at 15:44

## 2017-12-14 RX ADMIN — OXYCODONE AND ACETAMINOPHEN 2 TABLET: 5; 325 TABLET ORAL at 21:25

## 2017-12-14 RX ADMIN — VANCOMYCIN HCL-SODIUM CHLORIDE IV SOLN 1.5 GM/250ML-0.9% 1500 MG: 1.5-0.9/25 SOLUTION at 12:19

## 2017-12-14 RX ADMIN — CEFTRIAXONE SODIUM 2 G: 2 INJECTION, SOLUTION INTRAVENOUS at 08:31

## 2017-12-14 RX ADMIN — OXYCODONE AND ACETAMINOPHEN 2 TABLET: 5; 325 TABLET ORAL at 09:54

## 2017-12-14 RX ADMIN — HYDROCODONE BITARTRATE AND ACETAMINOPHEN 1 TABLET: 5; 325 TABLET ORAL at 04:07

## 2017-12-14 RX ADMIN — ASPIRIN 325 MG: 325 TABLET, DELAYED RELEASE ORAL at 20:55

## 2017-12-14 RX ADMIN — ONDANSETRON 4 MG: 4 TABLET, FILM COATED ORAL at 16:11

## 2017-12-14 RX ADMIN — ONDANSETRON 4 MG: 4 TABLET, FILM COATED ORAL at 21:22

## 2017-12-14 RX ADMIN — ONDANSETRON 4 MG: 4 TABLET, FILM COATED ORAL at 06:43

## 2017-12-14 NOTE — PLAN OF CARE
Problem: Patient Care Overview (Adult)  Goal: Plan of Care Review  Outcome: Ongoing (interventions implemented as appropriate)    12/14/17 1629   Coping/Psychosocial Response Interventions   Plan Of Care Reviewed With patient   Patient Care Overview   Progress improving   Outcome Evaluation   Outcome Summary/Follow up Plan Pt increased ambulation distance to 200ft with RW and CGA. Pt demonstrated swing through gait pattern with slow gait speed. Pt given VC's for upright posture and increased gait speed. Pt's mobility limited by fatigue and pain. Continue mobility as able.          Problem: Inpatient Physical Therapy  Goal: Bed Mobility Goal LTG- PT  Outcome: Revised    12/14/17 1629   Bed Mobility PT LTG   Bed Mobility PT LTG, Date Established 12/14/17   Bed Mobility PT LTG, Time to Achieve 5 days   Bed Mobility PT LTG, Activity Type supine to sit/sit to supine   Bed Mobility PT LTG, [place] Level conditional independence   Bed Mobility PT Goal LTG, Assist Device leg    Bed Mobility PT LTG, Date Goal Reviewed 12/14/17   Bed Mobility PT LTG, Outcome goal ongoing       Goal: Transfer Training Goal 1 LTG- PT  Outcome: Ongoing (interventions implemented as appropriate)    12/11/17 1002 12/14/17 1628   Transfer Training PT LTG   Transfer Training PT LTG, Date Established 12/11/17 --    Transfer Training PT LTG, Time to Achieve 2 wks --    Transfer Training PT LTG, Activity Type sit to stand/stand to sit --    Transfer Training PT LTG, [place] Level supervision required --    Transfer Training PT LTG, Assist Device walker, rolling --    Transfer Training PT LTG, Date Goal Reviewed --  12/14/17   Transfer Training PT LTG, Outcome --  goal ongoing       Goal: Gait Training Goal LTG- PT  Outcome: Ongoing (interventions implemented as appropriate)    12/11/17 1002 12/14/17 1628   Gait Training PT LTG   Gait Training Goal PT LTG, Date Established 12/11/17 --    Gait Training Goal PT LTG, Time to Achieve 2 wks  --    Gait Training Goal PT LTG, [place] Level supervision required --    Gait Training Goal PT LTG, Assist Device walker, rolling --    Gait Training Goal PT LTG, Distance to Achieve 200 --    Gait Training Goal PT LTG, Date Goal Reviewed --  12/14/17   Gait Training Goal PT LTG, Outcome --  goal ongoing       Goal: Stair Training Goal LTG- PT  Outcome: Ongoing (interventions implemented as appropriate)    12/11/17 1002 12/14/17 1628   Stair Training PT LTG   Stair Training Goal PT LTG, Date Established 12/11/17 --    Stair Training Goal PT LTG, Time to Achieve 2 wks --    Stair Training Goal PT LTG, Number of Steps 3 --    Stair Training Goal PT LTG, [place] Level contact guard assist --    Stair Training Goal PT LTG, Assist Device 1 handrail --    Stair Training Goal PT LTG, Date Goal Reviewed --  12/14/17   Stair Training Goal PT LTG, Outcome --  goal ongoing

## 2017-12-14 NOTE — PLAN OF CARE
Problem: Patient Care Overview (Adult)  Goal: Plan of Care Review  Outcome: Ongoing (interventions implemented as appropriate)    12/14/17 1336   Coping/Psychosocial Response Interventions   Plan Of Care Reviewed With patient   Patient Care Overview   Progress improving   Outcome Evaluation   Outcome Summary/Follow up Plan Issued AE and educated pt on use. She achieved ADL goal, orientation goal and partialy achieved transfer goal. Issued handouts on tub benches for home use and educated pt/son on places to obtain. Recommend DC home with HHOT and assist from family. She will have 24/hr assist between son and spouse.          Problem: Inpatient Occupational Therapy  Goal: Transfer Training Goal 1 LTG- OT  Outcome: Ongoing (interventions implemented as appropriate)    12/11/17 1141 12/14/17 1336   Transfer Training OT LTG   Transfer Training OT LTG, Date Established 12/11/17 --    Transfer Training OT LTG, Time to Achieve by discharge --    Transfer Training OT LTG, Activity Type toilet;sit to stand/stand to sit --    Transfer Training OT LTG, [place] Level minimum assist (75% patient effort) --    Transfer Training OT LTG, Additional Goal AAD --    Transfer Training OT LTG, Outcome --  goal partially met       Goal: Orientation Goal LTG- OT  Outcome: Outcome(s) achieved Date Met:  12/14/17 12/11/17 1141 12/14/17 1336   Orientation OT LTG   Orientation OT LTG, Date Established 12/11/17 --    Orientation OT LTG, Time to Achieve by discharge --    Orientation OT LTG, Activity Type person;place;time;100% treatment session --    Orientation OT LTG, Outcome --  goal met       Goal: LB Dressing Goal LTG- OT  Outcome: Outcome(s) achieved Date Met:  12/14/17 12/11/17 1141 12/14/17 1336   LB Dressing OT LTG   LB Dressing Goal OT LTG, Date Established 12/11/17 --    LB Dressing Goal OT LTG, Time to Achieve by discharge --    LB Dressing Goal OT LTG, Activity Type Don/doff socks --    LB Dressing Goal OT LTG,  [place] Level minimum assist (75% patient effort) --    LB Dressing Goal OT LTG, Adaptive Equipment shoe horn, long handled;reacher;sock-aid --    LB Dressing Goal OT LTG, Additional Goal AAD --    LB Dressing Goal OT LTG, Outcome --  goal met       Goal: Activity Tolerance Goal LTG- OT  Outcome: Ongoing (interventions implemented as appropriate)    12/11/17 1141 12/14/17 1336   Activity Tolerance OT LTG   Activity Tolerance Goal OT LTG, Date Established 12/11/17 --    Activity Tolerance Goal OT LTG, Time to Achieve by discharge --    Activity Tolerance Goal OT LTG, Activity Level 15 min activity --    Activity Tolerance Goal OT LTG, Additional Goal x1 seated rest break --    Activity Tolerance Goal OT LTG, Outcome --  goal ongoing

## 2017-12-14 NOTE — PLAN OF CARE
Problem: Patient Care Overview (Adult)  Goal: Plan of Care Review  Outcome: Ongoing (interventions implemented as appropriate)    Problem: Infection, Risk/Actual (Adult)  Goal: Identify Related Risk Factors and Signs and Symptoms  Outcome: Ongoing (interventions implemented as appropriate)

## 2017-12-14 NOTE — PROGRESS NOTES
[facility] Progress Note    Admission Date: 12/10/2017    Lesli Leon  1955  [id number]    Date: 12/14/2017    Meds:    Anti-Infectives     Ordered     Dose/Rate Route Frequency Start Stop    12/13/17 0909  vancomycin IVPB 1500 mg in 0.9% NaCl (Premix) 500 mL     Ordering Provider:  QUINTEN Lance, RPH    1,500 mg  over 90 Minutes Intravenous Every 24 Hours 12/13/17 1100 01/20/18 1059    12/10/17 1523  cefTRIAXone (ROCEPHIN) IVPB 2 g     Ordering Provider:  Stephon Carbajal MD    2 g  100 mL/hr over 30 Minutes Intravenous Every 24 Hours 12/11/17 0800 12/23/17 0759    12/10/17 2138  vancomycin (VANCOCIN) in iso-osmotic dextrose IVPB 1 g (premix) 200 mL     Ordering Provider:  Stephon Garcia MD    15 mg/kg × 74.8 kg  over 60 Minutes Intravenous Once 12/11/17 0600 12/11/17 0705    12/10/17 1523  Pharmacy to dose vancomycin     Ordering Provider:  Stephon Carbajal MD     Does not apply Continuous PRN 12/10/17 1522 12/22/17 1521          CC:  Right prosthetic knee infection     SUBJECTIVE: 12/11/17: Patient is a 62 y.o. female who was transferred to [facility] from [facility] after presenting there with increasing right knee pain, swelling and confusion which started on Thursday of last week.   The knee was aspirated, and she was then referred to [facility] for admission. She underwent incision and drainage with poly exchange yesterday. Admitting labs with a leukocytosis of 17, 500, acute kidney injury with SCr of 1.7.  Synovial fluid with moderate WBCs, no organisms and culture no growth so far.  The gram stain at [facility] with no organisms, culture is pending.    12/12/17:  She feels much better.  She has decreased knee pain.  She has remained afebrile.  She denies nausea, vomiting, and diarrhea.  12/13/17:  She has decreased right knee pain and her pain is now controlled with oral contacts.  She has been able to ambulate approximately 150 feet.  She has remained afebrile.  PICC line has now been  place.  17:  Feeling better.  No fever, chills,sweats.  Was nauseated earlier.   ROS:  No f/c/s. No n/v/d. No rash. No new ADR to Abx.     PE:   Vital Signs  Temp (24hrs), Av.6 °F (36.4 °C), Min:97.1 °F (36.2 °C), Max:98 °F (36.7 °C)    Temp  Min: 97.1 °F (36.2 °C)  Max: 98 °F (36.7 °C)  BP  Min: 151/74  Max: 169/77  Pulse  Min: 48  Max: 50  Resp  Min: 18  Max: 18  SpO2  Min: 97 %  Max: 98 %    GENERAL: Awake and alert, in no acute distress.   HEENT:  No conjunctival injection. No icterus. Oropharynx clear without evidence of thrush or exudate.  There is a right IJ deep line in place with no exit site erythema  NECK: Supple, no JVD     HEART: RRR; No murmur, rubs, gallops.   LUNGS: Clear to auscultation bilaterally without wheezing, rales, rhonchi. Normal respiratory effort. Nonlabored. No dullness.  ABDOMEN: Soft, nontender, nondistended. Positive bowel sounds. No rebound or guarding. NO mass or HSM.  EXT:  The right knee decreased swelling, dressing along incision line   : Genitalia generally unremarkable.    SKIN: Warm and dry without cutaneous eruptions on Inspection/palpation.    NEURO: Alert and oriented x 3, Motor 5/5 bilaterally  Right arm: There is a PICC line in place with no erythema.    Laboratory Data      Results from last 7 days  Lab Units 17  0527 17  0439 12/10/17  2225 12/10/17  1518   WBC 10*3/mm3 15.62* 15.70*  --  17.51*   HEMOGLOBIN g/dL 10.0* 9.7* 10.3* 10.4*   HEMATOCRIT % 32.2* 31.0* 32.8* 33.7*   PLATELETS 10*3/mm3 277 221  --  243       Results from last 7 days  Lab Units 17  0527   SODIUM mmol/L 142   POTASSIUM mmol/L 4.3   CHLORIDE mmol/L 106   CO2 mmol/L 26.0   BUN mg/dL 33*   CREATININE mg/dL 0.90   GLUCOSE mg/dL 125*   CALCIUM mg/dL 8.4*               :  vanc trough:  21.2    Results from last 7 days  Lab Units 12/10/17  1517   LACTATE mmol/L 0.5           Results from last 7 days  Lab Units 17  0529 17  0439   VANCOMYCIN TR mcg/mL 21.20*   --    VANCOMYCIN RM mcg/mL  --  8.80     Estimated Creatinine Clearance: 64.2 mL/min (by C-G formula based on Cr of 0.9).    Microbiology:  Blood Culture   Date Value Ref Range Status   12/10/2017 No growth at 24 hours  Preliminary       culture results from [facility]-no growth final -  called ( no anaerobic culture obtained)                    Radiology:  Imaging Results (last 72 hours)     Procedure Component Value Units Date/Time    XR Chest 1 View [[id number]] Collected:  12/10/17 1520     Updated:  12/10/17 1730    Narrative:          EXAMINATION: XR CHEST 1 VW - 12/10/2017     INDICATION: Central line placement.     COMPARISON: None.     FINDINGS:   1. A right IJ catheter has been placed. The tip is perfectly positioned  in the SVC at the entrance to the right atrium.     2. There is no detectable pneumothorax.     3. There is mild chronic scarring in the chest but there is no active  disease, edema or free fluid.           Impression:       Perfect position of the right IJ catheter in the SVC at the  entrance to the right atrium.     No pneumothorax and no other acute finding.     DICTATED:     12/10/2017  EDITED:          12/10/2017        This report was finalized on 12/10/2017 5:28 PM by Dr. Rigo Urean MD.       XR Knee 1 or 2 View Right [[id number]] Collected:  12/10/17 1810     Updated:  12/11/17 1041    Narrative:          EXAMINATION: XR KNEE 1 OR 2 VIEWS RIGHT - 12/10/2017      INDICATION: Painful TKA.     COMPARISON: None.     FINDINGS:   1. The total right knee arthroplasty appears to be well-positioned. The  hardware appears to be intact without evidence of overt signs of  loosening.     2. There is a transverse defect in the proximal right tibia, likely  previous surgery and this should be correlated.     3. There is no evidence of fracture of the native bone structures and  there is no malalignment.           Impression:       Chronic findings in the right knee as  described, however,  the total knee arthroplasty hardware appears well positioned. There is  no abnormality or fracture of the native bone structures. There is a  transverse lucent defect in the proximal third of the right tibia which  is likely a screw tract and should be correlated with the patient's  previous history.     DICTATED:     12/10/2017  EDITED:          12/10/2017        This report was finalized on 12/11/2017 10:39 AM by Dr. Rigo Urena MD.       CT Head With & Without Contrast [[id number]] Collected:  12/11/17 1630     Updated:  12/11/17 1630    Narrative:       EXAMINATION: CT HEAD W WO CONTRAST- 12/11/2017     INDICATION: Aphasia; A41.9-Sepsis, unspecified organism;  T84.53XA-Infection and inflammatory reaction due to internal right knee  prosthesis, initial encounter; Z96.651-Presence of right artificial knee  joint; Z74.09-Other reduced mobility; difficulty speaking     TECHNIQUE: Multiple axial CT imaging was obtained of the head from skull  base to skull vertex pre and post administration of intravenous  contrast.     The radiation dose reduction device was turned on for each scan per the  ALARA (As Low as Reasonably Achievable) protocol.     COMPARISON: None.     FINDINGS: The parenchyma is grossly unremarkable in appearance with some  low-density area seen in the periventricular white matter suggesting  chronic vessel ischemic change. Tiny area of low density within the  right basal ganglia suggesting possible prior vascular malformation.  There is no mass, mass effect, or midline shift. No abnormal extra-axial  fluid collection is identified. The bony structures reveal no evidence  of osseous abnormality. The visualized paranasal sinuses are clear. The  mastoid air cells are patent.       Impression:       No acute intracranial abnormality is identified. Vascularity  is unremarkable in appearance.     D:  12/11/2017  E:  12/11/2017       XR Knee 1 or 2 View Right [[id number]] Collected:   12/11/17 0830     Updated:  12/11/17 1644    Narrative:       EXAMINATION: XR KNEE 1 OR 2 VW RIGHT-      INDICATION: Postop knee arthoplasty; A41.9-Sepsis, unspecified organism;  T84.53XA-Infection and inflammatory reaction due to internal right knee  prosthesis, initial encounter; Z96.651-Presence of right artificial knee  joint.      COMPARISON: Radiographs 12/10/2017 earlier same day     FINDINGS: Status post right total knee arthroplasty. No evidence of  hardware complication with components in satisfactory alignment.  Expected postsurgical changes including subcutaneous emphysema are  noted.       Impression:       Status post right total knee arthroplasty without evidence  of complication in satisfactory alignment.     D:  12/11/2017  E:  12/11/2017     This report was finalized on 12/11/2017 4:42 PM by Dr. Wisam Galarza.               IMPRESSION:  Impression:   1.  Septic shock -secondary to a right total knee arthroplasty infection.  This is dramatically improved.  2.  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  She appears to be dramatically improved.  We are awaiting culture data.  She will require a prolonged 6-8 week course of intravenous antibiotic therapy.  L [facility] will provide her intravenous antibiotics.  Her cultures at [facility] are reportedly negative.  The cultures may potentially be antibiotic modified.  She does appear to be clinically improving and is starting to ambulate.  She may be able to discharge to home.  3.  Acute kidney injury/ATN-improved     PLAN/RECOMMENDATIONS:  2.  Continue intravenous vancomycin/ceftriaxone  3.  Possible discharge to home      Dr. Landrum has obtained the history, performed the physical exam and formulated the above treatment plan.     I discussed her disposition with case management today.  I discussed her potential disposition at length with her and her  today.  We discussed outpatient intravenous antibiotic  therapy in detail again today.  I spent over 35 minutes on her care today.  Over 50% of this time was spent in conference and care coordination.    .Dom Landrum MD  12/14/2017  6:22 PM

## 2017-12-14 NOTE — THERAPY TREATMENT NOTE
Acute Care - Occupational Therapy Treatment Note  [facility]     Patient Name: Lesli Leon  : 1955  MRN: [id number]  Today's Date: 2017  Onset of Illness/Injury or Date of Surgery Date: 12/10/17  Date of Referral to OT: 12/10/17  Referring Physician: MD Jose      Admit Date: 12/10/2017    Visit Dx:     ICD-10-CM ICD-9-CM   1. Sepsis, due to unspecified organism A41.9 038.9     995.91   2. Infection of total right knee replacement T84.53XA 996.66    Z96.651 V43.65   3. Impaired functional mobility, balance, gait, and endurance Z74.09 V49.89   4. Impaired mobility and ADLs Z74.09 799.89     Patient Active Problem List   Diagnosis   • Sepsis, probable right septic knee as source   • Hypertension   • Disease of thyroid gland   • Expressive aphasia   • Heart murmur             Adult Rehabilitation Note       17 1130 17 0938 17 1512    Rehab Assessment/Intervention    Discipline occupational therapist  -AR physical therapist  -KR physical therapist  -EH    Document Type therapy note (daily note)   POD#4   -AR therapy note (daily note)  -KR therapy note (daily note)  -EH    Subjective Information agree to therapy;complains of;fatigue;pain;nausea/vomiting  -AR agree to therapy;complains of;pain  -KR agree to therapy  -EH    Patient Effort, Rehab Treatment good  -AR good  -KR adequate  -EH    Symptoms Noted During/After Treatment fatigue  -AR fatigue  -KR fatigue  -EH    Precautions/Limitations fall precautions  -AR fall precautions  -KR fall precautions  -EH    Equipment Issued to Patient bathing equipment;dressing equipment   reacher, leg lifte, shoe horn and sponge  -AR      Recorded by [AR] Mayra Miguel, OT [KR] Mayelin Kebede, PT [EH] Ally Azevedo, PT    Vital Signs    Pre Systolic BP Rehab  --   RN cleared for treatment; VSS   -KR     Pre Patient Position  Supine  -KR     Intra Patient Position  Standing  -KR     Post Patient Position  Supine  -KR     Recorded by   [KR] Mayelin Kebede, PT     Pain Assessment    Pain Assessment 0-10  -AR 0-10  -KR Serrano-Vasquez FACES  -EH    Serrano-Baker FACES Pain Rating   4  -EH    Pain Score 5  -AR 9  -KR     Post Pain Score 5  -AR 6  -KR     Pain Type Acute pain  -AR Acute pain  -KR Acute pain  -EH    Pain Location Knee  -AR Knee  -KR Knee  -EH    Pain Orientation Right;Anterior;Posterior  -AR Right  -KR Right  -EH    Patient's Stated Pain Goal   No pain  -EH    Pain Intervention(s) Medication (See MAR);Repositioned;Ambulation/increased activity  -AR Repositioned;Ambulation/increased activity  -KR Repositioned;Ambulation/increased activity  -EH    Response to Interventions tolerated  -AR tolerated  -KR tolerated  -EH    Recorded by [AR] Mayra Miguel, OT [KR] Mayelin Kebede, PT [EH] Ally Azevedo, PT    Cognitive Assessment/Intervention    Current Cognitive/Communication Assessment functional  -AR functional  -KR functional  -EH    Orientation Status oriented x 4  -AR oriented x 4   intermittent confusion  -KR oriented x 4  -EH    Follows Commands/Answers Questions 100% of the time;able to follow multi-step instructions  -AR able to follow single-step instructions;100% of the time;needs increased time;needs cueing  -% of the time;able to follow single-step instructions  -EH    Personal Safety WNL/WFL  -AR mild impairment;decreased awareness, need for assist;decreased awareness, need for safety;decreased insight to deficits  -KR mild impairment;decreased awareness, need for assist;decreased awareness, need for safety  -EH    Personal Safety Interventions fall prevention program maintained  -AR fall prevention program maintained;gait belt;nonskid shoes/slippers when out of bed  -KR fall prevention program maintained;gait belt;nonskid shoes/slippers when out of bed  -EH    Recorded by [AR] Mayra Miguel, OT [KR] Mayelin Kebede, PT [EH] Ally Azevedo, PT    Mobility Assessment/Training    Extremity Weight-Bearing Status  right lower extremity  -AR      Right Lower Extremity Weight-Bearing weight-bearing as tolerated  -AR      Recorded by [AR] Mayra Miguel, OT      Bed Mobility, Assessment/Treatment    Bed Mobility, Assistive Device bed rails;head of bed elevated;leg   -AR  bed rails;head of bed elevated  -    Bed Mobility, Scoot/Bridge, [place] supervision required  -AR      Bed Mob, Supine to Sit, [place] supervision required;verbal cues required  -AR supervision required  -KR supervision required  -    Bed Mob, Sit to Supine, [place] supervision required;verbal cues required  -AR supervision required  -KR     Bed Mobility, Safety Issues  decreased use of legs for bridging/pushing  -KR decreased use of legs for bridging/pushing;decreased use of arms for pushing/pulling  -    Bed Mobility, Impairments ROM decreased;pain  -AR ROM decreased;strength decreased;pain  -KR pain;coordination impaired;motor control impaired;strength decreased  -    Bed Mobility, Comment issued leg  and educated pt on use to assist with bed mobility, car transfers and tub bench transfers  -AR Pt demonstrated good safety awareness with bed mobility.  -KR     Recorded by [AR] Mayra Miguel, OT [KR] Mayelin Kebede, PT [EH] Ally Azevedo, PT    Transfer Assessment/Treatment    Transfers, Sit-Stand [place] contact guard assist;verbal cues required  -AR contact guard assist;verbal cues required  -KR contact guard assist;2 person assist required;verbal cues required  -    Transfers, Stand-Sit [place] contact guard assist;verbal cues required  -AR contact guard assist;verbal cues required  -KR contact guard assist;verbal cues required  -    Transfers, Sit-Stand-Sit, Assist Device elevated surface;rolling walker  -AR rolling walker  -KR rolling walker  -    Toilet Transfer, [place]  contact guard assist;verbal cues required  -KR     Toilet Transfer, Assistive Device  rolling walker  -KR      Transfer, Safety Issues  step length decreased;weight-shifting ability decreased  -KR other (see comments)  -    Transfer, Impairments strength decreased;pain  -AR strength decreased;impaired balance;coordination impaired;pain  -KR impaired balance;motor control impaired;strength decreased  -    Transfer, Comment Educated pt on tub bench use for tub transfer and issued handout on places to purchase, educated pt/son on safe car transfer technique   -AR Pt given verbal cues for correct hand placement to push up from bed prior to initial stand and to reach for grab bar for toilet transfer.  -KR extended time needed  -    Recorded by [AR] Mayra Miguel, OT [KR] Mayelin Kebede, PT [EH] Ally Azevedo, PT    Gait Assessment/Treatment    Gait, [place] Level  contact guard assist;verbal cues required  -KR contact guard assist  -    Gait, Assistive Device  rolling walker  -KR rolling walker  -    Gait, Distance (Feet)  120  -KR 90  -    Gait, Gait Pattern Analysis  swing-through gait  -KR swing-through gait  -    Gait, Gait Deviations  right:;antalgic;franklin decreased;forward flexed posture;step length decreased;weight-shifting ability decreased  -KR [place] decreased;decreased heel strike;forward flexed posture;weight-shifting ability decreased;step length decreased  -    Gait, Safety Issues  step length decreased;weight-shifting ability decreased  -KR step length decreased;weight-shifting ability decreased;balance decreased during turns;sequencing ability decreased  -    Gait, Impairments  ROM decreased;strength decreased;impaired balance;pain  -KR strength decreased;pain;motor control impaired  -    Gait, Comment  Pt demonstrated swing through gait pattern during ambulation. Pt given verbal cues for upright posture and increased step length. Pt required encouragement to continue. Pt's mobility limited by fatigue and pain.   -KR pt progressing with step through gait pattern with  symmetrical step length. Pt improves performance with son's presence during ambulaiton.  -EH    Recorded by  [KR] Mayelin Kebede, PT [EH] Ally Azevedo, PT    Lower Body Bathing Assessment/Training    LB Bathing Assess/Train, Comment Issued LH sponge and educated pt on use  -AR      Recorded by [AR] Mayra Miguel OT      Lower Body Dressing Assessment/Training    LB Dressing Assess/Train, Clothing Type donning:;doffing:;slipper socks  -AR      LB Dressing Assess/Train, Assist Device reacher;sock-aid  -AR      LB Dressing Assess/Train, Position edge of bed  -AR      LB Dressing Assess/Train, [place] verbal cues required;contact guard assist  -AR      LB Dressing Assess/Train, Impairments ROM decreased;pain  -AR      LB Dressing Assess/Train, Comment Reviewed kain-technique and ADL retraining. Pt unable to don socks without AE d/t pain and ROM restrictions. Issued AE and educated pt on use. Sock aide not currently available to issue.   -AR      Recorded by [AR] Mayra Miguel OT      Balance Skills Training    Gait Balance-Level of Assistance   Contact guard  -    Gait Balance Support   assistive device  -EH    Recorded by   [EH] Ally Azevedo, PT    Therapy Exercises    Bilateral Lower Extremities   AROM:;10 reps;ankle pumps/circles;glut sets;quad sets;knee flexion;heel slides;LAQ;SLR   assist with heel slides. encouragement for SLR  -    Exercise Protocols  total knee  -KR     Total Knee Exercises  right:;15 reps;completed protocol;with assist;ankle pumps/circles;quad set;glut set;heel slides;heel slide stretch;SLR;SAQ;LAQ  -KR     Recorded by  [KR] Mayelin Kebede, PT [EH] Ally Azevedo, PT    Positioning and Restraints    Pre-Treatment Position in bed  -AR in bed  -KR in bed  -EH    Post Treatment Position bed  -AR bed  -KR chair  -EH    In Bed supine;call light within reach;encouraged to call for assist;exit alarm on;with family/caregiver  -AR supine;call light within  reach;encouraged to call for assist;exit alarm on;with family/caregiver;side rails up x2  -KR     In Chair   reclined;call light within reach;encouraged to call for assist;with family/caregiver;with other staff  -    Recorded by [AR] Mayra Miguel, OT [KR] Mayelin Kebede, PT [EH] Ally Azevedo, PT      12/13/17 1116 12/12/17 1444 12/12/17 0915    Rehab Assessment/Intervention    Discipline physical therapist  -EH physical therapist  -SC physical therapy assistant  -AS    Document Type therapy note (daily note)  - therapy note (daily note)  -SC therapy note (daily note)  -AS    Subjective Information agree to therapy  - agree to therapy  -SC     Patient Effort, Rehab Treatment adequate  - adequate  -SC     Symptoms Noted During/After Treatment fatigue;other (see comments)  - fatigue;other (see comments)   slow to respond to any commands  -SC     Symptoms Noted Comment nausea; pt cued for PLB in sitting with improvement in nausea. Increased nausea with standing. Pt assisted to t/f to chair instead of ambulation. At that time PICC line RN presents in room.  -      Precautions/Limitations fall precautions  - fall precautions  -SC     Specific Treatment Considerations  followed by chair  -SC     Recorded by [EH] Ally Azevedo, PT [SC] Ananda Bro, PT [AS] Juliann Irving, PTA    Pain Assessment    Pain Assessment Serrano-Vasquez FACES  - Serrano-Baker FACES  -SC 0-10  -AS    Serrano-Vasquez FACES Pain Rating 4  - 6  -SC     Pain Score   5  -AS    Post Pain Score  6  -SC 5  -AS    Pain Type  Acute pain  -SC Acute pain;Surgical pain  -AS    Pain Location  Knee  -SC Knee  -AS    Pain Orientation  Right  -SC Right  -AS    Patient's Stated Pain Goal   No pain  -AS    Pain Intervention(s)  Repositioned  -SC Repositioned;Ambulation/increased activity  -AS    Response to Interventions   tolerated  -AS    Recorded by [EH] Ally Azevedo, PT [SC] Ananda Bro, PT [AS] Juliann Irving, PTA     Cognitive Assessment/Intervention    Current Cognitive/Communication Assessment   functional  -AS    Orientation Status oriented to;person;place;time  - oriented to;person;place;time  -SC oriented x 4  -AS    Follows Commands/Answers Questions 75% of the time;able to follow single-step instructions;needs cueing;needs increased time  - 75% of the time;needs increased time;needs repetition;needs cueing  -SC 75% of the time;able to follow single-step instructions;needs cueing  -AS    Personal Safety mild impairment;decreased awareness, need for assist;decreased awareness, need for safety  - mild impairment;decreased awareness, need for assist;decreased awareness, need for safety;decreased insight to deficits  -SC mild impairment  -AS    Personal Safety Interventions fall prevention program maintained;gait belt;nonskid shoes/slippers when out of bed  - gait belt;fall prevention program maintained  -SC fall prevention program maintained;gait belt;nonskid shoes/slippers when out of bed;other (see comments)   exit alarm  -AS    Recorded by [EH] Ally Azevedo, PT [SC] Ananda Bro PT [AS] Juliann Irving, HEMALATHA    Bed Mobility, Assessment/Treatment    Bed Mobility, Assistive Device bed rails;head of bed elevated  - bed rails;head of bed elevated  -SC     Bed Mob, Supine to Sit, [place] verbal cues required;minimum assist (75% patient effort)  - verbal cues required;minimum assist (75% patient effort)  -SC verbal cues required;minimum assist (75% patient effort)  -AS    Bed Mob, Sit to Supine, [place]   not tested  -AS    Bed Mobility, Safety Issues decreased use of legs for bridging/pushing;other (see comments)   sequencing difficulty  - decreased use of legs for bridging/pushing;other (see comments)   sequencing difficulty  -SC     Bed Mobility, Impairments pain;coordination impaired;motor control impaired;strength decreased  - pain;coordination impaired;motor control impaired;strength  decreased  -SC strength decreased;pain  -AS    Bed Mobility, Comment  up to edge of bed very slowly with difficulty sequencing  moverments  -SC assist to move R LE to EOB   -AS    Recorded by [] Ally Azevedo, PT [SC] Ananda Bro, PT [AS] Juliann Irving, HEMALATHA    Transfer Assessment/Treatment    Transfers, Bed-Chair [place] contact guard assist;2 person assist required;verbal cues required  -      Transfers, Chair-Bed [place] contact guard assist;2 person assist required;verbal cues required  -EH      Transfers, Sit-Stand [place] contact guard assist;verbal cues required  -EH contact guard assist;verbal cues required  -SC verbal cues required;minimum assist (75% patient effort);2 person assist required  -AS    Transfers, Stand-Sit [place] verbal cues required;contact guard assist  -EH verbal cues required;contact guard assist  -SC verbal cues required;minimum assist (75% patient effort);2 person assist required  -AS    Transfers, Sit-Stand-Sit, Assist Device rolling walker  -EH rolling walker  -SC rolling walker  -AS    Transfer, Safety Issues other (see comments)   difficulty sequencing  - other (see comments)   difficulty sequencing  -SC weight-shifting ability decreased;step length decreased  -AS    Transfer, Impairments impaired balance;motor control impaired;strength decreased  - impaired balance;motor control impaired;strength decreased  -SC strength decreased;impaired balance;pain  -AS    Transfer, Comment  repeted cues for sequencing standing--patient very slow  -SC verbal cues to reach back when sitting, patient sat without reaching back and without chair locked despite cueing.  -AS    Recorded by [EH] Ally Azevedo, PT [SC] Ananda Bro, PT [AS] Juliann Irving, HEMALATHA    Gait Assessment/Treatment    Gait, [place] Level contact guard assist  - contact guard assist  -SC verbal cues required;minimum assist (75% patient effort);1 person + 1 person to manage  equipment  -AS    Gait, Assistive Device rolling walker  - rolling walker  -SC rolling walker  -AS    Gait, Distance (Feet) 4  - 80   followed by chair  -  -AS    Gait, Gait Deviations   [place] decreased;decreased heel strike;right:;forward flexed posture;weight-shifting ability decreased;step length decreased  -AS    Gait, Safety Issues   step length decreased;weight-shifting ability decreased;balance decreased during turns;sequencing ability decreased  -AS    Gait, Impairments strength decreased;motor control impaired;pain  -EH strength decreased;motor control impaired;pain  -SC strength decreased;impaired balance;ROM decreased;pain  -AS    Gait, Comment  cues to stay close to walker and some assist to steer walker. Had to take one sit down rest. Needed much encouragement to keep going.  -SC patient ambulateed with step to gait pattern, 1 seated resr break due to fatigue and dizziness, follow with chair for safety. Verbal cues to increase right heel strike and for walker placement  -AS    Recorded by [] Ally Azevedo, PT [SC] Ananda Bro, PT [AS] Juliann Irving, HEMALATHA    Balance Skills Training    Gait Balance-Level of Assistance Minimum assistance;x2  - Minimum assistance;x2  -SC     Gait Balance Support assistive device  - assistive device  -SC     Recorded by [] Ally Azevedo, PT [SC] Ananda Bro, PT     Therapy Exercises    Right Lower Extremity --  - 10 reps;AROM:;supine;ankle pumps/circles;quad sets;heel slides;SLR   needs repeted cues  -SC     Bilateral Lower Extremities   AROM:;10 reps;sitting;ankle pumps/circles;glut sets;quad sets   feet elevated in recliner to perform exercises  -AS    Recorded by [] Ally Azevedo, PT [SC] Ananda Bro, PT [AS] Juliann Irving, HEMALATHA    Positioning and Restraints    Pre-Treatment Position in bed  - in bed  -SC in bed  -AS    Post Treatment Position bed  - chair  -SC chair  -AS    In Bed supine;call light within  reach;encouraged to call for assist;notified nsg;with other staff;with family/caregiver  -EH      In Chair --  - sitting;reclined;notified nsg;call light within reach;encouraged to call for assist;exit alarm on  -SC reclined;call light within reach;encouraged to call for assist;exit alarm on;with family/caregiver  -AS    Recorded by [EH] Ally Azevedo, PT [SC] Ananda Bro, PT [AS] Juliann Irving, PTA      User Key  (r) = Recorded By, (t) = Taken By, (c) = Cosigned By    Initials Name Effective Dates    SC Ananda Bro, PT 06/19/15 -     EH Ally Azevedo, PT 06/19/15 -     AR Mayra Miguel, OT 06/22/15 -     AS Juliann Irving, PTA 06/22/15 -     KR Mayelin Kebede, PT 09/25/17 -                 OT Goals       12/14/17 1336 12/11/17 1141       Transfer Training OT LTG    Transfer Training OT LTG, Date Established  12/11/17  -CL     Transfer Training OT LTG, Time to Achieve  by discharge  -CL     Transfer Training OT LTG, Activity Type  toilet;sit to stand/stand to sit  -CL     Transfer Training OT LTG, [place] Level  minimum assist (75% patient effort)  -CL     Transfer Training OT LTG, Additional Goal  AAD  -CL     Transfer Training OT LTG, Outcome goal partially met  -AR      Orientation OT LTG    Orientation OT LTG, Date Established  12/11/17  -CL     Orientation OT LTG, Time to Achieve  by discharge  -CL     Orientation OT LTG, Activity Type  person;place;time;100% treatment session  -CL     Orientation OT LTG, Outcome goal met  -AR      LB Dressing OT LTG    LB Dressing Goal OT LTG, Date Established  12/11/17  -CL     LB Dressing Goal OT LTG, Time to Achieve  by discharge  -CL     LB Dressing Goal OT LTG, Activity Type  Don/doff socks  -CL     LB Dressing Goal OT LTG, [place] Level  minimum assist (75% patient effort)  -CL     LB Dressing Goal OT LTG, Adaptive Equipment  shoe horn, long handled;reacher;sock-aid  -CL     LB Dressing Goal OT LTG, Additional Goal  AAD  -CL      LB Dressing Goal OT LTG, Outcome goal met  -AR      Activity Tolerance OT LTG    Activity Tolerance Goal OT LTG, Date Established  12/11/17  -CL     Activity Tolerance Goal OT LTG, Time to Achieve  by discharge  -CL     Activity Tolerance Goal OT LTG, Activity Level  15 min activity  -CL     Activity Tolerance Goal OT LTG, Additional Goal  x1 seated rest break  -CL     Activity Tolerance Goal OT LTG, Outcome goal ongoing  -AR        User Key  (r) = Recorded By, (t) = Taken By, (c) = Cosigned By    Initials Name Provider Type    AR Mayra Miguel, OT Occupational Therapist    CL Philly Gray, OT Occupational Therapist          Occupational Therapy Education     Title: PT OT SLP Therapies (Active)     Topic: Occupational Therapy (Active)     Point: ADL training (Active)    Description: Instruct learner(s) on proper safety adaptation and remediation techniques during self care or transfers.   Instruct in proper use of assistive devices.    Learning Progress Summary    Learner Readiness Method Response Comment Documented by Status   Patient Eager E,TB,D,H VU,DU ADL retraining, transfer training, bed mobiliy, home satety, DME for home use AR 12/14/17 1336 Done    Acceptance E NR   12/13/17 1550 Active    Acceptance E,D NR Pt educated on appropriate safety precautions, t/f techniques, positioning, and benefits of therapy. CL 12/11/17 1139 Active   Family Acceptance E NR   12/13/17 1550 Active               Point: Home exercise program (Active)    Description: Instruct learner(s) on appropriate technique for monitoring, assisting and/or progressing therapeutic exercises/activities.    Learning Progress Summary    Learner Readiness Method Response Comment Documented by Status   Patient Acceptance E NR   12/13/17 1550 Active   Family Acceptance E NR   12/13/17 1550 Active               Point: Precautions (Active)    Description: Instruct learner(s) on prescribed precautions during self-care and functional  transfers.    Learning Progress Summary    Learner Readiness Method Response Comment Documented by Status   Patient Eager E,TB,D,H VU,DU ADL retraining, transfer training, bed mobiliy, home satety, DME for home use AR 12/14/17 1336 Done    Acceptance E NR   12/13/17 1550 Active    Acceptance E,D NR Pt educated on appropriate safety precautions, t/f techniques, positioning, and benefits of therapy.  12/11/17 1139 Active   Family Acceptance E NR   12/13/17 1550 Active               Point: Body mechanics (Active)    Description: Instruct learner(s) on proper positioning and spine alignment during self-care, functional mobility activities and/or exercises.    Learning Progress Summary    Learner Readiness Method Response Comment Documented by Status   Patient Eager E,TB,D,H VU,DU ADL retraining, transfer training, bed mobiliy, home satety, DME for home use AR 12/14/17 1336 Done    Acceptance E NR   12/13/17 1550 Active    Acceptance E,D NR Pt educated on appropriate safety precautions, t/f techniques, positioning, and benefits of therapy.  12/11/17 1139 Active   Family Acceptance E NR   12/13/17 1550 Active                      User Key     Initials Effective Dates Name Provider Type Discipline     06/19/15 -  Ally Azevedo, PT Physical Therapist PT    AR 06/22/15 -  Mayra Miguel, OT Occupational Therapist OT     06/08/16 -  Philly Gray, OT Occupational Therapist OT                  OT Recommendation and Plan  Anticipated Equipment Needs At Discharge: front wheeled walker (TBA further)  Anticipated Discharge Disposition: skilled nursing facility  Therapy Frequency: daily  Plan of Care Review  Plan Of Care Reviewed With: patient  Progress: improving  Outcome Summary/Follow up Plan: Issued AE and educated pt on use. She achieved ADL goal, orientation goal and partialy achieved transfer goal. Issued handouts on tub benches for home use and educated pt/son on places to obtain. Recommend DC home  with HHOT and assist from family. She will have 24/hr assist between son and spouse.          Outcome Measures       12/14/17 1135 12/14/17 1130 12/14/17 0938    How much help from another person do you currently need...    Turning from your back to your side while in flat bed without using bedrails?   4  -KR    Moving from lying on back to sitting on the side of a flat bed without bedrails?   3  -KR    Moving to and from a bed to a chair (including a wheelchair)?   3  -KR    Standing up from a chair using your arms (e.g., wheelchair, bedside chair)?   3  -KR    Climbing 3-5 steps with a railing?   2  -KR    To walk in hospital room?   3  -KR    AM-PAC 6 Clicks Score   18  -KR    How much help from another is currently needed...    Putting on and taking off regular lower body clothing? --  -AR 3  -AR     Bathing (including washing, rinsing, and drying) --  -AR 3  -AR     Toileting (which includes using toilet bed pan or urinal) --  -AR 3  -AR     Putting on and taking off regular upper body clothing --  -AR 3  -AR     Taking care of personal grooming (such as brushing teeth) --  -AR 3  -AR     Eating meals --  -AR 4  -AR     Score --  -AR 19  -AR     Functional Assessment    Outcome Measure Options --  -AR AM-PAC 6 Clicks Daily Activity (OT)  -AR AM-PAC 6 Clicks Basic Mobility (PT)  -KR      12/13/17 1512 12/13/17 1116 12/12/17 1444    How much help from another person do you currently need...    Turning from your back to your side while in flat bed without using bedrails? 4  -EH 3  -EH 3  -SC    Moving from lying on back to sitting on the side of a flat bed without bedrails? 4  -EH 3  -EH 3  -SC    Moving to and from a bed to a chair (including a wheelchair)? 3  -EH 3  -EH 3  -SC    Standing up from a chair using your arms (e.g., wheelchair, bedside chair)? 3  -EH 3  -EH 3  -SC    Climbing 3-5 steps with a railing? 3  -EH 2  -EH 2  -SC    To walk in hospital room? 3  -EH 3  -EH 3  -SC    AM-PAC 6 Clicks Score 20   - 17  - 17  -SC    Functional Assessment    Outcome Measure Options AM-PAC 6 Clicks Basic Mobility (PT)  - AM-PAC 6 Clicks Basic Mobility (PT)  -[facility] 6 Clicks Basic Mobility (PT)  -SC      12/12/17 0915          How much help from another person do you currently need...    Turning from your back to your side while in flat bed without using bedrails? 3  -AS      Moving from lying on back to sitting on the side of a flat bed without bedrails? 3  -AS      Moving to and from a bed to a chair (including a wheelchair)? 3  -AS      Standing up from a chair using your arms (e.g., wheelchair, bedside chair)? 3  -AS      Climbing 3-5 steps with a railing? 2  -AS      To walk in hospital room? 3  -AS      AM-PAC 6 Clicks Score 17  -AS      Functional Assessment    Outcome Measure Options AM-[facility] 6 Clicks Basic Mobility (PT)  -AS        User Key  (r) = Recorded By, (t) = Taken By, (c) = Cosigned By    Initials Name Provider Type    SC Ananda Bro, PT Physical Therapist     Ally Azevedo, PT Physical Therapist    AR Mayra Miguel, OT Occupational Therapist    AS Juliann Irving, PTA Physical Therapy Assistant    ALEX Kebede, PT Physical Therapist           Time Calculation:         Time Calculation- OT       12/14/17 1340          Time Calculation- OT    OT Start Time 1130  -AR      Total Timed Code Minutes- OT 40 minute(s)  -AR      OT Received On 12/14/17  -AR      OT Goal Re-Cert Due Date 12/21/17  -AR        User Key  (r) = Recorded By, (t) = Taken By, (c) = Cosigned By    Initials Name Provider Type    AR Mayra Miguel OT Occupational Therapist           Therapy Charges for Today     Code Description Service Date Service Provider Modifiers Qty    [id number]  OT THERAPEUTIC ACT EA 15 MIN 12/14/2017 Mayra Miguel OT GO 3               Mayra Miguel OT  12/14/2017

## 2017-12-14 NOTE — PROGRESS NOTES
"  SUBJECTIVE  Patient resting comfortably.  Pain controlled.  Cognitive status much improved.  No events overnight    OBJECTIVE  Temp (24hrs), Av.4 °F (36.3 °C), Min:97.1 °F (36.2 °C), Max:97.7 °F (36.5 °C)    Blood pressure 160/74, pulse 58, temperature 97.3 °F (36.3 °C), temperature source Temporal Artery , resp. rate 18, height 162.6 cm (64\"), weight 74.8 kg (164 lb 12.8 oz), SpO2 98 %.    Lab Results (last 24 hours)     Procedure Component Value Units Date/Time    Blood Culture - Blood, Blood, Venous Line [[id number]]  (Normal) Collected:  12/10/17 1539    Specimen:  Blood from Blood, Venous Line Updated:  17     Blood Culture No growth at 3 days    Blood Culture - Blood, Blood, Venous Line [[id number]]  (Normal) Collected:  12/10/17 1535    Specimen:  Blood from Blood, Venous Line Updated:  17     Blood Culture No growth at 3 days    Body Fluid Culture - Synovial Fluid, Knee, Right [[id number]]  (Normal) Collected:  12/10/17 1915    Specimen:  Synovial Fluid from Knee, Right Updated:  17 0707     BF Culture No growth at 4 days     Gram Stain Result Moderate (3+) WBCs seen      No organisms seen    Tissue / Bone Culture - Tissue, Knee, Right [[id number]]  (Normal) Collected:  12/10/17 1924    Specimen:  Tissue from Knee, Right Updated:  17 0707     Tissue Culture No growth at 4 days     Gram Stain Result Occasional WBCs seen      No organisms seen    Tissue / Bone Culture - Tissue, Knee, Right [[id number]]  (Normal) Collected:  12/10/17 1925    Specimen:  Tissue from Knee, Right Updated:  17 0707     Tissue Culture No growth at 4 days     Gram Stain Result Occasional WBCs seen      No organisms seen    Tissue / Bone Culture - Tissue, Knee, Right [[id number]]  (Normal) Collected:  12/10/17 1921    Specimen:  Tissue from Knee, Right Updated:  17 0708     Tissue Culture No growth at 4 days     Gram Stain Result No WBCs or organisms seen            PHYSICAL EXAM  Right " lower extremity:Dressing clean, dry and intact.  Patient able to perform straight leg raise without assistance.  Intact EHL, FHL, tibialis anterior, and gastrocsoleus. Sensation intact to light touch to deep peroneal, superficial peroneal, sural, saphenous, tibial nerves. 2+ palpable DP and PT pulses.       Principal Problem:    Sepsis, probable right septic knee as source  Active Problems:    Hypertension    Disease of thyroid gland    Expressive aphasia    Heart murmur  Acute periprosthetic joint infection status post right total knee arthroplasty    PLAN / DISPOSITION:  4 Day Post-Op single component revision right knee arthroplasty with irrigation and debridement and quadricepsplasty    Protected weight bearing as tolerated right lower extremity, knee range of motion as tolerated   Pain control  PT/OT for post op mobilization and medical equipment needs   Continue empiric antibiotic coverage.  Follow-up intraoperative cultures-No growth to date.   Infectious disease consulted for antibiotic recommendations and treatment.   TEDs and SCD's bilateral lower extremities   Aspirin for DVT prophylaxis    Social work for discharge planning.   Dressing to remain in place for 7 days. May remove on POD#7. If no drainage, may shower on POD#10. No submerging wound in water. If drainage is noted, sterile dressing should be placed and wound checked daily. No showering until wound has remained dry for 72 consecutive hours.   Follow up in 3 weeks for re-assessment.      Stephon Garcia MD  12/14/17  7:28 AM

## 2017-12-14 NOTE — PLAN OF CARE
Problem: Patient Care Overview (Adult)  Goal: Plan of Care Review  Outcome: Ongoing (interventions implemented as appropriate)    12/14/17 4115   Coping/Psychosocial Response Interventions   Plan Of Care Reviewed With patient;spouse   Patient Care Overview   Progress improving   Outcome Evaluation   Outcome Summary/Follow up Plan Pt pain controlled with oral pain medications. Still continues to complain of nausea. PICC line dressing changed today. Will continue to montior.

## 2017-12-14 NOTE — PROGRESS NOTES
[facility] Medicine Services  PROGRESS NOTE    Patient Name: Lesli Leon  : 1955  MRN: [id number]    Date of Admission: 12/10/2017  Length of Stay: 4  Primary Care Physician: Phani Joaquin MD    Subjective   Subjective     CC: f/u septic right knee    HPI: No acute events overnight, patient state that she is doing well, pain is controlled    Review of Systems  Gen- No fevers, chills  CV- No chest pain, palpitations  Resp- No cough, dyspnea  GI- No N/V/D, abd pain    Otherwise ROS is negative except as mentioned in the HPI.    Objective   Objective     Vital Signs:   Temp:  [97.1 °F (36.2 °C)-98 °F (36.7 °C)] 98 °F (36.7 °C)  Heart Rate:  [50] 50  Resp:  [18] 18  BP: (146-169)/(74-79) 164/79        Physical Exam:  Constitutional: No acute distress, awake, alert, copmfortable  HENT: NCAT, mucous membranes moist  Respiratory: Clear to auscultation bilaterally, respiratory effort normal   Cardiovascular: RRR, no murmurs, rubs, or gallops, palpable pedal pulses bilaterally  Gastrointestinal: Positive bowel sounds, soft, nontender, nondistended  Musculoskeletal: right knee with dressing, bilateral LE edema 2+  Psychiatric: Appropriate affect, cooperative  Neurologic: Oriented x 3, strength symmetric in all extremities, Cranial Nerves grossly intact to confrontation, speech clear  Skin: No rashes    Results Reviewed:  I have personally reviewed current lab, radiology, and data and agree.      Results from last 7 days  Lab Units 12/12/17  0527 12/11/17  0439 12/10/17  2225 12/10/17  1518   WBC 10*3/mm3 15.62* 15.70*  --  17.51*   HEMOGLOBIN g/dL 10.0* 9.7* 10.3* 10.4*   HEMATOCRIT % 32.2* 31.0* 32.8* 33.7*   PLATELETS 10*3/mm3 277 221  --  243       Results from last 7 days  Lab Units 17  0527 12/11/17  0439 12/10/17  2226 12/10/17  1517   SODIUM mmol/L 142 140 142 142   POTASSIUM mmol/L 4.3 3.8 3.9 3.7   CHLORIDE mmol/L 106 106 108 110*   CO2 mmol/L 26.0 25.0 25.0 26.0   BUN  mg/dL 33* 25* 25* 30*   CREATININE mg/dL 0.90 1.00 1.10 1.70*   GLUCOSE mg/dL 125* 115* 123* 105*   CALCIUM mg/dL 8.4* 8.3* 7.8* 8.1*   TROPONIN I ng/mL  --   --   --  0.007     No results found for: BNP  No results found for: PHART    Microbiology Results Abnormal     Procedure Component Value - Date/Time    Tissue / Bone Culture - Tissue, Knee, Right [[id number]]  (Abnormal) Collected:  12/10/17 1926    Lab Status:  Preliminary result Specimen:  Tissue from Knee, Right Updated:  12/14/17 1108     Tissue Culture --      Light growth (2+) Streptococcus, Alpha Hemolytic, Not S. pneumoniae or Group D (A)     Gram Stain Result Many (4+) WBCs seen      No organisms seen    Tissue / Bone Culture - Tissue, Knee, Right [[id number]]  (Normal) Collected:  12/10/17 1921    Lab Status:  Final result Specimen:  Tissue from Knee, Right Updated:  12/14/17 0708     Tissue Culture No growth at 4 days     Gram Stain Result No WBCs or organisms seen    Tissue / Bone Culture - Tissue, Knee, Right [[id number]]  (Normal) Collected:  12/10/17 1925    Lab Status:  Final result Specimen:  Tissue from Knee, Right Updated:  12/14/17 0707     Tissue Culture No growth at 4 days     Gram Stain Result Occasional WBCs seen      No organisms seen    Tissue / Bone Culture - Tissue, Knee, Right [[id number]]  (Normal) Collected:  12/10/17 1924    Lab Status:  Final result Specimen:  Tissue from Knee, Right Updated:  12/14/17 0707     Tissue Culture No growth at 4 days     Gram Stain Result Occasional WBCs seen      No organisms seen    Body Fluid Culture - Synovial Fluid, Knee, Right [[id number]]  (Normal) Collected:  12/10/17 1915    Lab Status:  Final result Specimen:  Synovial Fluid from Knee, Right Updated:  12/14/17 0707     BF Culture No growth at 4 days     Gram Stain Result Moderate (3+) WBCs seen      No organisms seen    Blood Culture - Blood, Blood, Venous Line [[id number]]  (Normal) Collected:  12/10/17 1539    Lab Status:  Preliminary result  Specimen:  Blood from Blood, Venous Line Updated:  12/13/17 1631     Blood Culture No growth at 3 days    Blood Culture - Blood, Blood, Venous Line [[id number]]  (Normal) Collected:  12/10/17 1535    Lab Status:  Preliminary result Specimen:  Blood from Blood, Venous Line Updated:  12/13/17 1631     Blood Culture No growth at 3 days    Anaerobic Culture - Synovial Fluid, Knee, Right [[id number]]  (Normal) Collected:  12/10/17 1915    Lab Status:  Preliminary result Specimen:  Synovial Fluid from Knee, Right Updated:  12/11/17 1347     Culture No anaerobes isolated    Anaerobic Culture - Tissue, Knee, Right [[id number]]  (Normal) Collected:  12/10/17 1921    Lab Status:  Preliminary result Specimen:  Tissue from Knee, Right Updated:  12/11/17 1345     Culture No anaerobes isolated    Anaerobic Culture - Tissue, Knee, Right [[id number]]  (Normal) Collected:  12/10/17 1924    Lab Status:  Preliminary result Specimen:  Tissue from Knee, Right Updated:  12/11/17 1344     Culture No anaerobes isolated    Anaerobic Culture - Tissue, Knee, Right [[id number]]  (Normal) Collected:  12/10/17 1925    Lab Status:  Preliminary result Specimen:  Tissue from Knee, Right Updated:  12/11/17 1343     Culture No anaerobes isolated    Anaerobic Culture - Tissue, Knee, Right [[id number]]  (Normal) Collected:  12/10/17 1926    Lab Status:  Preliminary result Specimen:  Tissue from Knee, Right Updated:  12/11/17 1343     Culture No anaerobes isolated    AFB Culture - Synovial Fluid, Knee, Right [[id number]] Collected:  12/10/17 1915    Lab Status:  Preliminary result Specimen:  Synovial Fluid from Knee, Right Updated:  12/11/17 1316     AFB Stain No acid fast bacilli seen on concentrated smear    AFB Culture - Tissue, Knee, Right [[id number]] Collected:  12/10/17 1921    Lab Status:  Preliminary result Specimen:  Tissue from Knee, Right Updated:  12/11/17 1316     AFB Stain No acid fast bacilli seen on concentrated smear    AFB Culture - Tissue,  Knee, Right [[id number]] Collected:  12/10/17 1924    Lab Status:  Preliminary result Specimen:  Tissue from Knee, Right Updated:  12/11/17 1316     AFB Stain No acid fast bacilli seen on concentrated smear    AFB Culture - Tissue, Knee, Right [[id number]] Collected:  12/10/17 1925    Lab Status:  Preliminary result Specimen:  Tissue from Knee, Right Updated:  12/11/17 1316     AFB Stain No acid fast bacilli seen on concentrated smear    AFB Culture - Tissue, Knee, Right [[id number]] Collected:  12/10/17 1926    Lab Status:  Preliminary result Specimen:  Tissue from Knee, Right Updated:  12/11/17 1316     AFB Stain No acid fast bacilli seen on concentrated smear          Imaging Results (last 24 hours)     ** No results found for the last 24 hours. **        Results for orders placed during the hospital encounter of 12/10/17   Adult Transthoracic Echo Complete W/ Cont if Necessary Per Protocol    Narrative · Left ventricular systolic function is normal.  · Estimated EF appears to be in the range of 61 - 65%  · Left ventricular wall thickness is consistent with borderline concentric   hypertrophy.  · Left ventricular diastolic dysfunction (grade II) consistent with   pseudonormalization.  · Left atrial cavity size is mildly dilated.  · Mild aortic valve regurgitation is present.  · Estimated right ventricular systolic pressure from tricuspid   regurgitation is mildly elevated (35-45 mmHg).  · Mild tricuspid valve regurgitation is present.          I have reviewed the medications.    Assessment/Plan   Assessment / Plan     Hospital Problem List     * (Principal)Sepsis, probable right septic knee as source    Hypertension    Disease of thyroid gland    Expressive aphasia    Heart murmur         Brief Hospital Course to date:  Lesli Leon is a 62 y.o. female with hx of breast CA s/p radiation, recent right knee replacements, she presented 12/10 with 2 days of right knee pain , worsening confusion, fevers and  hypotension. Subsequently admitted to the ICU for septic right knee. She was started on abx, vanc and zosyn per ID. Patient alose developed worsening confusion, neurology was consulted. Patient was deemed stable for transfer to the floor 12/12 for continued care.      Assessment & Plan:  - Septic right knee, s/p single component revision of right knee arthroplasty with irrigation and debridement and quadricepsplasty per ortho. Continue abx, per ID blood and right knee cultures pending, anticipate longer abx course, PICC line ordered.  - Confusion and word finding difficulties likely septic encephalopathy resolving-neurology consulted, doubt CVA, f/u CT head w/wo unrevealing  - Sinus bradycardia on tele, HR was in the low 30s high 40s, asymptomatic, echo wnl, get EKG  - continue home rx for hypothyroidism.      DVT Prophylaxis:  ASA      CODE STATUS: Full Code      Disposition: anticipate d/c in a few days, patient will benefit from rehab, needs also long term abx, CM following    Xavier Barrera MD  12/14/17  2:06 PM

## 2017-12-14 NOTE — PLAN OF CARE
Problem: Patient Care Overview (Adult)  Goal: Plan of Care Review  Outcome: Ongoing (interventions implemented as appropriate)    12/14/17 1110   Coping/Psychosocial Response Interventions   Plan Of Care Reviewed With patient   Patient Care Overview   Progress improving   Outcome Evaluation   Outcome Summary/Follow up Plan Pt increased ambulation distance to 120ft with RW and CGA. Pt demonstrates swing through gait pattern, but requires increased verbal cueing with onset of fatigue. Pt given VC's for increased step length and upright posture. Pt's mobility limited by pain and fatigue.          Problem: Inpatient Physical Therapy  Goal: Bed Mobility Goal LTG- PT  Outcome: Ongoing (interventions implemented as appropriate)    12/11/17 1002 12/14/17 1110   Bed Mobility PT LTG   Bed Mobility PT LTG, Date Established 12/11/17 --    Bed Mobility PT LTG, Time to Achieve 2 wks --    Bed Mobility PT LTG, Activity Type supine to sit/sit to supine --    Bed Mobility PT LTG, [place] Level supervision required --    Bed Mobility PT LTG, Date Goal Reviewed --  12/14/17   Bed Mobility PT LTG, Outcome --  goal ongoing       Goal: Transfer Training Goal 1 LTG- PT  Outcome: Ongoing (interventions implemented as appropriate)    12/11/17 1002 12/14/17 1110   Transfer Training PT LTG   Transfer Training PT LTG, Date Established 12/11/17 --    Transfer Training PT LTG, Time to Achieve 2 wks --    Transfer Training PT LTG, Activity Type sit to stand/stand to sit --    Transfer Training PT LTG, [place] Level supervision required --    Transfer Training PT LTG, Assist Device walker, rolling --    Transfer Training PT LTG, Date Goal Reviewed --  12/14/17   Transfer Training PT LTG, Outcome --  goal ongoing       Goal: Gait Training Goal LTG- PT  Outcome: Ongoing (interventions implemented as appropriate)    12/11/17 1002 12/14/17 1110   Gait Training PT LTG   Gait Training Goal PT LTG, Date Established 12/11/17 --    Gait Training  Goal PT LTG, Time to Achieve 2 wks --    Gait Training Goal PT LTG, [place] Level supervision required --    Gait Training Goal PT LTG, Assist Device walker, rolling --    Gait Training Goal PT LTG, Distance to Achieve 200 --    Gait Training Goal PT LTG, Date Goal Reviewed --  12/14/17   Gait Training Goal PT LTG, Outcome --  goal ongoing       Goal: Stair Training Goal LTG- PT  Outcome: Ongoing (interventions implemented as appropriate)    12/11/17 1002 12/14/17 1110   Stair Training PT LTG   Stair Training Goal PT LTG, Date Established 12/11/17 --    Stair Training Goal PT LTG, Time to Achieve 2 wks --    Stair Training Goal PT LTG, Number of Steps 3 --    Stair Training Goal PT LTG, [place] Level contact guard assist --    Stair Training Goal PT LTG, Assist Device 1 handrail --    Stair Training Goal PT LTG, Date Goal Reviewed --  12/14/17   Stair Training Goal PT LTG, Outcome --  goal ongoing

## 2017-12-14 NOTE — THERAPY TREATMENT NOTE
Acute Care - Physical Therapy Treatment Note  [facility]     Patient Name: Lesli Leon  : 1955  MRN: [id number]  Today's Date: 2017  Onset of Illness/Injury or Date of Surgery Date: 12/10/17  Date of Referral to PT: 12/10/17  Referring Physician: MD Jose    Admit Date: 12/10/2017    Visit Dx:    ICD-10-CM ICD-9-CM   1. Sepsis, due to unspecified organism A41.9 038.9     995.91   2. Infection of total right knee replacement T84.53XA 996.66    Z96.651 V43.65   3. Impaired functional mobility, balance, gait, and endurance Z74.09 V49.89   4. Impaired mobility and ADLs Z74.09 799.89     Patient Active Problem List   Diagnosis   • Sepsis, probable right septic knee as source   • Hypertension   • Disease of thyroid gland   • Expressive aphasia   • Heart murmur               Adult Rehabilitation Note       17 0938 17 1512 17 1116    Rehab Assessment/Intervention    Discipline physical therapist  -KR physical therapist  -EH physical therapist  -EH    Document Type therapy note (daily note)  -KR therapy note (daily note)  -EH therapy note (daily note)  -EH    Subjective Information agree to therapy;complains of;pain  -KR agree to therapy  -EH agree to therapy  -EH    Patient Effort, Rehab Treatment good  -KR adequate  -EH adequate  -EH    Symptoms Noted During/After Treatment fatigue  -KR fatigue  -EH fatigue;other (see comments)  -EH    Symptoms Noted Comment   nausea; pt cued for PLB in sitting with improvement in nausea. Increased nausea with standing. Pt assisted to t/f to chair instead of ambulation. At that time PICC line RN presents in room.  -EH    Precautions/Limitations fall precautions  -KR fall precautions  -EH fall precautions  -EH    Recorded by [KR] Mayelin Kebede, PT [EH] Ally Azevedo, PT [EH] Ally Azevedo, PT    Vital Signs    Pre Systolic BP Rehab --   RN cleared for treatment; VSS   -KR      Pre Patient Position Supine  -KR      Intra Patient Position  Standing  -KR      Post Patient Position Supine  -KR      Recorded by [KR] Mayelin Kebede, PT      Pain Assessment    Pain Assessment 0-10  -KR Serrano-Vasquez FACES  -EH Serrano-Baker FACES  -EH    Serrano-Baker FACES Pain Rating  4  -EH 4  -EH    Pain Score 9  -KR      Post Pain Score 6  -KR      Pain Type Acute pain  -KR Acute pain  -EH     Pain Location Knee  -KR Knee  -EH     Pain Orientation Right  -KR Right  -EH     Patient's Stated Pain Goal  No pain  -EH     Pain Intervention(s) Repositioned;Ambulation/increased activity  -KR Repositioned;Ambulation/increased activity  -EH     Response to Interventions tolerated  -KR tolerated  -EH     Recorded by [KR] Mayelin Kebede, PT [EH] Ally Azevedo, PT [EH] Ally Azevedo, PT    Cognitive Assessment/Intervention    Current Cognitive/Communication Assessment functional  -KR functional  -EH     Orientation Status oriented x 4   intermittent confusion  -KR oriented x 4  -EH oriented to;person;place;time  -EH    Follows Commands/Answers Questions able to follow single-step instructions;100% of the time;needs increased time;needs cueing  -% of the time;able to follow single-step instructions  - 75% of the time;able to follow single-step instructions;needs cueing;needs increased time  -    Personal Safety mild impairment;decreased awareness, need for assist;decreased awareness, need for safety;decreased insight to deficits  -KR mild impairment;decreased awareness, need for assist;decreased awareness, need for safety  - mild impairment;decreased awareness, need for assist;decreased awareness, need for safety  -    Personal Safety Interventions fall prevention program maintained;gait belt;nonskid shoes/slippers when out of bed  -KR fall prevention program maintained;gait belt;nonskid shoes/slippers when out of bed  -EH fall prevention program maintained;gait belt;nonskid shoes/slippers when out of bed  -EH    Recorded by [KR] Mayelin Kebede, PT [EH] Ally CIFUENTES  Roberto Carlos, PT [] Ally Azevedo, PT    Bed Mobility, Assessment/Treatment    Bed Mobility, Assistive Device  bed rails;head of bed elevated  - bed rails;head of bed elevated  -    Bed Mob, Supine to Sit, [place] supervision required  -KR supervision required  - verbal cues required;minimum assist (75% patient effort)  -    Bed Mob, Sit to Supine, [place] supervision required  -KR      Bed Mobility, Safety Issues decreased use of legs for bridging/pushing  -KR decreased use of legs for bridging/pushing;decreased use of arms for pushing/pulling  -EH decreased use of legs for bridging/pushing;other (see comments)   sequencing difficulty  -    Bed Mobility, Impairments ROM decreased;strength decreased;pain  -KR pain;coordination impaired;motor control impaired;strength decreased  -EH pain;coordination impaired;motor control impaired;strength decreased  -    Bed Mobility, Comment Pt demonstrated good safety awareness with bed mobility.  -KR      Recorded by [KR] Mayelin Kebede, PT [] Ally Azevedo, PT [] Ally Azevedo, PT    Transfer Assessment/Treatment    Transfers, Bed-Chair [place]   contact guard assist;2 person assist required;verbal cues required  -    Transfers, Chair-Bed [place]   contact guard assist;2 person assist required;verbal cues required  -EH    Transfers, Sit-Stand [place] contact guard assist;verbal cues required  -KR contact guard assist;2 person assist required;verbal cues required  -EH contact guard assist;verbal cues required  -EH    Transfers, Stand-Sit [place] contact guard assist;verbal cues required  -KR contact guard assist;verbal cues required  - verbal cues required;contact guard assist  -    Transfers, Sit-Stand-Sit, Assist Device rolling walker  -KR rolling walker  -EH rolling walker  -EH    Toilet Transfer, [place] contact guard assist;verbal cues required  -KR      Toilet Transfer, Assistive Device rolling walker  -KR       Transfer, Safety Issues step length decreased;weight-shifting ability decreased  -KR other (see comments)  -EH other (see comments)   difficulty sequencing  -EH    Transfer, Impairments strength decreased;impaired balance;coordination impaired;pain  -KR impaired balance;motor control impaired;strength decreased  - impaired balance;motor control impaired;strength decreased  -    Transfer, Comment Pt given verbal cues for correct hand placement to push up from bed prior to initial stand and to reach for grab bar for toilet transfer.  -KR extended time needed  -EH     Recorded by [KR] Mayelin Kebede, PT [EH] Ally Azevedo, PT [EH] Ally Azevedo, PT    Gait Assessment/Treatment    Gait, [place] Level contact guard assist;verbal cues required  -KR contact guard assist  - contact guard assist  -    Gait, Assistive Device rolling walker  -KR rolling walker  -EH rolling walker  -    Gait, Distance (Feet) 120  -KR 90  -EH 4  -EH    Gait, Gait Pattern Analysis swing-through gait  -KR swing-through gait  -     Gait, Gait Deviations right:;antalgic;franklin decreased;forward flexed posture;step length decreased;weight-shifting ability decreased  -KR franklin decreased;decreased heel strike;forward flexed posture;weight-shifting ability decreased;step length decreased  -     Gait, Safety Issues step length decreased;weight-shifting ability decreased  -KR step length decreased;weight-shifting ability decreased;balance decreased during turns;sequencing ability decreased  -     Gait, Impairments ROM decreased;strength decreased;impaired balance;pain  -KR strength decreased;pain;motor control impaired  - strength decreased;motor control impaired;pain  -    Gait, Comment Pt demonstrated swing through gait pattern during ambulation. Pt given verbal cues for upright posture and increased step length. Pt required encouragement to continue. Pt's mobility limited by fatigue and pain.   -KR pt  progressing with step through gait pattern with symmetrical step length. Pt improves performance with son's presence during ambulaiton.  -EH     Recorded by [KR] Mayelin Kebede, PT [] Ally Azevedo, PT [] Ally Azevedo PT    Balance Skills Training    Gait Balance-Level of Assistance  Contact guard  -EH Minimum assistance;x2  -EH    Gait Balance Support  assistive device  - assistive device  -EH    Recorded by  [EH] Ally Azevedo, PT [] Ally Azevedo PT    Therapy Exercises    Right Lower Extremity   --  -EH    Bilateral Lower Extremities  AROM:;10 reps;ankle pumps/circles;glut sets;quad sets;knee flexion;heel slides;LAQ;SLR   assist with heel slides. encouragement for SLR  -EH     Exercise Protocols total knee  -KR      Total Knee Exercises right:;15 reps;completed protocol;with assist;ankle pumps/circles;quad set;glut set;heel slides;heel slide stretch;SLR;SAQ;LAQ  -KR      Recorded by [KR] Mayelin Kebede, PT [] Ally Azevedo, PT [] Ally Azevedo PT    Positioning and Restraints    Pre-Treatment Position in bed  -KR in bed  - in bed  -    Post Treatment Position bed  -KR chair  -EH bed  -EH    In Bed supine;call light within reach;encouraged to call for assist;exit alarm on;with family/caregiver;side rails up x2  -KR  supine;call light within reach;encouraged to call for assist;notified nsg;with other staff;with family/caregiver  -    In Chair  reclined;call light within reach;encouraged to call for assist;with family/caregiver;with other staff  - --  -EH    Recorded by [KR] Mayelin Kebede, PT [] Ally Azevedo, PT [] Ally Azevedo, PT      12/12/17 1444 12/12/17 0915 12/11/17 1321    Rehab Assessment/Intervention    Discipline physical therapist  -SC physical therapy assistant  -AS physical therapist  -LS    Document Type therapy note (daily note)  -SC therapy note (daily note)  -AS therapy note (daily note)  -LS    Subjective Information agree to  therapy  -SC  agree to therapy;no complaints  -LS    Patient Effort, Rehab Treatment adequate  -SC  good  -LS    Symptoms Noted During/After Treatment fatigue;other (see comments)   slow to respond to any commands  -SC      Symptoms Noted Comment   Confusion persists.   -LS    Precautions/Limitations fall precautions  -SC  fall precautions  -LS    Specific Treatment Considerations followed by chair  -SC      Recorded by [SC] Ananda Bro, PT [AS] Juliann Irving, PTA [LS] Dawna Blanchard, PT    Vital Signs    Pre Systolic BP Rehab   106  -LS    Pre Treatment Diastolic BP   54  -LS    Post Systolic BP Rehab   --   in w/c for transport to floor bed  -LS    Pretreatment Heart Rate (beats/min)   82  -LS    Pre SpO2 (%)   98  -LS    O2 Delivery Pre Treatment   room air  -LS    O2 Delivery Post Treatment   room air  -LS    Pre Patient Position   Sitting  -LS    Intra Patient Position   Standing  -LS    Post Patient Position   Sitting  -LS    Recorded by   [LS] Dawna Blanchard, PT    Pain Assessment    Pain Assessment Serrano-Vasquez FACES  -SC 0-10  -AS 0-10  -LS    Serrano-Vasquez FACES Pain Rating 6  -SC      Pain Score  5  -AS 0  -LS    Post Pain Score 6  -SC 5  -AS 8  -LS    Pain Type Acute pain  -SC Acute pain;Surgical pain  -AS Surgical pain  -LS    Pain Location Knee  -SC Knee  -AS Knee  -LS    Pain Orientation Right  -SC Right  -AS Right  -LS    Patient's Stated Pain Goal  No pain  -AS     Pain Intervention(s) Repositioned  -SC Repositioned;Ambulation/increased activity  -AS Ambulation/increased activity;Repositioned  -LS    Response to Interventions  tolerated  -AS tolerated; notified RN  -LS    Recorded by [SC] Ananda Bro, PT [AS] Juliann Irving, PTA [LS] Dawna Blanchard, PT    Cognitive Assessment/Intervention    Current Cognitive/Communication Assessment  functional  -AS impaired  -LS    Orientation Status oriented to;person;place;time  -SC oriented x 4  -AS oriented to;person;required verbal cueing (specifiy in  comments)   cues for location and date  -    Follows Commands/Answers Questions 75% of the time;needs increased time;needs repetition;needs cueing  -SC 75% of the time;able to follow single-step instructions;needs cueing  -AS able to follow single-step instructions;75% of the time;needs cueing;needs increased time;needs repetition  -    Personal Safety mild impairment;decreased awareness, need for assist;decreased awareness, need for safety;decreased insight to deficits  -SC mild impairment  -AS moderate impairment;decreased awareness, need for assist;decreased awareness, need for safety;decreased insight to deficits  -    Personal Safety Interventions gait belt;fall prevention program maintained  -SC fall prevention program maintained;gait belt;nonskid shoes/slippers when out of bed;other (see comments)   exit alarm  -AS fall prevention program maintained;gait belt;nonskid shoes/slippers when out of bed  -LS    Recorded by [SC] Ananda Bro, PT [AS] Juliann Irving, PTA [LS] Dawna Blanchard, PT    ROM (Range of Motion)    General ROM Detail   R AAROM in sittin deg; R knee AROM extension in sitting: lacking 14 degrees full extension (ace bandage intact during measurements)  -LS    Recorded by   [LS] Dawna Blanchard, PT    Mobility Assessment/Training    Extremity Weight-Bearing Status   right lower extremity  -LS    Right Lower Extremity Weight-Bearing   weight-bearing as tolerated  -LS    Recorded by   [LS] Dawna Blanchard, PT    Bed Mobility, Assessment/Treatment    Bed Mobility, Assistive Device bed rails;head of bed elevated  -SC      Bed Mob, Supine to Sit, [place] verbal cues required;minimum assist (75% patient effort)  -SC verbal cues required;minimum assist (75% patient effort)  -AS     Bed Mob, Sit to Supine, [place]  not tested  -AS     Bed Mobility, Safety Issues decreased use of legs for bridging/pushing;other (see comments)   sequencing difficulty  -SC      Bed Mobility, Impairments  pain;coordination impaired;motor control impaired;strength decreased  -SC strength decreased;pain  -AS     Bed Mobility, Comment up to edge of bed very slowly with difficulty sequencing  moverments  -SC assist to move R LE to EOB   -AS [facility] upon arrival.   -LS    Recorded by [SC] Ananda Bro, PT [AS] Juliann Irving, PTA [LS] Dawna Blanchard, PT    Transfer Assessment/Treatment    Transfers, Sit-Stand [place] contact guard assist;verbal cues required  -SC verbal cues required;minimum assist (75% patient effort);2 person assist required  -AS minimum assist (75% patient effort);verbal cues required  -LS    Transfers, Stand-Sit [place] verbal cues required;contact guard assist  -SC verbal cues required;minimum assist (75% patient effort);2 person assist required  -AS minimum assist (75% patient effort);verbal cues required  -LS    Transfers, Sit-Stand-Sit, Assist Device rolling walker  -SC rolling walker  -AS rolling walker  -LS    Transfer, Safety Issues other (see comments)   difficulty sequencing  -SC weight-shifting ability decreased;step length decreased  -AS     Transfer, Impairments impaired balance;motor control impaired;strength decreased  -SC strength decreased;impaired balance;pain  -AS strength decreased;impaired balance  -LS    Transfer, Comment repeted cues for sequencing standing--patient very slow  -SC verbal cues to reach back when sitting, patient sat without reaching back and without chair locked despite cueing.  -AS VC's for hand placement and for slight RLE forward prior to transfer.   -LS    Recorded by [SC] Ananda Bro, PT [AS] Juliann Irving, PTA [LS] Dawna Blanchard, PT    Gait Assessment/Treatment    Gait, [place] Level contact guard assist  -SC verbal cues required;minimum assist (75% patient effort);1 person + 1 person to manage equipment  -AS contact guard assist;1 person + 1 person to manage equipment;verbal cues required  -LS    Gait, Assistive Device rolling walker   -SC rolling walker  -AS rolling walker  -LS    Gait, Distance (Feet) 80   followed by chair  -  -  -LS    Gait, Gait Pattern Analysis   swing-through gait  -LS    Gait, Gait Deviations  franklin decreased;decreased heel strike;right:;forward flexed posture;weight-shifting ability decreased;step length decreased  -AS franklin decreased;forward flexed posture;right:;antalgic  -LS    Gait, Safety Issues  step length decreased;weight-shifting ability decreased;balance decreased during turns;sequencing ability decreased  -AS     Gait, Impairments strength decreased;motor control impaired;pain  -SC strength decreased;impaired balance;ROM decreased;pain  -AS strength decreased;impaired balance;pain  -LS    Gait, Comment cues to stay close to walker and some assist to steer walker. Had to take one sit down rest. Needed much encouragement to keep going.  -SC patient ambulateed with step to gait pattern, 1 seated resr break due to fatigue and dizziness, follow with chair for safety. Verbal cues to increase right heel strike and for walker placement  -AS VC's for upright posture, equal step length (R versus L), and for increased R knee flexion to promote normalized swing through phase. Distance limited by fatigue; followed with w/c for safety.   -LS    Recorded by [SC] Ananda Bro, PT [AS] Juliann Irving, PTA [LS] Dawna Blanchard, PT    Motor Skills/Interventions    Additional Documentation   Balance Skills Training (Group)  -LS    Recorded by   [LS] Dawna Blanchard, PT    Balance Skills Training    Sitting-Level of Assistance   Contact guard  -    Sitting-Balance Support   Feet supported  -    Standing-Level of Assistance   Contact guard  -    Static Standing Balance Support   assistive device  -    Gait Balance-Level of Assistance Minimum assistance;x2  -SC  Contact guard  -LS    Gait Balance Support assistive device  -SC  assistive device  -LS    Recorded by [SC] Ananda Bro, PT  [LS] Dawna Blanchard,  PT    Therapy Exercises    Right Lower Extremity 10 reps;AROM:;supine;ankle pumps/circles;quad sets;heel slides;SLR   needs repeted cues  -SC      Bilateral Lower Extremities  AROM:;10 reps;sitting;ankle pumps/circles;glut sets;quad sets   feet elevated in recliner to perform exercises  -AS     Exercise Protocols   total knee  -LS    Total Knee Exercises   bilateral:;10 reps;ankle pumps/circles;quad set;glut set;right:;LAQ;hip abduction/adduction;heel slide stretch;heel slides   issued HEP handout  -LS    Recorded by [SC] Ananda Bro, PT [AS] Juliann Irving, PTA [LS] Dawna Blanchard, PT    Positioning and Restraints    Pre-Treatment Position in bed  -SC in bed  -AS sitting in chair/recliner  -    Post Treatment Position chair  -SC chair  -AS wheelchair  -LS    In Chair sitting;reclined;notified nsg;call light within reach;encouraged to call for assist;exit alarm on  -SC reclined;call light within reach;encouraged to call for assist;exit alarm on;with family/caregiver  -AS     In Wheelchair   notified nsg;sitting;call light within reach;encouraged to call for assist;with family/caregiver;legs elevated   RN prepping pt for transfer to floor room  -LS    Recorded by [SC] Ananda Bro, PT [AS] Juliann Irving, PTA [LS] Dawna Blanchard, PT      User Key  (r) = Recorded By, (t) = Taken By, (c) = Cosigned By    Initials Name Effective Dates    SC Ananda Bro, PT 06/19/15 -     EH Ally Azevedo, PT 06/19/15 -     AS Juliann Irving, PTA 06/22/15 -     LS Dawna Blanchard, PT 06/19/15 -     KR Mayelin Kebede, PT 09/25/17 -                 IP PT Goals       12/14/17 1110 12/13/17 1551 12/13/17 1259    Bed Mobility PT LTG    Bed Mobility PT LTG, Date Goal Reviewed 12/14/17  -KR      Bed Mobility PT LTG, Outcome goal ongoing  -KR goal ongoing  -EH goal ongoing  -EH    Transfer Training PT LTG    Transfer Training PT  LTG, Date Goal Reviewed 12/14/17  -KR      Transfer Training PT LTG, Outcome goal ongoing  -KR  goal ongoing  -EH goal ongoing  -EH    Gait Training PT LTG    Gait Training Goal PT LTG, Date Goal Reviewed 12/14/17  -KR      Gait Training Goal PT LTG, Outcome goal ongoing  -KR goal ongoing  -EH goal ongoing  -EH    Stair Training PT LTG    Stair Training Goal PT LTG, Date Goal Reviewed 12/14/17  -KR      Stair Training Goal PT LTG, Outcome goal ongoing  -KR goal ongoing  -EH goal ongoing  -EH      12/12/17 1640 12/12/17 0948 12/11/17 1448    Bed Mobility PT LTG    Bed Mobility PT LTG, Date Goal Reviewed  12/12/17  -AS     Bed Mobility PT LTG, Outcome goal ongoing  -SC goal ongoing  -AS goal ongoing  -LS    Transfer Training PT LTG    Transfer Training PT  LTG, Date Goal Reviewed  12/12/17  -AS     Transfer Training PT LTG, Outcome goal ongoing  -SC goal ongoing  -AS goal ongoing  -LS    Gait Training PT LTG    Gait Training Goal PT LTG, Date Goal Reviewed  12/12/17  -AS     Gait Training Goal PT LTG, Outcome goal ongoing  -SC goal ongoing  -AS goal ongoing  -LS    Stair Training PT LTG    Stair Training Goal PT LTG, Date Goal Reviewed  12/12/17  -AS     Stair Training Goal PT LTG, Outcome goal ongoing  -SC goal ongoing  -AS goal ongoing  -LS      12/11/17 1002          Bed Mobility PT LTG    Bed Mobility PT LTG, Date Established 12/11/17  -LS      Bed Mobility PT LTG, Time to Achieve 2 wks  -LS      Bed Mobility PT LTG, Activity Type supine to sit/sit to supine  -LS      Bed Mobility PT LTG, [place] Level supervision required  -LS      Transfer Training PT LTG    Transfer Training PT LTG, Date Established 12/11/17  -LS      Transfer Training PT LTG, Time to Achieve 2 wks  -LS      Transfer Training PT LTG, Activity Type sit to stand/stand to sit  -LS      Transfer Training PT LTG, [place] Level supervision required  -LS      Transfer Training PT LTG, Assist Device walker, rolling  -LS      Gait Training PT LTG    Gait Training Goal PT LTG, Date Established 12/11/17  -LS      Gait Training Goal PT LTG,  Time to Achieve 2 wks  -LS      Gait Training Goal PT LTG, [place] Level supervision required  -LS      Gait Training Goal PT LTG, Assist Device walker, rolling  -LS      Gait Training Goal PT LTG, Distance to Achieve 200  -LS      Stair Training PT LTG    Stair Training Goal PT LTG, Date Established 12/11/17  -LS      Stair Training Goal PT LTG, Time to Achieve 2 wks  -LS      Stair Training Goal PT LTG, Number of Steps 3  -LS      Stair Training Goal PT LTG, [place] Level contact guard assist  -LS      Stair Training Goal PT LTG, Assist Device 1 handrail  -LS        User Key  (r) = Recorded By, (t) = Taken By, (c) = Cosigned By    Initials Name Provider Type    SC Ananda Bro, PT Physical Therapist     Ally Azevedo, PT Physical Therapist    AS Juliann Irving, PTA Physical Therapy Assistant    LS Dawna Blanchard, PT Physical Therapist    KR Mayelin Kebede, PT Physical Therapist          Physical Therapy Education     Title: PT OT SLP Therapies (Active)     Topic: Physical Therapy (Active)     Point: Mobility training (Active)    Learning Progress Summary    Learner Readiness Method Response Comment Documented by Status   Patient Acceptance E VU,NR Reviewed gait mechanics with mobility and HEP. KR 12/14/17 1109 Done    Acceptance E NR   12/13/17 1550 Active    Acceptance E NR   12/13/17 1258 Active    Acceptance E,D NR reviewed safety with mobility SC 12/12/17 1640 Active    Acceptance E NR  AS 12/12/17 0948 Active    Acceptance E,D,H NR Edu pt/son re: HEP; issued illustrated handout.  12/11/17 1448 Active    Acceptance E,D NR   12/11/17 1002 Active   Family Acceptance E NR   12/13/17 1550 Active    Acceptance E NR  AS 12/12/17 0948 Active   Significant Other Acceptance E NR   12/13/17 1258 Active               Point: Home exercise program (Active)    Learning Progress Summary    Learner Readiness Method Response Comment Documented by Status   Patient Acceptance E VU,NR Reviewed  gait mechanics with mobility and HEP.  12/14/17 1109 Done    Acceptance E NR   12/13/17 1550 Active    Acceptance E NR   12/13/17 1258 Active    Acceptance E,D NR reviewed safety with mobility SC 12/12/17 1640 Active    Acceptance E NR  AS 12/12/17 0948 Active    Acceptance E,D,H NR Edu pt/son re: HEP; issued illustrated handout.  12/11/17 1448 Active    Acceptance E,D NR   12/11/17 1002 Active   Family Acceptance E NR   12/13/17 1550 Active    Acceptance E NR  AS 12/12/17 0948 Active   Significant Other Acceptance E NR   12/13/17 1258 Active               Point: Body mechanics (Active)    Learning Progress Summary    Learner Readiness Method Response Comment Documented by Status   Patient Acceptance E VU,NR Reviewed gait mechanics with mobility and HEP.  12/14/17 1109 Done    Acceptance E NR   12/13/17 1550 Active    Acceptance E NR   12/13/17 1258 Active    Acceptance E,D NR reviewed safety with mobility SC 12/12/17 1640 Active    Acceptance E NR  AS 12/12/17 0948 Active    Acceptance E,D,H NR Edu pt/son re: HEP; issued illustrated handout.  12/11/17 1448 Active    Acceptance E,D NR   12/11/17 1002 Active   Family Acceptance E NR   12/13/17 1550 Active    Acceptance E NR  AS 12/12/17 0948 Active   Significant Other Acceptance E NR   12/13/17 1258 Active               Point: Precautions (Active)    Learning Progress Summary    Learner Readiness Method Response Comment Documented by Status   Patient Acceptance E VU,NR Reviewed gait mechanics with mobility and HEP.  12/14/17 1109 Done    Acceptance E NR   12/13/17 1550 Active    Acceptance E NR   12/13/17 1258 Active    Acceptance E,D NR reviewed safety with mobility SC 12/12/17 1640 Active    Acceptance E NR  AS 12/12/17 0948 Active    Acceptance E,D,H NR Edu pt/son re: HEP; issued illustrated handout.  12/11/17 1448 Active    Acceptance E,D NR   12/11/17 1002 Active   Family Acceptance E NR   12/13/17 1550 Active     Acceptance E NR  AS 12/12/17 0948 Active   Significant Other Acceptance E NR   12/13/17 1258 Active                      User Key     Initials Effective Dates Name Provider Type Discipline    SC 06/19/15 -  Ananda Bro, PT Physical Therapist PT     06/19/15 -  Ally Azevedo, PT Physical Therapist PT    AS 06/22/15 -  Juliann Irving, PTA Physical Therapy Assistant PT     06/19/15 -  Dawna Blanchard, PT Physical Therapist PT    KR 09/25/17 -  Mayelin Kebede, PT Physical Therapist PT                    PT Recommendation and Plan  Anticipated Discharge Disposition: skilled nursing facility  PT Frequency: 2 times/day  Plan of Care Review  Plan Of Care Reviewed With: patient  Progress: improving  Outcome Summary/Follow up Plan: Pt increased ambulation distance to 120ft with RW and CGA. Pt demonstrates swing through gait pattern, but requires increased verbal cueing with onset of fatigue. Pt given VC's for increased step length and upright posture. Pt's mobility limited by pain and fatigue.           Outcome Measures       12/14/17 0938 12/13/17 1512 12/13/17 1116    How much help from another person do you currently need...    Turning from your back to your side while in flat bed without using bedrails? 4  -KR 4  -EH 3  -EH    Moving from lying on back to sitting on the side of a flat bed without bedrails? 3  -KR 4  -EH 3  -EH    Moving to and from a bed to a chair (including a wheelchair)? 3  -KR 3  -EH 3  -EH    Standing up from a chair using your arms (e.g., wheelchair, bedside chair)? 3  -KR 3  -EH 3  -EH    Climbing 3-5 steps with a railing? 2  -KR 3  -EH 2  -EH    To walk in hospital room? 3  -KR 3  -EH 3  -EH    AM-PAC 6 Clicks Score 18  -KR 20  -EH 17  -EH    Functional Assessment    Outcome Measure Options AM-PAC 6 Clicks Basic Mobility (PT)  -KR AM-PAC 6 Clicks Basic Mobility (PT)  -EH AM-PAC 6 Clicks Basic Mobility (PT)  -EH      12/12/17 1444 12/12/17 0915 12/11/17 1321    How much help from  another person do you currently need...    Turning from your back to your side while in flat bed without using bedrails? 3  -SC 3  -AS 3  -LS    Moving from lying on back to sitting on the side of a flat bed without bedrails? 3  -SC 3  -AS 2  -LS    Moving to and from a bed to a chair (including a wheelchair)? 3  -SC 3  -AS 3  -LS    Standing up from a chair using your arms (e.g., wheelchair, bedside chair)? 3  -SC 3  -AS 3  -LS    Climbing 3-5 steps with a railing? 2  -SC 2  -AS 2  -LS    To walk in hospital room? 3  -SC 3  -AS 3  -LS    AM-PAC 6 Clicks Score 17  -SC 17  -AS 16  -LS    Functional Assessment    Outcome Measure Options AM-PAC 6 Clicks Basic Mobility (PT)  -SC AM-PAC 6 Clicks Basic Mobility (PT)  -AS AM-PAC 6 Clicks Basic Mobility (PT)  -LS      User Key  (r) = Recorded By, (t) = Taken By, (c) = Cosigned By    Initials Name Provider Type    SC Ananda Bro, PT Physical Therapist    EH Ally Azevedo, PT Physical Therapist    AS Juliann Irving, PTA Physical Therapy Assistant    LS Dawna Blanchard, PT Physical Therapist    ALEX Kebede, PT Physical Therapist           Time Calculation:         PT Charges       12/14/17 1112          Time Calculation    Start Time 0938  -KR      PT Received On 12/14/17  -KR      PT Goal Re-Cert Due Date 12/21/17  -KR      Time Calculation- PT    Total Timed Code Minutes- PT 45 minute(s)  -KR        User Key  (r) = Recorded By, (t) = Taken By, (c) = Cosigned By    Initials Name Provider Type    ALEX Kebede, PT Physical Therapist          Therapy Charges for Today     Code Description Service Date Service Provider Modifiers Qty    04874210989 HC PT THER PROC EA 15 MIN 12/14/2017 Mayelin Kebede, PT GP 2    48879489492 HC GAIT TRAINING EA 15 MIN 12/14/2017 Mayelin Kebede, PT GP 1          PT G-Codes  Outcome Measure Options: AM-PAC 6 Clicks Basic Mobility (PT)    Humera Kebede PT  12/14/2017

## 2017-12-14 NOTE — THERAPY TREATMENT NOTE
Acute Care - Physical Therapy Treatment Note  [facility]     Patient Name: Lesli Leon  : 1955  MRN: [id number]  Today's Date: 2017  Onset of Illness/Injury or Date of Surgery Date: 12/10/17  Date of Referral to PT: 12/10/17  Referring Physician: MD Jose    Admit Date: 12/10/2017    Visit Dx:    ICD-10-CM ICD-9-CM   1. Sepsis, due to unspecified organism A41.9 038.9     995.91   2. Infection of total right knee replacement T84.53XA 996.66    Z96.651 V43.65   3. Impaired functional mobility, balance, gait, and endurance Z74.09 V49.89   4. Impaired mobility and ADLs Z74.09 799.89     Patient Active Problem List   Diagnosis   • Sepsis, probable right septic knee as source   • Hypertension   • Disease of thyroid gland   • Expressive aphasia   • Heart murmur               Adult Rehabilitation Note       17 1527 17 1130 17 0938    Rehab Assessment/Intervention    Discipline physical therapist  -KR occupational therapist  -AR physical therapist  -KR    Document Type therapy note (daily note)  -KR therapy note (daily note)   POD#4   -AR therapy note (daily note)  -KR    Subjective Information agree to therapy;complains of;pain  -KR agree to therapy;complains of;fatigue;pain;nausea/vomiting  -AR agree to therapy;complains of;pain  -KR    Patient Effort, Rehab Treatment good  -KR good  -AR good  -KR    Symptoms Noted During/After Treatment fatigue  -KR fatigue  -AR fatigue  -KR    Precautions/Limitations fall precautions  -KR fall precautions  -AR fall precautions  -KR    Equipment Issued to Patient  bathing equipment;dressing equipment   reacher, leg lifte, shoe horn and sponge  -AR     Recorded by [KR] Mayelin Kebede, PT [AR] Mayra Miguel OT [KR] Mayelin Kebede PT    Vital Signs    Pre Systolic BP Rehab --   RN cleared for treatment; VSS  -KR  --   RN cleared for treatment; VSS   -KR    Pre Patient Position Supine  -KR  Supine  -KR    Intra Patient Position Standing  -KR   Standing  -KR    Post Patient Position Supine  -KR  Supine  -KR    Recorded by [KR] Mayelin Kebede, PT  [KR] Mayelin Kebede, PT    Pain Assessment    Pain Assessment 0-10  -KR 0-10  -AR 0-10  -KR    Pain Score 9  -KR 5  -AR 9  -KR    Post Pain Score 9  -KR 5  -AR 6  -KR    Pain Type Acute pain  -KR Acute pain  -AR Acute pain  -KR    Pain Location Knee  -KR Knee  -AR Knee  -KR    Pain Orientation Right;Anterior;Posterior  -KR Right;Anterior;Posterior  -AR Right  -KR    Pain Intervention(s) Repositioned;Ambulation/increased activity  -KR Medication (See MAR);Repositioned;Ambulation/increased activity  -AR Repositioned;Ambulation/increased activity  -KR    Response to Interventions tolerated; RN aware  -KR tolerated  -AR tolerated  -KR    Recorded by [KR] Mayelin Kebede, PT [AR] Mayra Miguel, OT [KR] Mayelin Kebede, PT    Cognitive Assessment/Intervention    Current Cognitive/Communication Assessment functional  -KR functional  -AR functional  -KR    Orientation Status oriented x 4  -KR oriented x 4  -AR oriented x 4   intermittent confusion  -KR    Follows Commands/Answers Questions able to follow single-step instructions;100% of the time;needs cueing;needs increased time  -% of the time;able to follow multi-step instructions  -AR able to follow single-step instructions;100% of the time;needs increased time;needs cueing  -KR    Personal Safety mild impairment;decreased awareness, need for assist;decreased awareness, need for safety  -KR WNL/WFL  -AR mild impairment;decreased awareness, need for assist;decreased awareness, need for safety;decreased insight to deficits  -KR    Personal Safety Interventions fall prevention program maintained;gait belt;nonskid shoes/slippers when out of bed  -KR fall prevention program maintained  -AR fall prevention program maintained;gait belt;nonskid shoes/slippers when out of bed  -KR    Recorded by [KR] Mayelin Kebede, PT [AR] Mayra Miguel, OT [KR] Mayelin Kebede,  PT    General LE Assessment    ROM Detail 9-74 degrees. Pt lacking 9 degrees full extension AROM. Flexion AAROM measured in sitting.   -KR      Recorded by [KR] Mayelin Kebede, PT      Mobility Assessment/Training    Extremity Weight-Bearing Status right lower extremity  -KR right lower extremity  -AR     Right Lower Extremity Weight-Bearing weight-bearing as tolerated  -KR weight-bearing as tolerated  -AR     Recorded by [KR] Mayelin Kebede, PT [AR] Mayra Miguel, OT     Bed Mobility, Assessment/Treatment    Bed Mobility, Assistive Device  bed rails;head of bed elevated;leg   -AR     Bed Mobility, Scoot/Bridge, [place]  supervision required  -AR     Bed Mob, Supine to Sit, [place] supervision required;verbal cues required  -KR supervision required;verbal cues required  -AR supervision required  -KR    Bed Mob, Sit to Supine, [place] supervision required;verbal cues required  -KR supervision required;verbal cues required  -AR supervision required  -KR    Bed Mobility, Safety Issues decreased use of legs for bridging/pushing  -KR  decreased use of legs for bridging/pushing  -KR    Bed Mobility, Impairments ROM decreased;strength decreased;pain  -KR ROM decreased;pain  -AR ROM decreased;strength decreased;pain  -KR    Bed Mobility, Comment Pt given verbal cues for proper sequencing. Pt demonstrated proper use of leg .   -KR issued leg  and educated pt on use to assist with bed mobility, car transfers and tub bench transfers  -AR Pt demonstrated good safety awareness with bed mobility.  -KR    Recorded by [KR] Mayelin Kebede, PT [AR] Mayra Miguel, OT [KR] Mayelin Kebede, PT    Transfer Assessment/Treatment    Transfers, Sit-Stand [place] contact guard assist;verbal cues required  -KR contact guard assist;verbal cues required  -AR contact guard assist;verbal cues required  -KR    Transfers, Stand-Sit [place] contact guard assist;verbal cues required  -KR contact  guard assist;verbal cues required  -AR contact guard assist;verbal cues required  -KR    Transfers, Sit-Stand-Sit, Assist Device rolling walker  -KR elevated surface;rolling walker  -AR rolling walker  -KR    Toilet Transfer, [place] contact guard assist;verbal cues required  -KR  contact guard assist;verbal cues required  -KR    Toilet Transfer, Assistive Device rolling walker  -KR  rolling walker  -KR    Transfer, Safety Issues step length decreased;weight-shifting ability decreased  -KR  step length decreased;weight-shifting ability decreased  -KR    Transfer, Impairments strength decreased;impaired balance;pain  -KR strength decreased;pain  -AR strength decreased;impaired balance;coordination impaired;pain  -KR    Transfer, Comment Pt given verbal cues for proper hand placement to push up from bed instead of reaching for RW upon initial stand.   -KR Educated pt on tub bench use for tub transfer and issued handout on places to purchase, educated pt/son on safe car transfer technique   -AR Pt given verbal cues for correct hand placement to push up from bed prior to initial stand and to reach for grab bar for toilet transfer.  -KR    Recorded by [KR] Mayelin Kebede PT [AR] Mayra Miguel, OT [KR] Mayelin Kebede PT    Gait Assessment/Treatment    Gait, [place] Level contact guard assist;verbal cues required  -KR  contact guard assist;verbal cues required  -KR    Gait, Assistive Device rolling walker  -KR  rolling walker  -KR    Gait, Distance (Feet) 200  -KR  120  -KR    Gait, Gait Pattern Analysis swing-through gait  -KR  swing-through gait  -KR    Gait, Gait Deviations bilateral:;[place] decreased;forward flexed posture;step length decreased;weight-shifting ability decreased  -KR  right:;antalgic;fr[place] decreased;forward flexed posture;step length decreased;weight-shifting ability decreased  -KR    Gait, Safety Issues step length decreased;weight-shifting ability decreased;balance decreased during  turns  -KR  step length decreased;weight-shifting ability decreased  -KR    Gait, Impairments ROM decreased;strength decreased;impaired balance;pain  -KR  ROM decreased;strength decreased;impaired balance;pain  -KR    Gait, Comment Pt demonstrated swing through gait pattern with slow gait speed. Pt given verbal cues for upright posture and increased weight bearing on RLE.   -KR  Pt demonstrated swing through gait pattern during ambulation. Pt given verbal cues for upright posture and increased step length. Pt required encouragement to continue. Pt's mobility limited by fatigue and pain.   -KR    Recorded by [KR] Mayelin Kebede, PT  [KR] Mayelin Kebede, PT    Lower Body Bathing Assessment/Training    LB Bathing Assess/Train, Comment  Issued LH sponge and educated pt on use  -AR     Recorded by  [AR] Mayra Miguel OT     Lower Body Dressing Assessment/Training    LB Dressing Assess/Train, Clothing Type  donning:;doffing:;slipper socks  -AR     LB Dressing Assess/Train, Assist Device  reacher;sock-aid  -AR     LB Dressing Assess/Train, Position  edge of bed  -AR     LB Dressing Assess/Train, [place]  verbal cues required;contact guard assist  -AR     LB Dressing Assess/Train, Impairments  ROM decreased;pain  -AR     LB Dressing Assess/Train, Comment  Reviewed kain-technique and ADL retraining. Pt unable to don socks without AE d/t pain and ROM restrictions. Issued AE and educated pt on use. Sock aide not currently available to issue.   -AR     Recorded by  [AR] Mayra Miguel OT     Therapy Exercises    Exercise Protocols total knee  -KR  total knee  -KR    Total Knee Exercises right:;15 reps;completed protocol;with assist;ankle pumps/circles;quad set;glut set;heel slides;heel slide stretch;SLR;SAQ;LAQ  -KR  right:;15 reps;completed protocol;with assist;ankle pumps/circles;quad set;glut set;heel slides;heel slide stretch;SLR;SAQ;LAQ  -KR    Recorded by [KR] Mayelin Kebede, PT  [KR] Mayelin Kebede, PT     Positioning and Restraints    Pre-Treatment Position in bed  -KR in bed  -AR in bed  -KR    Post Treatment Position bed  -KR bed  -AR bed  -KR    In Bed supine;call light within reach;encouraged to call for assist;exit alarm on;with family/caregiver;with nsg  -KR supine;call light within reach;encouraged to call for assist;exit alarm on;with family/caregiver  -AR supine;call light within reach;encouraged to call for assist;exit alarm on;with family/caregiver;side rails up x2  -KR    Recorded by [KR] Mayelin Kebede, PT [AR] Mayra Miguel, OT [KR] Mayelin Kebede, PT      12/13/17 1512 12/13/17 1116 12/12/17 1444    Rehab Assessment/Intervention    Discipline physical therapist  - physical therapist  - physical therapist  -SC    Document Type therapy note (daily note)  - therapy note (daily note)  - therapy note (daily note)  -SC    Subjective Information agree to therapy  - agree to therapy  - agree to therapy  -SC    Patient Effort, Rehab Treatment adequate  - adequate  - adequate  -SC    Symptoms Noted During/After Treatment fatigue  -EH fatigue;other (see comments)  - fatigue;other (see comments)   slow to respond to any commands  -SC    Symptoms Noted Comment  nausea; pt cued for PLB in sitting with improvement in nausea. Increased nausea with standing. Pt assisted to t/f to chair instead of ambulation. At that time PICC line RN presents in room.  -     Precautions/Limitations fall precautions  - fall precautions  - fall precautions  -SC    Specific Treatment Considerations   followed by chair  -SC    Recorded by [EH] Ally Azevedo, PT [EH] Ally Azevedo, PT [SC] Ananda Bro, PT    Pain Assessment    Pain Assessment Serrano-Vasquez FACES  -EH Serrano-Baker FACES  -EH Serrano-Baker FACES  -SC    Serrano-Vasquez FACES Pain Rating 4  -EH 4  -EH 6  -SC    Post Pain Score   6  -SC    Pain Type Acute pain  -EH  Acute pain  -SC    Pain Location Knee  -EH  Knee  -SC    Pain Orientation Right  -   Right  -SC    Patient's Stated Pain Goal No pain  -      Pain Intervention(s) Repositioned;Ambulation/increased activity  -  Repositioned  -SC    Response to Interventions tolerated  -      Recorded by [] Ally Azevedo, PT [] Ally Azevedo, PT [SC] Ananda Bro, PT    Cognitive Assessment/Intervention    Current Cognitive/Communication Assessment functional  -      Orientation Status oriented x 4  - oriented to;person;place;time  - oriented to;person;place;time  -SC    Follows Commands/Answers Questions 100% of the time;able to follow single-step instructions  - 75% of the time;able to follow single-step instructions;needs cueing;needs increased time  - 75% of the time;needs increased time;needs repetition;needs cueing  -SC    Personal Safety mild impairment;decreased awareness, need for assist;decreased awareness, need for safety  - mild impairment;decreased awareness, need for assist;decreased awareness, need for safety  - mild impairment;decreased awareness, need for assist;decreased awareness, need for safety;decreased insight to deficits  -SC    Personal Safety Interventions fall prevention program maintained;gait belt;nonskid shoes/slippers when out of bed  - fall prevention program maintained;gait belt;nonskid shoes/slippers when out of bed  - gait belt;fall prevention program maintained  -SC    Recorded by [] Ally Azevedo, PT [] Ally Azevedo, PT [SC] Ananda Bro, PT    Bed Mobility, Assessment/Treatment    Bed Mobility, Assistive Device bed rails;head of bed elevated  - bed rails;head of bed elevated  - bed rails;head of bed elevated  -SC    Bed Mob, Supine to Sit, [place] supervision required  - verbal cues required;minimum assist (75% patient effort)  - verbal cues required;minimum assist (75% patient effort)  -SC    Bed Mobility, Safety Issues decreased use of legs for bridging/pushing;decreased use of arms for pushing/pulling  -  decreased use of legs for bridging/pushing;other (see comments)   sequencing difficulty  -EH decreased use of legs for bridging/pushing;other (see comments)   sequencing difficulty  -SC    Bed Mobility, Impairments pain;coordination impaired;motor control impaired;strength decreased  -EH pain;coordination impaired;motor control impaired;strength decreased  -EH pain;coordination impaired;motor control impaired;strength decreased  -SC    Bed Mobility, Comment   up to edge of bed very slowly with difficulty sequencing  moverments  -SC    Recorded by [EH] Ally Azevedo, PT [EH] Ally Azevedo, PT [SC] Ananda Bro, PT    Transfer Assessment/Treatment    Transfers, Bed-Chair [place]  contact guard assist;2 person assist required;verbal cues required  -     Transfers, Chair-Bed [place]  contact guard assist;2 person assist required;verbal cues required  -     Transfers, Sit-Stand [place] contact guard assist;2 person assist required;verbal cues required  - contact guard assist;verbal cues required  - contact guard assist;verbal cues required  -SC    Transfers, Stand-Sit [place] contact guard assist;verbal cues required  - verbal cues required;contact guard assist  - verbal cues required;contact guard assist  -SC    Transfers, Sit-Stand-Sit, Assist Device rolling walker  -EH rolling walker  -EH rolling walker  -SC    Transfer, Safety Issues other (see comments)  - other (see comments)   difficulty sequencing  -EH other (see comments)   difficulty sequencing  -SC    Transfer, Impairments impaired balance;motor control impaired;strength decreased  -EH impaired balance;motor control impaired;strength decreased  -EH impaired balance;motor control impaired;strength decreased  -SC    Transfer, Comment extended time needed  -  repeted cues for sequencing standing--patient very slow  -SC    Recorded by [EH] Ally Azevedo, PT [EH] Ally Azevedo, PT [SC] Ananda Bro, PT     Gait Assessment/Treatment    Gait, [place] Level contact guard assist  - contact guard assist  - contact guard assist  -SC    Gait, Assistive Device rolling walker  - rolling walker  - rolling walker  -SC    Gait, Distance (Feet) 90  -EH 4  -EH 80   followed by chair  -SC    Gait, Gait Pattern Analysis swing-through gait  -      Gait, Gait Deviations [place] decreased;decreased heel strike;forward flexed posture;weight-shifting ability decreased;step length decreased  -      Gait, Safety Issues step length decreased;weight-shifting ability decreased;balance decreased during turns;sequencing ability decreased  -      Gait, Impairments strength decreased;pain;motor control impaired  - strength decreased;motor control impaired;pain  - strength decreased;motor control impaired;pain  -SC    Gait, Comment pt progressing with step through gait pattern with symmetrical step length. Pt improves performance with son's presence during ambulaiton.  -  cues to stay close to walker and some assist to steer walker. Had to take one sit down rest. Needed much encouragement to keep going.  -SC    Recorded by [] Ally Azevedo, PT [] Ally Azevedo, PT [SC] Ananda Bro, PT    Balance Skills Training    Gait Balance-Level of Assistance Contact guard  - Minimum assistance;x2  -EH Minimum assistance;x2  -SC    Gait Balance Support assistive device  - assistive device  - assistive device  -SC    Recorded by [] Ally Azevedo, PT [] Ally Azevedo, PT [SC] Ananda Bro, PT    Therapy Exercises    Right Lower Extremity  --  -EH 10 reps;AROM:;supine;ankle pumps/circles;quad sets;heel slides;SLR   needs repeted cues  -SC    Bilateral Lower Extremities AROM:;10 reps;ankle pumps/circles;glut sets;quad sets;knee flexion;heel slides;LAQ;SLR   assist with heel slides. encouragement for SLR  -      Recorded by [] Ally Azevedo, PT [] Ally Azevedo, PT [SC] Ananda Bro,  PT    Positioning and Restraints    Pre-Treatment Position in bed  - in bed  - in bed  -SC    Post Treatment Position chair  - bed  - chair  -SC    In Bed  supine;call light within reach;encouraged to call for assist;notified nsg;with other staff;with family/caregiver  -EH     In Chair reclined;call light within reach;encouraged to call for assist;with family/caregiver;with other staff  - --  - sitting;reclined;notified nsg;call light within reach;encouraged to call for assist;exit alarm on  -SC    Recorded by [] Ally Azevedo, PT [] Ally Azevedo, PT [SC] Ananda Bro, PT      12/12/17 3646          Rehab Assessment/Intervention    Discipline physical therapy assistant  -AS      Document Type therapy note (daily note)  -AS      Recorded by [AS] Juliann Irving PTA      Pain Assessment    Pain Assessment 0-10  -AS      Pain Score 5  -AS      Post Pain Score 5  -AS      Pain Type Acute pain;Surgical pain  -AS      Pain Location Knee  -AS      Pain Orientation Right  -AS      Patient's Stated Pain Goal No pain  -AS      Pain Intervention(s) Repositioned;Ambulation/increased activity  -AS      Response to Interventions tolerated  -AS      Recorded by [AS] Juliann Irving PTA      Cognitive Assessment/Intervention    Current Cognitive/Communication Assessment functional  -AS      Orientation Status oriented x 4  -AS      Follows Commands/Answers Questions 75% of the time;able to follow single-step instructions;needs cueing  -AS      Personal Safety mild impairment  -AS      Personal Safety Interventions fall prevention program maintained;gait belt;nonskid shoes/slippers when out of bed;other (see comments)   exit alarm  -AS      Recorded by [AS] Juliann Irving PTA      Bed Mobility, Assessment/Treatment    Bed Mob, Supine to Sit, [place] verbal cues required;minimum assist (75% patient effort)  -AS      Bed Mob, Sit to Supine, [place] not tested  -AS      Bed Mobility,  Impairments strength decreased;pain  -AS      Bed Mobility, Comment assist to move R LE to EOB   -AS      Recorded by [AS] Juliann Irving PTA      Transfer Assessment/Treatment    Transfers, Sit-Stand [place] verbal cues required;minimum assist (75% patient effort);2 person assist required  -AS      Transfers, Stand-Sit [place] verbal cues required;minimum assist (75% patient effort);2 person assist required  -AS      Transfers, Sit-Stand-Sit, Assist Device rolling walker  -AS      Transfer, Safety Issues weight-shifting ability decreased;step length decreased  -AS      Transfer, Impairments strength decreased;impaired balance;pain  -AS      Transfer, Comment verbal cues to reach back when sitting, patient sat without reaching back and without chair locked despite cueing.  -AS      Recorded by [AS] Juliann Irving PTA      Gait Assessment/Treatment    Gait, [place] Level verbal cues required;minimum assist (75% patient effort);1 person + 1 person to manage equipment  -AS      Gait, Assistive Device rolling walker  -AS      Gait, Distance (Feet) 100  -AS      Gait, Gait Deviations [place] decreased;decreased heel strike;right:;forward flexed posture;weight-shifting ability decreased;step length decreased  -AS      Gait, Safety Issues step length decreased;weight-shifting ability decreased;balance decreased during turns;sequencing ability decreased  -AS      Gait, Impairments strength decreased;impaired balance;ROM decreased;pain  -AS      Gait, Comment patient ambulateed with step to gait pattern, 1 seated resr break due to fatigue and dizziness, follow with chair for safety. Verbal cues to increase right heel strike and for walker placement  -AS      Recorded by [AS] Juliann Irving PTA      Therapy Exercises    Bilateral Lower Extremities AROM:;10 reps;sitting;ankle pumps/circles;glut sets;quad sets   feet elevated in recliner to perform exercises  -AS      Recorded by [AS] Juliann López  HEMALATHA Irving      Positioning and Restraints    Pre-Treatment Position in bed  -AS      Post Treatment Position chair  -AS      In Chair reclined;call light within reach;encouraged to call for assist;exit alarm on;with family/caregiver  -AS      Recorded by [AS] Juliann Irving PTA        User Key  (r) = Recorded By, (t) = Taken By, (c) = Cosigned By    Initials Name Effective Dates    SC Fannyon VANE Bro, PT 06/19/15 -     EH Ally Azevedo, PT 06/19/15 -     AR Mayra Miguel, OT 06/22/15 -     AS Juliann Irving, PTA 06/22/15 -     KR aMyelin ISA Kebede, PT 09/25/17 -                 IP PT Goals       12/14/17 1629 12/14/17 1628 12/14/17 1110    Bed Mobility PT LTG    Bed Mobility PT LTG, Date Established 12/14/17  -KR      Bed Mobility PT LTG, Time to Achieve 5 days  -KR      Bed Mobility PT LTG, Activity Type supine to sit/sit to supine  -KR      Bed Mobility PT LTG, [place] Level conditional independence  -KR      Bed Mobility PT Goal  LTG, Assist Device leg   -KR      Bed Mobility PT LTG, Date Goal Reviewed 12/14/17  -KR (P)  12/14/17  -KR 12/14/17  -KR    Bed Mobility PT LTG, Outcome goal ongoing  -KR (P)  goal ongoing  -KR goal ongoing  -KR    Transfer Training PT LTG    Transfer Training PT  LTG, Date Goal Reviewed  12/14/17  -KR 12/14/17  -KR    Transfer Training PT LTG, Outcome  goal ongoing  -KR goal ongoing  -KR    Gait Training PT LTG    Gait Training Goal PT LTG, Date Goal Reviewed  12/14/17  -KR 12/14/17  -KR    Gait Training Goal PT LTG, Outcome  goal ongoing  -KR goal ongoing  -KR    Stair Training PT LTG    Stair Training Goal PT LTG, Date Goal Reviewed  12/14/17  -KR 12/14/17  -KR    Stair Training Goal PT LTG, Outcome  goal ongoing  -KR goal ongoing  -KR      12/13/17 1551 12/13/17 1259 12/12/17 1640    Bed Mobility PT LTG    Bed Mobility PT LTG, Outcome goal ongoing  -EH goal ongoing  -EH goal ongoing  -SC    Transfer Training PT LTG    Transfer Training PT LTG, Outcome goal  ongoing  - goal ongoing  - goal ongoing  -SC    Gait Training PT LTG    Gait Training Goal PT LTG, Outcome goal ongoing  -EH goal ongoing  -EH goal ongoing  -SC    Stair Training PT LTG    Stair Training Goal PT LTG, Outcome goal ongoing  - goal ongoing  - goal ongoing  -SC      12/12/17 0948 12/11/17 1448 12/11/17 1002    Bed Mobility PT LTG    Bed Mobility PT LTG, Date Established   12/11/17  -LS    Bed Mobility PT LTG, Time to Achieve   2 wks  -LS    Bed Mobility PT LTG, Activity Type   supine to sit/sit to supine  -LS    Bed Mobility PT LTG, Walker Level   supervision required  -LS    Bed Mobility PT LTG, Date Goal Reviewed 12/12/17  -AS      Bed Mobility PT LTG, Outcome goal ongoing  -AS goal ongoing  -LS     Transfer Training PT LTG    Transfer Training PT LTG, Date Established   12/11/17  -LS    Transfer Training PT LTG, Time to Achieve   2 wks  -LS    Transfer Training PT LTG, Activity Type   sit to stand/stand to sit  -LS    Transfer Training PT LTG, [place] Level   supervision required  -LS    Transfer Training PT LTG, Assist Device   walker, rolling  -LS    Transfer Training PT  LTG, Date Goal Reviewed 12/12/17  -AS      Transfer Training PT LTG, Outcome goal ongoing  -AS goal ongoing  -LS     Gait Training PT LTG    Gait Training Goal PT LTG, Date Established   12/11/17  -LS    Gait Training Goal PT LTG, Time to Achieve   2 wks  -LS    Gait Training Goal PT LTG, Walker Level   supervision required  -LS    Gait Training Goal PT LTG, Assist Device   walker, rolling  -LS    Gait Training Goal PT LTG, Distance to Achieve   200  -LS    Gait Training Goal PT LTG, Date Goal Reviewed 12/12/17  -AS      Gait Training Goal PT LTG, Outcome goal ongoing  -AS goal ongoing  -LS     Stair Training PT LTG    Stair Training Goal PT LTG, Date Established   12/11/17  -LS    Stair Training Goal PT LTG, Time to Achieve   2 wks  -LS    Stair Training Goal PT LTG, Number of Steps   3  -LS    Stair  Training Goal PT LTG, [place] Level   contact guard assist  -LS    Stair Training Goal PT LTG, Assist Device   1 handrail  -LS    Stair Training Goal PT LTG, Date Goal Reviewed 12/12/17  -AS      Stair Training Goal PT LTG, Outcome goal ongoing  -AS goal ongoing  -LS       User Key  (r) = Recorded By, (t) = Taken By, (c) = Cosigned By    Initials Name Provider Type    SC Ananda Bro, PT Physical Therapist     Ally Azevedo, PT Physical Therapist    AS Juliann Irving, PTA Physical Therapy Assistant     Dawna Blanchard, PT Physical Therapist    KR Mayelin Kebede, PT Physical Therapist          Physical Therapy Education     Title: PT OT SLP Therapies (Active)     Topic: Physical Therapy (Active)     Point: Mobility training (Active)    Learning Progress Summary    Learner Readiness Method Response Comment Documented by Status   Patient Acceptance E VU,NR Reviewed safety with mobility and HEP.  12/14/17 1627 Done    Acceptance E VU,NR Reviewed gait mechanics with mobility and HEP.  12/14/17 1109 Done    Acceptance E NR   12/13/17 1550 Active    Acceptance E NR   12/13/17 1258 Active    Acceptance E,D NR reviewed safety with mobility SC 12/12/17 1640 Active    Acceptance E NR  AS 12/12/17 0948 Active    Acceptance E,D,H NR Edu pt/son re: HEP; issued illustrated handout.  12/11/17 1448 Active    Acceptance E,D NR   12/11/17 1002 Active   Family Acceptance E VU,NR Reviewed safety with mobility and HEP.  12/14/17 1627 Done    Acceptance E NR   12/13/17 1550 Active    Acceptance E NR  AS 12/12/17 0948 Active   Significant Other Acceptance E NR   12/13/17 1258 Active               Point: Home exercise program (Active)    Learning Progress Summary    Learner Readiness Method Response Comment Documented by Status   Patient Acceptance E VU,NR Reviewed safety with mobility and HEP.  12/14/17 1627 Done    Acceptance E VU,NR Reviewed gait mechanics with mobility and HEP.  12/14/17 1109 Done     Acceptance E NR   12/13/17 1550 Active    Acceptance E NR   12/13/17 1258 Active    Acceptance E,D NR reviewed safety with mobility SC 12/12/17 1640 Active    Acceptance E NR  AS 12/12/17 0948 Active    Acceptance E,D,H NR Edu pt/son re: HEP; issued illustrated handout.  12/11/17 1448 Active    Acceptance E,D NR   12/11/17 1002 Active   Family Acceptance E VU,NR Reviewed safety with mobility and HEP.  12/14/17 1627 Done    Acceptance E NR   12/13/17 1550 Active    Acceptance E NR  AS 12/12/17 0948 Active   Significant Other Acceptance E NR   12/13/17 1258 Active               Point: Body mechanics (Active)    Learning Progress Summary    Learner Readiness Method Response Comment Documented by Status   Patient Acceptance E VU,NR Reviewed safety with mobility and HEP.  12/14/17 1627 Done    Acceptance E VU,NR Reviewed gait mechanics with mobility and HEP.  12/14/17 1109 Done    Acceptance E NR   12/13/17 1550 Active    Acceptance E NR   12/13/17 1258 Active    Acceptance E,D NR reviewed safety with mobility SC 12/12/17 1640 Active    Acceptance E NR  AS 12/12/17 0948 Active    Acceptance E,D,H NR Edu pt/son re: HEP; issued illustrated handout.  12/11/17 1448 Active    Acceptance E,D NR   12/11/17 1002 Active   Family Acceptance E VU,NR Reviewed safety with mobility and HEP.  12/14/17 1627 Done    Acceptance E NR   12/13/17 1550 Active    Acceptance E NR  AS 12/12/17 0948 Active   Significant Other Acceptance E NR   12/13/17 1258 Active               Point: Precautions (Active)    Learning Progress Summary    Learner Readiness Method Response Comment Documented by Status   Patient Acceptance E VU,NR Reviewed safety with mobility and HEP.  12/14/17 1627 Done    Acceptance E VU,NR Reviewed gait mechanics with mobility and HEP.  12/14/17 1109 Done    Acceptance E NR   12/13/17 1550 Active    Acceptance E NR   12/13/17 1258 Active    Acceptance E,D NR reviewed safety with mobility  SC 12/12/17 1640 Active    Acceptance E NR  AS 12/12/17 0948 Active    Acceptance E,D,H NR Edu pt/son re: HEP; issued illustrated handout.  12/11/17 1448 Active    Acceptance E,D NR   12/11/17 1002 Active   Family Acceptance E VU,NR Reviewed safety with mobility and HEP.  12/14/17 1627 Done    Acceptance E NR   12/13/17 1550 Active    Acceptance E NR  AS 12/12/17 0948 Active   Significant Other Acceptance E NR   12/13/17 1258 Active                      User Key     Initials Effective Dates Name Provider Type Discipline    SC 06/19/15 -  Ananda Bro, PT Physical Therapist PT     06/19/15 -  Ally Azevedo, PT Physical Therapist PT    AS 06/22/15 -  Juliann Irving, PTA Physical Therapy Assistant PT     06/19/15 -  Dawna Blanchard, PT Physical Therapist PT     09/25/17 -  Mayelin Kebede, PT Physical Therapist PT                    PT Recommendation and Plan  Anticipated Discharge Disposition: skilled nursing facility  PT Frequency: 2 times/day  Plan of Care Review  Plan Of Care Reviewed With: patient  Progress: improving  Outcome Summary/Follow up Plan: Pt increased ambulation distance to 200ft with RW and CGA. Pt demonstrated swing through gait pattern with slow gait speed. Pt given VC's for upright posture and increased gait speed. Pt's mobility limited by fatigue and pain. Continue mobility as able.           Outcome Measures       12/14/17 1527 12/14/17 1135 12/14/17 1130    How much help from another person do you currently need...    Turning from your back to your side while in flat bed without using bedrails? 4  -KR      Moving from lying on back to sitting on the side of a flat bed without bedrails? 3  -KR      Moving to and from a bed to a chair (including a wheelchair)? 3  -KR      Standing up from a chair using your arms (e.g., wheelchair, bedside chair)? 3  -KR      Climbing 3-5 steps with a railing? 2  -KR      To walk in hospital room? 3  -KR      AM-PAC 6 Clicks Score 18  -KR       How much help from another is currently needed...    Putting on and taking off regular lower body clothing?  --  -AR 3  -AR    Bathing (including washing, rinsing, and drying)  --  -AR 3  -AR    Toileting (which includes using toilet bed pan or urinal)  --  -AR 3  -AR    Putting on and taking off regular upper body clothing  --  -AR 3  -AR    Taking care of personal grooming (such as brushing teeth)  --  -AR 3  -AR    Eating meals  --  -AR 4  -AR    Score  --  -AR 19  -AR    Functional Assessment    Outcome Measure Options AM-PAC 6 Clicks Basic Mobility (PT)  -KR --  -AR AM-PAC 6 Clicks Daily Activity (OT)  -AR      12/14/17 0938 12/13/17 1512 12/13/17 1116    How much help from another person do you currently need...    Turning from your back to your side while in flat bed without using bedrails? 4  -KR 4  -EH 3  -EH    Moving from lying on back to sitting on the side of a flat bed without bedrails? 3  -KR 4  -EH 3  -EH    Moving to and from a bed to a chair (including a wheelchair)? 3  -KR 3  -EH 3  -EH    Standing up from a chair using your arms (e.g., wheelchair, bedside chair)? 3  -KR 3  -EH 3  -EH    Climbing 3-5 steps with a railing? 2  -KR 3  -EH 2  -EH    To walk in hospital room? 3  -KR 3  -EH 3  -EH    AM-PAC 6 Clicks Score 18  -KR 20  -EH 17  -EH    Functional Assessment    Outcome Measure Options AM-PAC 6 Clicks Basic Mobility (PT)  -KR AM-PAC 6 Clicks Basic Mobility (PT)  -EH AM-PAC 6 Clicks Basic Mobility (PT)  -EH      12/12/17 1444 12/12/17 0915       How much help from another person do you currently need...    Turning from your back to your side while in flat bed without using bedrails? 3  -SC 3  -AS     Moving from lying on back to sitting on the side of a flat bed without bedrails? 3  -SC 3  -AS     Moving to and from a bed to a chair (including a wheelchair)? 3  -SC 3  -AS     Standing up from a chair using your arms (e.g., wheelchair, bedside chair)? 3  -SC 3  -AS     Climbing 3-5 steps  with a railing? 2  -SC 2  -AS     To walk in hospital room? 3  -SC 3  -AS     AM-PAC 6 Clicks Score 17  -SC 17  -AS     Functional Assessment    Outcome Measure Options AM-PAC 6 Clicks Basic Mobility (PT)  -SC AM-PAC 6 Clicks Basic Mobility (PT)  -AS       User Key  (r) = Recorded By, (t) = Taken By, (c) = Cosigned By    Initials Name Provider Type    SC Ananda Bro, PT Physical Therapist    EH Ally Azevedo, PT Physical Therapist    AR Mayra Miguel, OT Occupational Therapist    AS Juliann Irving, PTA Physical Therapy Assistant    KR Mayelin Kebede, PT Physical Therapist           Time Calculation:         PT Charges       12/14/17 1640 12/14/17 1112       Time Calculation    Start Time 1527  -KR 0938  -KR     PT Received On 12/14/17  -KR 12/14/17  -KR     PT Goal Re-Cert Due Date 12/21/17  -KR 12/21/17  -KR     Time Calculation- PT    Total Timed Code Minutes- PT 42 minute(s)  -KR 45 minute(s)  -KR       User Key  (r) = Recorded By, (t) = Taken By, (c) = Cosigned By    Initials Name Provider Type    KR Mayelin Kebede, PT Physical Therapist          Therapy Charges for Today     Code Description Service Date Service Provider Modifiers Qty    91243395969 HC PT THER PROC EA 15 MIN 12/14/2017 Mayelin Kebede, PT GP 2    32362710548 HC GAIT TRAINING EA 15 MIN 12/14/2017 Mayelin Kebede, PT GP 1    03323620528 HC PT THER PROC EA 15 MIN 12/14/2017 Mayelin Kebede, PT GP 2    19180440431 HC GAIT TRAINING EA 15 MIN 12/14/2017 Mayelin Kebede, PT GP 1          PT G-Codes  Outcome Measure Options: AM-PAC 6 Clicks Basic Mobility (PT)    Humera Kebede PT  12/14/2017

## 2017-12-14 NOTE — PROGRESS NOTES
Continued Stay Note   [place]     Patient Name: Lesli Leon  MRN: [id number]  Today's Date: 12/14/2017    Admit Date: 12/10/2017          Discharge Plan       12/14/17 0908    Case Management/Social Work Plan    Plan Home w/ HH    Patient/Family In Agreement With Plan yes    Additional Comments Patient will not be recieving daily IV abx at [facility], they are only supplying the medications, education, and weekly PICC dsg/labs, therefore patient will be doing home abx infusions and will able to utilize HH/PT. Patient and her  are agreeable, they prefer HH over STR, and would like [facility] to provide. A referral has been made to [facility] and she is following. CM will follow.               Discharge Codes     None            Araceli Pastor RN

## 2017-12-15 LAB
BACTERIA SPEC AEROBE CULT: ABNORMAL
BACTERIA SPEC AEROBE CULT: ABNORMAL
BACTERIA SPEC AEROBE CULT: NORMAL
BACTERIA SPEC AEROBE CULT: NORMAL
GRAM STN SPEC: ABNORMAL
GRAM STN SPEC: ABNORMAL

## 2017-12-15 PROCEDURE — 97110 THERAPEUTIC EXERCISES: CPT

## 2017-12-15 PROCEDURE — 99024 POSTOP FOLLOW-UP VISIT: CPT | Performed by: PHYSICIAN ASSISTANT

## 2017-12-15 PROCEDURE — 25010000002 HYDROMORPHONE PER 4 MG: Performed by: ORTHOPAEDIC SURGERY

## 2017-12-15 PROCEDURE — 25010000003 CEFTRIAXONE PER 250 MG: Performed by: INTERNAL MEDICINE

## 2017-12-15 PROCEDURE — 99233 SBSQ HOSP IP/OBS HIGH 50: CPT | Performed by: INTERNAL MEDICINE

## 2017-12-15 PROCEDURE — 97116 GAIT TRAINING THERAPY: CPT

## 2017-12-15 RX ORDER — LEVOTHYROXINE SODIUM 0.1 MG/1
200 TABLET ORAL
Status: DISCONTINUED | OUTPATIENT
Start: 2017-12-16 | End: 2017-12-16 | Stop reason: HOSPADM

## 2017-12-15 RX ORDER — LISINOPRIL 20 MG/1
20 TABLET ORAL DAILY
Status: DISCONTINUED | OUTPATIENT
Start: 2017-12-15 | End: 2017-12-16 | Stop reason: HOSPADM

## 2017-12-15 RX ORDER — BUPROPION HYDROCHLORIDE 150 MG/1
150 TABLET ORAL EVERY 12 HOURS SCHEDULED
Status: DISCONTINUED | OUTPATIENT
Start: 2017-12-15 | End: 2017-12-16 | Stop reason: HOSPADM

## 2017-12-15 RX ADMIN — DULOXETINE HYDROCHLORIDE 90 MG: 60 CAPSULE, DELAYED RELEASE ORAL at 15:10

## 2017-12-15 RX ADMIN — ONDANSETRON 4 MG: 4 TABLET, FILM COATED ORAL at 09:29

## 2017-12-15 RX ADMIN — ALPRAZOLAM 1 MG: 1 TABLET ORAL at 15:18

## 2017-12-15 RX ADMIN — OXYCODONE AND ACETAMINOPHEN 2 TABLET: 5; 325 TABLET ORAL at 03:30

## 2017-12-15 RX ADMIN — OXYCODONE AND ACETAMINOPHEN 2 TABLET: 5; 325 TABLET ORAL at 13:13

## 2017-12-15 RX ADMIN — LISINOPRIL 20 MG: 20 TABLET ORAL at 15:11

## 2017-12-15 RX ADMIN — ASPIRIN 325 MG: 325 TABLET, DELAYED RELEASE ORAL at 20:01

## 2017-12-15 RX ADMIN — BUPROPION HYDROCHLORIDE 150 MG: 150 TABLET, FILM COATED, EXTENDED RELEASE ORAL at 15:17

## 2017-12-15 RX ADMIN — OXYCODONE AND ACETAMINOPHEN 2 TABLET: 5; 325 TABLET ORAL at 08:10

## 2017-12-15 RX ADMIN — ONDANSETRON 4 MG: 4 TABLET, FILM COATED ORAL at 15:18

## 2017-12-15 RX ADMIN — ASPIRIN 325 MG: 325 TABLET, DELAYED RELEASE ORAL at 08:09

## 2017-12-15 RX ADMIN — ONDANSETRON 4 MG: 4 TABLET, FILM COATED ORAL at 03:30

## 2017-12-15 RX ADMIN — HYDROMORPHONE HYDROCHLORIDE 0.5 MG: 10 INJECTION INTRAMUSCULAR; INTRAVENOUS; SUBCUTANEOUS at 09:58

## 2017-12-15 RX ADMIN — LEVOTHYROXINE SODIUM ANHYDROUS 100 MCG: 100 INJECTION, POWDER, LYOPHILIZED, FOR SOLUTION INTRAVENOUS at 12:13

## 2017-12-15 RX ADMIN — CEFTRIAXONE SODIUM 2 G: 2 INJECTION, SOLUTION INTRAVENOUS at 08:12

## 2017-12-15 RX ADMIN — BUPROPION HYDROCHLORIDE 150 MG: 150 TABLET, FILM COATED, EXTENDED RELEASE ORAL at 20:02

## 2017-12-15 NOTE — PROGRESS NOTES
"  SUBJECTIVE  Patient resting comfortably.  Pain controlled.  No events overnight    OBJECTIVE  Temp (24hrs), Av.7 °F (36.5 °C), Min:97.2 °F (36.2 °C), Max:98 °F (36.7 °C)    Blood pressure 144/72, pulse (!) 48, temperature 97.2 °F (36.2 °C), temperature source Temporal Artery , resp. rate 18, height 162.6 cm (64\"), weight 74.8 kg (164 lb 12.8 oz), SpO2 99 %.    Lab Results (last 24 hours)     Procedure Component Value Units Date/Time    Tissue / Bone Culture - Tissue, Knee, Right [[id number]]  (Abnormal) Collected:  12/10/17 192    Specimen:  Tissue from Knee, Right Updated:  17 110     Tissue Culture --      Light growth (2+) Streptococcus, Alpha Hemolytic, Not S. pneumoniae or Group D (A)     Gram Stain Result Many (4+) WBCs seen      No organisms seen    Blood Culture - Blood, Blood, Venous Line [[id number]]  (Normal) Collected:  12/10/17 1539    Specimen:  Blood from Blood, Venous Line Updated:  17 1631     Blood Culture No growth at 4 days    Blood Culture - Blood, Blood, Venous Line [[id number]]  (Normal) Collected:  12/10/17 1535    Specimen:  Blood from Blood, Venous Line Updated:  17 1631     Blood Culture No growth at 4 days            PHYSICAL EXAM  Right lower extremity:Dressing clean, dry and intact.  Patient able to perform straight leg raise without assistance.  Intact EHL, FHL, tibialis anterior, and gastrocsoleus. Sensation intact to light touch to deep peroneal, superficial peroneal, sural, saphenous, tibial nerves. 2+ palpable DP and PT pulses.       Principal Problem:    Sepsis, probable right septic knee as source  Active Problems:    Hypertension    Disease of thyroid gland    Expressive aphasia    Heart murmur  Acute periprosthetic joint infection status post right total knee arthroplasty    PLAN / DISPOSITION:  5 Day Post-Op single component revision right knee arthroplasty with irrigation and debridement and quadricepsplasty    Protected weight bearing as tolerated " right lower extremity, knee range of motion as tolerated   Pain control  PT/OT for post op mobilization and medical equipment needs   Continue empiric antibiotic coverage.  Follow-up intraoperative cultures-Light growth alpha hemolytic streptococcus right knee tissue culture  Infectious disease consulted for antibiotic recommendations and treatment.   TEDs and SCD's bilateral lower extremities   Aspirin for DVT prophylaxis    Social work for discharge planning.   Dressing to remain in place for 7 days. May remove on POD#7. If no drainage, may shower on POD#10. No submerging wound in water. If drainage is noted, sterile dressing should be placed and wound checked daily. No showering until wound has remained dry for 72 consecutive hours.   Follow up in 3 weeks for re-assessment.      Candi Sharpe PA-C  12/15/17  7:46 AM

## 2017-12-15 NOTE — THERAPY TREATMENT NOTE
"Acute Care - Physical Therapy Treatment Note  [facility]     Patient Name: Lesli Leon  : 1955  MRN: [id number]  Today's Date: 12/15/2017  Onset of Illness/Injury or Date of Surgery Date: 12/10/17  Date of Referral to PT: 12/10/17  Referring Physician: MD Jose    Admit Date: 12/10/2017    Visit Dx:    ICD-10-CM ICD-9-CM   1. Sepsis, due to unspecified organism A41.9 038.9     995.91   2. Infection of total right knee replacement T84.53XA 996.66    Z96.651 V43.65   3. Impaired functional mobility, balance, gait, and endurance Z74.09 V49.89   4. Impaired mobility and ADLs Z74.09 799.89     Patient Active Problem List   Diagnosis   • Sepsis, probable right septic knee as source   • Hypertension   • Disease of thyroid gland   • Expressive aphasia   • Heart murmur               Adult Rehabilitation Note       12/15/17 1030 17 1527 17 1130    Rehab Assessment/Intervention    Discipline physical therapist  -DM physical therapist  -KR occupational therapist  -AR    Document Type therapy note (daily note)  -DM therapy note (daily note)  -KR therapy note (daily note)   POD#4   -AR    Subjective Information agree to therapy;complains of;weakness;pain;nausea/vomiting   hadZofran,painPill;still nauseated;pale appearance;gaveBasin  -DM agree to therapy;complains of;pain  -KR agree to therapy;complains of;fatigue;pain;nausea/vomiting  -AR    Patient Effort, Rehab Treatment good  -DM good  -KR good  -AR    Symptoms Noted During/After Treatment fatigue;increased pain;significant change in vital signs  -DM fatigue  -KR fatigue  -AR    Symptoms Noted Comment incr.nauseated;\"more stiff today\"  -DM      Precautions/Limitations fall precautions;other (see comments)   brain lesion;impulsive;speech occ.garbled(nsg concurred)  -DM fall precautions  -KR fall precautions  -AR    Equipment Issued to Patient   bathing equipment;dressing equipment   reacher, leg lifte, shoe horn and sponge  -AR    Recorded by [DM] " "Lynn Villasenor, PT [KR] Mayelin Kebede, PT [AR] Mayra Miguel, OT    Vital Signs    Pre Systolic BP Rehab 165  -DM --   RN cleared for treatment; VSS  -KR     Pre Treatment Diastolic BP 83  -DM      Post Systolic BP Rehab 148  -DM      Post Treatment Diastolic BP 83  -DM      Pretreatment Heart Rate (beats/min) 57  -DM      Intratreatment Heart Rate (beats/min) 65  -DM      Pre SpO2 (%) 97  -DM      O2 Delivery Pre Treatment room air  -DM      Post SpO2 (%) 96  -DM      O2 Delivery Post Treatment room air  -DM      Pre Patient Position Supine  -DM Supine  -KR     Intra Patient Position Standing  -DM Standing  -KR     Post Patient Position Standing  -DM Supine  -KR     Recorded by [DM] Lynn Villasenor, PT [KR] Mayelin Kebede, PT     Pain Assessment    Pain Assessment No/denies pain  -DM 0-10  -KR 0-10  -AR    Pain Score 7  -DM 9  -KR 5  -AR    Post Pain Score 8  -DM 9  -KR 5  -AR    Pain Type Acute pain  -DM Acute pain  -KR Acute pain  -AR    Pain Location Knee  -DM Knee  -KR Knee  -AR    Pain Orientation Right  -DM Right;Anterior;Posterior  -KR Right;Anterior;Posterior  -AR    Pain Descriptors Aching  -DM      Pain Frequency Constant/continuous  -DM      Patient's Stated Pain Goal No pain  -DM      Pain Intervention(s) Medication (See MAR);Repositioned;Rest  -DM Repositioned;Ambulation/increased activity  -KR Medication (See MAR);Repositioned;Ambulation/increased activity  -AR    Response to Interventions tolerated  -DM tolerated; RN aware  -KR tolerated  -AR    Recorded by [DM] Lynn Villasenor, PT [KR] Mayelin Kebede, PT [AR] Mayra Miguel, OT    Cognitive Assessment/Intervention    Current Cognitive/Communication Assessment functional  -DM functional  -KR functional  -AR    Orientation Status oriented to;person;place   can't recall date:\"I have to look at it\"  -DM oriented x 4  -KR oriented x 4  -AR    Follows Commands/Answers Questions 75% of the time;100% of the time;able to follow single-step " "instructions;needs cueing;needs increased time;needs repetition  -DM able to follow single-step instructions;100% of the time;needs cueing;needs increased time  -% of the time;able to follow multi-step instructions  -AR    Personal Safety mild impairment;decreased awareness, need for assist;decreased awareness, need for safety;decreased insight to deficits  -DM mild impairment;decreased awareness, need for assist;decreased awareness, need for safety  -KR WNL/WFL  -AR    Personal Safety Interventions fall prevention program maintained;gait belt;nonskid shoes/slippers when out of bed  -DM fall prevention program maintained;gait belt;nonskid shoes/slippers when out of bed  -KR fall prevention program maintained  -AR    Recorded by [DM] Lynn Villasenor, PT [KR] Mayelin Kebede, PT [AR] Mayra Miguel, OT    Communication Assessment/Intervention    Communication Assessment Expressive Aphasia   occ slurred speech,\"Jibberish\",noted by nsg,PT (Brain lesion  -DM      Recorded by [DM] Lynn Villasenor, PT      ROM (Range of Motion)    General ROM lower extremity range of motion deficits identified  -DM      Recorded by [DM] Lynn Villasenor, PT      General LE Assessment    ROM Detail R knee 18 deg to 69 deg (ext.measured w/legrest elev;flex w/leg dependent   c/o stiffness, incr. weakness d/t nausea  -DM 9-74 degrees. Pt lacking 9 degrees full extension AROM. Flexion AAROM measured in sitting.   -KR     Recorded by [DM] Lynn Villasenor, PT [KR] Mayelin Kebede, PT     Mobility Assessment/Training    Extremity Weight-Bearing Status  right lower extremity  -KR right lower extremity  -AR    Right Lower Extremity Weight-Bearing  weight-bearing as tolerated  -KR weight-bearing as tolerated  -AR    Recorded by  [KR] Mayelin Kebede, PT [AR] Mayra Miguel, OT    Bed Mobility, Assessment/Treatment    Bed Mobility, Assistive Device bed rails;head of bed elevated;leg   -DM  bed rails;head of bed elevated;leg   -AR "    Bed Mobility, Scoot/Bridge, [place] supervision required  -DM  supervision required  -AR    Bed Mob, Supine to Sit, [place] contact guard assist;verbal cues required  -DM supervision required;verbal cues required  -KR supervision required;verbal cues required  -AR    Bed Mob, Sit to Supine, [place]  supervision required;verbal cues required  -KR supervision required;verbal cues required  -AR    Bed Mobility, Safety Issues decreased use of arms for pushing/pulling;decreased use of legs for bridging/pushing  -DM decreased use of legs for bridging/pushing  -KR     Bed Mobility, Impairments strength decreased;impaired balance;pain  -DM ROM decreased;strength decreased;pain  -KR ROM decreased;pain  -AR    Bed Mobility, Comment cues for HP,leg  use  -DM Pt given verbal cues for proper sequencing. Pt demonstrated proper use of leg .   -KR issued leg  and educated pt on use to assist with bed mobility, car transfers and tub bench transfers  -AR    Recorded by [DM] Lynn Villasenor, PT [KR] Mayelin Kebede, PT [AR] Mayra Miguel, OT    Transfer Assessment/Treatment    Transfers, Sit-Stand [place] minimum assist (75% patient effort);verbal cues required  -DM contact guard assist;verbal cues required  -KR contact guard assist;verbal cues required  -AR    Transfers, Stand-Sit [place] contact guard assist;verbal cues required  -DM contact guard assist;verbal cues required  -KR contact guard assist;verbal cues required  -AR    Transfers, Sit-Stand-Sit, Assist Device rolling walker  -DM rolling walker  -KR elevated surface;rolling walker  -AR    Toilet Transfer, [place]  contact guard assist;verbal cues required  -KR     Toilet Transfer, Assistive Device  rolling walker  -KR     Transfer, Maintain Weight Bearing Status able to maintain weight bearing status  -DM      Transfer, Safety Issues sequencing ability decreased;weight-shifting ability decreased  -DM step length  decreased;weight-shifting ability decreased  -KR     Transfer, Impairments strength decreased;impaired balance;pain  -DM strength decreased;impaired balance;pain  -KR strength decreased;pain  -AR    Transfer, Comment CUES for HP, seq, WS over C of G  -DM Pt given verbal cues for proper hand placement to push up from bed instead of reaching for RW upon initial stand.   -KR Educated pt on tub bench use for tub transfer and issued handout on places to purchase, educated pt/son on safe car transfer technique   -AR    Recorded by [DM] Lynn Villasenor, PT [KR] Mayelin Kebede, PT [AR] Mayra Miguel, JESE    Gait Assessment/Treatment    Gait, [place] Level contact guard assist  -DM contact guard assist;verbal cues required  -KR     Gait, Assistive Device rolling walker   > 6 stand.rests  -DM rolling walker  -KR     Gait, Distance (Feet) 120   mult.c/o's,inc.nausea,fatigue;tech brought chair;rolled toBS  -  -KR     Gait, Gait Pattern Analysis swing-through gait  -DM swing-through gait  -KR     Gait, Gait Deviations antalgic;right:;franklin decreased;decreased heel strike;double stance time increased;knee buckling;leans to the right;narrow base;step length decreased;weight-shifting ability decreased  -DM bilateral:;franklin decreased;forward flexed posture;step length decreased;weight-shifting ability decreased  -KR     Gait, Maintain Weight Bearing Status able to maintain weight bearing status  -DM      Gait, Safety Issues step length decreased;weight-shifting ability decreased  -DM step length decreased;weight-shifting ability decreased;balance decreased during turns  -KR     Gait, Impairments strength decreased;impaired balance;postural control impaired;pain  -DM ROM decreased;strength decreased;impaired balance;pain  -KR     Gait, Comment cues for incr step length & YUSEF, trunk ext/focusing,PLB  -DM Pt demonstrated swing through gait pattern with slow gait speed. Pt given verbal cues for upright posture and  "increased weight bearing on RLE.   -KR     Recorded by [DM] Lynn Villasenor, PT [KR] Mayelin Kebede, PT     Lower Body Bathing Assessment/Training    LB Bathing Assess/Train, Comment   Issued LH sponge and educated pt on use  -AR    Recorded by   [AR] Mayra Miguel, OT    Lower Body Dressing Assessment/Training    LB Dressing Assess/Train, Clothing Type   donning:;doffing:;slipper socks  -AR    LB Dressing Assess/Train, Assist Device   reacher;sock-aid  -AR    LB Dressing Assess/Train, Position   edge of bed  -AR    LB Dressing Assess/Train, [place]   verbal cues required;contact guard assist  -AR    LB Dressing Assess/Train, Impairments   ROM decreased;pain  -AR    LB Dressing Assess/Train, Comment   Reviewed kain-technique and ADL retraining. Pt unable to don socks without AE d/t pain and ROM restrictions. Issued AE and educated pt on use. Sock aide not currently available to issue.   -AR    Recorded by   [AR] Mayra Miguel, JESE    Motor Skills/Interventions    Additional Documentation Balance Skills Training (Group)  -DM      Recorded by [DM] Lynn Villasenor, PT      Balance Skills Training    Sitting-Level of Assistance Contact guard  -DM      Sitting-Balance Support Feet supported  -DM      Sitting-Balance Activities Trunk control activities   \"Whoa\" (c/o incr.nausea); PLB;Scooting; LE exer  -DM      Sitting # of Minutes 6  -DM      Standing-Level of Assistance Contact guard  -DM      Static Standing Balance Support assistive device  -DM      Standing-Balance Activities Weight Shift A-P;Weight Shift R-L  -DM      Standing Balance # of Minutes 2  -DM      Gait Balance-Level of Assistance Contact guard  -DM      Gait Balance Support assistive device  -DM      Gait Balance Activities scanning environment R/L;side-stepping  -DM      Recorded by [DM] Lynn Villasenor, PT      Therapy Exercises    Left Lower Extremity AROM:;10 reps;supine;sitting;ankle pumps/circles;glut sets;heel slides;hip " abduction/adduction;hip ER;hip flexion;hip IR;LAQ;quad sets;SAQ;SLR   HS sets;used LLE to self range R knee  -DM      Exercise Protocols total knee  -DM total knee  -KR     Total Knee Exercises right:;10 reps;with assist;ankle pumps/circles;quad set;glut set;hamstring set;heel slides;heel slide stretch;SLR;SAQ;LAQ  -DM right:;15 reps;completed protocol;with assist;ankle pumps/circles;quad set;glut set;heel slides;heel slide stretch;SLR;SAQ;LAQ  -KR     Recorded by [DM] Lynn Villasenor, PT [KR] Mayelin Kebede, PT     Positioning and Restraints    Pre-Treatment Position in bed  -DM in bed  -KR in bed  -AR    Post Treatment Position chair  -DM bed  -KR bed  -AR    In Bed  supine;call light within reach;encouraged to call for assist;exit alarm on;with family/caregiver;with nsg  -KR supine;call light within reach;encouraged to call for assist;exit alarm on;with family/caregiver  -AR    In Chair notified nsg;reclined;encouraged to call for assist;exit alarm on;with family/caregiver;compression device;waffle cushion;on mechanical lift sling;legs elevated   spouse present;mult.ptreq.ofPT(cold comp. to face,ice,drinks  -DM      Recorded by [DM] Lynn Villasenor, PT [KR] Mayelin Kebede, PT [AR] Mayra Miguel, OT      12/14/17 0938 12/13/17 1512 12/13/17 1116    Rehab Assessment/Intervention    Discipline physical therapist  -KR physical therapist  -EH physical therapist  -    Document Type therapy note (daily note)  -KR therapy note (daily note)  -EH therapy note (daily note)  -EH    Subjective Information agree to therapy;complains of;pain  -KR agree to therapy  -EH agree to therapy  -EH    Patient Effort, Rehab Treatment good  -KR adequate  -EH adequate  -EH    Symptoms Noted During/After Treatment fatigue  -KR fatigue  -EH fatigue;other (see comments)  -EH    Symptoms Noted Comment   nausea; pt cued for PLB in sitting with improvement in nausea. Increased nausea with standing. Pt assisted to t/f to chair instead of  ambulation. At that time PICC line RN presents in room.  -EH    Precautions/Limitations fall precautions  -KR fall precautions  -EH fall precautions  -EH    Recorded by [KR] Mayelin Kebede, PT [EH] Ally Azevedo, PT [EH] Ally Azevedo, PT    Vital Signs    Pre Systolic BP Rehab --   RN cleared for treatment; VSS   -KR      Pre Patient Position Supine  -KR      Intra Patient Position Standing  -KR      Post Patient Position Supine  -KR      Recorded by [KR] Mayelin Kebede, PT      Pain Assessment    Pain Assessment 0-10  -KR Serrano-Vasquez FACES  -EH Serrano-Baker FACES  -EH    Serrano-Baker FACES Pain Rating  4  -EH 4  -EH    Pain Score 9  -KR      Post Pain Score 6  -KR      Pain Type Acute pain  -KR Acute pain  -EH     Pain Location Knee  -KR Knee  -EH     Pain Orientation Right  -KR Right  -EH     Patient's Stated Pain Goal  No pain  -EH     Pain Intervention(s) Repositioned;Ambulation/increased activity  -KR Repositioned;Ambulation/increased activity  -EH     Response to Interventions tolerated  -KR tolerated  -EH     Recorded by [KR] Mayelin Kebede, PT [EH] Ally Azevedo, PT [EH] Ally Azevedo, PT    Cognitive Assessment/Intervention    Current Cognitive/Communication Assessment functional  -KR functional  -EH     Orientation Status oriented x 4   intermittent confusion  -KR oriented x 4  -EH oriented to;person;place;time  -EH    Follows Commands/Answers Questions able to follow single-step instructions;100% of the time;needs increased time;needs cueing  -% of the time;able to follow single-step instructions  -EH 75% of the time;able to follow single-step instructions;needs cueing;needs increased time  -EH    Personal Safety mild impairment;decreased awareness, need for assist;decreased awareness, need for safety;decreased insight to deficits  -KR mild impairment;decreased awareness, need for assist;decreased awareness, need for safety  -EH mild impairment;decreased awareness, need for  assist;decreased awareness, need for safety  -    Personal Safety Interventions fall prevention program maintained;gait belt;nonskid shoes/slippers when out of bed  -KR fall prevention program maintained;gait belt;nonskid shoes/slippers when out of bed  -EH fall prevention program maintained;gait belt;nonskid shoes/slippers when out of bed  -EH    Recorded by [KR] Mayelin Kebede, PT [EH] Ally Azevedo, PT [] Ally Azevedo, PT    Bed Mobility, Assessment/Treatment    Bed Mobility, Assistive Device  bed rails;head of bed elevated  - bed rails;head of bed elevated  -    Bed Mob, Supine to Sit, [place] supervision required  -KR supervision required  - verbal cues required;minimum assist (75% patient effort)  -    Bed Mob, Sit to Supine, [place] supervision required  -KR      Bed Mobility, Safety Issues decreased use of legs for bridging/pushing  -KR decreased use of legs for bridging/pushing;decreased use of arms for pushing/pulling  - decreased use of legs for bridging/pushing;other (see comments)   sequencing difficulty  -    Bed Mobility, Impairments ROM decreased;strength decreased;pain  -KR pain;coordination impaired;motor control impaired;strength decreased  - pain;coordination impaired;motor control impaired;strength decreased  -    Bed Mobility, Comment Pt demonstrated good safety awareness with bed mobility.  -KR      Recorded by [KR] Mayelin Kebede, PT [] Ally Azevedo, PT [] Ally Azevedo, PT    Transfer Assessment/Treatment    Transfers, Bed-Chair [place]   contact guard assist;2 person assist required;verbal cues required  -    Transfers, Chair-Bed [place]   contact guard assist;2 person assist required;verbal cues required  -    Transfers, Sit-Stand [place] contact guard assist;verbal cues required  -KR contact guard assist;2 person assist required;verbal cues required  - contact guard assist;verbal cues required  -    Transfers,  Stand-Sit [place] contact guard assist;verbal cues required  -KR contact guard assist;verbal cues required  -EH verbal cues required;contact guard assist  -EH    Transfers, Sit-Stand-Sit, Assist Device rolling walker  -KR rolling walker  -EH rolling walker  -EH    Toilet Transfer, [place] contact guard assist;verbal cues required  -KR      Toilet Transfer, Assistive Device rolling walker  -KR      Transfer, Safety Issues step length decreased;weight-shifting ability decreased  -KR other (see comments)  - other (see comments)   difficulty sequencing  -EH    Transfer, Impairments strength decreased;impaired balance;coordination impaired;pain  -KR impaired balance;motor control impaired;strength decreased  - impaired balance;motor control impaired;strength decreased  -    Transfer, Comment Pt given verbal cues for correct hand placement to push up from bed prior to initial stand and to reach for grab bar for toilet transfer.  -KR extended time needed  -EH     Recorded by [KR] Mayelin Kebede, PT [EH] Ally Azevedo, PT [EH] Ally Azevedo, PT    Gait Assessment/Treatment    Gait, [place] Level contact guard assist;verbal cues required  -KR contact guard assist  -EH contact guard assist  -EH    Gait, Assistive Device rolling walker  -KR rolling walker  -EH rolling walker  -EH    Gait, Distance (Feet) 120  -KR 90  -EH 4  -EH    Gait, Gait Pattern Analysis swing-through gait  -KR swing-through gait  -     Gait, Gait Deviations right:;antalgic;franklin decreased;forward flexed posture;step length decreased;weight-shifting ability decreased  -KR [place]lin decreased;decreased heel strike;forward flexed posture;weight-shifting ability decreased;step length decreased  -     Gait, Safety Issues step length decreased;weight-shifting ability decreased  -KR step length decreased;weight-shifting ability decreased;balance decreased during turns;sequencing ability decreased  -     Gait, Impairments ROM  decreased;strength decreased;impaired balance;pain  -KR strength decreased;pain;motor control impaired  -EH strength decreased;motor control impaired;pain  -EH    Gait, Comment Pt demonstrated swing through gait pattern during ambulation. Pt given verbal cues for upright posture and increased step length. Pt required encouragement to continue. Pt's mobility limited by fatigue and pain.   -KR pt progressing with step through gait pattern with symmetrical step length. Pt improves performance with son's presence during ambulaiton.  -     Recorded by [KR] Mayelin Kebede, PT [] Ally Azevedo, PT [] Ally Azevedo, PT    Balance Skills Training    Gait Balance-Level of Assistance  Contact guard  - Minimum assistance;x2  -EH    Gait Balance Support  assistive device  - assistive device  -    Recorded by  [EH] Ally Azevedo, PT [] Ally Azevedo, PT    Therapy Exercises    Right Lower Extremity   --  -    Bilateral Lower Extremities  AROM:;10 reps;ankle pumps/circles;glut sets;quad sets;knee flexion;heel slides;LAQ;SLR   assist with heel slides. encouragement for SLR  -     Exercise Protocols total knee  -KR      Total Knee Exercises right:;15 reps;completed protocol;with assist;ankle pumps/circles;quad set;glut set;heel slides;heel slide stretch;SLR;SAQ;LAQ  -KR      Recorded by [KR] Mayelin Kebede, PT [] Ally Azevedo, PT [] Ally Azevedo, PT    Positioning and Restraints    Pre-Treatment Position in bed  - in bed  - in bed  -    Post Treatment Position bed  - chair  - bed  -    In Bed supine;call light within reach;encouraged to call for assist;exit alarm on;with family/caregiver;side rails up x2  -KR  supine;call light within reach;encouraged to call for assist;notified nsg;with other staff;with family/caregiver  -    In Chair  reclined;call light within reach;encouraged to call for assist;with family/caregiver;with other staff  - --  -EH    Recorded by  [KR] Mayelin Kebede, PT [EH] Ally Azevedo, PT [EH] Ally Azevedo, PT      User Key  (r) = Recorded By, (t) = Taken By, (c) = Cosigned By    Initials Name Effective Dates    EH Ally Azevedo, PT 06/19/15 -     DM Lynn Villasenor, PT 06/19/15 -     AR Mayra Luda Lamont, OT 06/22/15 -     KR Mayelin Kebede, PT 09/25/17 -                 IP PT Goals       12/15/17 1842 12/15/17 1115 12/14/17 1629    Bed Mobility PT LTG    Bed Mobility PT LTG, Date Established   12/14/17  -KR    Bed Mobility PT LTG, Time to Achieve   5 days  -KR    Bed Mobility PT LTG, Activity Type   supine to sit/sit to supine  -KR    Bed Mobility PT LTG, [place] Level   conditional independence  -KR    Bed Mobility PT Goal  LTG, Assist Device   leg   -KR    Bed Mobility PT LTG, Date Goal Reviewed   12/14/17  -KR    Bed Mobility PT LTG, Outcome  goal ongoing  -DM goal ongoing  -KR    Transfer Training PT LTG    Transfer Training PT LTG, Outcome goal ongoing  -DM      Gait Training PT LTG    Gait Training Goal PT LTG, Outcome  goal ongoing  -DM     Stair Training PT LTG    Stair Training Goal PT LTG, Outcome  goal ongoing  -DM       12/14/17 1628 12/14/17 1110 12/13/17 1551    Bed Mobility PT LTG    Bed Mobility PT LTG, Date Goal Reviewed (P)  12/14/17  -KR 12/14/17  -KR     Bed Mobility PT LTG, Outcome (P)  goal ongoing  -KR goal ongoing  -KR goal ongoing  -EH    Transfer Training PT LTG    Transfer Training PT  LTG, Date Goal Reviewed 12/14/17  -KR 12/14/17  -KR     Transfer Training PT LTG, Outcome goal ongoing  -KR goal ongoing  -KR goal ongoing  -EH    Gait Training PT LTG    Gait Training Goal PT LTG, Date Goal Reviewed 12/14/17  -KR 12/14/17  -KR     Gait Training Goal PT LTG, Outcome goal ongoing  -KR goal ongoing  -KR goal ongoing  -EH    Stair Training PT LTG    Stair Training Goal PT LTG, Date Goal Reviewed 12/14/17  -KR 12/14/17  -KR     Stair Training Goal PT LTG, Outcome goal ongoing  -KR goal ongoing  -KR goal  ongoing  -EH      12/13/17 1259 12/12/17 1640 12/12/17 0948    Bed Mobility PT LTG    Bed Mobility PT LTG, Date Goal Reviewed   12/12/17  -AS    Bed Mobility PT LTG, Outcome goal ongoing  -EH goal ongoing  -SC goal ongoing  -AS    Transfer Training PT LTG    Transfer Training PT  LTG, Date Goal Reviewed   12/12/17  -AS    Transfer Training PT LTG, Outcome goal ongoing  -EH goal ongoing  -SC goal ongoing  -AS    Gait Training PT LTG    Gait Training Goal PT LTG, Date Goal Reviewed   12/12/17  -AS    Gait Training Goal PT LTG, Outcome goal ongoing  -EH goal ongoing  -SC goal ongoing  -AS    Stair Training PT LTG    Stair Training Goal PT LTG, Date Goal Reviewed   12/12/17  -AS    Stair Training Goal PT LTG, Outcome goal ongoing  -EH goal ongoing  -SC goal ongoing  -AS      12/11/17 1448 12/11/17 1002       Bed Mobility PT LTG    Bed Mobility PT LTG, Date Established  12/11/17  -LS     Bed Mobility PT LTG, Time to Achieve  2 wks  -LS     Bed Mobility PT LTG, Activity Type  supine to sit/sit to supine  -LS     Bed Mobility PT LTG, [place] Level  supervision required  -LS     Bed Mobility PT LTG, Outcome goal ongoing  -LS      Transfer Training PT LTG    Transfer Training PT LTG, Date Established  12/11/17  -LS     Transfer Training PT LTG, Time to Achieve  2 wks  -LS     Transfer Training PT LTG, Activity Type  sit to stand/stand to sit  -LS     Transfer Training PT LTG, [place] Level  supervision required  -LS     Transfer Training PT LTG, Assist Device  walker, rolling  -LS     Transfer Training PT LTG, Outcome goal ongoing  -LS      Gait Training PT LTG    Gait Training Goal PT LTG, Date Established  12/11/17  -LS     Gait Training Goal PT LTG, Time to Achieve  2 wks  -LS     Gait Training Goal PT LTG, [place] Level  supervision required  -LS     Gait Training Goal PT LTG, Assist Device  walker, rolling  -LS     Gait Training Goal PT LTG, Distance to Achieve  200  -LS     Gait Training Goal PT LTG,  Outcome goal ongoing  -LS      Stair Training PT LTG    Stair Training Goal PT LTG, Date Established  12/11/17  -LS     Stair Training Goal PT LTG, Time to Achieve  2 wks  -LS     Stair Training Goal PT LTG, Number of Steps  3  -LS     Stair Training Goal PT LTG, [place] Level  contact guard assist  -LS     Stair Training Goal PT LTG, Assist Device  1 handrail  -LS     Stair Training Goal PT LTG, Outcome goal ongoing  -LS        User Key  (r) = Recorded By, (t) = Taken By, (c) = Cosigned By    Initials Name Provider Type    SC Ananda Bro, PT Physical Therapist    EH Ally Azevedo, PT Physical Therapist    DM Lynn Villasenor, PT Physical Therapist    AS Juliann Irving, PTA Physical Therapy Assistant    LS Dawna Blanchard, PT Physical Therapist    KR Mayelin Kebede, PT Physical Therapist          Physical Therapy Education     Title: PT OT SLP Therapies (Active)     Topic: Physical Therapy (Active)     Point: Mobility training (Active)    Learning Progress Summary    Learner Readiness Method Response Comment Documented by Status   Patient Eager E,D,H NR   12/15/17 1115 Active    Acceptance TB VU  TP 12/14/17 1757 Done    Acceptance E VU,NR Reviewed safety with mobility and HEP.  12/14/17 1627 Done    Acceptance E VU,NR Reviewed gait mechanics with mobility and HEP.  12/14/17 1109 Done    Acceptance E NR   12/13/17 1550 Active    Acceptance E NR   12/13/17 1258 Active    Acceptance E,D NR reviewed safety with mobility SC 12/12/17 1640 Active    Acceptance E NR  AS 12/12/17 0948 Active    Acceptance E,D,H NR [facility] pt/son re: HEP; issued illustrated handout.  12/11/17 1448 Active    Acceptance E,D NR   12/11/17 1002 Active   Family Acceptance E VU,NR Reviewed safety with mobility and HEP.  12/14/17 1627 Done    Acceptance E NR   12/13/17 1550 Active    Acceptance E NR  AS 12/12/17 0948 Active   Significant Other Eager E,D,H NR   12/15/17 1115 Active    Acceptance E NR   12/13/17 1258  Active               Point: Home exercise program (Active)    Learning Progress Summary    Learner Readiness Method Response Comment Documented by Status   Patient Eager E,D,H NR  DM 12/15/17 1115 Active    Acceptance TB VU  TP 12/14/17 1757 Done    Acceptance E VU,NR Reviewed safety with mobility and HEP.  12/14/17 1627 Done    Acceptance E VU,NR Reviewed gait mechanics with mobility and HEP.  12/14/17 1109 Done    Acceptance E NR   12/13/17 1550 Active    Acceptance E NR   12/13/17 1258 Active    Acceptance E,D NR reviewed safety with mobility SC 12/12/17 1640 Active    Acceptance E NR  AS 12/12/17 0948 Active    Acceptance E,D,H NR Edu pt/son re: HEP; issued illustrated handout.  12/11/17 1448 Active    Acceptance E,D NR   12/11/17 1002 Active   Family Acceptance E VU,NR Reviewed safety with mobility and HEP.  12/14/17 1627 Done    Acceptance E NR   12/13/17 1550 Active    Acceptance E NR  AS 12/12/17 0948 Active   Significant Other Eager E,D,H NR   12/15/17 1115 Active    Acceptance E NR   12/13/17 1258 Active               Point: Body mechanics (Active)    Learning Progress Summary    Learner Readiness Method Response Comment Documented by Status   Patient Eager E,D,H NR  DM 12/15/17 1115 Active    Acceptance TB VU   12/14/17 1757 Done    Acceptance E VU,NR Reviewed safety with mobility and HEP.  12/14/17 1627 Done    Acceptance E VU,NR Reviewed gait mechanics with mobility and HEP.  12/14/17 1109 Done    Acceptance E NR   12/13/17 1550 Active    Acceptance E NR   12/13/17 1258 Active    Acceptance E,D NR reviewed safety with mobility SC 12/12/17 1640 Active    Acceptance E NR  AS 12/12/17 0948 Active    Acceptance E,D,H NR Edu pt/son re: HEP; issued illustrated handout.  12/11/17 1448 Active    Acceptance E,D NR   12/11/17 1002 Active   Family Acceptance E VU,NR Reviewed safety with mobility and HEP.  12/14/17 1627 Done    Acceptance E NR   12/13/17 1550 Active     Acceptance E NR  AS 12/12/17 0948 Active   Significant Other Eager E,D,H NR   12/15/17 1115 Active    Acceptance E NR   12/13/17 1258 Active               Point: Precautions (Active)    Learning Progress Summary    Learner Readiness Method Response Comment Documented by Status   Patient Eager E,D,H NR   12/15/17 1115 Active    Acceptance TB VU  TP 12/14/17 1757 Done    Acceptance E VU,NR Reviewed safety with mobility and HEP.  12/14/17 1627 Done    Acceptance E VU,NR Reviewed gait mechanics with mobility and HEP. KR 12/14/17 1109 Done    Acceptance E NR   12/13/17 1550 Active    Acceptance E NR   12/13/17 1258 Active    Acceptance E,D NR reviewed safety with mobility SC 12/12/17 1640 Active    Acceptance E NR  AS 12/12/17 0948 Active    Acceptance E,D,H NR Edu pt/son re: HEP; issued illustrated handout.  12/11/17 1448 Active    Acceptance E,D NR   12/11/17 1002 Active   Family Acceptance E VU,NR Reviewed safety with mobility and HEP.  12/14/17 1627 Done    Acceptance E NR   12/13/17 1550 Active    Acceptance E NR  AS 12/12/17 0948 Active   Significant Other Eager E,D,H NR   12/15/17 1115 Active    Acceptance E NR   12/13/17 1258 Active                      User Key     Initials Effective Dates Name Provider Type Discipline    SC 06/19/15 -  Ananda Bro, PT Physical Therapist PT     06/19/15 -  Ally Azevedo, PT Physical Therapist PT     06/19/15 -  Lynn Villasenor, PT Physical Therapist PT    AS 06/22/15 -  Juliann Irving, PTA Physical Therapy Assistant PT     06/19/15 -  Dawna Blanchard, PT Physical Therapist PT    KR 09/25/17 -  Mayelin Kebede, PT Physical Therapist PT    TP 07/10/17 -  Ca Kelly, RN Registered Nurse Nurse                    PT Recommendation and Plan  Anticipated Discharge Disposition: skilled nursing facility  PT Frequency: 2 times/day  Plan of Care Review  Plan Of Care Reviewed With: patient, spouse  Progress: progress towards functional goals is  "fair  Outcome Summary/Follow up Plan: Able to amb 120 ft w/ R wx, w/mult stand.rests, but villalobos. by nausea,incr. stiffness/pain (not responding to meds), elev BP, exp aphasia (occ \"jibberish speech\" noted by nsg,,PT), & Mod SOB/fatigue; will encourage spouse to do ther ex w/ her as well          Outcome Measures       12/15/17 1030 12/14/17 1527 12/14/17 1135    How much help from another person do you currently need...    Turning from your back to your side while in flat bed without using bedrails? 4  -DM 4  -KR     Moving from lying on back to sitting on the side of a flat bed without bedrails? 3  -DM 3  -KR     Moving to and from a bed to a chair (including a wheelchair)? 3  -DM 3  -KR     Standing up from a chair using your arms (e.g., wheelchair, bedside chair)? 3  -DM 3  -KR     Climbing 3-5 steps with a railing? 2  -DM 2  -KR     To walk in hospital room? 3  -DM 3  -KR     AM-PAC 6 Clicks Score 18  -DM 18  -KR     How much help from another is currently needed...    Putting on and taking off regular lower body clothing?   --  -AR    Bathing (including washing, rinsing, and drying)   --  -AR    Toileting (which includes using toilet bed pan or urinal)   --  -AR    Putting on and taking off regular upper body clothing   --  -AR    Taking care of personal grooming (such as brushing teeth)   --  -AR    Eating meals   --  -AR    Score   --  -AR    Functional Assessment    Outcome Measure Options AM-PAC 6 Clicks Basic Mobility (PT)  -DM AM-PAC 6 Clicks Basic Mobility (PT)  -KR --  -AR      12/14/17 1130 12/14/17 0938 12/13/17 1512    How much help from another person do you currently need...    Turning from your back to your side while in flat bed without using bedrails?  4  -KR 4  -EH    Moving from lying on back to sitting on the side of a flat bed without bedrails?  3  -KR 4  -EH    Moving to and from a bed to a chair (including a wheelchair)?  3  -KR 3  -EH    Standing up from a chair using your arms (e.g., " wheelchair, bedside chair)?  3  -KR 3  -EH    Climbing 3-5 steps with a railing?  2  -KR 3  -EH    To walk in hospital room?  3  -KR 3  -EH    AM-PAC 6 Clicks Score  18  -KR 20  -EH    How much help from another is currently needed...    Putting on and taking off regular lower body clothing? 3  -AR      Bathing (including washing, rinsing, and drying) 3  -AR      Toileting (which includes using toilet bed pan or urinal) 3  -AR      Putting on and taking off regular upper body clothing 3  -AR      Taking care of personal grooming (such as brushing teeth) 3  -AR      Eating meals 4  -AR      Score 19  -AR      Functional Assessment    Outcome Measure Options AM-PAC 6 Clicks Daily Activity (OT)  -AR AM-PAC 6 Clicks Basic Mobility (PT)  -KR AM-PAC 6 Clicks Basic Mobility (PT)  -      12/13/17 1116          How much help from another person do you currently need...    Turning from your back to your side while in flat bed without using bedrails? 3  -EH      Moving from lying on back to sitting on the side of a flat bed without bedrails? 3  -EH      Moving to and from a bed to a chair (including a wheelchair)? 3  -EH      Standing up from a chair using your arms (e.g., wheelchair, bedside chair)? 3  -EH      Climbing 3-5 steps with a railing? 2  -EH      To walk in hospital room? 3  -EH      AM-PAC 6 Clicks Score 17  -EH      Functional Assessment    Outcome Measure Options AM-PAC 6 Clicks Basic Mobility (PT)  -        User Key  (r) = Recorded By, (t) = Taken By, (c) = Cosigned By    Initials Name Provider Type    EH Ally Azevedo, PT Physical Therapist    LAURO Villasenor, PT Physical Therapist    IGNACIO Miguel, OT Occupational Therapist    ALEX Kebede, PT Physical Therapist           Time Calculation:         PT Charges       12/15/17 1115          Time Calculation    Start Time 1030  -DM      PT Received On 12/15/17  -DM      PT Goal Re-Cert Due Date 12/21/17  -DM      Time Calculation- PT     Total Timed Code Minutes- PT 39 minute(s)  -DM        User Key  (r) = Recorded By, (t) = Taken By, (c) = Cosigned By    Initials Name Provider Type    DM Lynn Villasenor, PT Physical Therapist          Therapy Charges for Today     Code Description Service Date Service Provider Modifiers Qty    06962797156 HC PT THER PROC EA 15 MIN 12/15/2017 Lynn Villasenor, PT GP 2    85839887347 HC GAIT TRAINING EA 15 MIN 12/15/2017 Lynn Villasenor, PT GP 1    97544295740 HC PT THER SUPP EA 15 MIN 12/15/2017 Lynn Villasenor, PT GP 1          PT G-Codes  Outcome Measure Options: AM-PAC 6 Clicks Basic Mobility (PT)    Lynn Villasenor, PT  12/15/2017

## 2017-12-15 NOTE — PLAN OF CARE
"Problem: Patient Care Overview (Adult)  Goal: Plan of Care Review  Outcome: Ongoing (interventions implemented as appropriate)    12/15/17 1115   Coping/Psychosocial Response Interventions   Plan Of Care Reviewed With patient;spouse   Patient Care Overview   Progress progress towards functional goals is fair   Outcome Evaluation   Outcome Summary/Follow up Plan Able to amb 120 ft w/ R wx, w/mult stand.rests, but villalobos. by nausea,incr. stiffness/pain (not responding to meds), elev BP, exp aphasia (occ \"jibberish speech\" noted by nsg,,PT), & Mod SOB/fatigue; will encourage spouse to do ther ex w/ her as well         Problem: Inpatient Physical Therapy  Goal: Bed Mobility Goal LTG- PT  Outcome: Ongoing (interventions implemented as appropriate)    12/14/17 1629 12/15/17 1115   Bed Mobility PT LTG   Bed Mobility PT LTG, Date Established 12/14/17 --    Bed Mobility PT LTG, Time to Achieve 5 days --    Bed Mobility PT LTG, Activity Type supine to sit/sit to supine --    Bed Mobility PT LTG, [place] Level conditional independence --    Bed Mobility PT Goal LTG, Assist Device leg  --    Bed Mobility PT LTG, Date Goal Reviewed 12/14/17 --    Bed Mobility PT LTG, Outcome --  goal ongoing       Goal: Transfer Training Goal 1 LTG- PT  Outcome: Ongoing (interventions implemented as appropriate)    12/11/17 1002 12/14/17 1628 12/15/17 1842   Transfer Training PT LTG   Transfer Training PT LTG, Date Established 12/11/17 --  --    Transfer Training PT LTG, Time to Achieve 2 wks --  --    Transfer Training PT LTG, Activity Type sit to stand/stand to sit --  --    Transfer Training PT LTG, [place] Level supervision required --  --    Transfer Training PT LTG, Assist Device walker, rolling --  --    Transfer Training PT LTG, Date Goal Reviewed --  12/14/17 --    Transfer Training PT LTG, Outcome --  --  goal ongoing       Goal: Gait Training Goal LTG- PT  Outcome: Ongoing (interventions implemented as appropriate)    " 12/11/17 1002 12/14/17 1628 12/15/17 1115   Gait Training PT LTG   Gait Training Goal PT LTG, Date Established 12/11/17 --  --    Gait Training Goal PT LTG, Time to Achieve 2 wks --  --    Gait Training Goal PT LTG, [place] Level supervision required --  --    Gait Training Goal PT LTG, Assist Device walker, rolling --  --    Gait Training Goal PT LTG, Distance to Achieve 200 --  --    Gait Training Goal PT LTG, Date Goal Reviewed --  12/14/17 --    Gait Training Goal PT LTG, Outcome --  --  goal ongoing       Goal: Stair Training Goal LTG- PT  Outcome: Ongoing (interventions implemented as appropriate)    12/11/17 1002 12/14/17 1628 12/15/17 1115   Stair Training PT LTG   Stair Training Goal PT LTG, Date Established 12/11/17 --  --    Stair Training Goal PT LTG, Time to Achieve 2 wks --  --    Stair Training Goal PT LTG, Number of Steps 3 --  --    Stair Training Goal PT LTG, [place] Level contact guard assist --  --    Stair Training Goal PT LTG, Assist Device 1 handrail --  --    Stair Training Goal PT LTG, Date Goal Reviewed --  12/14/17 --    Stair Training Goal PT LTG, Outcome --  --  goal ongoing

## 2017-12-15 NOTE — PROGRESS NOTES
[facility] Progress Note    Admission Date: 12/10/2017    Lesli Leon  1955  [id number]    Date: 12/15/2017    Meds:    Anti-Infectives     Ordered     Dose/Rate Route Frequency Start Stop    12/10/17 1523  cefTRIAXone (ROCEPHIN) IVPB 2 g     Ordering Provider:  Stephon Carbajal MD    2 g  100 mL/hr over 30 Minutes Intravenous Every 24 Hours 17 0800 17 0759    12/10/17 2138  vancomycin (VANCOCIN) in iso-osmotic dextrose IVPB 1 g (premix) 200 mL     Ordering Provider:  Stephon Garcia MD    15 mg/kg × 74.8 kg  over 60 Minutes Intravenous Once 17 0600 17 0705          CC:  Right prosthetic knee infection     SUBJECTIVE: 17: Patient is a 62 y.o. female who was transferred to [facility] from [facility] after presenting there with increasing right knee pain, swelling and confusion which started on Thursday of last week.   The knee was aspirated, and she was then referred to [facility] for admission. She underwent incision and drainage with poly exchange yesterday. Admitting labs with a leukocytosis of 17, 500, acute kidney injury with SCr of 1.7.  Synovial fluid with moderate WBCs, no organisms and culture no growth so far.  The gram stain at [facility] with no organisms, culture is pending.    17:  She feels much better.  She has decreased knee pain.  She has remained afebrile.  She denies nausea, vomiting, and diarrhea.  17:  She has decreased right knee pain and her pain is now controlled with oral contacts.  She has been able to ambulate approximately 150 feet.  She has remained afebrile.  PICC line has now been place.  17:  Feeling better.  No fever, chills,sweats.  Was nauseated earlier.   12/15/17:  She continues to feel improved.  She denies increased right knee pain.  She has remained afebrile.  She denies nausea, vomiting, and diarrhea.    ROS:  No f/c/s. No n/v/d. No rash. No new ADR to Abx.     PE:   Vital Signs  Temp (24hrs), Av.9 °F (36.6 °C), Min:97.2  °F (36.2 °C), Max:99.1 °F (37.3 °C)    Temp  Min: 97.2 °F (36.2 °C)  Max: 99.1 °F (37.3 °C)  BP  Min: 144/72  Max: 165/83  Pulse  Min: 48  Max: 58  Resp  Min: 18  Max: 18  SpO2  Min: 96 %  Max: 99 %    GENERAL: Awake and alert, in no acute distress.   HEENT:  No conjunctival injection. No icterus. Oropharynx clear without evidence of thrush or exudate.  There is a right IJ deep line in place with no exit site erythema  NECK: Supple, no JVD     HEART: RRR; No murmur, rubs, gallops.   LUNGS: Clear to auscultation bilaterally without wheezing, rales, rhonchi. Normal respiratory effort. Nonlabored. No dullness.  ABDOMEN: Soft, nontender, nondistended. Positive bowel sounds. No rebound or guarding. NO mass or HSM.  EXT:  The right knee decreased swelling, dressing along incision line   : Genitalia generally unremarkable.    SKIN: Warm and dry without cutaneous eruptions on Inspection/palpation.    NEURO: Alert and oriented x 3, Motor 5/5 bilaterally  Right arm: There is a PICC line in place with no erythema.    Laboratory Data      Results from last 7 days  Lab Units 12/12/17  0527 12/11/17  0439 12/10/17  2225 12/10/17  1518   WBC 10*3/mm3 15.62* 15.70*  --  17.51*   HEMOGLOBIN g/dL 10.0* 9.7* 10.3* 10.4*   HEMATOCRIT % 32.2* 31.0* 32.8* 33.7*   PLATELETS 10*3/mm3 277 221  --  243       Results from last 7 days  Lab Units 12/12/17  0527   SODIUM mmol/L 142   POTASSIUM mmol/L 4.3   CHLORIDE mmol/L 106   CO2 mmol/L 26.0   BUN mg/dL 33*   CREATININE mg/dL 0.90   GLUCOSE mg/dL 125*   CALCIUM mg/dL 8.4*               12/13:  vanc trough:  21.2    Results from last 7 days  Lab Units 12/10/17  1517   LACTATE mmol/L 0.5           Results from last 7 days  Lab Units 12/13/17  0529 12/11/17  0439   VANCOMYCIN TR mcg/mL 21.20*  --    VANCOMYCIN RM mcg/mL  --  8.80     Estimated Creatinine Clearance: 64.2 mL/min (by C-G formula based on Cr of 0.9).    Microbiology:  Blood Culture   Date Value Ref Range Status   12/10/2017 No  growth at 24 hours  Preliminary       culture results from [facility]-no growth final -  called ( no anaerobic culture obtained)       12/10:  Tissue:  Streptococcus, Alpha hemolytic, not S. pneumoniae or Group D             Radiology:  Imaging Results (last 72 hours)     Procedure Component Value Units Date/Time    XR Chest 1 View [[id number]] Collected:  12/10/17 1520     Updated:  12/10/17 1730    Narrative:          EXAMINATION: XR CHEST 1 VW - 12/10/2017     INDICATION: Central line placement.     COMPARISON: None.     FINDINGS:   1. A right IJ catheter has been placed. The tip is perfectly positioned  in the SVC at the entrance to the right atrium.     2. There is no detectable pneumothorax.     3. There is mild chronic scarring in the chest but there is no active  disease, edema or free fluid.           Impression:       Perfect position of the right IJ catheter in the SVC at the  entrance to the right atrium.     No pneumothorax and no other acute finding.     DICTATED:     12/10/2017  EDITED:          12/10/2017        This report was finalized on 12/10/2017 5:28 PM by Dr. Rigo Urena MD.       XR Knee 1 or 2 View Right [[id number]] Collected:  12/10/17 1810     Updated:  12/11/17 1041    Narrative:          EXAMINATION: XR KNEE 1 OR 2 VIEWS RIGHT - 12/10/2017      INDICATION: Painful TKA.     COMPARISON: None.     FINDINGS:   1. The total right knee arthroplasty appears to be well-positioned. The  hardware appears to be intact without evidence of overt signs of  loosening.     2. There is a transverse defect in the proximal right tibia, likely  previous surgery and this should be correlated.     3. There is no evidence of fracture of the native bone structures and  there is no malalignment.           Impression:       Chronic findings in the right knee as described, however,  the total knee arthroplasty hardware appears well positioned. There is  no abnormality or fracture of the native  bone structures. There is a  transverse lucent defect in the proximal third of the right tibia which  is likely a screw tract and should be correlated with the patient's  previous history.     DICTATED:     12/10/2017  EDITED:          12/10/2017        This report was finalized on 12/11/2017 10:39 AM by Dr. Rigo Urena MD.       CT Head With & Without Contrast [[id number]] Collected:  12/11/17 1630     Updated:  12/11/17 1630    Narrative:       EXAMINATION: CT HEAD W WO CONTRAST- 12/11/2017     INDICATION: Aphasia; A41.9-Sepsis, unspecified organism;  T84.53XA-Infection and inflammatory reaction due to internal right knee  prosthesis, initial encounter; Z96.651-Presence of right artificial knee  joint; Z74.09-Other reduced mobility; difficulty speaking     TECHNIQUE: Multiple axial CT imaging was obtained of the head from skull  base to skull vertex pre and post administration of intravenous  contrast.     The radiation dose reduction device was turned on for each scan per the  ALARA (As Low as Reasonably Achievable) protocol.     COMPARISON: None.     FINDINGS: The parenchyma is grossly unremarkable in appearance with some  low-density area seen in the periventricular white matter suggesting  chronic vessel ischemic change. Tiny area of low density within the  right basal ganglia suggesting possible prior vascular malformation.  There is no mass, mass effect, or midline shift. No abnormal extra-axial  fluid collection is identified. The bony structures reveal no evidence  of osseous abnormality. The visualized paranasal sinuses are clear. The  mastoid air cells are patent.       Impression:       No acute intracranial abnormality is identified. Vascularity  is unremarkable in appearance.     D:  12/11/2017  E:  12/11/2017       XR Knee 1 or 2 View Right [[id number]] Collected:  12/11/17 0830     Updated:  12/11/17 1644    Narrative:       EXAMINATION: XR KNEE 1 OR 2 VW RIGHT-      INDICATION: Postop knee  arthoplasty; A41.9-Sepsis, unspecified organism;  T84.53XA-Infection and inflammatory reaction due to internal right knee  prosthesis, initial encounter; Z96.651-Presence of right artificial knee  joint.      COMPARISON: Radiographs 12/10/2017 earlier same day     FINDINGS: Status post right total knee arthroplasty. No evidence of  hardware complication with components in satisfactory alignment.  Expected postsurgical changes including subcutaneous emphysema are  noted.       Impression:       Status post right total knee arthroplasty without evidence  of complication in satisfactory alignment.     D:  12/11/2017  E:  12/11/2017     This report was finalized on 12/11/2017 4:42 PM by Dr. Wisam Galarza.           12/13:  Vanc T  21.10     IMPRESSION:  Impression:   1.  Septic shock -secondary to a right total knee arthroplasty infection.  This is dramatically improved.  2.  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  She appears to be dramatically improved. .  She will require a prolonged 6-8 week course of intravenous antibiotic therapy.  [facility] will provide her intravenous antibiotics.  Her cultures at [facility] are reportedly negative.  The cultures may potentially be antibiotic modified.    3.  Acute kidney injury/ATN-improved     PLAN/RECOMMENDATIONS:  1.  Discontinue vancomycin  2.  Continue ceftriaxone 2 g IV daily  3.  Possible discharge to home tomorrow    UM/NATALIE: I discussed her disposition with Dr. Barrera.  I also discussed her disposition in detail with case management yesterday.  I coordinated her care.  I discussed her outpatient plans in detail with her and I will attempt to talk to her  again this morning.  Her  is not currently in the room.    Outpatient orders:  1.  Fax this page to [phone]  2.  Patient intravenous antibiotic therapy: Rocephin 2 g IV daily to be provided by [facility]  3.  Anticipate discharge on Saturday 12/16  4.  Appointment to see  me on Monday 12/18 at 1400- this appointment has been made  5.  Stat CBC, CMP, ESR, CRP at the time of the visit on 12/18    I spent over 35 minutes on her care today.  Over 50% of the time was spent in conference and care coordination.              .Dom Landrum MD  12/15/2017  8:31 AM

## 2017-12-15 NOTE — PLAN OF CARE
Problem: Patient Care Overview (Adult)  Goal: Plan of Care Review  Outcome: Ongoing (interventions implemented as appropriate)    Problem: Infection, Risk/Actual (Adult)  Goal: Identify Related Risk Factors and Signs and Symptoms  Outcome: Ongoing (interventions implemented as appropriate)    12/15/17 0405   Infection, Risk/Actual   Infection, Risk/Actual: Related Risk Factors age extremes;prolonged hospitalization

## 2017-12-15 NOTE — PROGRESS NOTES
[facility] Medicine Services  PROGRESS NOTE    Patient Name: Lesli Leon  : 1955  MRN: [id number]    Date of Admission: 12/10/2017  Length of Stay: 5  Primary Care Physician: Phani Joaquin MD    Subjective   Subjective     CC: f/u for septic right knee    HPI: No acute events overnight, patient states that she is feeling ok,sleepy, pain is controlled, did endorse a HA    Review of Systems  Gen- No fevers, chills  CV- No chest pain, palpitations  Resp- No cough, dyspnea  GI- No N/V/D, abd pain    Otherwise ROS is negative except as mentioned in the HPI.    Objective   Objective     Vital Signs:   Temp:  [97.2 °F (36.2 °C)-99.1 °F (37.3 °C)] 99.1 °F (37.3 °C)  Heart Rate:  [48-58] 58  Resp:  [18] 18  BP: (144-165)/(69-83) 165/83      Physical Exam:  Constitutional: No acute distress, awake, alert  HENT: NCAT, mucous membranes moist  Respiratory: Clear to auscultation bilaterally, respiratory effort normal   Cardiovascular: RRR, no murmurs, rubs, or gallops, palpable pedal pulses bilaterally  Gastrointestinal: Positive bowel sounds, soft, nontender, nondistended  Musculoskeletal: bilateral ankle edema, right knee with dressing, aquacel in place  Psychiatric: Appropriate affect, cooperative  Neurologic: Oriented x 3, strength symmetric in all extremities, Cranial Nerves grossly intact to confrontation, speech clear  Skin: No rashes    Results Reviewed:  I have personally reviewed current lab, radiology, and data and agree.      Results from last 7 days  Lab Units 12/12/17  0527 12/11/17  0439 12/10/17  2225 12/10/17  1518   WBC 10*3/mm3 15.62* 15.70*  --  17.51*   HEMOGLOBIN g/dL 10.0* 9.7* 10.3* 10.4*   HEMATOCRIT % 32.2* 31.0* 32.8* 33.7*   PLATELETS 10*3/mm3 277 221  --  243       Results from last 7 days  Lab Units 12/12/17  0527 12/11/17  0439 12/10/17  2226 12/10/17  1517   SODIUM mmol/L 142 140 142 142   POTASSIUM mmol/L 4.3 3.8 3.9 3.7   CHLORIDE mmol/L 106 106 108 110*    CO2 mmol/L 26.0 25.0 25.0 26.0   BUN mg/dL 33* 25* 25* 30*   CREATININE mg/dL 0.90 1.00 1.10 1.70*   GLUCOSE mg/dL 125* 115* 123* 105*   CALCIUM mg/dL 8.4* 8.3* 7.8* 8.1*   TROPONIN I ng/mL  --   --   --  0.007     No results found for: BNP  No results found for: PHART    Microbiology Results Abnormal     Procedure Component Value - Date/Time    Anaerobic Culture - Tissue, Knee, Right [[id number]]  (Abnormal) Collected:  12/10/17 1921    Lab Status:  Preliminary result Specimen:  Tissue from Knee, Right Updated:  12/15/17 1107     Culture No anaerobes isolated      Staphylococcus, coagulase negative (A)      Isolated from broth culture       Blood Culture - Blood, Blood, Venous Line [[id number]]  (Normal) Collected:  12/10/17 1539    Lab Status:  Preliminary result Specimen:  Blood from Blood, Venous Line Updated:  12/14/17 1631     Blood Culture No growth at 4 days    Blood Culture - Blood, Blood, Venous Line [[id number]]  (Normal) Collected:  12/10/17 1535    Lab Status:  Preliminary result Specimen:  Blood from Blood, Venous Line Updated:  12/14/17 1631     Blood Culture No growth at 4 days    Tissue / Bone Culture - Tissue, Knee, Right [[id number]]  (Abnormal) Collected:  12/10/17 1926    Lab Status:  Preliminary result Specimen:  Tissue from Knee, Right Updated:  12/14/17 1108     Tissue Culture --      Light growth (2+) Streptococcus, Alpha Hemolytic, Not S. pneumoniae or Group D (A)     Gram Stain Result Many (4+) WBCs seen      No organisms seen    Tissue / Bone Culture - Tissue, Knee, Right [[id number]]  (Normal) Collected:  12/10/17 1921    Lab Status:  Final result Specimen:  Tissue from Knee, Right Updated:  12/14/17 0708     Tissue Culture No growth at 4 days     Gram Stain Result No WBCs or organisms seen    Tissue / Bone Culture - Tissue, Knee, Right [[id number]]  (Normal) Collected:  12/10/17 1925    Lab Status:  Final result Specimen:  Tissue from Knee, Right Updated:  12/14/17 0707     Tissue Culture  No growth at 4 days     Gram Stain Result Occasional WBCs seen      No organisms seen    Tissue / Bone Culture - Tissue, Knee, Right [[id number]]  (Normal) Collected:  12/10/17 1924    Lab Status:  Final result Specimen:  Tissue from Knee, Right Updated:  12/14/17 0707     Tissue Culture No growth at 4 days     Gram Stain Result Occasional WBCs seen      No organisms seen    Body Fluid Culture - Synovial Fluid, Knee, Right [[id number]]  (Normal) Collected:  12/10/17 1915    Lab Status:  Final result Specimen:  Synovial Fluid from Knee, Right Updated:  12/14/17 0707     BF Culture No growth at 4 days     Gram Stain Result Moderate (3+) WBCs seen      No organisms seen    Anaerobic Culture - Synovial Fluid, Knee, Right [[id number]]  (Normal) Collected:  12/10/17 1915    Lab Status:  Preliminary result Specimen:  Synovial Fluid from Knee, Right Updated:  12/11/17 1347     Culture No anaerobes isolated    Anaerobic Culture - Tissue, Knee, Right [[id number]]  (Normal) Collected:  12/10/17 1924    Lab Status:  Preliminary result Specimen:  Tissue from Knee, Right Updated:  12/11/17 1344     Culture No anaerobes isolated    Anaerobic Culture - Tissue, Knee, Right [[id number]]  (Normal) Collected:  12/10/17 1925    Lab Status:  Preliminary result Specimen:  Tissue from Knee, Right Updated:  12/11/17 1343     Culture No anaerobes isolated    Anaerobic Culture - Tissue, Knee, Right [[id number]]  (Normal) Collected:  12/10/17 1926    Lab Status:  Preliminary result Specimen:  Tissue from Knee, Right Updated:  12/11/17 1343     Culture No anaerobes isolated    AFB Culture - Synovial Fluid, Knee, Right [[id number]] Collected:  12/10/17 1915    Lab Status:  Preliminary result Specimen:  Synovial Fluid from Knee, Right Updated:  12/11/17 1316     AFB Stain No acid fast bacilli seen on concentrated smear    AFB Culture - Tissue, Knee, Right [[id number]] Collected:  12/10/17 1921    Lab Status:  Preliminary result Specimen:  Tissue from  Knee, Right Updated:  12/11/17 1316     AFB Stain No acid fast bacilli seen on concentrated smear    AFB Culture - Tissue, Knee, Right [[id number]] Collected:  12/10/17 1924    Lab Status:  Preliminary result Specimen:  Tissue from Knee, Right Updated:  12/11/17 1316     AFB Stain No acid fast bacilli seen on concentrated smear    AFB Culture - Tissue, Knee, Right [[id number]] Collected:  12/10/17 1925    Lab Status:  Preliminary result Specimen:  Tissue from Knee, Right Updated:  12/11/17 1316     AFB Stain No acid fast bacilli seen on concentrated smear    AFB Culture - Tissue, Knee, Right [[id number]] Collected:  12/10/17 1926    Lab Status:  Preliminary result Specimen:  Tissue from Knee, Right Updated:  12/11/17 1316     AFB Stain No acid fast bacilli seen on concentrated smear          Results for orders placed during the hospital encounter of 12/10/17   Adult Transthoracic Echo Complete W/ Cont if Necessary Per Protocol    Narrative · Left ventricular systolic function is normal.  · Estimated EF appears to be in the range of 61 - 65%  · Left ventricular wall thickness is consistent with borderline concentric   hypertrophy.  · Left ventricular diastolic dysfunction (grade II) consistent with   pseudonormalization.  · Left atrial cavity size is mildly dilated.  · Mild aortic valve regurgitation is present.  · Estimated right ventricular systolic pressure from tricuspid   regurgitation is mildly elevated (35-45 mmHg).  · Mild tricuspid valve regurgitation is present.          I have reviewed the medications.    Assessment/Plan   Assessment / Plan     Hospital Problem List     * (Principal)Sepsis, probable right septic knee as source    Hypertension    Disease of thyroid gland    Expressive aphasia    Heart murmur         Brief Hospital Course to date:    Lesli Leon is a 62 y.o. female with hx of breast CA s/p radiation, recent right knee replacements, she presented 12/10 with 2 days of right knee pain ,  worsening confusion, fevers and hypotension. Subsequently admitted to the ICU for septic right knee. She was started on abx, vanc and zosyn per ID. Patient alose developed worsening confusion, neurology was consulted. Patient was deemed stable for transfer to the floor 12/12 for continued care.      Assessment & Plan:  - Septic right knee, s/p single component revision of right knee arthroplasty with irrigation and debridement and quadricepsplasty per ortho. Continue abx, per ID blood and right knee tissue cultures growing alpha hemolytic strep, anticipate longer abx course, PICC line ordered.  - Confusion and word finding difficulties, resolved, this was likely septicencephalopathy -neurology consulted, doubt CVA, f/u CT head w/wo unrevealing  - Sinus bradycardia on tele, HR was in the low 30s high 40s, asymptomatic, echo wnl, get EKG  - continue home rx for hypothyroidism.  - BP elevated will restart home lisinopril  - since mentation is clearer restart depression rx    DVT Prophylaxis:  ASA      CODE STATUS: Full Code      Disposition: anticipate d/c in 1-2 days, per PT patient will benefit from rehab, but she wants to go home, CM following, will need long term abx    Xavier Barrera MD  12/15/17  12:13 PM

## 2017-12-15 NOTE — PROGRESS NOTES
"Adult Nutrition  Assessment/PES    Patient Name:  Lesli Leon  YOB: 1955  MRN: [id number]  Admit Date:  12/10/2017    Assessment Date:  12/15/2017              Reason for Assessment       12/15/17 1400    Reason for Assessment    Reason For Assessment/Visit follow up protocol    Time Spent (min) 20    Diagnosis --   per notes this admission              Nutrition/Diet History       12/15/17 1401    Nutrition/Diet History    Reported/Observed By Patient    Other Patient reports less than usual PO intake related to decreased appetite, nerves, and nausea. States she currently has no nausea. This AM did not eat well at breakfast, did not feel well. Ate better at lunch though. Requests chocolate Boost with all meals.            Anthropometrics       12/15/17 1403    Anthropometrics    Height 162.6 cm (64\")    Weight 74.8 kg (164 lb 12.8 oz)    Ideal Body Weight (IBW)    Ideal Body Weight (IBW), Female 55.4    % Ideal Body Weight 135.21    Body Mass Index (BMI)    BMI (kg/m2) 28.35            Labs/Tests/Procedures/Meds       12/15/17 1403    Labs/Tests/Procedures/Meds    Labs/Tests Review Reviewed                Nutrition Prescription Ordered       12/15/17 1403    Nutrition Prescription PO    Current PO Diet Regular    Fluid Consistency Thin            Evaluation of Received Nutrient/Fluid Intake       12/15/17 1403    PO Evaluation    Number of Meals 8    % PO Intake 43            Problem/Interventions:        Problem 1       12/15/17 1403    Nutrition Diagnoses Problem 1    Problem 1 Inadequate Intake/Infusion    Inadequate Intake Type Oral    Etiology (related to) --   clinical condition    Signs/Symptoms (evidenced by) PO Intake    Percent (%) intake recorded 43 %    Over number of meals 8                    Intervention Goal       12/15/17 1404    Intervention Goal    General Nutrition support treatment    PO Increase intake            Nutrition Intervention       12/15/17 1404    Nutrition " Intervention    RD/Tech Action Care plan reviewd;Follow Tx progress;Interview for preference;Encourage intake;Recommend/ordered            Nutrition Prescription       12/15/17 1404    Nutrition Prescription PO    PO Prescription Begin/change supplement    Supplement Boost Glucose Control    Supplement Frequency 3 times a day    New PO Prescription Ordered? Yes            Education/Evaluation       12/15/17 1404    Monitor/Evaluation    Monitor Per protocol;PO intake;Supplement intake        Electronically signed by:  Dasia Jones RD  12/15/17 2:04 PM

## 2017-12-15 NOTE — NURSING NOTE
[facility] OPAT  Spoke with patient re: iv antibiotics at home. She says her  is willing and able to learn. We will plan to teach prior to discharge and send all necessary supplies home with patient. Will F/u with md and labs on Monday.  Thanks  Merline fermin rn [facility]

## 2017-12-16 VITALS
TEMPERATURE: 98.4 F | HEIGHT: 64 IN | OXYGEN SATURATION: 98 % | DIASTOLIC BLOOD PRESSURE: 72 MMHG | BODY MASS INDEX: 28.13 KG/M2 | SYSTOLIC BLOOD PRESSURE: 157 MMHG | WEIGHT: 164.8 LBS | RESPIRATION RATE: 16 BRPM | HEART RATE: 93 BPM

## 2017-12-16 LAB
BASOPHILS # BLD AUTO: 0.03 10*3/MM3 (ref 0–0.2)
BASOPHILS NFR BLD AUTO: 0.3 % (ref 0–1)
DEPRECATED RDW RBC AUTO: 45.6 FL (ref 37–54)
EOSINOPHIL # BLD AUTO: 0.32 10*3/MM3 (ref 0–0.3)
EOSINOPHIL NFR BLD AUTO: 3.1 % (ref 0–3)
ERYTHROCYTE [DISTWIDTH] IN BLOOD BY AUTOMATED COUNT: 13.2 % (ref 11.3–14.5)
HCT VFR BLD AUTO: 32.2 % (ref 34.5–44)
HGB BLD-MCNC: 10.3 G/DL (ref 11.5–15.5)
IMM GRANULOCYTES # BLD: 0.04 10*3/MM3 (ref 0–0.03)
IMM GRANULOCYTES NFR BLD: 0.4 % (ref 0–0.6)
LYMPHOCYTES # BLD AUTO: 1.47 10*3/MM3 (ref 0.6–4.8)
LYMPHOCYTES NFR BLD AUTO: 14.4 % (ref 24–44)
MCH RBC QN AUTO: 30.3 PG (ref 27–31)
MCHC RBC AUTO-ENTMCNC: 32 G/DL (ref 32–36)
MCV RBC AUTO: 94.7 FL (ref 80–99)
MONOCYTES # BLD AUTO: 0.95 10*3/MM3 (ref 0–1)
MONOCYTES NFR BLD AUTO: 9.3 % (ref 0–12)
NEUTROPHILS # BLD AUTO: 7.39 10*3/MM3 (ref 1.5–8.3)
NEUTROPHILS NFR BLD AUTO: 72.5 % (ref 41–71)
PLATELET # BLD AUTO: 345 10*3/MM3 (ref 150–450)
PMV BLD AUTO: 9.2 FL (ref 6–12)
RBC # BLD AUTO: 3.4 10*6/MM3 (ref 3.89–5.14)
WBC NRBC COR # BLD: 10.2 10*3/MM3 (ref 3.5–10.8)

## 2017-12-16 PROCEDURE — 85025 COMPLETE CBC W/AUTO DIFF WBC: CPT | Performed by: INTERNAL MEDICINE

## 2017-12-16 PROCEDURE — 99024 POSTOP FOLLOW-UP VISIT: CPT | Performed by: ORTHOPAEDIC SURGERY

## 2017-12-16 PROCEDURE — 97116 GAIT TRAINING THERAPY: CPT

## 2017-12-16 PROCEDURE — 25010000003 CEFTRIAXONE PER 250 MG: Performed by: INTERNAL MEDICINE

## 2017-12-16 PROCEDURE — 99239 HOSP IP/OBS DSCHRG MGMT >30: CPT | Performed by: NURSE PRACTITIONER

## 2017-12-16 PROCEDURE — 97110 THERAPEUTIC EXERCISES: CPT

## 2017-12-16 RX ORDER — ONDANSETRON 4 MG/1
4 TABLET, FILM COATED ORAL EVERY 6 HOURS PRN
Qty: 20 TABLET | Refills: 0 | Status: SHIPPED | OUTPATIENT
Start: 2017-12-16 | End: 2018-03-07

## 2017-12-16 RX ORDER — OXYCODONE HYDROCHLORIDE AND ACETAMINOPHEN 5; 325 MG/1; MG/1
2 TABLET ORAL EVERY 6 HOURS PRN
Qty: 24 TABLET | Refills: 0 | Status: SHIPPED | OUTPATIENT
Start: 2017-12-16 | End: 2017-12-19 | Stop reason: SDUPTHER

## 2017-12-16 RX ADMIN — LEVOTHYROXINE SODIUM 200 MCG: 100 TABLET ORAL at 05:34

## 2017-12-16 RX ADMIN — LISINOPRIL 20 MG: 20 TABLET ORAL at 08:43

## 2017-12-16 RX ADMIN — ALPRAZOLAM 1 MG: 1 TABLET ORAL at 08:53

## 2017-12-16 RX ADMIN — ALPRAZOLAM 1 MG: 1 TABLET ORAL at 00:53

## 2017-12-16 RX ADMIN — OXYCODONE AND ACETAMINOPHEN 2 TABLET: 5; 325 TABLET ORAL at 07:23

## 2017-12-16 RX ADMIN — ASPIRIN 325 MG: 325 TABLET, DELAYED RELEASE ORAL at 08:44

## 2017-12-16 RX ADMIN — DULOXETINE HYDROCHLORIDE 90 MG: 60 CAPSULE, DELAYED RELEASE ORAL at 08:44

## 2017-12-16 RX ADMIN — OXYCODONE AND ACETAMINOPHEN 2 TABLET: 5; 325 TABLET ORAL at 00:50

## 2017-12-16 RX ADMIN — ONDANSETRON 4 MG: 4 TABLET, FILM COATED ORAL at 07:23

## 2017-12-16 RX ADMIN — OXYCODONE AND ACETAMINOPHEN 2 TABLET: 5; 325 TABLET ORAL at 12:17

## 2017-12-16 RX ADMIN — CEFTRIAXONE SODIUM 2 G: 2 INJECTION, SOLUTION INTRAVENOUS at 08:43

## 2017-12-16 NOTE — NURSING NOTE
[facility] here to reach IV antibiotic administration via [facility] PICC. ,Candi demonstrated understanding . He States He  Has experience with administration of abx. In the past. All supplies and Rocephin at bedside for home use. Has a F/U appt with Dr.Dougherty Leach @ 2pm in our office with stat labs prior to visit. We supply IV antibiotic,labs and PICC care.

## 2017-12-16 NOTE — PLAN OF CARE
Problem: Patient Care Overview (Adult)  Goal: Plan of Care Review  Outcome: Ongoing (interventions implemented as appropriate)    12/16/17 1323   Coping/Psychosocial Response Interventions   Plan Of Care Reviewed With patient;family   Patient Care Overview   Progress improving   Outcome Evaluation   Outcome Summary/Follow up Plan Pt ambulates increased distance in [place]. Pt expected to d/c home this date with continued PT after d/c.         Problem: Inpatient Physical Therapy  Goal: Bed Mobility Goal LTG- PT  Outcome: Outcome(s) achieved Date Met:  12/16/17 12/14/17 1629 12/16/17 1323   Bed Mobility PT LTG   Bed Mobility PT LTG, Date Established 12/14/17 --    Bed Mobility PT LTG, Time to Achieve 5 days --    Bed Mobility PT LTG, Activity Type supine to sit/sit to supine --    Bed Mobility PT LTG, [place] Level conditional independence --    Bed Mobility PT Goal LTG, Assist Device leg  --    Bed Mobility PT LTG, Date Goal Reviewed 12/14/17 --    Bed Mobility PT LTG, Outcome --  goal met       Goal: Transfer Training Goal 1 LTG- PT  Outcome: Outcome(s) achieved Date Met:  12/16/17 12/11/17 1002 12/14/17 1628 12/16/17 1323   Transfer Training PT LTG   Transfer Training PT LTG, Date Established 12/11/17 --  --    Transfer Training PT LTG, Time to Achieve 2 wks --  --    Transfer Training PT LTG, Activity Type sit to stand/stand to sit --  --    Transfer Training PT LTG, [place] Level supervision required --  --    Transfer Training PT LTG, Assist Device walker, rolling --  --    Transfer Training PT LTG, Date Goal Reviewed --  12/14/17 --    Transfer Training PT LTG, Outcome --  --  goal met       Goal: Gait Training Goal LTG- PT  Outcome: Outcome(s) achieved Date Met:  12/16/17 12/11/17 1002 12/14/17 1628 12/16/17 1323   Gait Training PT LTG   Gait Training Goal PT LTG, Date Established 12/11/17 --  --    Gait Training Goal PT LTG, Time to Achieve 2 wks --  --    Gait Training Goal PT LTG,  [place] Level supervision required --  --    Gait Training Goal PT LTG, Assist Device walker, rolling --  --    Gait Training Goal PT LTG, Distance to Achieve 200 --  --    Gait Training Goal PT LTG, Date Goal Reviewed --  12/14/17 --    Gait Training Goal PT LTG, Outcome --  --  goal partially met       Goal: Stair Training Goal LTG- PT  Outcome: Unable to achieve outcome(s) by discharge Date Met:  12/16/17 12/11/17 1002 12/14/17 1628 12/16/17 1323   Stair Training PT LTG   Stair Training Goal PT LTG, Date Established 12/11/17 --  --    Stair Training Goal PT LTG, Time to Achieve 2 wks --  --    Stair Training Goal PT LTG, Number of Steps 3 --  --    Stair Training Goal PT LTG, [place] Level contact guard assist --  --    Stair Training Goal PT LTG, Assist Device 1 handrail --  --    Stair Training Goal PT LTG, Date Goal Reviewed --  12/14/17 --    Stair Training Goal PT LTG, Outcome --  --  goal not met

## 2017-12-16 NOTE — THERAPY TREATMENT NOTE
"Acute Care - Physical Therapy Treatment Note  [facility]     Patient Name: Lesli Leon  : 1955  MRN: [id number]  Today's Date: 2017  Onset of Illness/Injury or Date of Surgery Date: 12/10/17  Date of Referral to PT: 12/10/17  Referring Physician: MD Jose    Admit Date: 12/10/2017    Visit Dx:    ICD-10-CM ICD-9-CM   1. Sepsis, due to unspecified organism A41.9 038.9     995.91   2. Infection of total right knee replacement T84.53XA 996.66    Z96.651 V43.65   3. Impaired functional mobility, balance, gait, and endurance Z74.09 V49.89   4. Impaired mobility and ADLs Z74.09 799.89     Patient Active Problem List   Diagnosis   • Sepsis, probable right septic knee as source   • Hypertension   • Disease of thyroid gland   • Expressive aphasia   • Heart murmur               Adult Rehabilitation Note       17 1125 12/15/17 1610 12/15/17 1030    Rehab Assessment/Intervention    Discipline physical therapist  -EH physical therapist  -DM physical therapist  -DM    Document Type therapy note (daily note)  -EH therapy note (daily note)  -DM therapy note (daily note)  -DM    Subjective Information agree to therapy;no complaints  - agree to therapy;complains of;weakness;pain;nausea/vomiting   still somewhat nauseated;nsg premed (nausea & pain med)  -DM agree to therapy;complains of;weakness;pain;nausea/vomiting   hadZofran,painPill;still nauseated;pale appearance;gaveBasin  -DM    Patient Effort, Rehab Treatment good  -EH good  -DM good  -DM    Symptoms Noted During/After Treatment  fatigue;increased pain  -DM fatigue;increased pain;significant change in vital signs  -DM    Symptoms Noted Comment  fatigue;signif pain w/ act.knee flex(dsg getting rigid)  -DM incr.nauseated;\"more stiff today\"  -DM    Precautions/Limitations  fall precautions;other (see comments)   brain lesion; impulsive; speech occ.garbled(nsg concurred)  -DM fall precautions;other (see comments)   brain lesion;impulsive;speech " occ.johnnie(nsg concurred)  -DM    Recorded by [EH] Ally Azevedo, PT [DM] Lynn Villasenor, PT [DM] Lynn Villasenor, PT    Vital Signs    Pre Systolic BP Rehab  148  -  -DM    Pre Treatment Diastolic BP  83  -DM 83  -DM    Post Systolic BP Rehab  146  -  -DM    Post Treatment Diastolic BP  84  -DM 83  -DM    Pretreatment Heart Rate (beats/min)  63  -DM 57  -DM    Intratreatment Heart Rate (beats/min)  72  -DM 65  -DM    Posttreatment Heart Rate (beats/min)  65  -DM     Pre SpO2 (%)  96  -DM 97  -DM    O2 Delivery Pre Treatment  room air  -DM room air  -DM    O2 Delivery Intra Treatment  room air  -DM     Post SpO2 (%)  95  -DM 96  -DM    O2 Delivery Post Treatment  room air  -DM room air  -DM    Pre Patient Position  Supine  -DM Supine  -DM    Intra Patient Position  Standing  -DM Standing  -DM    Post Patient Position  Sitting  -DM Standing  -DM    Recorded by  [DM] Lynn Villasenor, PT [DM] Lynn Villasenor PT    Pain Assessment    Pain Assessment Serrano-Vasquez FACES  -EH 0-10  -DM No/denies pain  -DM    Serrano-Vasquez FACES Pain Rating 2  -EH      Pain Score  7  -DM 7  -DM    Post Pain Score  8  -DM 8  -DM    Pain Type Acute pain  -EH Acute pain  -DM Acute pain  -DM    Pain Location Knee  -EH Knee  -DM Knee  -DM    Pain Orientation Right  -EH Right  -DM Right  -DM    Pain Descriptors  Aching  -DM Aching  -DM    Pain Frequency  Constant/continuous  -DM Constant/continuous  -DM    Patient's Stated Pain Goal  No pain  -DM No pain  -DM    Pain Intervention(s) Repositioned;Ambulation/increased activity  -EH Medication (See MAR);Ambulation/increased activity  -DM Medication (See MAR);Repositioned;Rest  -DM    Response to Interventions tolerated; reported increased pain with knee flexion, SLR  -EH tolerated  -DM tolerated  -DM    Recorded by [EH] Ally Azevedo, PT [DM] Lynn Villasenor, PT [DM] Lynn Villasenor PT    Cognitive Assessment/Intervention    Current Cognitive/Communication Assessment functional   "-EH functional  -DM functional  -DM    Orientation Status oriented to;person;place;time  -EH oriented to;person;place;time  -DM oriented to;person;place   can't recall date:\"I have to look at it\"  -DM    Follows Commands/Answers Questions 100% of the time;able to follow single-step instructions  -% of the time;able to follow single-step instructions;needs cueing;needs increased time;needs repetition  -DM 75% of the time;100% of the time;able to follow single-step instructions;needs cueing;needs increased time;needs repetition  -DM    Personal Safety mild impairment;decreased awareness, need for assist;decreased awareness, need for safety  -EH mild impairment;decreased awareness, need for assist;decreased awareness, need for safety;decreased insight to deficits  -DM mild impairment;decreased awareness, need for assist;decreased awareness, need for safety;decreased insight to deficits  -DM    Personal Safety Interventions gait belt;fall prevention program maintained;nonskid shoes/slippers when out of bed  - fall prevention program maintained;gait belt;nonskid shoes/slippers when out of bed  -DM fall prevention program maintained;gait belt;nonskid shoes/slippers when out of bed  -DM    Recorded by [EH] Ally Azevedo, PT [DM] Lynn Villasenor, PT [DM] Lynn Villasenor, PT    Communication Assessment/Intervention    Communication Assessment Expressive Aphasia  - Expressive Aphasia   occ. slurred speech/'Jibberish\" per nsg  -DM Expressive Aphasia   occ slurred speech,\"Jibberish\",noted by nsg,PT (Brain lesion  -DM    Recorded by [] Ally Azevedo, PT [DM] Lynn Villasenor, PT [DM] Lynn Villasenor, PT    ROM (Range of Motion)    General ROM   lower extremity range of motion deficits identified  -DM    Recorded by   [DM] Lynn Villasenor, PT    General LE Assessment    ROM Detail 8 to 64degrees on R knee, lacking 8 degrees from extension.  -  R knee 18 deg to 69 deg (ext.measured w/legrest elev;flex w/leg " "dependent   c/o stiffness, incr. weakness d/t nausea  -DM    Recorded by [EH] Ally Azevedo, PT  [DM] Lynn Villasenor, PT    Bed Mobility, Assessment/Treatment    Bed Mobility, Assistive Device head of bed elevated  - bed rails;head of bed elevated;leg   -DM bed rails;head of bed elevated;leg   -DM    Bed Mobility, Scoot/Bridge, [place] supervision required  - supervision required   had phone call x 4 min.then completed scooting  -DM supervision required  -DM    Bed Mob, Supine to Sit, [place] supervision required  - contact guard assist;verbal cues required  -DM contact guard assist;verbal cues required  -DM    Bed Mobility, Safety Issues decreased use of arms for pushing/pulling;decreased use of legs for bridging/pushing  - decreased use of arms for pushing/pulling;decreased use of legs for bridging/pushing  -DM decreased use of arms for pushing/pulling;decreased use of legs for bridging/pushing  -DM    Bed Mobility, Impairments strength decreased;impaired balance;pain  - strength decreased;impaired balance;pain  -DM strength decreased;impaired balance;pain  -DM    Bed Mobility, Comment extended time needed  - cues for HP, Leg   -DM cues for HP,leg  use  -DM    Recorded by [EH] Ally Azevedo, PT [DM] Lynn Villasenor, PT [DM] Lynn Villasenor, PT    Transfer Assessment/Treatment    Transfers, Sit-Stand [place] contact guard assist  - minimum assist (75% patient effort);verbal cues required   3TRIALS;crying out w/ pain,stating\"wait,wait\" @ time/satBack  -DM minimum assist (75% patient effort);verbal cues required  -DM    Transfers, Stand-Sit [place] contact guard assist;verbal cues required  - contact guard assist;verbal cues required  -DM contact guard assist;verbal cues required  -DM    Transfers, Sit-Stand-Sit, Assist Device rolling walker  -EH rolling walker  -DM rolling walker  -DM    Toilet Transfer, [place] contact guard assist "   toileting 5 min.  - minimum assist (75% patient effort)   toileting 5 min.  -DM     Toilet Transfer, Assistive Device rolling walker   grab bar on wall  -EH rolling walker   grab bar on wall  -DM     Transfer, Maintain Weight Bearing Status  able to maintain weight bearing status   WBAT RLE  -DM able to maintain weight bearing status  -DM    Transfer, Safety Issues sequencing ability decreased;weight-shifting ability decreased  -EH sequencing ability decreased;weight-shifting ability decreased  -DM sequencing ability decreased;weight-shifting ability decreased  -DM    Transfer, Impairments strength decreased;impaired balance;pain  -EH strength decreased;impaired balance;pain  -DM strength decreased;impaired balance;pain  -DM    Transfer, Comment  cues for HP, seq., WS Over CofG  -DM CUES for HP, seq, WS over C of G  -DM    Recorded by [EH] Ally Azevedo, PT [DM] Lynn Villasenor, PT [DM] Lynn Villasenor, PT    Gait Assessment/Treatment    Gait, [place] Level contact guard assist  -EH contact guard assist  -DM contact guard assist  -DM    Gait, Assistive Device rolling walker  -EH rolling walker   unable to amb back;rolled in recliner   -DM rolling walker   > 6 stand.rests  -DM    Gait, Distance (Feet) 220  -   pt.insisted son follow w/ chair;onset nausea/signif.fat.  -   mult.c/o's,inc.nausea,fatigue;tech brought chair;rolled toBS  -DM    Gait, Gait Pattern Analysis swing-through gait  -EH swing-through gait  -DM swing-through gait  -DM    Gait, Gait Deviations franklin decreased;right:;weight-shifting ability decreased  -EH antalgic;right:;franklin decreased;decreased heel strike;double stance time increased;knee buckling;leans to the right;narrow base;step length decreased;weight-shifting ability decreased  -DM antalgic;right:;franklin decreased;decreased heel strike;double stance time increased;knee buckling;leans to the right;narrow base;step length decreased;weight-shifting ability  "decreased  -DM    Gait, Maintain Weight Bearing Status assist to maintain weight bearing status  -EH able to maintain weight bearing status   WBAT RLE  -DM able to maintain weight bearing status  -DM    Gait, Safety Issues  step length decreased;weight-shifting ability decreased  -DM step length decreased;weight-shifting ability decreased  -DM    Gait, Impairments strength decreased;impaired balance;postural control impaired;pain  -EH strength decreased;impaired balance;postural control impaired;pain  -DM strength decreased;impaired balance;postural control impaired;pain  -DM    Gait, Comment  Cues for incr step length & YUSEF,trunk ext, PLB  -DM cues for incr step length & YUSEF, trunk ext/focusing,PLB  -DM    Recorded by [EH] Ally Azevedo, PT [DM] Lynn Villasenor, PT [DM] Lynn Villasenor, PT    Motor Skills/Interventions    Additional Documentation Balance Skills Training (Group)  - Balance Skills Training (Group)  -DM Balance Skills Training (Group)  -DM    Recorded by [EH] Ally Azevedo, PT [DM] Lynn Villasenor, PT [DM] Lynn Villasenor, PT    Balance Skills Training    Sitting-Level of Assistance --  - Close supervision  -DM Contact guard  -DM    Sitting-Balance Support --  -EH Feet supported  -DM Feet supported  -DM    Sitting-Balance Activities --   TOILETING/hygiene  -EH Trunk control activities;Reaching for objects   TOILETING/hygiene  -DM Trunk control activities   \"Whoa\" (c/o incr.nausea); PLB;Scooting; LE exer  -DM    Sitting # of Minutes  10  -DM 6  -DM    Standing-Level of Assistance --  -EH Contact guard  -DM Contact guard  -DM    Static Standing Balance Support --  -EH assistive device  -DM assistive device  -DM    Standing-Balance Activities --   hygiene  -EH Weight Shift A-P;Weight Shift R-L   hygiene  -DM Weight Shift A-P;Weight Shift R-L  -DM    Standing Balance # of Minutes  3  -DM 2  -DM    Gait Balance-Level of Assistance --  -EH Contact guard  -DM Contact guard  -DM    Gait Balance " Support --  - assistive device  -DM assistive device  -DM    Gait Balance Activities --  - side-stepping;scanning environment R/L   PLB  -DM scanning environment R/L;side-stepping  -DM    Recorded by [] Ally Azevedo, PT [DM] Lynn Villasenor, PT [DM] Lynn Villasenor, PT    Therapy Exercises    Left Lower Extremity AROM:;10 reps;sitting;ankle pumps/circles;glut sets;heel slides;hip abduction/adduction;hip ER;hip flexion;hip IR;LAQ;quad sets;SAQ;SLR   HS sets  -EH AROM:;10 reps;sitting;ankle pumps/circles;glut sets;heel slides;hip abduction/adduction;hip ER;hip flexion;hip IR;LAQ;quad sets;SAQ;SLR   HS sets  -DM AROM:;10 reps;supine;sitting;ankle pumps/circles;glut sets;heel slides;hip abduction/adduction;hip ER;hip flexion;hip IR;LAQ;quad sets;SAQ;SLR   HS sets;used LLE to self range R knee  -DM    Exercise Protocols total knee  -EH total knee  -DM total knee  -DM    Total Knee Exercises right:;10 reps;completed protocol;quad set;glut set;SLR;heel slides;LAQ;ankle pumps/circles   exception of HS stretches  -EH right:;10 reps;completed protocol   exception of HS stretches  -DM right:;10 reps;with assist;ankle pumps/circles;quad set;glut set;hamstring set;heel slides;heel slide stretch;SLR;SAQ;LAQ  -DM    Recorded by [EH] Ally Azevedo, PT [DM] Lynn Villasenor, PT [DM] Lynn Villasenor, PT    Sensory Assessment/Intervention    Light Touch LLE;RLE  -      LLE Light Touch WNL  -      RLE Light Touch mild impairment  -      Recorded by [] Ally Azevedo, PT      Positioning and Restraints    Pre-Treatment Position in bed  -EH in bed  -DM in bed  -DM    Post Treatment Position chair  - chair  -DM chair  -DM    In Chair reclined;call light within reach;encouraged to call for assist;exit alarm on;with family/caregiver;waffle cushion  - notified nsg;reclined;call light within reach;encouraged to call for assist;exit alarm on;with family/caregiver;waffle cushion;legs elevated  -DM notified  nsg;reclined;encouraged to call for assist;exit alarm on;with family/caregiver;compression device;waffle cushion;on mechanical lift sling;legs elevated   spouse present;mult.ptreq.ofPT(cold comp. to face,ice,drinks  -DM    Recorded by [EH] Ally Azevedo, PT [DM] Lynn Villasenor, PT [DM] Lynn Villasenor, PT      12/14/17 1527 12/14/17 1130 12/14/17 0938    Rehab Assessment/Intervention    Discipline physical therapist  -KR occupational therapist  -AR physical therapist  -KR    Document Type therapy note (daily note)  -KR therapy note (daily note)   POD#4   -AR therapy note (daily note)  -KR    Subjective Information agree to therapy;complains of;pain  -KR agree to therapy;complains of;fatigue;pain;nausea/vomiting  -AR agree to therapy;complains of;pain  -KR    Patient Effort, Rehab Treatment good  -KR good  -AR good  -KR    Symptoms Noted During/After Treatment fatigue  -KR fatigue  -AR fatigue  -KR    Precautions/Limitations fall precautions  -KR fall precautions  -AR fall precautions  -KR    Equipment Issued to Patient  bathing equipment;dressing equipment   reacher, leg lifte, shoe horn and sponge  -AR     Recorded by [KR] Mayelin Kebede, PT [AR] Mayra Miguel, OT [KR] Mayelin Kebede, PT    Vital Signs    Pre Systolic BP Rehab --   RN cleared for treatment; VSS  -KR  --   RN cleared for treatment; VSS   -KR    Pre Patient Position Supine  -KR  Supine  -KR    Intra Patient Position Standing  -KR  Standing  -KR    Post Patient Position Supine  -KR  Supine  -KR    Recorded by [KR] Mayelin Kebede, PT  [KR] Mayelin Kebede, PT    Pain Assessment    Pain Assessment 0-10  -KR 0-10  -AR 0-10  -KR    Pain Score 9  -KR 5  -AR 9  -KR    Post Pain Score 9  -KR 5  -AR 6  -KR    Pain Type Acute pain  -KR Acute pain  -AR Acute pain  -KR    Pain Location Knee  -KR Knee  -AR Knee  -KR    Pain Orientation Right;Anterior;Posterior  -KR Right;Anterior;Posterior  -AR Right  -KR    Pain Intervention(s)  Repositioned;Ambulation/increased activity  -KR Medication (See MAR);Repositioned;Ambulation/increased activity  -AR Repositioned;Ambulation/increased activity  -KR    Response to Interventions tolerated; RN aware  -KR tolerated  -AR tolerated  -KR    Recorded by [KR] Mayelin Kebede, PT [AR] Mayra Miguel, OT [KR] Mayelin Kebede PT    Cognitive Assessment/Intervention    Current Cognitive/Communication Assessment functional  -KR functional  -AR functional  -KR    Orientation Status oriented x 4  -KR oriented x 4  -AR oriented x 4   intermittent confusion  -KR    Follows Commands/Answers Questions able to follow single-step instructions;100% of the time;needs cueing;needs increased time  -% of the time;able to follow multi-step instructions  -AR able to follow single-step instructions;100% of the time;needs increased time;needs cueing  -KR    Personal Safety mild impairment;decreased awareness, need for assist;decreased awareness, need for safety  -KR WNL/WFL  -AR mild impairment;decreased awareness, need for assist;decreased awareness, need for safety;decreased insight to deficits  -KR    Personal Safety Interventions fall prevention program maintained;gait belt;nonskid shoes/slippers when out of bed  -KR fall prevention program maintained  -AR fall prevention program maintained;gait belt;nonskid shoes/slippers when out of bed  -KR    Recorded by [KR] Mayelin Kebede, PT [AR] Mayra Miguel, OT [KR] Mayelin Kebede PT    General LE Assessment    ROM Detail 9-74 degrees. Pt lacking 9 degrees full extension AROM. Flexion AAROM measured in sitting.   -KR      Recorded by [KR] Mayelin Kebede PT      Mobility Assessment/Training    Extremity Weight-Bearing Status right lower extremity  -KR right lower extremity  -AR     Right Lower Extremity Weight-Bearing weight-bearing as tolerated  -KR weight-bearing as tolerated  -AR     Recorded by [KR] Mayelin Kebede, PT [AR] Mayra Miguel, OT     Bed Mobility,  Assessment/Treatment    Bed Mobility, Assistive Device  bed rails;head of bed elevated;leg   -AR     Bed Mobility, Scoot/Bridge, [place]  supervision required  -AR     Bed Mob, Supine to Sit, [place] supervision required;verbal cues required  -KR supervision required;verbal cues required  -AR supervision required  -KR    Bed Mob, Sit to Supine, [place] supervision required;verbal cues required  -KR supervision required;verbal cues required  -AR supervision required  -KR    Bed Mobility, Safety Issues decreased use of legs for bridging/pushing  -KR  decreased use of legs for bridging/pushing  -KR    Bed Mobility, Impairments ROM decreased;strength decreased;pain  -KR ROM decreased;pain  -AR ROM decreased;strength decreased;pain  -KR    Bed Mobility, Comment Pt given verbal cues for proper sequencing. Pt demonstrated proper use of leg .   -KR issued leg  and educated pt on use to assist with bed mobility, car transfers and tub bench transfers  -AR Pt demonstrated good safety awareness with bed mobility.  -KR    Recorded by [KR] Mayelin Kebede, PT [AR] Mayra Miguel OT [KR] Mayelin Kebede PT    Transfer Assessment/Treatment    Transfers, Sit-Stand [place] contact guard assist;verbal cues required  -KR contact guard assist;verbal cues required  -AR contact guard assist;verbal cues required  -KR    Transfers, Stand-Sit [place] contact guard assist;verbal cues required  -KR contact guard assist;verbal cues required  -AR contact guard assist;verbal cues required  -KR    Transfers, Sit-Stand-Sit, Assist Device rolling walker  -KR elevated surface;rolling walker  -AR rolling walker  -KR    Toilet Transfer, [place] contact guard assist;verbal cues required  -KR  contact guard assist;verbal cues required  -KR    Toilet Transfer, Assistive Device rolling walker  -KR  rolling walker  -KR    Transfer, Safety Issues step length decreased;weight-shifting ability decreased  -KR   step length decreased;weight-shifting ability decreased  -KR    Transfer, Impairments strength decreased;impaired balance;pain  -KR strength decreased;pain  -AR strength decreased;impaired balance;coordination impaired;pain  -KR    Transfer, Comment Pt given verbal cues for proper hand placement to push up from bed instead of reaching for RW upon initial stand.   -KR Educated pt on tub bench use for tub transfer and issued handout on places to purchase, educated pt/son on safe car transfer technique   -AR Pt given verbal cues for correct hand placement to push up from bed prior to initial stand and to reach for grab bar for toilet transfer.  -KR    Recorded by [KR] Mayelin Kebede PT [AR] Mayra Miguel, OT [KR] Mayelin Kebede PT    Gait Assessment/Treatment    Gait, [place] Level contact guard assist;verbal cues required  -KR  contact guard assist;verbal cues required  -KR    Gait, Assistive Device rolling walker  -KR  rolling walker  -KR    Gait, Distance (Feet) 200  -KR  120  -KR    Gait, Gait Pattern Analysis swing-through gait  -KR  swing-through gait  -KR    Gait, Gait Deviations bilateral:;franklin decreased;forward flexed posture;step length decreased;weight-shifting ability decreased  -KR  right:;antalgic;franklin decreased;forward flexed posture;step length decreased;weight-shifting ability decreased  -KR    Gait, Safety Issues step length decreased;weight-shifting ability decreased;balance decreased during turns  -KR  step length decreased;weight-shifting ability decreased  -KR    Gait, Impairments ROM decreased;strength decreased;impaired balance;pain  -KR  ROM decreased;strength decreased;impaired balance;pain  -KR    Gait, Comment Pt demonstrated swing through gait pattern with slow gait speed. Pt given verbal cues for upright posture and increased weight bearing on RLE.   -KR  Pt demonstrated swing through gait pattern during ambulation. Pt given verbal cues for upright posture and increased  step length. Pt required encouragement to continue. Pt's mobility limited by fatigue and pain.   -KR    Recorded by [KR] Mayelin Kebede, PT  [KR] Mayelin Kebede, PT    Lower Body Bathing Assessment/Training    LB Bathing Assess/Train, Comment  Issued LH sponge and educated pt on use  -AR     Recorded by  [AR] Mayra Miguel OT     Lower Body Dressing Assessment/Training    LB Dressing Assess/Train, Clothing Type  donning:;doffing:;slipper socks  -AR     LB Dressing Assess/Train, Assist Device  reacher;sock-aid  -AR     LB Dressing Assess/Train, Position  edge of bed  -AR     LB Dressing Assess/Train, [place]  verbal cues required;contact guard assist  -AR     LB Dressing Assess/Train, Impairments  ROM decreased;pain  -AR     LB Dressing Assess/Train, Comment  Reviewed kain-technique and ADL retraining. Pt unable to don socks without AE d/t pain and ROM restrictions. Issued AE and educated pt on use. Sock aide not currently available to issue.   -AR     Recorded by  [AR] Mayra Miguel OT     Therapy Exercises    Exercise Protocols total knee  -KR  total knee  -KR    Total Knee Exercises right:;15 reps;completed protocol;with assist;ankle pumps/circles;quad set;glut set;heel slides;heel slide stretch;SLR;SAQ;LAQ  -KR  right:;15 reps;completed protocol;with assist;ankle pumps/circles;quad set;glut set;heel slides;heel slide stretch;SLR;SAQ;LAQ  -KR    Recorded by [KR] Mayelin Kebede PT  [KR] Mayelin Kebede, PT    Positioning and Restraints    Pre-Treatment Position in bed  -KR in bed  -AR in bed  -KR    Post Treatment Position bed  -KR bed  -AR bed  -KR    In Bed supine;call light within reach;encouraged to call for assist;exit alarm on;with family/caregiver;with nsg  -KR supine;call light within reach;encouraged to call for assist;exit alarm on;with family/caregiver  -AR supine;call light within reach;encouraged to call for assist;exit alarm on;with family/caregiver;side rails up x2  -KR    Recorded  by [KR] Mayelin Kebede, PT [AR] Mayra Lares Lamont, OT [KR] Mayelin Kebede, PT      12/13/17 1512          Rehab Assessment/Intervention    Discipline physical therapist  -      Document Type therapy note (daily note)  -      Subjective Information agree to therapy  -      Patient Effort, Rehab Treatment adequate  -      Symptoms Noted During/After Treatment fatigue  -      Precautions/Limitations fall precautions  -      Recorded by [] Ally Azevedo, PT      Pain Assessment    Pain Assessment Serrano-Vasquez FACES  -      Serrano-Baker FACES Pain Rating 4  -      Pain Type Acute pain  -      Pain Location Knee  -      Pain Orientation Right  -      Patient's Stated Pain Goal No pain  -      Pain Intervention(s) Repositioned;Ambulation/increased activity  -      Response to Interventions tolerated  -      Recorded by [] Ally Azevedo, PT      Cognitive Assessment/Intervention    Current Cognitive/Communication Assessment functional  -      Orientation Status oriented x 4  -      Follows Commands/Answers Questions 100% of the time;able to follow single-step instructions  -      Personal Safety mild impairment;decreased awareness, need for assist;decreased awareness, need for safety  -      Personal Safety Interventions fall prevention program maintained;gait belt;nonskid shoes/slippers when out of bed  -      Recorded by [] Ally Azevedo, PT      Bed Mobility, Assessment/Treatment    Bed Mobility, Assistive Device bed rails;head of bed elevated  -      Bed Mob, Supine to Sit, [place] supervision required  -      Bed Mobility, Safety Issues decreased use of legs for bridging/pushing;decreased use of arms for pushing/pulling  -      Bed Mobility, Impairments pain;coordination impaired;motor control impaired;strength decreased  -      Recorded by [] Ally Azevedo, PT      Transfer Assessment/Treatment    Transfers, Sit-Stand [place] contact  guard assist;2 person assist required;verbal cues required  -      Transfers, Stand-Sit [place] contact guard assist;verbal cues required  -      Transfers, Sit-Stand-Sit, Assist Device rolling walker  -      Transfer, Safety Issues other (see comments)  -      Transfer, Impairments impaired balance;motor control impaired;strength decreased  -      Transfer, Comment extended time needed  -      Recorded by [] Ally Azevedo, PT      Gait Assessment/Treatment    Gait, [place] Level contact guard assist  -      Gait, Assistive Device rolling walker  -      Gait, Distance (Feet) 90  -      Gait, Gait Pattern Analysis swing-through gait  -      Gait, Gait Deviations [place] decreased;decreased heel strike;forward flexed posture;weight-shifting ability decreased;step length decreased  -      Gait, Safety Issues step length decreased;weight-shifting ability decreased;balance decreased during turns;sequencing ability decreased  -      Gait, Impairments strength decreased;pain;motor control impaired  -      Gait, Comment pt progressing with step through gait pattern with symmetrical step length. Pt improves performance with son's presence during ambulaiton.  -      Recorded by [] Ally Azevedo, PT      Balance Skills Training    Gait Balance-Level of Assistance Contact guard  -      Gait Balance Support assistive device  -      Recorded by [] Ally Azevedo, PT      Therapy Exercises    Bilateral Lower Extremities AROM:;10 reps;ankle pumps/circles;glut sets;quad sets;knee flexion;heel slides;LAQ;SLR   assist with heel slides. encouragement for SLR  -      Recorded by [] Ally Azevedo, PT      Positioning and Restraints    Pre-Treatment Position in bed  -      Post Treatment Position chair  -      In Chair reclined;call light within reach;encouraged to call for assist;with family/caregiver;with other staff  -      Recorded by [] Ally Azevedo,  PT        User Key  (r) = Recorded By, (t) = Taken By, (c) = Cosigned By    Initials Name Effective Dates    EH Ally VANE Azevedo, PT 06/19/15 -     DM Lynn Villasenor, PT 06/19/15 -     AR Mayra Miguel, OT 06/22/15 -     KR Mayelin Kebede, PT 09/25/17 -                 IP PT Goals       12/16/17 1323 12/15/17 1842 12/15/17 1715    Bed Mobility PT LTG    Bed Mobility PT LTG, Outcome goal met  -EH  goal ongoing  -DM    Transfer Training PT LTG    Transfer Training PT LTG, Outcome goal met  -EH goal ongoing  -DM goal ongoing  -DM    Gait Training PT LTG    Gait Training Goal PT LTG, Outcome goal partially met  -EH  goal ongoing  -DM    Stair Training PT LTG    Stair Training Goal PT LTG, Outcome goal not met  -EH  goal ongoing  -DM      12/15/17 1115 12/14/17 1629 12/14/17 1628    Bed Mobility PT LTG    Bed Mobility PT LTG, Date Established  12/14/17  -KR     Bed Mobility PT LTG, Time to Achieve  5 days  -KR     Bed Mobility PT LTG, Activity Type  supine to sit/sit to supine  -KR     Bed Mobility PT LTG, [place] Level  conditional independence  -KR     Bed Mobility PT Goal  LTG, Assist Device  leg   -KR     Bed Mobility PT LTG, Date Goal Reviewed  12/14/17  -KR (P)  12/14/17  -KR    Bed Mobility PT LTG, Outcome goal ongoing  -DM goal ongoing  -KR (P)  goal ongoing  -KR    Transfer Training PT LTG    Transfer Training PT  LTG, Date Goal Reviewed   12/14/17  -KR    Transfer Training PT LTG, Outcome   goal ongoing  -KR    Gait Training PT LTG    Gait Training Goal PT LTG, Date Goal Reviewed   12/14/17  -KR    Gait Training Goal PT LTG, Outcome goal ongoing  -DM  goal ongoing  -KR    Stair Training PT LTG    Stair Training Goal PT LTG, Date Goal Reviewed   12/14/17  -KR    Stair Training Goal PT LTG, Outcome goal ongoing  -DM  goal ongoing  -KR      12/14/17 1110 12/13/17 1551 12/13/17 1259    Bed Mobility PT LTG    Bed Mobility PT LTG, Date Goal Reviewed 12/14/17  -KR      Bed Mobility PT LTG, Outcome  goal ongoing  -KR goal ongoing  -EH goal ongoing  -EH    Transfer Training PT LTG    Transfer Training PT  LTG, Date Goal Reviewed 12/14/17  -KR      Transfer Training PT LTG, Outcome goal ongoing  -KR goal ongoing  -EH goal ongoing  -EH    Gait Training PT LTG    Gait Training Goal PT LTG, Date Goal Reviewed 12/14/17  -KR      Gait Training Goal PT LTG, Outcome goal ongoing  -KR goal ongoing  -EH goal ongoing  -EH    Stair Training PT LTG    Stair Training Goal PT LTG, Date Goal Reviewed 12/14/17  -KR      Stair Training Goal PT LTG, Outcome goal ongoing  -KR goal ongoing  -EH goal ongoing  -EH      12/12/17 1640 12/12/17 0948 12/11/17 1448    Bed Mobility PT LTG    Bed Mobility PT LTG, Date Goal Reviewed  12/12/17  -AS     Bed Mobility PT LTG, Outcome goal ongoing  -SC goal ongoing  -AS goal ongoing  -LS    Transfer Training PT LTG    Transfer Training PT  LTG, Date Goal Reviewed  12/12/17  -AS     Transfer Training PT LTG, Outcome goal ongoing  -SC goal ongoing  -AS goal ongoing  -LS    Gait Training PT LTG    Gait Training Goal PT LTG, Date Goal Reviewed  12/12/17  -AS     Gait Training Goal PT LTG, Outcome goal ongoing  -SC goal ongoing  -AS goal ongoing  -LS    Stair Training PT LTG    Stair Training Goal PT LTG, Date Goal Reviewed  12/12/17  -AS     Stair Training Goal PT LTG, Outcome goal ongoing  -SC goal ongoing  -AS goal ongoing  -LS      12/11/17 1002          Bed Mobility PT LTG    Bed Mobility PT LTG, Date Established 12/11/17  -LS      Bed Mobility PT LTG, Time to Achieve 2 wks  -LS      Bed Mobility PT LTG, Activity Type supine to sit/sit to supine  -LS      Bed Mobility PT LTG, [place] Level supervision required  -LS      Transfer Training PT LTG    Transfer Training PT LTG, Date Established 12/11/17  -LS      Transfer Training PT LTG, Time to Achieve 2 wks  -LS      Transfer Training PT LTG, Activity Type sit to stand/stand to sit  -LS      Transfer Training PT LTG, [place] Level  supervision required  -LS      Transfer Training PT LTG, Assist Device walker, rolling  -LS      Gait Training PT LTG    Gait Training Goal PT LTG, Date Established 12/11/17  -LS      Gait Training Goal PT LTG, Time to Achieve 2 wks  -LS      Gait Training Goal PT LTG, [place] Level supervision required  -LS      Gait Training Goal PT LTG, Assist Device walker, rolling  -LS      Gait Training Goal PT LTG, Distance to Achieve 200  -LS      Stair Training PT LTG    Stair Training Goal PT LTG, Date Established 12/11/17  -LS      Stair Training Goal PT LTG, Time to Achieve 2 wks  -LS      Stair Training Goal PT LTG, Number of Steps 3  -LS      Stair Training Goal PT LTG, [place] Level contact guard assist  -LS      Stair Training Goal PT LTG, Assist Device 1 handrail  -LS        User Key  (r) = Recorded By, (t) = Taken By, (c) = Cosigned By    Initials Name Provider Type    SC Ananda Bro, PT Physical Therapist    EH Ally Azevedo, PT Physical Therapist    DM Lynn Villasenor, PT Physical Therapist    AS Juliann Irving, PTA Physical Therapy Assistant    LS Dawna Blanchard, PT Physical Therapist    KR Mayelin Kebede, PT Physical Therapist          Physical Therapy Education     Title: PT OT SLP Therapies (Active)     Topic: Physical Therapy (Active)     Point: Mobility training (Active)    Learning Progress Summary    Learner Readiness Method Response Comment Documented by Status   Patient Acceptance E VU   12/16/17 1321 Done    Eager E,D,H NR   12/15/17 1715 Active    Eager E,D,H NR   12/15/17 1115 Active    Acceptance TB VU   12/14/17 1757 Done    Acceptance E VU,NR Reviewed safety with mobility and HEP. KR 12/14/17 1627 Done    Acceptance E VU,NR Reviewed gait mechanics with mobility and HEP.  12/14/17 1109 Done    Acceptance E NR   12/13/17 1550 Active    Acceptance E NR   12/13/17 1258 Active    Acceptance E,D NR reviewed safety with mobility SC 12/12/17 1640 Active    Acceptance E  NR  AS 12/12/17 0948 Active    Acceptance E,D,H NR Edu pt/son re: HEP; issued illustrated handout.  12/11/17 1448 Active    Acceptance E,D NR  LS 12/11/17 1002 Active   Family Acceptance E VU,NR recommed have continued OPPT vs HHPT as needed.  12/16/17 1322 Done    Acceptance E VU   12/16/17 1321 Done    Eager E,D,H NR  DM 12/15/17 1715 Active    Acceptance E VU,NR Reviewed safety with mobility and HEP. KR 12/14/17 1627 Done    Acceptance E NR   12/13/17 1550 Active    Acceptance E NR  AS 12/12/17 0948 Active   Significant Other Eager E,D,H NR  DM 12/15/17 1115 Active    Acceptance E NR   12/13/17 1258 Active               Point: Home exercise program (Active)    Learning Progress Summary    Learner Readiness Method Response Comment Documented by Status   Patient Acceptance E VU   12/16/17 1321 Done    Eager E,D,H NR  DM 12/15/17 1715 Active    Eager E,D,H NR  DM 12/15/17 1115 Active    Acceptance TB VU   12/14/17 1757 Done    Acceptance E VU,NR Reviewed safety with mobility and HEP.  12/14/17 1627 Done    Acceptance E VU,NR Reviewed gait mechanics with mobility and HEP.  12/14/17 1109 Done    Acceptance E NR   12/13/17 1550 Active    Acceptance E NR   12/13/17 1258 Active    Acceptance E,D NR reviewed safety with mobility SC 12/12/17 1640 Active    Acceptance E NR  AS 12/12/17 0948 Active    Acceptance E,D,H NR Edu pt/son re: HEP; issued illustrated handout.  12/11/17 1448 Active    Acceptance E,D NR   12/11/17 1002 Active   Family Acceptance E VU,NR recommed have continued OPPT vs HHPT as needed.  12/16/17 1322 Done    Acceptance E VU   12/16/17 1321 Done    Eager E,D,H NR  DM 12/15/17 1715 Active    Acceptance E VU,NR Reviewed safety with mobility and HEP. KR 12/14/17 1627 Done    Acceptance E NR   12/13/17 1550 Active    Acceptance E NR  AS 12/12/17 0948 Active   Significant Other Eager E,D,H NR  DM 12/15/17 1115 Active    Acceptance E NR   12/13/17 1258 Active                Point: Body mechanics (Active)    Learning Progress Summary    Learner Readiness Method Response Comment Documented by Status   Patient Acceptance E VU   12/16/17 1321 Done    Eager E,D,H NR  DM 12/15/17 1715 Active    Eager E,D,H NR  DM 12/15/17 1115 Active    Acceptance TB VU  TP 12/14/17 1757 Done    Acceptance E VU,NR Reviewed safety with mobility and HEP.  12/14/17 1627 Done    Acceptance E VU,NR Reviewed gait mechanics with mobility and HEP.  12/14/17 1109 Done    Acceptance E NR   12/13/17 1550 Active    Acceptance E NR   12/13/17 1258 Active    Acceptance E,D NR reviewed safety with mobility SC 12/12/17 1640 Active    Acceptance E NR  AS 12/12/17 0948 Active    Acceptance E,D,H NR Edu pt/son re: HEP; issued illustrated handout.  12/11/17 1448 Active    Acceptance E,D NR   12/11/17 1002 Active   Family Acceptance E VU,NR recommed have continued OPPT vs HHPT as needed.  12/16/17 1322 Done    Acceptance E VU   12/16/17 1321 Done    Eager E,D,H NR   12/15/17 1715 Active    Acceptance E VU,NR Reviewed safety with mobility and HEP.  12/14/17 1627 Done    Acceptance E NR   12/13/17 1550 Active    Acceptance E NR  AS 12/12/17 0948 Active   Significant Other Eager E,D,H NR  DM 12/15/17 1115 Active    Acceptance E NR   12/13/17 1258 Active               Point: Precautions (Active)    Learning Progress Summary    Learner Readiness Method Response Comment Documented by Status   Patient Acceptance E VU   12/16/17 1321 Done    Eager E,D,H NR   12/15/17 1715 Active    Eager E,D,H NR   12/15/17 1115 Active    Acceptance TB VU  TP 12/14/17 1757 Done    Acceptance E VU,NR Reviewed safety with mobility and HEP.  12/14/17 1627 Done    Acceptance E VU,NR Reviewed gait mechanics with mobility and HEP.  12/14/17 1109 Done    Acceptance E NR   12/13/17 1550 Active    Acceptance E NR   12/13/17 1258 Active    Acceptance E,D NR reviewed safety with mobility SC 12/12/17 1640 Active    Acceptance  E NR  AS 12/12/17 0948 Active    Acceptance E,D,H NR Edu pt/son re: HEP; issued illustrated handout. LS 12/11/17 1448 Active    Acceptance E,D NR  LS 12/11/17 1002 Active   Family Acceptance E VU,NR recommed have continued OPPT vs HHPT as needed.  12/16/17 1322 Done    Acceptance E VU  EH 12/16/17 1321 Done    Eager E,D,H NR  DM 12/15/17 1715 Active    Acceptance E VU,NR Reviewed safety with mobility and HEP. KR 12/14/17 1627 Done    Acceptance E NR   12/13/17 1550 Active    Acceptance E NR  AS 12/12/17 0948 Active   Significant Other Eager E,D,H NR  DM 12/15/17 1115 Active    Acceptance E NR   12/13/17 1258 Active                      User Key     Initials Effective Dates Name Provider Type Discipline    SC 06/19/15 -  Ananda Bro, PT Physical Therapist PT     06/19/15 -  Ally Azevedo, PT Physical Therapist PT     06/19/15 -  Lynn Villasenor, PT Physical Therapist PT    AS 06/22/15 -  Juliann Irving, PTA Physical Therapy Assistant PT    LS 06/19/15 -  Dawna Blanchard, PT Physical Therapist PT    KR 09/25/17 -  Mayelin Kebede, PT Physical Therapist PT    TP 07/10/17 -  Ca Kelly, RN Registered Nurse Nurse                    PT Recommendation and Plan  Anticipated Discharge Disposition: skilled nursing facility  PT Frequency: 2 times/day  Plan of Care Review  Plan Of Care Reviewed With: patient, family  Progress: improving  Outcome Summary/Follow up Plan: Pt ambulates increased distance in bal. Pt expected to d/c home this date with continued PT after d/c.          Outcome Measures       12/16/17 1125 12/15/17 1610 12/15/17 1030    How much help from another person do you currently need...    Turning from your back to your side while in flat bed without using bedrails? 4  -EH 4  -DM 4  -DM    Moving from lying on back to sitting on the side of a flat bed without bedrails? 4  -EH 3  -DM 3  -DM    Moving to and from a bed to a chair (including a wheelchair)? 3  -EH 3  -DM 3  -DM    Standing  up from a chair using your arms (e.g., wheelchair, bedside chair)? 3  -EH 3  -DM 3  -DM    Climbing 3-5 steps with a railing? 3  -EH 2  -DM 2  -DM    To walk in hospital room? 3  -EH 3  -DM 3  -DM    AM-PAC 6 Clicks Score 20  -EH 18  -DM 18  -DM    Functional Assessment    Outcome Measure Options AM-PAC 6 Clicks Basic Mobility (PT)  -EH AM-PAC 6 Clicks Basic Mobility (PT)  -DM AM-PAC 6 Clicks Basic Mobility (PT)  -DM      12/14/17 1527 12/14/17 1135 12/14/17 1130    How much help from another person do you currently need...    Turning from your back to your side while in flat bed without using bedrails? 4  -KR      Moving from lying on back to sitting on the side of a flat bed without bedrails? 3  -KR      Moving to and from a bed to a chair (including a wheelchair)? 3  -KR      Standing up from a chair using your arms (e.g., wheelchair, bedside chair)? 3  -KR      Climbing 3-5 steps with a railing? 2  -KR      To walk in hospital room? 3  -KR      AM-PAC 6 Clicks Score 18  -KR      How much help from another is currently needed...    Putting on and taking off regular lower body clothing?  --  -AR 3  -AR    Bathing (including washing, rinsing, and drying)  --  -AR 3  -AR    Toileting (which includes using toilet bed pan or urinal)  --  -AR 3  -AR    Putting on and taking off regular upper body clothing  --  -AR 3  -AR    Taking care of personal grooming (such as brushing teeth)  --  -AR 3  -AR    Eating meals  --  -AR 4  -AR    Score  --  -AR 19  -AR    Functional Assessment    Outcome Measure Options AM-PAC 6 Clicks Basic Mobility (PT)  -KR --  -AR AM-PAC 6 Clicks Daily Activity (OT)  -AR      12/14/17 0938 12/13/17 1512       How much help from another person do you currently need...    Turning from your back to your side while in flat bed without using bedrails? 4  -KR 4  -EH     Moving from lying on back to sitting on the side of a flat bed without bedrails? 3  -KR 4  -EH     Moving to and from a bed to a chair  (including a wheelchair)? 3  -KR 3  -EH     Standing up from a chair using your arms (e.g., wheelchair, bedside chair)? 3  -KR 3  -EH     Climbing 3-5 steps with a railing? 2  -KR 3  -EH     To walk in hospital room? 3  -KR 3  -EH     AM-PAC 6 Clicks Score 18  -KR 20  -EH     Functional Assessment    Outcome Measure Options AM-PAC 6 Clicks Basic Mobility (PT)  -KR AM-PAC 6 Clicks Basic Mobility (PT)  -       User Key  (r) = Recorded By, (t) = Taken By, (c) = Cosigned By    Initials Name Provider Type     Ally Azevedo, PT Physical Therapist    LAURO Villasenor, PT Physical Therapist    AR Mayra Miguel, OT Occupational Therapist    ALEX Kebede, PT Physical Therapist           Time Calculation:         PT Charges       12/16/17 1326          Time Calculation    Start Time 1125  -EH      PT Received On 12/16/17  -      PT Goal Re-Cert Due Date 12/21/17  -      Time Calculation- PT    Total Timed Code Minutes- PT 26 minute(s)  -        User Key  (r) = Recorded By, (t) = Taken By, (c) = Cosigned By    Initials Name Provider Type     Ally Azevedo, PT Physical Therapist          Therapy Charges for Today     Code Description Service Date Service Provider Modifiers Qty    57499138387 HC GAIT TRAINING EA 15 MIN 12/16/2017 Ally Azevedo, PT GP 1    11911608115 HC PT THER PROC EA 15 MIN 12/16/2017 Ally Azevedo, PT GP 1          PT G-Codes  Outcome Measure Options: AM-PAC 6 Clicks Basic Mobility (PT)    Ally Azevedo, PT  12/16/2017

## 2017-12-16 NOTE — PLAN OF CARE
Problem: Perioperative Period (Adult)  Intervention: Prevent/Manage DVT/VTE Risk    12/16/17 0335   Support Surgical/Anesthesia Recovery   Venous Thromboembolism Prevent/Manage bilateral;foot pump device on;bleeding precautions maintained;fluids promoted

## 2017-12-16 NOTE — PROGRESS NOTES
Orthopedic Foot/Ankle Progress Note    Subjective     Post-Operative Day: 6 Days Post-Op    Systemic or Specific Complaints: s/p I&D right TKR infection, feeling better  Objective     Vital signs in last 24 hours:  Temp:  [98.4 °F (36.9 °C)-98.5 °F (36.9 °C)] 98.4 °F (36.9 °C)  Heart Rate:  [65-93] 93  Resp:  [16-18] 16  BP: (129-157)/(72-83) 157/72    Neurovascular: Right knee dressing dry, toes wiggle, no swelling   Wound: As above         Data Review  Lab Results (last 24 hours)     Procedure Component Value Units Date/Time    Tissue / Bone Culture - Tissue, Knee, Right [[id number]]  (Abnormal)  (Susceptibility) Collected:  12/10/17 1926    Specimen:  Tissue from Knee, Right Updated:  12/15/17 1505     Tissue Culture --      Light growth (2+) Streptococcus mitis / oralis (A)     Gram Stain Result Many (4+) WBCs seen      No organisms seen    Susceptibility      Streptococcus mitis / oralis     PENELOPE     Ampicillin <=0.06 ug/ml Susceptible     Azithromycin <=0.25 ug/ml Susceptible     Cefepime <=0.25 ug/ml Susceptible     Cefotaxime <=0.25 ug/ml Susceptible     Ceftriaxone <=0.25 ug/ml Susceptible     Chloramphenicol 2 ug/ml Susceptible     Clindamycin <=0.06 ug/ml Susceptible     Erythromycin <=0.06 ug/ml Susceptible     Levofloxacin 1 ug/ml Susceptible     Meropenem <=0.06 ug/ml Susceptible     Penicillin G <=0.03 ug/ml Susceptible     Tetracycline <=0.5 ug/ml Susceptible     Vancomycin 0.5 ug/ml Susceptible                    Blood Culture - Blood, Blood, Venous Line [[id number]]  (Normal) Collected:  12/10/17 1539    Specimen:  Blood from Blood, Venous Line Updated:  12/15/17 1631     Blood Culture No growth at 5 days    Blood Culture - Blood, Blood, Venous Line [[id number]]  (Normal) Collected:  12/10/17 1535    Specimen:  Blood from Blood, Venous Line Updated:  12/15/17 1631     Blood Culture No growth at 5 days    Fungus Culture - Synovial Fluid, Knee, Right [[id number]] Collected:  12/10/17 1915    Specimen:   Synovial Fluid from Knee, Right Updated:  12/15/17 2211     Fungus Culture No fungus isolated at less than 1 week    AFB Culture - Synovial Fluid, Knee, Right [[id number]]  (Normal) Collected:  12/10/17 1915    Specimen:  Synovial Fluid from Knee, Right Updated:  12/15/17 2211     AFB Culture No AFB isolated at less than 1 week     AFB Stain No acid fast bacilli seen on concentrated smear    Fungus Culture - Tissue, Knee, Right [[id number]] Collected:  12/10/17 1921    Specimen:  Tissue from Knee, Right Updated:  12/15/17 2211     Fungus Culture No fungus isolated at less than 1 week    AFB Culture - Tissue, Knee, Right [[id number]]  (Normal) Collected:  12/10/17 1921    Specimen:  Tissue from Knee, Right Updated:  12/15/17 2211     AFB Culture No AFB isolated at less than 1 week     AFB Stain No acid fast bacilli seen on concentrated smear    Fungus Culture - Tissue, Knee, Right [[id number]] Collected:  12/10/17 1924    Specimen:  Tissue from Knee, Right Updated:  12/15/17 2216     Fungus Culture No fungus isolated at less than 1 week    AFB Culture - Tissue, Knee, Right [[id number]]  (Normal) Collected:  12/10/17 1924    Specimen:  Tissue from Knee, Right Updated:  12/15/17 2216     AFB Culture No AFB isolated at less than 1 week     AFB Stain No acid fast bacilli seen on concentrated smear    Fungus Culture - Tissue, Knee, Right [[id number]] Collected:  12/10/17 1925    Specimen:  Tissue from Knee, Right Updated:  12/15/17 2231     Fungus Culture No fungus isolated at less than 1 week    AFB Culture - Tissue, Knee, Right [[id number]]  (Normal) Collected:  12/10/17 1925    Specimen:  Tissue from Knee, Right Updated:  12/15/17 2231     AFB Culture No AFB isolated at less than 1 week     AFB Stain No acid fast bacilli seen on concentrated smear    Fungus Culture - Tissue, Knee, Right [[id number]] Collected:  12/10/17 1926    Specimen:  Tissue from Knee, Right Updated:  12/15/17 2246     Fungus Culture No fungus isolated at  less than 1 week    AFB Culture - Tissue, Knee, Right [[id number]]  (Normal) Collected:  12/10/17 1926    Specimen:  Tissue from Knee, Right Updated:  12/15/17 2246     AFB Culture No AFB isolated at less than 1 week     AFB Stain No acid fast bacilli seen on concentrated smear    CBC Auto Differential [[id number]]  (Abnormal) Collected:  12/16/17 0657    Specimen:  Blood Updated:  12/16/17 0723     WBC 10.20 10*3/mm3      RBC 3.40 (L) 10*6/mm3      Hemoglobin 10.3 (L) g/dL      Hematocrit 32.2 (L) %      MCV 94.7 fL      MCH 30.3 pg      MCHC 32.0 g/dL      RDW 13.2 %      RDW-SD 45.6 fl      MPV 9.2 fL      Platelets 345 10*3/mm3      Neutrophil % 72.5 (H) %      Lymphocyte % 14.4 (L) %      Monocyte % 9.3 %      Eosinophil % 3.1 (H) %      Basophil % 0.3 %      Immature Grans % 0.4 %      Neutrophils, Absolute 7.39 10*3/mm3      Lymphocytes, Absolute 1.47 10*3/mm3      Monocytes, Absolute 0.95 10*3/mm3      Eosinophils, Absolute 0.32 (H) 10*3/mm3      Basophils, Absolute 0.03 10*3/mm3      Immature Grans, Absolute 0.04 (H) 10*3/mm3     CBC & Differential [[id number]] Collected:  12/16/17 0657    Specimen:  Blood Updated:  12/16/17 0723    Narrative:       The following orders were created for panel order CBC & Differential.  Procedure                               Abnormality         Status                     ---------                               -----------         ------                     CBC Auto Differential[[id number]]        Abnormal            Final result                 Please view results for these tests on the individual orders.            Assessment/Plan     Status post- I&D right TKR- ok to go home with PICC line, dressing stays in place 7 days.  Dr Garcia arranged follow up.  Call or return sooner if any problems.             Anupama Pizarro MD    Date: 12/16/2017  Time: 12:34 PM

## 2017-12-16 NOTE — THERAPY TREATMENT NOTE
"Acute Care - Physical Therapy Treatment Note  [facility]     Patient Name: Lesli Leon  : 1955  MRN: [id number]  Today's Date: 12/15/2017  Onset of Illness/Injury or Date of Surgery Date: 12/10/17  Date of Referral to PT: 12/10/17  Referring Physician: MD Jose    Admit Date: 12/10/2017    Visit Dx:    ICD-10-CM ICD-9-CM   1. Sepsis, due to unspecified organism A41.9 038.9     995.91   2. Infection of total right knee replacement T84.53XA 996.66    Z96.651 V43.65   3. Impaired functional mobility, balance, gait, and endurance Z74.09 V49.89   4. Impaired mobility and ADLs Z74.09 799.89     Patient Active Problem List   Diagnosis   • Sepsis, probable right septic knee as source   • Hypertension   • Disease of thyroid gland   • Expressive aphasia   • Heart murmur               Adult Rehabilitation Note       12/15/17 1610 12/15/17 1030 17 1527    Rehab Assessment/Intervention    Discipline physical therapist  -DM physical therapist  -DM physical therapist  -KR    Document Type therapy note (daily note)  -DM therapy note (daily note)  -DM therapy note (daily note)  -KR    Subjective Information agree to therapy;complains of;weakness;pain;nausea/vomiting   still somewhat nauseated;nsg premed (nausea & pain med)  -DM agree to therapy;complains of;weakness;pain;nausea/vomiting   hadZofran,painPill;still nauseated;pale appearance;gaveBasin  -DM agree to therapy;complains of;pain  -KR    Patient Effort, Rehab Treatment good  -DM good  -DM good  -KR    Symptoms Noted During/After Treatment fatigue;increased pain  -DM fatigue;increased pain;significant change in vital signs  -DM fatigue  -KR    Symptoms Noted Comment fatigue;signif pain w/ act.knee flex(dsg getting rigid)  -DM incr.nauseated;\"more stiff today\"  -DM     Precautions/Limitations fall precautions;other (see comments)   brain lesion; impulsive; speech occ.garbled(nsg concurred)  -DM fall precautions;other (see comments)   brain " lesion;impulsive;speech occ.garbled(nsg concurred)  -DM fall precautions  -KR    Recorded by [DM] Lynn Villasenor, PT [DM] Lynn Villasenor, PT [KR] Mayelin Kebede, PT    Vital Signs    Pre Systolic BP Rehab 148  -  -DM --   RN cleared for treatment; VSS  -KR    Pre Treatment Diastolic BP 83  -DM 83  -DM     Post Systolic BP Rehab 146  -  -DM     Post Treatment Diastolic BP 84  -DM 83  -DM     Pretreatment Heart Rate (beats/min) 63  -DM 57  -DM     Intratreatment Heart Rate (beats/min) 72  -DM 65  -DM     Posttreatment Heart Rate (beats/min) 65  -DM      Pre SpO2 (%) 96  -DM 97  -DM     O2 Delivery Pre Treatment room air  -DM room air  -DM     O2 Delivery Intra Treatment room air  -DM      Post SpO2 (%) 95  -DM 96  -DM     O2 Delivery Post Treatment room air  -DM room air  -DM     Pre Patient Position Supine  -DM Supine  -DM Supine  -KR    Intra Patient Position Standing  -DM Standing  -DM Standing  -KR    Post Patient Position Sitting  -DM Standing  -DM Supine  -KR    Recorded by [DM] Lynn Villasenor, PT [DM] Lynn Villasenor, PT [KR] Mayelin Kebede, PT    Pain Assessment    Pain Assessment 0-10  -DM No/denies pain  -DM 0-10  -KR    Pain Score 7  -DM 7  -DM 9  -KR    Post Pain Score 8  -DM 8  -DM 9  -KR    Pain Type Acute pain  -DM Acute pain  -DM Acute pain  -KR    Pain Location Knee  -DM Knee  -DM Knee  -KR    Pain Orientation Right  -DM Right  -DM Right;Anterior;Posterior  -KR    Pain Descriptors Aching  -DM Aching  -DM     Pain Frequency Constant/continuous  -DM Constant/continuous  -DM     Patient's Stated Pain Goal No pain  -DM No pain  -DM     Pain Intervention(s) Medication (See MAR);Ambulation/increased activity  -DM Medication (See MAR);Repositioned;Rest  -DM Repositioned;Ambulation/increased activity  -KR    Response to Interventions tolerated  -DM tolerated  -DM tolerated; RN aware  -KR    Recorded by [DM] Lynn Villasenor, PT [DM] Lynn Villasenor, PT [KR] Mayelin Kebede, PT    Cognitive  "Assessment/Intervention    Current Cognitive/Communication Assessment functional  -DM functional  -DM functional  -KR    Orientation Status oriented to;person;place;time  -DM oriented to;person;place   can't recall date:\"I have to look at it\"  -DM oriented x 4  -KR    Follows Commands/Answers Questions 100% of the time;able to follow single-step instructions;needs cueing;needs increased time;needs repetition  -DM 75% of the time;100% of the time;able to follow single-step instructions;needs cueing;needs increased time;needs repetition  -DM able to follow single-step instructions;100% of the time;needs cueing;needs increased time  -KR    Personal Safety mild impairment;decreased awareness, need for assist;decreased awareness, need for safety;decreased insight to deficits  -DM mild impairment;decreased awareness, need for assist;decreased awareness, need for safety;decreased insight to deficits  -DM mild impairment;decreased awareness, need for assist;decreased awareness, need for safety  -KR    Personal Safety Interventions fall prevention program maintained;gait belt;nonskid shoes/slippers when out of bed  -DM fall prevention program maintained;gait belt;nonskid shoes/slippers when out of bed  -DM fall prevention program maintained;gait belt;nonskid shoes/slippers when out of bed  -KR    Recorded by [DM] Lynn Villasenor, PT [DM] Lynn Villasenor, PT [KR] Mayelin Kebede PT    Communication Assessment/Intervention    Communication Assessment Expressive Aphasia   occ. slurred speech/'Jibberish\" per nsg  -DM Expressive Aphasia   occ slurred speech,\"Jibberish\",noted by nsg,PT (Brain lesion  -DM     Recorded by [DM] Lynn Villasenor PT [DM] Lynn Villasenor, PT     ROM (Range of Motion)    General ROM  lower extremity range of motion deficits identified  -DM     Recorded by  [LAURO] Lynn Villasenor, PT     General LE Assessment    ROM Detail  R knee 18 deg to 69 deg (ext.measured w/legrest elev;flex w/leg dependent   c/o " "stiffness, incr. weakness d/t nausea  -DM 9-74 degrees. Pt lacking 9 degrees full extension AROM. Flexion AAROM measured in sitting.   -KR    Recorded by  [DM] Lynn Villasenor PT [KR] Mayelin Kebede, PT    Mobility Assessment/Training    Extremity Weight-Bearing Status   right lower extremity  -KR    Right Lower Extremity Weight-Bearing   weight-bearing as tolerated  -KR    Recorded by   [KR] Mayelin Kebede PT    Bed Mobility, Assessment/Treatment    Bed Mobility, Assistive Device bed rails;head of bed elevated;leg   -DM bed rails;head of bed elevated;leg   -DM     Bed Mobility, Scoot/Bridge, [place] supervision required   had phone call x 4 min.then completed scooting  -DM supervision required  -DM     Bed Mob, Supine to Sit, [place] contact guard assist;verbal cues required  -DM contact guard assist;verbal cues required  -DM supervision required;verbal cues required  -KR    Bed Mob, Sit to Supine, [place]   supervision required;verbal cues required  -KR    Bed Mobility, Safety Issues decreased use of arms for pushing/pulling;decreased use of legs for bridging/pushing  -DM decreased use of arms for pushing/pulling;decreased use of legs for bridging/pushing  -DM decreased use of legs for bridging/pushing  -KR    Bed Mobility, Impairments strength decreased;impaired balance;pain  -DM strength decreased;impaired balance;pain  -DM ROM decreased;strength decreased;pain  -KR    Bed Mobility, Comment cues for HP, Leg   -DM cues for HP,leg  use  -DM Pt given verbal cues for proper sequencing. Pt demonstrated proper use of leg .   -KR    Recorded by [DM] Lynn Villasenor, PT [DM] Lynn Villasenor, PT [KR] Mayelin Kebede, PT    Transfer Assessment/Treatment    Transfers, Sit-Stand [place] minimum assist (75% patient effort);verbal cues required   3TRIALS;crying out w/ pain,stating\"wait,wait\" @ time/satBack  -DM minimum assist (75% patient effort);verbal cues required  -DM " contact guard assist;verbal cues required  -KR    Transfers, Stand-Sit [place] contact guard assist;verbal cues required  -DM contact guard assist;verbal cues required  -DM contact guard assist;verbal cues required  -KR    Transfers, Sit-Stand-Sit, Assist Device rolling walker  -DM rolling walker  -DM rolling walker  -KR    Toilet Transfer, [place] minimum assist (75% patient effort)   toileting 5 min.  -DM  contact guard assist;verbal cues required  -KR    Toilet Transfer, Assistive Device rolling walker   grab bar on wall  -DM  rolling walker  -KR    Transfer, Maintain Weight Bearing Status able to maintain weight bearing status   WBAT RLE  -DM able to maintain weight bearing status  -DM     Transfer, Safety Issues sequencing ability decreased;weight-shifting ability decreased  -DM sequencing ability decreased;weight-shifting ability decreased  -DM step length decreased;weight-shifting ability decreased  -KR    Transfer, Impairments strength decreased;impaired balance;pain  -DM strength decreased;impaired balance;pain  -DM strength decreased;impaired balance;pain  -KR    Transfer, Comment cues for HP, seq., WS Over CofG  -DM CUES for HP, seq, WS over C of G  -DM Pt given verbal cues for proper hand placement to push up from bed instead of reaching for RW upon initial stand.   -KR    Recorded by [DM] Lynn Villasenor, PT [DM] Lynn Villasenor, PT [KR] Mayelin Kebede, PT    Gait Assessment/Treatment    Gait, [place] Level contact guard assist  -DM contact guard assist  -DM contact guard assist;verbal cues required  -KR    Gait, Assistive Device rolling walker   unable to amb back;rolled in recliner   -DM rolling walker   > 6 stand.rests  -DM rolling walker  -KR    Gait, Distance (Feet) 160   pt.insisted son follow w/ chair;onset nausea/signif.fat.  -   mult.c/o's,inc.nausea,fatigue;tech brought chair;rolled toBS  -  -KR    Gait, Gait Pattern Analysis swing-through gait  -DM swing-through  gait  -DM swing-through gait  -KR    Gait, Gait Deviations antalgic;right:;franklin decreased;decreased heel strike;double stance time increased;knee buckling;leans to the right;narrow base;step length decreased;weight-shifting ability decreased  -DM antalgic;right:;franklin decreased;decreased heel strike;double stance time increased;knee buckling;leans to the right;narrow base;step length decreased;weight-shifting ability decreased  -DM bilateral:;franklin decreased;forward flexed posture;step length decreased;weight-shifting ability decreased  -KR    Gait, Maintain Weight Bearing Status able to maintain weight bearing status   WBAT RLE  -DM able to maintain weight bearing status  -DM     Gait, Safety Issues step length decreased;weight-shifting ability decreased  -DM step length decreased;weight-shifting ability decreased  -DM step length decreased;weight-shifting ability decreased;balance decreased during turns  -KR    Gait, Impairments strength decreased;impaired balance;postural control impaired;pain  -DM strength decreased;impaired balance;postural control impaired;pain  -DM ROM decreased;strength decreased;impaired balance;pain  -KR    Gait, Comment Cues for incr step length & YUSEF,trunk ext, PLB  -DM cues for incr step length & YUSEF, trunk ext/focusing,PLB  -DM Pt demonstrated swing through gait pattern with slow gait speed. Pt given verbal cues for upright posture and increased weight bearing on RLE.   -KR    Recorded by [DM] Lynn Villasenor, PT [DM] Lynn Villasenor, PT [KR] Mayelin Kebede, PT    Motor Skills/Interventions    Additional Documentation Balance Skills Training (Group)  -DM Balance Skills Training (Group)  -DM     Recorded by [DM] Lynn Villasenor, PT [DM] Lynn Villasenor, PT     Balance Skills Training    Sitting-Level of Assistance Close supervision  -DM Contact guard  -DM     Sitting-Balance Support Feet supported  -DM Feet supported  -DM     Sitting-Balance Activities Trunk control  "activities;Reaching for objects   TOILETING/hygiene  -DM Trunk control activities   \"Whoa\" (c/o incr.nausea); PLB;Scooting; LE exer  -DM     Sitting # of Minutes 10  -DM 6  -DM     Standing-Level of Assistance Contact guard  -DM Contact guard  -DM     Static Standing Balance Support assistive device  -DM assistive device  -DM     Standing-Balance Activities Weight Shift A-P;Weight Shift R-L   hygiene  -DM Weight Shift A-P;Weight Shift R-L  -DM     Standing Balance # of Minutes 3  -DM 2  -DM     Gait Balance-Level of Assistance Contact guard  -DM Contact guard  -DM     Gait Balance Support assistive device  -DM assistive device  -DM     Gait Balance Activities side-stepping;scanning environment R/L   PLB  -DM scanning environment R/L;side-stepping  -DM     Recorded by [DM] Lynn Villasenor, PT [DM] Lynn Villasenor, PT     Therapy Exercises    Left Lower Extremity AROM:;10 reps;sitting;ankle pumps/circles;glut sets;heel slides;hip abduction/adduction;hip ER;hip flexion;hip IR;LAQ;quad sets;SAQ;SLR   HS sets  -DM AROM:;10 reps;supine;sitting;ankle pumps/circles;glut sets;heel slides;hip abduction/adduction;hip ER;hip flexion;hip IR;LAQ;quad sets;SAQ;SLR   HS sets;used LLE to self range R knee  -DM     Exercise Protocols total knee  -DM total knee  -DM total knee  -KR    Total Knee Exercises right:;10 reps;completed protocol   exception of HS stretches  -DM right:;10 reps;with assist;ankle pumps/circles;quad set;glut set;hamstring set;heel slides;heel slide stretch;SLR;SAQ;LAQ  -DM right:;15 reps;completed protocol;with assist;ankle pumps/circles;quad set;glut set;heel slides;heel slide stretch;SLR;SAQ;LAQ  -KR    Recorded by [DM] Lynn Villasenor, PT [DM] Lynn Villasenor, PT [KR] Mayelin Kebede PT    Positioning and Restraints    Pre-Treatment Position in bed  -DM in bed  -DM in bed  -KR    Post Treatment Position chair  -DM chair  -DM bed  -KR    In Bed   supine;call light within reach;encouraged to call for " assist;exit alarm on;with family/caregiver;with nsg  -KR    In Chair notified nsg;reclined;call light within reach;encouraged to call for assist;exit alarm on;with family/caregiver;waffle cushion;legs elevated  -DM notified nsg;reclined;encouraged to call for assist;exit alarm on;with family/caregiver;compression device;waffle cushion;on mechanical lift sling;legs elevated   spouse present;mult.ptreq.ofPT(cold comp. to face,ice,drinks  -DM     Recorded by [DM] Lynn Villasenor, PT [DM] Lynn Villasenor, PT [KR] Mayelin Kebede, PT      12/14/17 1130 12/14/17 0938 12/13/17 1512    Rehab Assessment/Intervention    Discipline occupational therapist  -AR physical therapist  -KR physical therapist  -    Document Type therapy note (daily note)   POD#4   -AR therapy note (daily note)  -KR therapy note (daily note)  -EH    Subjective Information agree to therapy;complains of;fatigue;pain;nausea/vomiting  -AR agree to therapy;complains of;pain  -KR agree to therapy  -EH    Patient Effort, Rehab Treatment good  -AR good  -KR adequate  -EH    Symptoms Noted During/After Treatment fatigue  -AR fatigue  -KR fatigue  -EH    Precautions/Limitations fall precautions  -AR fall precautions  -KR fall precautions  -EH    Equipment Issued to Patient bathing equipment;dressing equipment   reacher, leg lifte, shoe horn and sponge  -AR      Recorded by [AR] Mayra Miguel, OT [KR] Mayelin Kebede, PT [EH] Ally Azevedo, PT    Vital Signs    Pre Systolic BP Rehab  --   RN cleared for treatment; VSS   -KR     Pre Patient Position  Supine  -KR     Intra Patient Position  Standing  -KR     Post Patient Position  Supine  -KR     Recorded by  [KR] Mayelin Kebede, PT     Pain Assessment    Pain Assessment 0-10  -AR 0-10  -KR Serrano-Vasquez FACES  -EH    Serrano-Baker FACES Pain Rating   4  -EH    Pain Score 5  -AR 9  -KR     Post Pain Score 5  -AR 6  -KR     Pain Type Acute pain  -AR Acute pain  -KR Acute pain  -EH    Pain Location Knee  -AR Knee   -KR Knee  -EH    Pain Orientation Right;Anterior;Posterior  -AR Right  -KR Right  -EH    Patient's Stated Pain Goal   No pain  -EH    Pain Intervention(s) Medication (See MAR);Repositioned;Ambulation/increased activity  -AR Repositioned;Ambulation/increased activity  -KR Repositioned;Ambulation/increased activity  -EH    Response to Interventions tolerated  -AR tolerated  -KR tolerated  -EH    Recorded by [AR] Mayra Miguel, OT [KR] Mayelin Kebede, PT [] Ally Azevedo, PT    Cognitive Assessment/Intervention    Current Cognitive/Communication Assessment functional  -AR functional  -KR functional  -EH    Orientation Status oriented x 4  -AR oriented x 4   intermittent confusion  -KR oriented x 4  -EH    Follows Commands/Answers Questions 100% of the time;able to follow multi-step instructions  -AR able to follow single-step instructions;100% of the time;needs increased time;needs cueing  -% of the time;able to follow single-step instructions  -    Personal Safety WNL/WFL  -AR mild impairment;decreased awareness, need for assist;decreased awareness, need for safety;decreased insight to deficits  -KR mild impairment;decreased awareness, need for assist;decreased awareness, need for safety  -    Personal Safety Interventions fall prevention program maintained  -AR fall prevention program maintained;gait belt;nonskid shoes/slippers when out of bed  -KR fall prevention program maintained;gait belt;nonskid shoes/slippers when out of bed  -    Recorded by [AR] Mayra Miguel, OT [KR] Mayelin Kebede, PT [EH] Ally Azevedo, PT    Mobility Assessment/Training    Extremity Weight-Bearing Status right lower extremity  -AR      Right Lower Extremity Weight-Bearing weight-bearing as tolerated  -AR      Recorded by [AR] Mayra Miguel OT      Bed Mobility, Assessment/Treatment    Bed Mobility, Assistive Device bed rails;head of bed elevated;leg   -AR  bed rails;head of bed elevated  -     Bed Mobility, Scoot/Bridge, [place] supervision required  -AR      Bed Mob, Supine to Sit, [place] supervision required;verbal cues required  -AR supervision required  -KR supervision required  -EH    Bed Mob, Sit to Supine, [place] supervision required;verbal cues required  -AR supervision required  -KR     Bed Mobility, Safety Issues  decreased use of legs for bridging/pushing  -KR decreased use of legs for bridging/pushing;decreased use of arms for pushing/pulling  -    Bed Mobility, Impairments ROM decreased;pain  -AR ROM decreased;strength decreased;pain  -KR pain;coordination impaired;motor control impaired;strength decreased  -    Bed Mobility, Comment issued leg  and educated pt on use to assist with bed mobility, car transfers and tub bench transfers  -AR Pt demonstrated good safety awareness with bed mobility.  -KR     Recorded by [AR] Mayra Miguel, OT [KR] Mayelin Kebede, PT [EH] Ally Azevedo, PT    Transfer Assessment/Treatment    Transfers, Sit-Stand [place] contact guard assist;verbal cues required  -AR contact guard assist;verbal cues required  -KR contact guard assist;2 person assist required;verbal cues required  -    Transfers, Stand-Sit [place] contact guard assist;verbal cues required  -AR contact guard assist;verbal cues required  -KR contact guard assist;verbal cues required  -    Transfers, Sit-Stand-Sit, Assist Device elevated surface;rolling walker  -AR rolling walker  -KR rolling walker  -    Toilet Transfer, [place]  contact guard assist;verbal cues required  -KR     Toilet Transfer, Assistive Device  rolling walker  -KR     Transfer, Safety Issues  step length decreased;weight-shifting ability decreased  -KR other (see comments)  -    Transfer, Impairments strength decreased;pain  -AR strength decreased;impaired balance;coordination impaired;pain  -KR impaired balance;motor control impaired;strength decreased  -    Transfer,  Comment Educated pt on tub bench use for tub transfer and issued handout on places to purchase, educated pt/son on safe car transfer technique   -AR Pt given verbal cues for correct hand placement to push up from bed prior to initial stand and to reach for grab bar for toilet transfer.  -KR extended time needed  -EH    Recorded by [AR] Mayra Miguel, OT [KR] Mayelin Kebede, PT [EH] Ally Azevedo, PT    Gait Assessment/Treatment    Gait, [place] Level  contact guard assist;verbal cues required  -KR contact guard assist  -    Gait, Assistive Device  rolling walker  -KR rolling walker  -    Gait, Distance (Feet)  120  -KR 90  -    Gait, Gait Pattern Analysis  swing-through gait  -KR swing-through gait  -    Gait, Gait Deviations  right:;antalgic;franklin decreased;forward flexed posture;step length decreased;weight-shifting ability decreased  -KR franklin decreased;decreased heel strike;forward flexed posture;weight-shifting ability decreased;step length decreased  -    Gait, Safety Issues  step length decreased;weight-shifting ability decreased  -KR step length decreased;weight-shifting ability decreased;balance decreased during turns;sequencing ability decreased  -    Gait, Impairments  ROM decreased;strength decreased;impaired balance;pain  -KR strength decreased;pain;motor control impaired  -    Gait, Comment  Pt demonstrated swing through gait pattern during ambulation. Pt given verbal cues for upright posture and increased step length. Pt required encouragement to continue. Pt's mobility limited by fatigue and pain.   -KR pt progressing with step through gait pattern with symmetrical step length. Pt improves performance with son's presence during ambulaiton.  -EH    Recorded by  [KR] Mayelin Kebede, PT [EH] Ally Azevedo, PT    Lower Body Bathing Assessment/Training    LB Bathing Assess/Train, Comment Issued LH sponge and educated pt on use  -AR      Recorded by [AR] Mayra Lares  [place], OT      Lower Body Dressing Assessment/Training    LB Dressing Assess/Train, Clothing Type donning:;doffing:;slipper socks  -AR      LB Dressing Assess/Train, Assist Device reacher;sock-aid  -AR      LB Dressing Assess/Train, Position edge of bed  -AR      LB Dressing Assess/Train, Gem verbal cues required;contact guard assist  -AR      LB Dressing Assess/Train, Impairments ROM decreased;pain  -AR      LB Dressing Assess/Train, Comment Reviewed kain-technique and ADL retraining. Pt unable to don socks without AE d/t pain and ROM restrictions. Issued AE and educated pt on use. Sock aide not currently available to issue.   -AR      Recorded by [AR] Mayra Miguel OT      Balance Skills Training    Gait Balance-Level of Assistance   Contact guard  -    Gait Balance Support   assistive device  -    Recorded by   [EH] Ally Azevedo, PT    Therapy Exercises    Bilateral Lower Extremities   AROM:;10 reps;ankle pumps/circles;glut sets;quad sets;knee flexion;heel slides;LAQ;SLR   assist with heel slides. encouragement for SLR  -    Exercise Protocols  total knee  -KR     Total Knee Exercises  right:;15 reps;completed protocol;with assist;ankle pumps/circles;quad set;glut set;heel slides;heel slide stretch;SLR;SAQ;LAQ  -KR     Recorded by  [KR] Mayelin Kebede, PT [EH] Ally Azevedo, PT    Positioning and Restraints    Pre-Treatment Position in bed  -AR in bed  -KR in bed  -EH    Post Treatment Position bed  -AR bed  -KR chair  -    In Bed supine;call light within reach;encouraged to call for assist;exit alarm on;with family/caregiver  -AR supine;call light within reach;encouraged to call for assist;exit alarm on;with family/caregiver;side rails up x2  -KR     In Chair   reclined;call light within reach;encouraged to call for assist;with family/caregiver;with other staff  -    Recorded by [AR] Mayra Miguel, OT [KR] Mayelin Kebede, PT [EH] Ally Azevedo, PT      12/13/17 2531           Rehab Assessment/Intervention    Discipline physical therapist  -      Document Type therapy note (daily note)  -      Subjective Information agree to therapy  -      Patient Effort, Rehab Treatment adequate  -      Symptoms Noted During/After Treatment fatigue;other (see comments)  -      Symptoms Noted Comment nausea; pt cued for PLB in sitting with improvement in nausea. Increased nausea with standing. Pt assisted to t/f to chair instead of ambulation. At that time PICC line RN presents in room.  -      Precautions/Limitations fall precautions  -      Recorded by [] Ally Azevedo PT      Pain Assessment    Pain Assessment Serrano-Vasuqez FACES  -      Serrano-Baker FACES Pain Rating 4  -      Recorded by [] Ally Azevedo PT      Cognitive Assessment/Intervention    Orientation Status oriented to;person;place;time  -      Follows Commands/Answers Questions 75% of the time;able to follow single-step instructions;needs cueing;needs increased time  -      Personal Safety mild impairment;decreased awareness, need for assist;decreased awareness, need for safety  -      Personal Safety Interventions fall prevention program maintained;gait belt;nonskid shoes/slippers when out of bed  -      Recorded by [] Ally Azevedo PT      Bed Mobility, Assessment/Treatment    Bed Mobility, Assistive Device bed rails;head of bed elevated  -      Bed Mob, Supine to Sit, [place] verbal cues required;minimum assist (75% patient effort)  -      Bed Mobility, Safety Issues decreased use of legs for bridging/pushing;other (see comments)   sequencing difficulty  -      Bed Mobility, Impairments pain;coordination impaired;motor control impaired;strength decreased  -      Recorded by [] Ally Azevedo PT      Transfer Assessment/Treatment    Transfers, Bed-Chair [place] contact guard assist;2 person assist required;verbal cues required  -      Transfers, Chair-Bed  [place] contact guard assist;2 person assist required;verbal cues required  -EH      Transfers, Sit-Stand [place] contact guard assist;verbal cues required  -EH      Transfers, Stand-Sit [place] verbal cues required;contact guard assist  -EH      Transfers, Sit-Stand-Sit, Assist Device rolling walker  -EH      Transfer, Safety Issues other (see comments)   difficulty sequencing  -EH      Transfer, Impairments impaired balance;motor control impaired;strength decreased  -EH      Recorded by [] Ally Azevedo, PT      Gait Assessment/Treatment    Gait, [place] Level contact guard assist  -EH      Gait, Assistive Device rolling walker  -EH      Gait, Distance (Feet) 4  -EH      Gait, Impairments strength decreased;motor control impaired;pain  -EH      Recorded by [] Ally Azevedo, PT      Balance Skills Training    Gait Balance-Level of Assistance Minimum assistance;x2  -EH      Gait Balance Support assistive device  -EH      Recorded by [] Ally Azevedo, PT      Therapy Exercises    Right Lower Extremity --  -EH      Recorded by [] Ally zAevedo, PT      Positioning and Restraints    Pre-Treatment Position in bed  -EH      Post Treatment Position bed  -EH      In Bed supine;call light within reach;encouraged to call for assist;notified nsg;with other staff;with family/caregiver  -EH      In Chair --  -EH      Recorded by [] Ally Azevedo, PT        User Key  (r) = Recorded By, (t) = Taken By, (c) = Cosigned By    Initials Name Effective Dates     Ally Azevedo, PT 06/19/15 -     DM Lynn Villasenor, PT 06/19/15 -     AR Mayra Miguel, OT 06/22/15 -     KR Mayelin Kebede, PT 09/25/17 -                 IP PT Goals       12/15/17 1842 12/15/17 1715 12/15/17 1115    Bed Mobility PT LTG    Bed Mobility PT LTG, Outcome  goal ongoing  -DM goal ongoing  -DM    Transfer Training PT LTG    Transfer Training PT LTG, Outcome goal ongoing  -DM goal ongoing  -DM      Gait Training PT LTG    Gait Training Goal PT LTG, Outcome  goal ongoing  -DM goal ongoing  -DM    Stair Training PT LTG    Stair Training Goal PT LTG, Outcome  goal ongoing  -DM goal ongoing  -DM      12/14/17 1629 12/14/17 1628 12/14/17 1110    Bed Mobility PT LTG    Bed Mobility PT LTG, Date Established 12/14/17  -KR      Bed Mobility PT LTG, Time to Achieve 5 days  -KR      Bed Mobility PT LTG, Activity Type supine to sit/sit to supine  -KR      Bed Mobility PT LTG, [place] Level conditional independence  -KR      Bed Mobility PT Goal  LTG, Assist Device leg   -KR      Bed Mobility PT LTG, Date Goal Reviewed 12/14/17  -KR (P)  12/14/17  -KR 12/14/17  -KR    Bed Mobility PT LTG, Outcome goal ongoing  -KR (P)  goal ongoing  -KR goal ongoing  -KR    Transfer Training PT LTG    Transfer Training PT  LTG, Date Goal Reviewed  12/14/17  -KR 12/14/17  -KR    Transfer Training PT LTG, Outcome  goal ongoing  -KR goal ongoing  -KR    Gait Training PT LTG    Gait Training Goal PT LTG, Date Goal Reviewed  12/14/17  -KR 12/14/17  -KR    Gait Training Goal PT LTG, Outcome  goal ongoing  -KR goal ongoing  -KR    Stair Training PT LTG    Stair Training Goal PT LTG, Date Goal Reviewed  12/14/17  -KR 12/14/17  -KR    Stair Training Goal PT LTG, Outcome  goal ongoing  -KR goal ongoing  -KR      12/13/17 1551 12/13/17 1259 12/12/17 1640    Bed Mobility PT LTG    Bed Mobility PT LTG, Outcome goal ongoing  -EH goal ongoing  -EH goal ongoing  -SC    Transfer Training PT LTG    Transfer Training PT LTG, Outcome goal ongoing  -EH goal ongoing  -EH goal ongoing  -SC    Gait Training PT LTG    Gait Training Goal PT LTG, Outcome goal ongoing  -EH goal ongoing  -EH goal ongoing  -SC    Stair Training PT LTG    Stair Training Goal PT LTG, Outcome goal ongoing  -EH goal ongoing  -EH goal ongoing  -SC      12/12/17 0948 12/11/17 1448 12/11/17 1002    Bed Mobility PT LTG    Bed Mobility PT LTG, Date Established   12/11/17  -LS    Bed  Mobility PT LTG, Time to Achieve   2 wks  -LS    Bed Mobility PT LTG, Activity Type   supine to sit/sit to supine  -LS    Bed Mobility PT LTG, [place] Level   supervision required  -LS    Bed Mobility PT LTG, Date Goal Reviewed 12/12/17  -AS      Bed Mobility PT LTG, Outcome goal ongoing  -AS goal ongoing  -LS     Transfer Training PT LTG    Transfer Training PT LTG, Date Established   12/11/17  -LS    Transfer Training PT LTG, Time to Achieve   2 wks  -LS    Transfer Training PT LTG, Activity Type   sit to stand/stand to sit  -LS    Transfer Training PT LTG, [place] Level   supervision required  -LS    Transfer Training PT LTG, Assist Device   walker, rolling  -LS    Transfer Training PT  LTG, Date Goal Reviewed 12/12/17  -AS      Transfer Training PT LTG, Outcome goal ongoing  -AS goal ongoing  -LS     Gait Training PT LTG    Gait Training Goal PT LTG, Date Established   12/11/17  -LS    Gait Training Goal PT LTG, Time to Achieve   2 wks  -LS    Gait Training Goal PT LTG, [place] Level   supervision required  -LS    Gait Training Goal PT LTG, Assist Device   walker, rolling  -LS    Gait Training Goal PT LTG, Distance to Achieve   200  -LS    Gait Training Goal PT LTG, Date Goal Reviewed 12/12/17  -AS      Gait Training Goal PT LTG, Outcome goal ongoing  -AS goal ongoing  -LS     Stair Training PT LTG    Stair Training Goal PT LTG, Date Established   12/11/17  -LS    Stair Training Goal PT LTG, Time to Achieve   2 wks  -LS    Stair Training Goal PT LTG, Number of Steps   3  -LS    Stair Training Goal PT LTG, [place] Level   contact guard assist  -LS    Stair Training Goal PT LTG, Assist Device   1 handrail  -LS    Stair Training Goal PT LTG, Date Goal Reviewed 12/12/17  -AS      Stair Training Goal PT LTG, Outcome goal ongoing  -AS goal ongoing  -LS       User Key  (r) = Recorded By, (t) = Taken By, (c) = Cosigned By    Initials Name Provider Type    BASILIO Bro, PT Physical Therapist      Ally Azevedo, PT Physical Therapist    DM Lynn Villasenor, PT Physical Therapist    AS Juliann Irving, PTA Physical Therapy Assistant    LS Dawna Blanchard, PT Physical Therapist    KR Mayelin Kebede, PT Physical Therapist          Physical Therapy Education     Title: PT OT SLP Therapies (Active)     Topic: Physical Therapy (Active)     Point: Mobility training (Active)    Learning Progress Summary    Learner Readiness Method Response Comment Documented by Status   Patient Eager E,D,H NR   12/15/17 1715 Active    Eager E,D,H NR   12/15/17 1115 Active    Acceptance TB VU   12/14/17 1757 Done    Acceptance E VU,NR Reviewed safety with mobility and HEP.  12/14/17 1627 Done    Acceptance E VU,NR Reviewed gait mechanics with mobility and HEP.  12/14/17 1109 Done    Acceptance E NR   12/13/17 1550 Active    Acceptance E NR   12/13/17 1258 Active    Acceptance E,D NR reviewed safety with mobility SC 12/12/17 1640 Active    Acceptance E NR  AS 12/12/17 0948 Active    Acceptance E,D,H NR [facility] pt/son re: HEP; issued illustrated handout.  12/11/17 1448 Active    Acceptance E,D NR   12/11/17 1002 Active   Family Eager E,D,H NR   12/15/17 1715 Active    Acceptance E VU,NR Reviewed safety with mobility and HEP.  12/14/17 1627 Done    Acceptance E NR   12/13/17 1550 Active    Acceptance E NR  AS 12/12/17 0948 Active   Significant Other Eager E,D,H NR   12/15/17 1115 Active    Acceptance E NR   12/13/17 1258 Active               Point: Home exercise program (Active)    Learning Progress Summary    Learner Readiness Method Response Comment Documented by Status   Patient Eager E,D,H NR   12/15/17 1715 Active    Eager E,D,H NR   12/15/17 1115 Active    Acceptance TB VU   12/14/17 1757 Done    Acceptance E VU,NR Reviewed safety with mobility and HEP.  12/14/17 1627 Done    Acceptance E VU,NR Reviewed gait mechanics with mobility and HEP.  12/14/17 1109 Done    Acceptance E NR    12/13/17 1550 Active    Acceptance E NR   12/13/17 1258 Active    Acceptance E,D NR reviewed safety with mobility SC 12/12/17 1640 Active    Acceptance E NR  AS 12/12/17 0948 Active    Acceptance E,D,H NR Edu pt/son re: HEP; issued illustrated handout.  12/11/17 1448 Active    Acceptance E,D NR   12/11/17 1002 Active   Family Eager E,D,H NR  DM 12/15/17 1715 Active    Acceptance E VU,NR Reviewed safety with mobility and HEP.  12/14/17 1627 Done    Acceptance E NR   12/13/17 1550 Active    Acceptance E NR  AS 12/12/17 0948 Active   Significant Other Eager E,D,H NR  DM 12/15/17 1115 Active    Acceptance E NR   12/13/17 1258 Active               Point: Body mechanics (Active)    Learning Progress Summary    Learner Readiness Method Response Comment Documented by Status   Patient Eager E,D,H NR   12/15/17 1715 Active    Eager E,D,H NR   12/15/17 1115 Active    Acceptance TB VU  TP 12/14/17 1757 Done    Acceptance E VU,NR Reviewed safety with mobility and HEP.  12/14/17 1627 Done    Acceptance E VU,NR Reviewed gait mechanics with mobility and HEP.  12/14/17 1109 Done    Acceptance E NR   12/13/17 1550 Active    Acceptance E NR   12/13/17 1258 Active    Acceptance E,D NR reviewed safety with mobility SC 12/12/17 1640 Active    Acceptance E NR  AS 12/12/17 0948 Active    Acceptance E,D,H NR Edu pt/son re: HEP; issued illustrated handout.  12/11/17 1448 Active    Acceptance E,D NR   12/11/17 1002 Active   Family Eager E,D,H NR   12/15/17 1715 Active    Acceptance E VU,NR Reviewed safety with mobility and HEP.  12/14/17 1627 Done    Acceptance E NR   12/13/17 1550 Active    Acceptance E NR  AS 12/12/17 0948 Active   Significant Other Eager E,D,H NR  DM 12/15/17 1115 Active    Acceptance E NR   12/13/17 1258 Active               Point: Precautions (Active)    Learning Progress Summary    Learner Readiness Method Response Comment Documented by Status   Patient Eager E,D,H NR  DM 12/15/17  1715 Active    Eager E,D,H NR   12/15/17 1115 Active    Acceptance TB VU  TP 12/14/17 1757 Done    Acceptance E VU,NR Reviewed safety with mobility and HEP.  12/14/17 1627 Done    Acceptance E VU,NR Reviewed gait mechanics with mobility and HEP. KR 12/14/17 1109 Done    Acceptance E NR  EH 12/13/17 1550 Active    Acceptance E NR   12/13/17 1258 Active    Acceptance E,D NR reviewed safety with mobility SC 12/12/17 1640 Active    Acceptance E NR  AS 12/12/17 0948 Active    Acceptance E,D,H NR [facility] pt/son re: HEP; issued illustrated handout.  12/11/17 1448 Active    Acceptance E,D NR   12/11/17 1002 Active   Family Eager E,D,H NR   12/15/17 1715 Active    Acceptance E VU,NR Reviewed safety with mobility and HEP.  12/14/17 1627 Done    Acceptance E NR   12/13/17 1550 Active    Acceptance E NR  AS 12/12/17 0948 Active   Significant Other Eager E,D,H NR   12/15/17 1115 Active    Acceptance E NR   12/13/17 1258 Active                      User Key     Initials Effective Dates Name Provider Type Discipline    SC 06/19/15 -  Ananda Bro, PT Physical Therapist PT     06/19/15 -  Ally Azevedo, PT Physical Therapist PT     06/19/15 -  Lynn Villasenor, PT Physical Therapist PT    AS 06/22/15 -  Juliann Irving, PTA Physical Therapy Assistant PT     06/19/15 -  Dawna Blanchard, PT Physical Therapist PT     09/25/17 -  Mayelin Kebede, PT Physical Therapist PT    TP 07/10/17 -  Ca Kelly, RN Registered Nurse Nurse                    PT Recommendation and Plan  Anticipated Discharge Disposition: skilled nursing facility  PT Frequency: 2 times/day  Plan of Care Review  Plan Of Care Reviewed With: patient, son  Progress: progress towards functional goals is fair  Outcome Summary/Follow up Plan: Able to incr. gt dist. by 40 ft this session, but cont. to be hampered by nausea & signif R knee pain, w/ decr. ROM  vs. yest. & incr. c/o stiffness          Outcome Measures       12/15/17 1610  12/15/17 1030 12/14/17 1527    How much help from another person do you currently need...    Turning from your back to your side while in flat bed without using bedrails? 4  -DM 4  -DM 4  -KR    Moving from lying on back to sitting on the side of a flat bed without bedrails? 3  -DM 3  -DM 3  -KR    Moving to and from a bed to a chair (including a wheelchair)? 3  -DM 3  -DM 3  -KR    Standing up from a chair using your arms (e.g., wheelchair, bedside chair)? 3  -DM 3  -DM 3  -KR    Climbing 3-5 steps with a railing? 2  -DM 2  -DM 2  -KR    To walk in hospital room? 3  -DM 3  -DM 3  -KR    AM-PAC 6 Clicks Score 18  -DM 18  -DM 18  -KR    Functional Assessment    Outcome Measure Options AM-PAC 6 Clicks Basic Mobility (PT)  -DM AM-PAC 6 Clicks Basic Mobility (PT)  -DM AM-PAC 6 Clicks Basic Mobility (PT)  -KR      12/14/17 1135 12/14/17 1130 12/14/17 0938    How much help from another person do you currently need...    Turning from your back to your side while in flat bed without using bedrails?   4  -KR    Moving from lying on back to sitting on the side of a flat bed without bedrails?   3  -KR    Moving to and from a bed to a chair (including a wheelchair)?   3  -KR    Standing up from a chair using your arms (e.g., wheelchair, bedside chair)?   3  -KR    Climbing 3-5 steps with a railing?   2  -KR    To walk in hospital room?   3  -KR    AM-PAC 6 Clicks Score   18  -KR    How much help from another is currently needed...    Putting on and taking off regular lower body clothing? --  -AR 3  -AR     Bathing (including washing, rinsing, and drying) --  -AR 3  -AR     Toileting (which includes using toilet bed pan or urinal) --  -AR 3  -AR     Putting on and taking off regular upper body clothing --  -AR 3  -AR     Taking care of personal grooming (such as brushing teeth) --  -AR 3  -AR     Eating meals --  -AR 4  -AR     Score --  -AR 19  -AR     Functional Assessment    Outcome Measure Options --  -AR AM-PAC 6 Clicks  Daily Activity (OT)  -AR AM-PAC 6 Clicks Basic Mobility (PT)  -KR      12/13/17 1512 12/13/17 1116       How much help from another person do you currently need...    Turning from your back to your side while in flat bed without using bedrails? 4  -EH 3  -EH     Moving from lying on back to sitting on the side of a flat bed without bedrails? 4  -EH 3  -EH     Moving to and from a bed to a chair (including a wheelchair)? 3  -EH 3  -EH     Standing up from a chair using your arms (e.g., wheelchair, bedside chair)? 3  -EH 3  -EH     Climbing 3-5 steps with a railing? 3  -EH 2  -EH     To walk in hospital room? 3  -EH 3  -EH     AM-PAC 6 Clicks Score 20  -EH 17  -EH     Functional Assessment    Outcome Measure Options AM-PAC 6 Clicks Basic Mobility (PT)  -EH AM-PAC 6 Clicks Basic Mobility (PT)  -EH       User Key  (r) = Recorded By, (t) = Taken By, (c) = Cosigned By    Initials Name Provider Type     Ally Azevedo, PT Physical Therapist    LAURO Villasenro, PT Physical Therapist    AR Mayra Miguel, OT Occupational Therapist    KR Mayelin Kebede, PT Physical Therapist           Time Calculation:         PT Charges       12/15/17 1715 12/15/17 1115       Time Calculation    Start Time 1610  -DM 1030  -DM     PT Received On 12/15/17  -DM 12/15/17  -DM     PT Goal Re-Cert Due Date 12/21/17  -DM 12/21/17  -DM     Time Calculation- PT    Total Timed Code Minutes- PT 40 minute(s)  -DM 39 minute(s)  -DM       User Key  (r) = Recorded By, (t) = Taken By, (c) = Cosigned By    Initials Name Provider Type    LAURO Villasenor, PT Physical Therapist          Therapy Charges for Today     Code Description Service Date Service Provider Modifiers Qty    31331214147 HC PT THER PROC EA 15 MIN 12/15/2017 Lynn Villasenor, PT GP 2    19679902457 HC GAIT TRAINING EA 15 MIN 12/15/2017 Lynn Villasenor, PT GP 1    58439873258 HC PT THER SUPP EA 15 MIN 12/15/2017 Lynn Villasenor, PT GP 1    91249769092 HC PT THER PROC EA 15 MIN  12/15/2017 Lynn Villasenor, PT GP 2    60177207059 HC GAIT TRAINING EA 15 MIN 12/15/2017 Lynn Villasenor, PT GP 1          PT G-Codes  Outcome Measure Options: AM-PAC 6 Clicks Basic Mobility (PT)    Lynn Villasenor, PT  12/15/2017

## 2017-12-16 NOTE — PLAN OF CARE
Problem: Patient Care Overview (Adult)  Goal: Plan of Care Review  Outcome: Ongoing (interventions implemented as appropriate)    12/15/17 1715   Coping/Psychosocial Response Interventions   Plan Of Care Reviewed With patient;son   Patient Care Overview   Progress progress towards functional goals is fair   Outcome Evaluation   Outcome Summary/Follow up Plan Able to incr. gt dist. by 40 ft this session, but cont. to be hampered by nausea & signif R knee pain, w/ decr. ROM vs. yest. & incr. c/o stiffness         Problem: Inpatient Physical Therapy  Goal: Bed Mobility Goal LTG- PT  Outcome: Ongoing (interventions implemented as appropriate)    12/14/17 1629 12/15/17 1715   Bed Mobility PT LTG   Bed Mobility PT LTG, Date Established 12/14/17 --    Bed Mobility PT LTG, Time to Achieve 5 days --    Bed Mobility PT LTG, Activity Type supine to sit/sit to supine --    Bed Mobility PT LTG, [place] Level conditional independence --    Bed Mobility PT Goal LTG, Assist Device leg  --    Bed Mobility PT LTG, Date Goal Reviewed 12/14/17 --    Bed Mobility PT LTG, Outcome --  goal ongoing       Goal: Transfer Training Goal 1 LTG- PT  Outcome: Ongoing (interventions implemented as appropriate)    12/11/17 1002 12/14/17 1628 12/15/17 1715   Transfer Training PT LTG   Transfer Training PT LTG, Date Established 12/11/17 --  --    Transfer Training PT LTG, Time to Achieve 2 wks --  --    Transfer Training PT LTG, Activity Type sit to stand/stand to sit --  --    Transfer Training PT LTG, [place] Level supervision required --  --    Transfer Training PT LTG, Assist Device walker, rolling --  --    Transfer Training PT LTG, Date Goal Reviewed --  12/14/17 --    Transfer Training PT LTG, Outcome --  --  goal ongoing       Goal: Gait Training Goal LTG- PT  Outcome: Ongoing (interventions implemented as appropriate)    12/11/17 1002 12/14/17 1628 12/15/17 1715   Gait Training PT LTG   Gait Training Goal PT LTG, Date  Established 12/11/17 --  --    Gait Training Goal PT LTG, Time to Achieve 2 wks --  --    Gait Training Goal PT LTG, [place] Level supervision required --  --    Gait Training Goal PT LTG, Assist Device walker, rolling --  --    Gait Training Goal PT LTG, Distance to Achieve 200 --  --    Gait Training Goal PT LTG, Date Goal Reviewed --  12/14/17 --    Gait Training Goal PT LTG, Outcome --  --  goal ongoing       Goal: Stair Training Goal LTG- PT  Outcome: Ongoing (interventions implemented as appropriate)    12/11/17 1002 12/14/17 1628 12/15/17 1715   Stair Training PT LTG   Stair Training Goal PT LTG, Date Established 12/11/17 --  --    Stair Training Goal PT LTG, Time to Achieve 2 wks --  --    Stair Training Goal PT LTG, Number of Steps 3 --  --    Stair Training Goal PT LTG, [place] Level contact guard assist --  --    Stair Training Goal PT LTG, Assist Device 1 handrail --  --    Stair Training Goal PT LTG, Date Goal Reviewed --  12/14/17 --    Stair Training Goal PT LTG, Outcome --  --  goal ongoing

## 2017-12-16 NOTE — DISCHARGE SUMMARY
[facility] Medicine Services  DISCHARGE SUMMARY    Patient Name: Lesli Leon  : 1955  MRN: [id number]    Date of Admission: 12/10/2017  Date of Discharge:  2017  Primary Care Physician: Phani Joaquin MD    Consults     Date and Time Order Name Status Description    2017 1230 Inpatient Consult to Neurology Completed     12/10/2017 2138 Inpatient Consult to Infectious Diseases Completed     12/10/2017 1633 Inpatient Consult to Orthopedic Surgery Completed         Hospital Course     Presenting Problem:   Sepsis [A41.9]    Active Hospital Problems (** Indicates Principal Problem)    Diagnosis Date Noted   • **Sepsis, probable right septic knee as source [A41.9] 12/10/2017   • Hypertension [I10] 2017   • Disease of thyroid gland [E07.9] 2017   • Expressive aphasia [R47.01] 2017   • Heart murmur [R01.1] 2017      Resolved Hospital Problems    Diagnosis Date Noted Date Resolved   No resolved problems to display.          Hospital Course:  Lesli Leon is a 62 y.o. female with medical history significant for breast CA s/p radiation and recent right knee replacements.  She presented to [facility] as a transfer from [facility] on 12/10/17 with 2 days of right knee pain , worsening confusion, fevers and hypotension. She was subsequently admitted here to the ICU for septic right knee. She was started on abx, vanc and zosyn per ID and underwent irrigation and debridement of right knee per Dr. Garcia on 12/10/17. The patient developed worsening confusion and neurology was consulted. Workup for stroke was negative and symptoms thought likely secondary to septic encephalopathy.   Symptoms improved and the patient was deemed stable for transfer to the floor  for continued care.    The patient returned to baseline mental status and neurology signed off of case on 17.  Her condition continued to improve and she has been ambulating with  physical therapy with pain mostly controlled.  Infectious disease has continued to follow the patient.  Cultures obtained at [facility] were reportedly negative but this result thought to be antibiotic modified.  Infectious disease discontinued Vancomycin 1 day ago but the patient will need to continue IV Ceftriaxone 2gram daily for a prolonged course.      The patient has a PICC line in the right upper arm.  [facility] will arrange for her antibiotics to be delivered to the patient's home.  The patient's family members plan to administer antibiotics to the patient daily and they report they have experience doing so with another family member.      Mrs. Leon is medically ready for discharge home today.  She will be given a course of antiemetics for pain related nausea as well as pain medication.  She will need to hold doses of Suboxone until she has finished pain medications.  It is recommended that she follow up with her Suboxone prescriber at first available appointment.  Additionally, she will need to keep follow up appointment with Dr. Smith in office on Monday 12/18/17 at 2pm.  She will need to follow up with Dr. Mondragon in 3 weeks and follow any activity recommendations that he has made.      The patient will be discharged with PICC line intact.  Nursing staff will need to ensure that the patient and family members have received necessary education on medication administration and medication supplies prior to discharging today.     Procedure(s):  INCISION AND DRAINAGE, POLY EXCHANGE RIGHT KNEE       Day of Discharge     HPI:   Patient is sitting up in bed with family members at bedside.  Denies any issues overnight.  Reports pain mostly controlled but does worsen with activity.  Additionally, she endorses some nausea this am and relates this to increased pain with movement.  Patient and family aware of plan for discharge home today.  Family members state they are  planning to administer IV antibiotics to patient via PICC line at home.     Review of Systems  Gen- No fevers, chills  CV- No chest pain, palpitations  Resp- No cough, dyspnea  GI- No vomiting, diarrhea or abd pain    Otherwise ROS is negative except as mentioned in the HPI.    Vital Signs:   Temp:  [98.4 °F (36.9 °C)-98.5 °F (36.9 °C)] 98.4 °F (36.9 °C)  Heart Rate:  [65-93] 93  Resp:  [16-18] 16  BP: (129-157)/(72-83) 157/72     Physical Exam:  Constitutional: No acute distress, awake, alert  HENT: NCAT, mucous membranes moist  Respiratory: Clear to auscultation bilaterally, respiratory effort normal   Cardiovascular: RRR, no murmurs, rubs, or gallops, palpable pedal pulses bilaterally  Gastrointestinal: Positive bowel sounds, soft, nontender, nondistended  Musculoskeletal: bilateral ankle edema, right knee with dressing, aquacel in place  Psychiatric: Appropriate affect, cooperative  Neurologic: Oriented x 3, strength symmetric in all extremities, Cranial Nerves grossly intact to confrontation, speech clear  Skin: No rashes, right upper extremity with PICC line intact.     Pertinent  and/or Most Recent Results       Results from last 7 days  Lab Units 12/16/17  0657 12/12/17  0527 12/11/17  0439 12/10/17  2226 12/10/17  2225 12/10/17  1518 12/10/17  1517   WBC 10*3/mm3 10.20 15.62* 15.70*  --   --  17.51*  --    HEMOGLOBIN g/dL 10.3* 10.0* 9.7*  --  10.3* 10.4*  --    HEMATOCRIT % 32.2* 32.2* 31.0*  --  32.8* 33.7*  --    PLATELETS 10*3/mm3 345 277 221  --   --  243  --    SODIUM mmol/L  --  142 140 142  --   --  142   POTASSIUM mmol/L  --  4.3 3.8 3.9  --   --  3.7   CHLORIDE mmol/L  --  106 106 108  --   --  110*   CO2 mmol/L  --  26.0 25.0 25.0  --   --  26.0   BUN mg/dL  --  33* 25* 25*  --   --  30*   CREATININE mg/dL  --  0.90 1.00 1.10  --   --  1.70*   GLUCOSE mg/dL  --  125* 115* 123*  --   --  105*   CALCIUM mg/dL  --  8.4* 8.3* 7.8*  --   --  8.1*       Results from last 7 days  Lab Units  12/10/17  1517   APTT seconds 30.6           Invalid input(s): TG, LDLREALC    Results from last 7 days  Lab Units 12/10/17  1535 12/10/17  1518 12/10/17  1517   TSH mIU/mL  --   --  0.214*   CORTISOL mcg/dL 18.80  --   --    HEMOGLOBIN A1C %  --  5.70*  --    TROPONIN I ng/mL  --   --  0.007     Brief Urine Lab Results     None          Microbiology Results Abnormal     Procedure Component Value - Date/Time    Fungus Culture - Tissue, Knee, Right [[id number]] Collected:  12/10/17 1926    Lab Status:  Preliminary result Specimen:  Tissue from Knee, Right Updated:  12/15/17 2246     Fungus Culture No fungus isolated at less than 1 week    AFB Culture - Tissue, Knee, Right [[id number]]  (Normal) Collected:  12/10/17 1926    Lab Status:  Preliminary result Specimen:  Tissue from Knee, Right Updated:  12/15/17 2246     AFB Culture No AFB isolated at less than 1 week     AFB Stain No acid fast bacilli seen on concentrated smear    Fungus Culture - Tissue, Knee, Right [[id number]] Collected:  12/10/17 1925    Lab Status:  Preliminary result Specimen:  Tissue from Knee, Right Updated:  12/15/17 2231     Fungus Culture No fungus isolated at less than 1 week    AFB Culture - Tissue, Knee, Right [[id number]]  (Normal) Collected:  12/10/17 1925    Lab Status:  Preliminary result Specimen:  Tissue from Knee, Right Updated:  12/15/17 2231     AFB Culture No AFB isolated at less than 1 week     AFB Stain No acid fast bacilli seen on concentrated smear    Fungus Culture - Tissue, Knee, Right [[id number]] Collected:  12/10/17 1924    Lab Status:  Preliminary result Specimen:  Tissue from Knee, Right Updated:  12/15/17 2216     Fungus Culture No fungus isolated at less than 1 week    AFB Culture - Tissue, Knee, Right [[id number]]  (Normal) Collected:  12/10/17 1924    Lab Status:  Preliminary result Specimen:  Tissue from Knee, Right Updated:  12/15/17 2216     AFB Culture No AFB isolated at less than 1 week     AFB Stain No acid fast  bacilli seen on concentrated smear    Fungus Culture - Synovial Fluid, Knee, Right [[id number]] Collected:  12/10/17 1915    Lab Status:  Preliminary result Specimen:  Synovial Fluid from Knee, Right Updated:  12/15/17 2211     Fungus Culture No fungus isolated at less than 1 week    AFB Culture - Synovial Fluid, Knee, Right [[id number]]  (Normal) Collected:  12/10/17 1915    Lab Status:  Preliminary result Specimen:  Synovial Fluid from Knee, Right Updated:  12/15/17 2211     AFB Culture No AFB isolated at less than 1 week     AFB Stain No acid fast bacilli seen on concentrated smear    Fungus Culture - Tissue, Knee, Right [[id number]] Collected:  12/10/17 1921    Lab Status:  Preliminary result Specimen:  Tissue from Knee, Right Updated:  12/15/17 2211     Fungus Culture No fungus isolated at less than 1 week    AFB Culture - Tissue, Knee, Right [[id number]]  (Normal) Collected:  12/10/17 1921    Lab Status:  Preliminary result Specimen:  Tissue from Knee, Right Updated:  12/15/17 2211     AFB Culture No AFB isolated at less than 1 week     AFB Stain No acid fast bacilli seen on concentrated smear    Blood Culture - Blood, Blood, Venous Line [[id number]]  (Normal) Collected:  12/10/17 1539    Lab Status:  Final result Specimen:  Blood from Blood, Venous Line Updated:  12/15/17 1631     Blood Culture No growth at 5 days    Blood Culture - Blood, Blood, Venous Line [[id number]]  (Normal) Collected:  12/10/17 1535    Lab Status:  Final result Specimen:  Blood from Blood, Venous Line Updated:  12/15/17 1631     Blood Culture No growth at 5 days    Tissue / Bone Culture - Tissue, Knee, Right [[id number]]  (Abnormal)  (Susceptibility) Collected:  12/10/17 1926    Lab Status:  Final result Specimen:  Tissue from Knee, Right Updated:  12/15/17 1505     Tissue Culture --      Light growth (2+) Streptococcus mitis / oralis (A)     Gram Stain Result Many (4+) WBCs seen      No organisms seen    Susceptibility      Streptococcus  mitis / oralis     PENELOPE     Ampicillin <=0.06 ug/ml Susceptible     Azithromycin <=0.25 ug/ml Susceptible     Cefepime <=0.25 ug/ml Susceptible     Cefotaxime <=0.25 ug/ml Susceptible     Ceftriaxone <=0.25 ug/ml Susceptible     Chloramphenicol 2 ug/ml Susceptible     Clindamycin <=0.06 ug/ml Susceptible     Erythromycin <=0.06 ug/ml Susceptible     Levofloxacin 1 ug/ml Susceptible     Meropenem <=0.06 ug/ml Susceptible     Penicillin G <=0.03 ug/ml Susceptible     Tetracycline <=0.5 ug/ml Susceptible     Vancomycin 0.5 ug/ml Susceptible                    Anaerobic Culture - Tissue, Knee, Right [[id number]]  (Abnormal) Collected:  12/10/17 1921    Lab Status:  Preliminary result Specimen:  Tissue from Knee, Right Updated:  12/15/17 1107     Culture No anaerobes isolated      Staphylococcus, coagulase negative (A)      Isolated from broth culture       Tissue / Bone Culture - Tissue, Knee, Right [[id number]]  (Normal) Collected:  12/10/17 1921    Lab Status:  Final result Specimen:  Tissue from Knee, Right Updated:  12/14/17 0708     Tissue Culture No growth at 4 days     Gram Stain Result No WBCs or organisms seen    Tissue / Bone Culture - Tissue, Knee, Right [[id number]]  (Normal) Collected:  12/10/17 1925    Lab Status:  Final result Specimen:  Tissue from Knee, Right Updated:  12/14/17 0707     Tissue Culture No growth at 4 days     Gram Stain Result Occasional WBCs seen      No organisms seen    Tissue / Bone Culture - Tissue, Knee, Right [[id number]]  (Normal) Collected:  12/10/17 1924    Lab Status:  Final result Specimen:  Tissue from Knee, Right Updated:  12/14/17 0707     Tissue Culture No growth at 4 days     Gram Stain Result Occasional WBCs seen      No organisms seen    Body Fluid Culture - Synovial Fluid, Knee, Right [[id number]]  (Normal) Collected:  12/10/17 1915    Lab Status:  Final result Specimen:  Synovial Fluid from Knee, Right Updated:  12/14/17 0707     BF Culture No growth at 4 days     Gram  Stain Result Moderate (3+) WBCs seen      No organisms seen    Anaerobic Culture - Synovial Fluid, Knee, Right [[id number]]  (Normal) Collected:  12/10/17 1915    Lab Status:  Preliminary result Specimen:  Synovial Fluid from Knee, Right Updated:  12/11/17 1347     Culture No anaerobes isolated    Anaerobic Culture - Tissue, Knee, Right [[id number]]  (Normal) Collected:  12/10/17 1924    Lab Status:  Preliminary result Specimen:  Tissue from Knee, Right Updated:  12/11/17 1344     Culture No anaerobes isolated    Anaerobic Culture - Tissue, Knee, Right [[id number]]  (Normal) Collected:  12/10/17 1925    Lab Status:  Preliminary result Specimen:  Tissue from Knee, Right Updated:  12/11/17 1343     Culture No anaerobes isolated    Anaerobic Culture - Tissue, Knee, Right [[id number]]  (Normal) Collected:  12/10/17 1926    Lab Status:  Preliminary result Specimen:  Tissue from Knee, Right Updated:  12/11/17 1343     Culture No anaerobes isolated          Imaging Results (all)     Procedure Component Value Units Date/Time    XR Chest 1 View [[id number]] Collected:  12/10/17 1520     Updated:  12/10/17 1730    Narrative:          EXAMINATION: XR CHEST 1 VW - 12/10/2017     INDICATION: Central line placement.     COMPARISON: None.     FINDINGS:   1. A right IJ catheter has been placed. The tip is perfectly positioned  in the SVC at the entrance to the right atrium.     2. There is no detectable pneumothorax.     3. There is mild chronic scarring in the chest but there is no active  disease, edema or free fluid.           Impression:       Perfect position of the right IJ catheter in the SVC at the  entrance to the right atrium.     No pneumothorax and no other acute finding.     DICTATED:     12/10/2017  EDITED:          12/10/2017        This report was finalized on 12/10/2017 5:28 PM by Dr. Rigo Urena MD.       XR Knee 1 or 2 View Right [[id number]] Collected:  12/10/17 1810     Updated:  12/11/17 1041    Narrative:           EXAMINATION: XR KNEE 1 OR 2 VIEWS RIGHT - 12/10/2017      INDICATION: Painful TKA.     COMPARISON: None.     FINDINGS:   1. The total right knee arthroplasty appears to be well-positioned. The  hardware appears to be intact without evidence of overt signs of  loosening.     2. There is a transverse defect in the proximal right tibia, likely  previous surgery and this should be correlated.     3. There is no evidence of fracture of the native bone structures and  there is no malalignment.           Impression:       Chronic findings in the right knee as described, however,  the total knee arthroplasty hardware appears well positioned. There is  no abnormality or fracture of the native bone structures. There is a  transverse lucent defect in the proximal third of the right tibia which  is likely a screw tract and should be correlated with the patient's  previous history.     DICTATED:     12/10/2017  EDITED:          12/10/2017        This report was finalized on 12/11/2017 10:39 AM by Dr. Rigo Urena MD.       XR Knee 1 or 2 View Right [[id number]] Collected:  12/11/17 0830     Updated:  12/11/17 1644    Narrative:       EXAMINATION: XR KNEE 1 OR 2 VW RIGHT-      INDICATION: Postop knee arthoplasty; A41.9-Sepsis, unspecified organism;  T84.53XA-Infection and inflammatory reaction due to internal right knee  prosthesis, initial encounter; Z96.651-Presence of right artificial knee  joint.      COMPARISON: Radiographs 12/10/2017 earlier same day     FINDINGS: Status post right total knee arthroplasty. No evidence of  hardware complication with components in satisfactory alignment.  Expected postsurgical changes including subcutaneous emphysema are  noted.       Impression:       Status post right total knee arthroplasty without evidence  of complication in satisfactory alignment.     D:  12/11/2017  E:  12/11/2017     This report was finalized on 12/11/2017 4:42 PM by Dr. Wisam Galarza.       CT Head With & Without  Contrast [[id number]] Collected:  12/11/17 1630     Updated:  12/13/17 0920    Narrative:       EXAMINATION: CT HEAD W WO CONTRAST- 12/11/2017     INDICATION: Aphasia; A41.9-Sepsis, unspecified organism;  T84.53XA-Infection and inflammatory reaction due to internal right knee  prosthesis, initial encounter; Z96.651-Presence of right artificial knee  joint; Z74.09-Other reduced mobility; difficulty speaking     TECHNIQUE: Multiple axial CT imaging was obtained of the head from skull  base to skull vertex pre and post administration of intravenous  contrast.     The radiation dose reduction device was turned on for each scan per the  ALARA (As Low as Reasonably Achievable) protocol.     COMPARISON: None.     FINDINGS: The parenchyma is grossly unremarkable in appearance with some  low-density area seen in the periventricular white matter suggesting  chronic vessel ischemic change. Tiny area of low density within the  right basal ganglia suggesting possible prior vascular malformation.  There is no mass, mass effect, or midline shift. No abnormal extra-axial  fluid collection is identified. The bony structures reveal no evidence  of osseous abnormality. The visualized paranasal sinuses are clear. The  mastoid air cells are patent.       Impression:       No acute intracranial abnormality is identified. Vascularity  is unremarkable in appearance.     D:  12/11/2017  E:  12/11/2017     This report was finalized on 12/13/2017 9:18 AM by Dr. Kerri Fu MD.             Results for orders placed during the hospital encounter of 12/10/17   Adult Transthoracic Echo Complete W/ Cont if Necessary Per Protocol    Narrative · Left ventricular systolic function is normal.  · Estimated EF appears to be in the range of 61 - 65%  · Left ventricular wall thickness is consistent with borderline concentric   hypertrophy.  · Left ventricular diastolic dysfunction (grade II) consistent with   pseudonormalization.  · Left atrial  cavity size is mildly dilated.  · Mild aortic valve regurgitation is present.  · Estimated right ventricular systolic pressure from tricuspid   regurgitation is mildly elevated (35-45 mmHg).  · Mild tricuspid valve regurgitation is present.           Order Current Status    AFB Culture - Synovial Fluid, Knee, Right Preliminary result    AFB Culture - Tissue, Knee, Right Preliminary result    AFB Culture - Tissue, Knee, Right Preliminary result    AFB Culture - Tissue, Knee, Right Preliminary result    AFB Culture - Tissue, Knee, Right Preliminary result    Anaerobic Culture - Synovial Fluid, Knee, Right Preliminary result    Anaerobic Culture - Tissue, Knee, Right Preliminary result    Anaerobic Culture - Tissue, Knee, Right Preliminary result    Anaerobic Culture - Tissue, Knee, Right Preliminary result    Anaerobic Culture - Tissue, Knee, Right Preliminary result    Fungus Culture - Synovial Fluid, Knee, Right Preliminary result    Fungus Culture - Tissue, Knee, Right Preliminary result    Fungus Culture - Tissue, Knee, Right Preliminary result    Fungus Culture - Tissue, Knee, Right Preliminary result    Fungus Culture - Tissue, Knee, Right Preliminary result        Discharge Details      Lesli Leon   Home Medication Instructions RAINER:[id number]    Printed on:12/16/17 0775   Medication Information                      ALPRAZolam (XANAX) 1 MG tablet  Take 1 mg by mouth 2 (Two) Times a Day.             aspirin  MG EC tablet  Take 1 tablet by mouth Every 12 (Twelve) Hours.             buprenorphine-naloxone (SUBOXONE) 8-2 MG film film  Place 1 film under the tongue 2 (Two) Times a Day.             buPROPion XL (WELLBUTRIN XL) 150 MG 24 hr tablet  Take 150 mg by mouth 2 (Two) Times a Day.             chlorzoxazone (PARAFON FORTE) 500 MG tablet  Take 500 mg by mouth 3 (Three) Times a Day As Needed for Muscle Spasms.             DULoxetine (CYMBALTA) 30 MG capsule  Take 90 mg by mouth Daily.              gabapentin (NEURONTIN) 800 MG tablet  Take 800 mg by mouth 4 (Four) Times a Day.             levothyroxine (SYNTHROID, LEVOTHROID) 100 MCG tablet  Take 100 mcg by mouth Daily. Brand name.             linaclotide (LINZESS) 290 MCG capsule capsule  Take 290 mcg by mouth Daily.             lisinopril (PRINIVIL,ZESTRIL) 20 MG tablet  Take 20 mg by mouth Daily.             meloxicam (MOBIC) 15 MG tablet  Take 15 mg by mouth Daily.             ondansetron (ZOFRAN) 4 MG tablet  Take 1 tablet by mouth Every 6 (Six) Hours As Needed for Nausea or Vomiting.             oxyCODONE-acetaminophen (PERCOCET) 5-325 MG per tablet  Take 2 tablets by mouth Every 6 (Six) Hours As Needed for Severe Pain  for up to 3 days.             triamterene-hydrochlorothiazide (MAXZIDE) 75-50 MG per tablet  Take 1 tablet by mouth Daily.                   Discharge Disposition:  Home or Self Care    Discharge Diet:  Diet Instructions     Diet: Regular; Thin       Discharge Diet:  Regular   Fluid Consistency:  Thin                 Discharge Activity:   Activity Instructions     Discharge Activity Restrictions       Per Ortho recommendations.           Additional Activity Instructions:      AMBULATE FREQUENTLY    WEIGHT BEARING AS TOLERATED TO RIGHT LOWER EXTREMITY                  Special Instructions:    1.  Patient should hold home med of suboxone and restart after completing course of pain medications.  Followup with prescribing provider recommended at earliest available appointment.    2.  Patient will need blood work performed on Monday 12/18/17 with [facility] appointment (per Dr. Smith's notes).     Future Appointments  Date Time Provider Department Center   1/4/2018 8:40 AM Stephon Garcia MD [facility] None       Additional Instructions for the Follow-ups that You Need to Schedule     Ambulatory Referral to Home Health    As directed    Face to Face Visit Date:  12/14/2017    Follow-up Provider for Plan of Care?:  I treated the patient in  an acute care facility and will not continue treatment after discharge.    Follow-up Provider:  MIRIAM SEPULVEDA [[id number]]    Reason/Clinical Findings:  sepsis of right knee    Describe mobility limitations that make leaving home difficult:  impaired functional mobility, balance, gait and endurance    Nursing/Therapeutic Services Requested:  Physical Therapy    PT orders:  Gait Training Transfer training Strengthening    Weight Bearing Status:  As Tolerated    Frequency:  1 Week 1           Discharge Follow-up with PCP    As directed    Follow Up Details:  follow up with PCP in 1-2 weeks.           Discharge Follow-up with Specialty: [facility] - Ortho; 3 Weeks    As directed    Specialty:  [facility] - [facility]o    Follow Up:  3 Weeks           Discharge Follow-up with Specialty: [facility] - [facility]; 3 Weeks    As directed    Specialty:  [facility] - [facility]o    Follow Up:  3 Weeks           Discharge Follow-up with Specified Provider: Keep scheduled appointment with Dr. Garcia in 3 weeks.    As directed    To:  Keep scheduled appointment with Dr. Garcia in 3 weeks.           Discharge Follow-up with Specified Provider: Keep scheduled follow up appointment with Dr. Smith on 12/18/17 at 2pm.    As directed    To:  Keep scheduled follow up appointment with Dr. Smith on 12/18/17 at 2pm.           Discharge Follow-up with Specified Provider: follow up with Pain specialist (for suboxone) at earliest appointment available.    As directed    To:  follow up with Pain specialist (for suboxone) at earliest appointment available.                     Time Spent on Discharge:  45 minutes      BECKY Bautista  12/16/17  11:06 AM

## 2017-12-18 ENCOUNTER — TRANSCRIBE ORDERS (OUTPATIENT)
Dept: LAB | Facility: HOSPITAL | Age: 62
End: 2017-12-18

## 2017-12-18 ENCOUNTER — LAB (OUTPATIENT)
Dept: LAB | Facility: HOSPITAL | Age: 62
End: 2017-12-18

## 2017-12-18 DIAGNOSIS — T84.53XD INFECTION OF TOTAL RIGHT KNEE REPLACEMENT, SUBSEQUENT ENCOUNTER: ICD-10-CM

## 2017-12-18 DIAGNOSIS — T84.53XD INFECTION OF TOTAL RIGHT KNEE REPLACEMENT, SUBSEQUENT ENCOUNTER: Primary | ICD-10-CM

## 2017-12-18 LAB
ALBUMIN SERPL-MCNC: 3.5 G/DL (ref 3.2–4.8)
ALBUMIN/GLOB SERPL: 1.8 G/DL (ref 1.5–2.5)
ALP SERPL-CCNC: 75 U/L (ref 25–100)
ALT SERPL W P-5'-P-CCNC: 17 U/L (ref 7–40)
ANION GAP SERPL CALCULATED.3IONS-SCNC: 5 MMOL/L (ref 3–11)
AST SERPL-CCNC: 20 U/L (ref 0–33)
BASOPHILS # BLD AUTO: 0.04 10*3/MM3 (ref 0–0.2)
BASOPHILS NFR BLD AUTO: 0.5 % (ref 0–1)
BILIRUB SERPL-MCNC: 0.2 MG/DL (ref 0.3–1.2)
BUN BLD-MCNC: 12 MG/DL (ref 9–23)
BUN/CREAT SERPL: 15 (ref 7–25)
CALCIUM SPEC-SCNC: 8.2 MG/DL (ref 8.7–10.4)
CHLORIDE SERPL-SCNC: 106 MMOL/L (ref 99–109)
CO2 SERPL-SCNC: 31 MMOL/L (ref 20–31)
CREAT BLD-MCNC: 0.8 MG/DL (ref 0.6–1.3)
CRP SERPL-MCNC: 1.08 MG/DL (ref 0–1)
DEPRECATED RDW RBC AUTO: 49.4 FL (ref 37–54)
EOSINOPHIL # BLD AUTO: 0.45 10*3/MM3 (ref 0–0.3)
EOSINOPHIL NFR BLD AUTO: 5.6 % (ref 0–3)
ERYTHROCYTE [DISTWIDTH] IN BLOOD BY AUTOMATED COUNT: 14.1 % (ref 11.3–14.5)
ERYTHROCYTE [SEDIMENTATION RATE] IN BLOOD: 22 MM/HR (ref 0–30)
GFR SERPL CREATININE-BSD FRML MDRD: 73 ML/MIN/1.73
GLOBULIN UR ELPH-MCNC: 2 GM/DL
GLUCOSE BLD-MCNC: 125 MG/DL (ref 70–100)
HCT VFR BLD AUTO: 34.5 % (ref 34.5–44)
HGB BLD-MCNC: 10.5 G/DL (ref 11.5–15.5)
IMM GRANULOCYTES # BLD: 0.02 10*3/MM3 (ref 0–0.03)
IMM GRANULOCYTES NFR BLD: 0.2 % (ref 0–0.6)
LYMPHOCYTES # BLD AUTO: 1.12 10*3/MM3 (ref 0.6–4.8)
LYMPHOCYTES NFR BLD AUTO: 13.9 % (ref 24–44)
MCH RBC QN AUTO: 30.1 PG (ref 27–31)
MCHC RBC AUTO-ENTMCNC: 30.4 G/DL (ref 32–36)
MCV RBC AUTO: 98.9 FL (ref 80–99)
MONOCYTES # BLD AUTO: 0.55 10*3/MM3 (ref 0–1)
MONOCYTES NFR BLD AUTO: 6.8 % (ref 0–12)
NEUTROPHILS # BLD AUTO: 5.89 10*3/MM3 (ref 1.5–8.3)
NEUTROPHILS NFR BLD AUTO: 73 % (ref 41–71)
PLATELET # BLD AUTO: 391 10*3/MM3 (ref 150–450)
PMV BLD AUTO: 9.6 FL (ref 6–12)
POTASSIUM BLD-SCNC: 3.9 MMOL/L (ref 3.5–5.5)
PROT SERPL-MCNC: 5.5 G/DL (ref 5.7–8.2)
RBC # BLD AUTO: 3.49 10*6/MM3 (ref 3.89–5.14)
SODIUM BLD-SCNC: 142 MMOL/L (ref 132–146)
WBC NRBC COR # BLD: 8.07 10*3/MM3 (ref 3.5–10.8)

## 2017-12-18 PROCEDURE — 86140 C-REACTIVE PROTEIN: CPT

## 2017-12-18 PROCEDURE — 85025 COMPLETE CBC W/AUTO DIFF WBC: CPT

## 2017-12-18 PROCEDURE — 85652 RBC SED RATE AUTOMATED: CPT

## 2017-12-18 PROCEDURE — 36415 COLL VENOUS BLD VENIPUNCTURE: CPT

## 2017-12-18 PROCEDURE — 80053 COMPREHEN METABOLIC PANEL: CPT

## 2017-12-19 ENCOUNTER — TELEPHONE (OUTPATIENT)
Dept: ORTHOPEDIC SURGERY | Facility: CLINIC | Age: 62
End: 2017-12-19

## 2017-12-19 RX ORDER — OXYCODONE HYDROCHLORIDE AND ACETAMINOPHEN 5; 325 MG/1; MG/1
1 TABLET ORAL EVERY 6 HOURS PRN
Qty: 30 TABLET | Refills: 0 | Status: SHIPPED | OUTPATIENT
Start: 2017-12-19 | End: 2018-01-10 | Stop reason: HOSPADM

## 2017-12-24 LAB
BACTERIA SPEC ANAEROBE CULT: ABNORMAL
BACTERIA SPEC ANAEROBE CULT: ABNORMAL
BACTERIA SPEC ANAEROBE CULT: NORMAL

## 2017-12-27 ENCOUNTER — TRANSCRIBE ORDERS (OUTPATIENT)
Dept: LAB | Facility: HOSPITAL | Age: 62
End: 2017-12-27

## 2017-12-27 ENCOUNTER — LAB (OUTPATIENT)
Dept: LAB | Facility: HOSPITAL | Age: 62
End: 2017-12-27

## 2017-12-27 DIAGNOSIS — T84.53XD INFECTION OF TOTAL RIGHT KNEE REPLACEMENT, SUBSEQUENT ENCOUNTER: Primary | ICD-10-CM

## 2017-12-27 DIAGNOSIS — T84.53XD INFECTION OF TOTAL RIGHT KNEE REPLACEMENT, SUBSEQUENT ENCOUNTER: ICD-10-CM

## 2017-12-27 LAB
ALBUMIN SERPL-MCNC: 4.2 G/DL (ref 3.2–4.8)
ALBUMIN/GLOB SERPL: 1.7 G/DL (ref 1.5–2.5)
ALP SERPL-CCNC: 103 U/L (ref 25–100)
ALT SERPL W P-5'-P-CCNC: 31 U/L (ref 7–40)
ANION GAP SERPL CALCULATED.3IONS-SCNC: 10 MMOL/L (ref 3–11)
AST SERPL-CCNC: 30 U/L (ref 0–33)
BASOPHILS # BLD AUTO: 0.07 10*3/MM3 (ref 0–0.2)
BASOPHILS NFR BLD AUTO: 0.8 % (ref 0–1)
BILIRUB SERPL-MCNC: 0.3 MG/DL (ref 0.3–1.2)
BUN BLD-MCNC: 21 MG/DL (ref 9–23)
BUN/CREAT SERPL: 23.3 (ref 7–25)
CALCIUM SPEC-SCNC: 9.2 MG/DL (ref 8.7–10.4)
CHLORIDE SERPL-SCNC: 103 MMOL/L (ref 99–109)
CO2 SERPL-SCNC: 29 MMOL/L (ref 20–31)
CREAT BLD-MCNC: 0.9 MG/DL (ref 0.6–1.3)
CRP SERPL-MCNC: 0.25 MG/DL (ref 0–1)
DEPRECATED RDW RBC AUTO: 51.8 FL (ref 37–54)
EOSINOPHIL # BLD AUTO: 0.22 10*3/MM3 (ref 0–0.3)
EOSINOPHIL NFR BLD AUTO: 2.6 % (ref 0–3)
ERYTHROCYTE [DISTWIDTH] IN BLOOD BY AUTOMATED COUNT: 14.7 % (ref 11.3–14.5)
ERYTHROCYTE [SEDIMENTATION RATE] IN BLOOD: 19 MM/HR (ref 0–30)
GFR SERPL CREATININE-BSD FRML MDRD: 63 ML/MIN/1.73
GLOBULIN UR ELPH-MCNC: 2.5 GM/DL
GLUCOSE BLD-MCNC: 93 MG/DL (ref 70–100)
HCT VFR BLD AUTO: 36.8 % (ref 34.5–44)
HGB BLD-MCNC: 11.3 G/DL (ref 11.5–15.5)
IMM GRANULOCYTES # BLD: 0.02 10*3/MM3 (ref 0–0.03)
IMM GRANULOCYTES NFR BLD: 0.2 % (ref 0–0.6)
LYMPHOCYTES # BLD AUTO: 1.03 10*3/MM3 (ref 0.6–4.8)
LYMPHOCYTES NFR BLD AUTO: 12.2 % (ref 24–44)
MCH RBC QN AUTO: 30 PG (ref 27–31)
MCHC RBC AUTO-ENTMCNC: 30.7 G/DL (ref 32–36)
MCV RBC AUTO: 97.6 FL (ref 80–99)
MONOCYTES # BLD AUTO: 0.71 10*3/MM3 (ref 0–1)
MONOCYTES NFR BLD AUTO: 8.4 % (ref 0–12)
NEUTROPHILS # BLD AUTO: 6.39 10*3/MM3 (ref 1.5–8.3)
NEUTROPHILS NFR BLD AUTO: 75.8 % (ref 41–71)
PLATELET # BLD AUTO: 381 10*3/MM3 (ref 150–450)
PMV BLD AUTO: 9.3 FL (ref 6–12)
POTASSIUM BLD-SCNC: 4.5 MMOL/L (ref 3.5–5.5)
PROT SERPL-MCNC: 6.7 G/DL (ref 5.7–8.2)
RBC # BLD AUTO: 3.77 10*6/MM3 (ref 3.89–5.14)
SODIUM BLD-SCNC: 142 MMOL/L (ref 132–146)
WBC NRBC COR # BLD: 8.44 10*3/MM3 (ref 3.5–10.8)

## 2017-12-27 PROCEDURE — 86140 C-REACTIVE PROTEIN: CPT | Performed by: INTERNAL MEDICINE

## 2017-12-27 PROCEDURE — 85652 RBC SED RATE AUTOMATED: CPT | Performed by: INTERNAL MEDICINE

## 2017-12-27 PROCEDURE — 36415 COLL VENOUS BLD VENIPUNCTURE: CPT | Performed by: INTERNAL MEDICINE

## 2017-12-27 PROCEDURE — 85025 COMPLETE CBC W/AUTO DIFF WBC: CPT

## 2017-12-27 PROCEDURE — 80053 COMPREHEN METABOLIC PANEL: CPT | Performed by: INTERNAL MEDICINE

## 2018-01-02 ENCOUNTER — TRANSCRIBE ORDERS (OUTPATIENT)
Dept: LAB | Facility: HOSPITAL | Age: 63
End: 2018-01-02

## 2018-01-02 ENCOUNTER — LAB (OUTPATIENT)
Dept: LAB | Facility: HOSPITAL | Age: 63
End: 2018-01-02
Attending: INTERNAL MEDICINE

## 2018-01-02 ENCOUNTER — OFFICE VISIT (OUTPATIENT)
Dept: ORTHOPEDIC SURGERY | Facility: CLINIC | Age: 63
End: 2018-01-02

## 2018-01-02 DIAGNOSIS — N17.0 ACUTE KIDNEY FAILURE WITH LESION OF TUBULAR NECROSIS (HCC): ICD-10-CM

## 2018-01-02 DIAGNOSIS — Z96.651 PRESENCE OF RIGHT ARTIFICIAL KNEE JOINT: Primary | ICD-10-CM

## 2018-01-02 DIAGNOSIS — Z98.890 STATUS POST KNEE SURGERY: Primary | ICD-10-CM

## 2018-01-02 DIAGNOSIS — Z96.651 PRESENCE OF RIGHT ARTIFICIAL KNEE JOINT: ICD-10-CM

## 2018-01-02 LAB
ALBUMIN SERPL-MCNC: 4 G/DL (ref 3.2–4.8)
ALBUMIN/GLOB SERPL: 1.7 G/DL (ref 1.5–2.5)
ALP SERPL-CCNC: 102 U/L (ref 25–100)
ALT SERPL W P-5'-P-CCNC: 28 U/L (ref 7–40)
ANION GAP SERPL CALCULATED.3IONS-SCNC: 7 MMOL/L (ref 3–11)
AST SERPL-CCNC: 25 U/L (ref 0–33)
BASOPHILS # BLD AUTO: 0.05 10*3/MM3 (ref 0–0.2)
BASOPHILS NFR BLD AUTO: 0.7 % (ref 0–1)
BILIRUB SERPL-MCNC: 0.2 MG/DL (ref 0.3–1.2)
BUN BLD-MCNC: 21 MG/DL (ref 9–23)
BUN/CREAT SERPL: 26.3 (ref 7–25)
CALCIUM SPEC-SCNC: 8.9 MG/DL (ref 8.7–10.4)
CHLORIDE SERPL-SCNC: 106 MMOL/L (ref 99–109)
CO2 SERPL-SCNC: 32 MMOL/L (ref 20–31)
CREAT BLD-MCNC: 0.8 MG/DL (ref 0.6–1.3)
CRP SERPL-MCNC: 1.33 MG/DL (ref 0–1)
DEPRECATED RDW RBC AUTO: 55 FL (ref 37–54)
EOSINOPHIL # BLD AUTO: 0.36 10*3/MM3 (ref 0–0.3)
EOSINOPHIL NFR BLD AUTO: 4.9 % (ref 0–3)
ERYTHROCYTE [DISTWIDTH] IN BLOOD BY AUTOMATED COUNT: 15.1 % (ref 11.3–14.5)
ERYTHROCYTE [SEDIMENTATION RATE] IN BLOOD: 23 MM/HR (ref 0–30)
GFR SERPL CREATININE-BSD FRML MDRD: 73 ML/MIN/1.73
GLOBULIN UR ELPH-MCNC: 2.3 GM/DL
GLUCOSE BLD-MCNC: 89 MG/DL (ref 70–100)
HCT VFR BLD AUTO: 36.4 % (ref 34.5–44)
HGB BLD-MCNC: 11.1 G/DL (ref 11.5–15.5)
IMM GRANULOCYTES # BLD: 0.02 10*3/MM3 (ref 0–0.03)
IMM GRANULOCYTES NFR BLD: 0.3 % (ref 0–0.6)
LYMPHOCYTES # BLD AUTO: 0.97 10*3/MM3 (ref 0.6–4.8)
LYMPHOCYTES NFR BLD AUTO: 13.3 % (ref 24–44)
MCH RBC QN AUTO: 30.5 PG (ref 27–31)
MCHC RBC AUTO-ENTMCNC: 30.5 G/DL (ref 32–36)
MCV RBC AUTO: 100 FL (ref 80–99)
MONOCYTES # BLD AUTO: 0.79 10*3/MM3 (ref 0–1)
MONOCYTES NFR BLD AUTO: 10.8 % (ref 0–12)
NEUTROPHILS # BLD AUTO: 5.13 10*3/MM3 (ref 1.5–8.3)
NEUTROPHILS NFR BLD AUTO: 70 % (ref 41–71)
PLATELET # BLD AUTO: 354 10*3/MM3 (ref 150–450)
PMV BLD AUTO: 10 FL (ref 6–12)
POTASSIUM BLD-SCNC: 4.8 MMOL/L (ref 3.5–5.5)
PROT SERPL-MCNC: 6.3 G/DL (ref 5.7–8.2)
RBC # BLD AUTO: 3.64 10*6/MM3 (ref 3.89–5.14)
SODIUM BLD-SCNC: 145 MMOL/L (ref 132–146)
WBC NRBC COR # BLD: 7.32 10*3/MM3 (ref 3.5–10.8)

## 2018-01-02 PROCEDURE — 99024 POSTOP FOLLOW-UP VISIT: CPT | Performed by: ORTHOPAEDIC SURGERY

## 2018-01-02 PROCEDURE — 80053 COMPREHEN METABOLIC PANEL: CPT

## 2018-01-02 PROCEDURE — 36415 COLL VENOUS BLD VENIPUNCTURE: CPT

## 2018-01-02 PROCEDURE — 85025 COMPLETE CBC W/AUTO DIFF WBC: CPT

## 2018-01-02 PROCEDURE — 86140 C-REACTIVE PROTEIN: CPT

## 2018-01-02 NOTE — PROGRESS NOTES
Orthopaedic Clinic Note:  Knee Post Op    Chief Complaint   Patient presents with   • Post-op     3 weeks INCISION AND DRAINAGE, POLY EXCHANGE RIGHT KNEE 12/10/17        HPI    Ms. Leon is 3  week(s) s/p Revision of right knee arthroplasty with polyethylene exchange and irrigation and debridement for acute hematogenous infection of right knee.  Patient is approximately 3 weeks out from her surgery.  She was initially doing well and is being treated with IV Rocephin.  Cultures grew out strep oralis which was pan susceptible.  She was doing well up until last night when she sustained a mechanical fall in which her knee bent back underneath her.  She says currently had significant pain and decreased function with difficulty bearing weight since that injury.  She localizes her pain anteriorly to the superior pole the patella region.  She does have some focal swelling at that area.  She presents today in a wheelchair.  She denies any recent fevers, chills, constitutional symptoms.  Patient was doing extremely well up until last night's fall and now she is doing significantly worse.    Past Medical History:   Diagnosis Date   • Arthritis    • Breast cancer 2015    RIGHT   • Disease of thyroid gland    • Hx of radiation therapy 2015    RT BREAST   • Hypertension       Past Surgical History:   Procedure Laterality Date   • BREAST BIOPSY Right 2015   • BREAST LUMPECTOMY Right 2015   • JOINT REPLACEMENT     • KNEE ARTHROTOMY Right 12/10/2017    Procedure: INCISION AND DRAINAGE, POLY EXCHANGE RIGHT KNEE;  Surgeon: Stephon Garcia MD;  Location: [facility];  Service:      Family History   Problem Relation Age of Onset   • Breast cancer Mother      DX AGE UNKNOWN   • Breast cancer Sister      DX AGE UNKNOWN   • Ovarian cancer Neg Hx       Social History     Social History   • Marital status:      Spouse name: N/A   • Number of children: N/A   • Years of education: N/A     Occupational History   • Not on file.      Social History Main Topics   • Smoking status: Current Every Day Smoker     Years: 5.00     Types: Electronic Cigarette   • Smokeless tobacco: Never Used   • Alcohol use No   • Drug use: No   • Sexual activity: Yes     Other Topics Concern   • Not on file     Social History Narrative      Current Outpatient Prescriptions on File Prior to Visit   Medication Sig Dispense Refill   • ALPRAZolam (XANAX) 1 MG tablet Take 1 mg by mouth 2 (Two) Times a Day.     • aspirin  MG EC tablet Take 1 tablet by mouth Every 12 (Twelve) Hours. 14 tablet 0   • buprenorphine-naloxone (SUBOXONE) 8-2 MG film film Place 1 film under the tongue 2 (Two) Times a Day.     • buPROPion XL (WELLBUTRIN XL) 150 MG 24 hr tablet Take 150 mg by mouth 2 (Two) Times a Day.     • chlorzoxazone (PARAFON FORTE) 500 MG tablet Take 500 mg by mouth 3 (Three) Times a Day As Needed for Muscle Spasms.     • DULoxetine (CYMBALTA) 30 MG capsule Take 90 mg by mouth Daily.     • gabapentin (NEURONTIN) 800 MG tablet Take 800 mg by mouth 4 (Four) Times a Day.     • levothyroxine (SYNTHROID, LEVOTHROID) 100 MCG tablet Take 100 mcg by mouth Daily. Brand name.     • linaclotide (LINZESS) 290 MCG capsule capsule Take 290 mcg by mouth Daily.     • lisinopril (PRINIVIL,ZESTRIL) 20 MG tablet Take 20 mg by mouth Daily.     • meloxicam (MOBIC) 15 MG tablet Take 15 mg by mouth Daily.     • ondansetron (ZOFRAN) 4 MG tablet Take 1 tablet by mouth Every 6 (Six) Hours As Needed for Nausea or Vomiting. 20 tablet 0   • oxyCODONE-acetaminophen (PERCOCET) 5-325 MG per tablet Take 1 tablet by mouth Every 6 (Six) Hours As Needed for Severe Pain . 30 tablet 0   • triamterene-hydrochlorothiazide (MAXZIDE) 75-50 MG per tablet Take 1 tablet by mouth Daily.       No current facility-administered medications on file prior to visit.       Allergies   Allergen Reactions   • Bactrim [Sulfamethoxazole-Trimethoprim] Hives        Review of Systems     Physical Exam  There were no vitals  taken for this visit.    There is no height or weight on file to calculate BMI.    GENERAL APPEARANCE: awake, alert, oriented, in no acute distress  LUNGS:  breathing nonlabored  EXTREMITIES: no clubbing, cyanosis  PERIPHERAL PULSES: palpable dorsalis pedis and posterior tibial pulses bilaterally.    GAIT:  Not assessed           Right Knee Exam:  ----------  ALIGNMENT: neutral  ----------  RANGE OF MOTION:   active range of motion is from .  Patient has a 45° extensor lag.  Passively I can achieve full extension but she is unable to maintain this with quad activation.  LIGAMENTOUS STABILITY:   stable to varus and valgus stress at terminal extension and 30 degrees without any evidence of laxity  ----------  STRENGTH:  KNEE FLEXION 4/5  KNEE EXTENSION  2/5  ANKLE DORSIFLEXION  5/5  ANKLE PLANTARFLEXION  5/5  ----------  PAIN WITH PALPATION:suprapatellar with no clearly perceptible palpable defect  KNEE EFFUSION: yes, mild effusion  PAIN WITH KNEE ROM: yes  PATELLAR CREPITUS:  no  ----------  SENSATION TO LIGHT TOUCH:  DEEP PERONEAL/SUPERFICIAL PERONEAL/SURAL/SAPHENOUS/TIBIAL:    intact  ----------  EDEMA:  no  ERYTHEMA:    no  WOUNDS/INCISIONS:  yes, anterior midline incision is healing well with sutures in place.  No erythema or drainage.  _____________________________________________________________________  _____________________________________________________________________    RADIOGRAPHIC FINDINGS:   Indication: Status post revision right knee arthroplasty    Comparison: Todays xrays were compared to previous xrays from 12/10/17     Knee films: Demonstrate well positioned knee arthroplasty components in satisfactory alignment without evidence of wear, loosening, subsidence, fracture, or osteolysis.  There is no clearly perceptible evidence of patella baja or Lyudmila which would indicate extensor mechanism injury. No significant changes compared to prior radiographs.       Assessment/Plan:   Diagnosis Plan    1. Status post knee surgery  XR Knee 1 or 2 View Right     I discussed with the patient that given her lack of extensor mechanism function at this point, I'm concerned about acute disruption of the extensor mechanism, namely a quadriceps tendon tear as a result of the recent fall.  I explained to her that if she is unable to regain this function over the next 2 days, I would likely recommend surgical intervention for exploration and repair of her extensor mechanism.  I will see her back on Thursday for repeat evaluation.  If she is unable to extend the knee without significant extensor lag at her appointment in 2 days, she'll be scheduled for surgery on Monday, January 8.  In light of her infection which is currently being treated with IV antibiotics, a repeat surgery this soon after her initial surgery for acute hematogenous infection is not optimal.  I explained this could greatly increase her risk of infection and decrease the likelihood of successful treatment with the current antibiotic regimen.  She expressed understanding.  I will see her back on Thursday for reassessment and scheduling for surgery as needed.    Stephon Garcia MD  01/02/18  2:55 PM

## 2018-01-04 ENCOUNTER — APPOINTMENT (OUTPATIENT)
Dept: PREADMISSION TESTING | Facility: HOSPITAL | Age: 63
End: 2018-01-04

## 2018-01-04 ENCOUNTER — OFFICE VISIT (OUTPATIENT)
Dept: ORTHOPEDIC SURGERY | Facility: CLINIC | Age: 63
End: 2018-01-04

## 2018-01-04 DIAGNOSIS — Z98.890 STATUS POST KNEE SURGERY: ICD-10-CM

## 2018-01-04 DIAGNOSIS — S76.111A QUADRICEPS TENDON RUPTURE, RIGHT, INITIAL ENCOUNTER: Primary | ICD-10-CM

## 2018-01-04 DIAGNOSIS — S76.111A QUADRICEPS TENDON RUPTURE, RIGHT, INITIAL ENCOUNTER: ICD-10-CM

## 2018-01-04 LAB
ALBUMIN SERPL-MCNC: 4 G/DL (ref 3.2–4.8)
ALBUMIN/GLOB SERPL: 1.7 G/DL (ref 1.5–2.5)
ALP SERPL-CCNC: 99 U/L (ref 25–100)
ALT SERPL W P-5'-P-CCNC: 24 U/L (ref 7–40)
ANION GAP SERPL CALCULATED.3IONS-SCNC: 5 MMOL/L (ref 3–11)
APTT PPP: 24.8 SECONDS (ref 24–31)
AST SERPL-CCNC: 25 U/L (ref 0–33)
BASOPHILS # BLD AUTO: 0.04 10*3/MM3 (ref 0–0.2)
BASOPHILS NFR BLD AUTO: 0.6 % (ref 0–1)
BILIRUB SERPL-MCNC: 0.4 MG/DL (ref 0.3–1.2)
BUN BLD-MCNC: 16 MG/DL (ref 9–23)
BUN/CREAT SERPL: 22.9 (ref 7–25)
CALCIUM SPEC-SCNC: 8.9 MG/DL (ref 8.7–10.4)
CHLORIDE SERPL-SCNC: 105 MMOL/L (ref 99–109)
CO2 SERPL-SCNC: 32 MMOL/L (ref 20–31)
CREAT BLD-MCNC: 0.7 MG/DL (ref 0.6–1.3)
DEPRECATED RDW RBC AUTO: 53.7 FL (ref 37–54)
EOSINOPHIL # BLD AUTO: 0.27 10*3/MM3 (ref 0–0.3)
EOSINOPHIL NFR BLD AUTO: 4.4 % (ref 0–3)
ERYTHROCYTE [DISTWIDTH] IN BLOOD BY AUTOMATED COUNT: 14.8 % (ref 11.3–14.5)
GFR SERPL CREATININE-BSD FRML MDRD: 85 ML/MIN/1.73
GLOBULIN UR ELPH-MCNC: 2.4 GM/DL
GLUCOSE BLD-MCNC: 70 MG/DL (ref 70–100)
HBA1C MFR BLD: 5.2 % (ref 4.8–5.6)
HCT VFR BLD AUTO: 35.3 % (ref 34.5–44)
HGB BLD-MCNC: 10.9 G/DL (ref 11.5–15.5)
IMM GRANULOCYTES # BLD: 0.01 10*3/MM3 (ref 0–0.03)
IMM GRANULOCYTES NFR BLD: 0.2 % (ref 0–0.6)
INR PPP: 0.98
LYMPHOCYTES # BLD AUTO: 1 10*3/MM3 (ref 0.6–4.8)
LYMPHOCYTES NFR BLD AUTO: 16.2 % (ref 24–44)
MCH RBC QN AUTO: 30.3 PG (ref 27–31)
MCHC RBC AUTO-ENTMCNC: 30.9 G/DL (ref 32–36)
MCV RBC AUTO: 98.1 FL (ref 80–99)
MONOCYTES # BLD AUTO: 0.53 10*3/MM3 (ref 0–1)
MONOCYTES NFR BLD AUTO: 8.6 % (ref 0–12)
NEUTROPHILS # BLD AUTO: 4.31 10*3/MM3 (ref 1.5–8.3)
NEUTROPHILS NFR BLD AUTO: 70 % (ref 41–71)
PLATELET # BLD AUTO: 333 10*3/MM3 (ref 150–450)
PMV BLD AUTO: 9.8 FL (ref 6–12)
POTASSIUM BLD-SCNC: 4.6 MMOL/L (ref 3.5–5.5)
PROT SERPL-MCNC: 6.4 G/DL (ref 5.7–8.2)
PROTHROMBIN TIME: 10.7 SECONDS (ref 9.6–11.5)
RBC # BLD AUTO: 3.6 10*6/MM3 (ref 3.89–5.14)
SODIUM BLD-SCNC: 142 MMOL/L (ref 132–146)
WBC NRBC COR # BLD: 6.16 10*3/MM3 (ref 3.5–10.8)

## 2018-01-04 PROCEDURE — 80053 COMPREHEN METABOLIC PANEL: CPT | Performed by: ORTHOPAEDIC SURGERY

## 2018-01-04 PROCEDURE — 99213 OFFICE O/P EST LOW 20 MIN: CPT | Performed by: ORTHOPAEDIC SURGERY

## 2018-01-04 PROCEDURE — 85730 THROMBOPLASTIN TIME PARTIAL: CPT | Performed by: ORTHOPAEDIC SURGERY

## 2018-01-04 PROCEDURE — 85025 COMPLETE CBC W/AUTO DIFF WBC: CPT | Performed by: ORTHOPAEDIC SURGERY

## 2018-01-04 PROCEDURE — 85610 PROTHROMBIN TIME: CPT | Performed by: ORTHOPAEDIC SURGERY

## 2018-01-04 PROCEDURE — 93005 ELECTROCARDIOGRAM TRACING: CPT

## 2018-01-04 PROCEDURE — 36415 COLL VENOUS BLD VENIPUNCTURE: CPT

## 2018-01-04 PROCEDURE — 93010 ELECTROCARDIOGRAM REPORT: CPT | Performed by: INTERNAL MEDICINE

## 2018-01-04 PROCEDURE — 83036 HEMOGLOBIN GLYCOSYLATED A1C: CPT | Performed by: ANESTHESIOLOGY

## 2018-01-04 RX ORDER — SODIUM CHLORIDE 9 MG/ML
100 INJECTION, SOLUTION INTRAVENOUS CONTINUOUS
Status: CANCELLED | OUTPATIENT
Start: 2018-01-04

## 2018-01-04 RX ORDER — PREGABALIN 25 MG/1
75 CAPSULE ORAL ONCE
Status: CANCELLED | OUTPATIENT
Start: 2018-01-04 | End: 2018-01-04

## 2018-01-04 RX ORDER — ACETAMINOPHEN 325 MG/1
1000 TABLET ORAL ONCE
Status: CANCELLED | OUTPATIENT
Start: 2018-01-04 | End: 2018-01-04

## 2018-01-04 NOTE — PROGRESS NOTES
Orthopaedic Clinic Note:  Knee Post Op    Chief Complaint   Patient presents with   • Post-op     3 weeks status post: INCISION AND DRAINAGE, POLY EXCHANGE RIGHT KNEE        HPI    Ms. Leon is 3  week(s) s/p Revision right knee arthroplasty for instability and acute hematogenous infection.  Patient returns for follow-up from 2 days ago.  She was doing well up until Monday afternoon when she sustained a mechanical fall and bent her knee backwards resulting in excruciating pain just above the patella.  She was unable to straighten her leg afterwards.  She was seen in clinic on Tuesday and had significant swelling over the anterior aspect of the knee making it difficult to assess for quadriceps function or defect.  Patient had significant extensor lag.  She returns to clinic today for repeat evaluation.  I have instructed her to work on trying to activate her quadriceps tendon to see if her extensor mechanism was intact but just had quad inhibition.  Patient states her pain has improved and is a 7/10 on the pain scale.  She still unable to bear weight secondary to weakness with knee extension.  She denies any fevers, chills, constitutional symptoms.  She continues to be on the IV antibiotics per the infectious disease team.  She states her pain is much improved, but her motion has failed to significantly improved.      Past Medical History:   Diagnosis Date   • Arthritis    • Breast cancer 2015    RIGHT   • Disease of thyroid gland    • Hx of radiation therapy 2015    RT BREAST   • Hypertension       Past Surgical History:   Procedure Laterality Date   • BREAST BIOPSY Right 2015   • BREAST LUMPECTOMY Right 2015   • JOINT REPLACEMENT     • KNEE ARTHROTOMY Right 12/10/2017    Procedure: INCISION AND DRAINAGE, POLY EXCHANGE RIGHT KNEE;  Surgeon: Stephon Garcia MD;  Location: [facility];  Service:      Family History   Problem Relation Age of Onset   • Breast cancer Mother      DX AGE UNKNOWN   • Breast cancer Sister       DX AGE UNKNOWN   • Ovarian cancer Neg Hx       Social History     Social History   • Marital status:      Spouse name: N/A   • Number of children: N/A   • Years of education: N/A     Occupational History   • Not on file.     Social History Main Topics   • Smoking status: Current Every Day Smoker     Years: 5.00     Types: Electronic Cigarette   • Smokeless tobacco: Never Used   • Alcohol use No   • Drug use: No   • Sexual activity: Yes     Other Topics Concern   • Not on file     Social History Narrative      Current Outpatient Prescriptions on File Prior to Visit   Medication Sig Dispense Refill   • ALPRAZolam (XANAX) 1 MG tablet Take 1 mg by mouth 2 (Two) Times a Day.     • aspirin  MG EC tablet Take 1 tablet by mouth Every 12 (Twelve) Hours. 14 tablet 0   • buprenorphine-naloxone (SUBOXONE) 8-2 MG film film Place 1 film under the tongue 2 (Two) Times a Day.     • buPROPion XL (WELLBUTRIN XL) 150 MG 24 hr tablet Take 150 mg by mouth 2 (Two) Times a Day.     • chlorzoxazone (PARAFON FORTE) 500 MG tablet Take 500 mg by mouth 3 (Three) Times a Day As Needed for Muscle Spasms.     • DULoxetine (CYMBALTA) 30 MG capsule Take 90 mg by mouth Daily.     • gabapentin (NEURONTIN) 800 MG tablet Take 800 mg by mouth 4 (Four) Times a Day.     • levothyroxine (SYNTHROID, LEVOTHROID) 100 MCG tablet Take 100 mcg by mouth Daily. Brand name.     • linaclotide (LINZESS) 290 MCG capsule capsule Take 290 mcg by mouth Daily.     • lisinopril (PRINIVIL,ZESTRIL) 20 MG tablet Take 20 mg by mouth Daily.     • meloxicam (MOBIC) 15 MG tablet Take 15 mg by mouth Daily.     • ondansetron (ZOFRAN) 4 MG tablet Take 1 tablet by mouth Every 6 (Six) Hours As Needed for Nausea or Vomiting. 20 tablet 0   • oxyCODONE-acetaminophen (PERCOCET) 5-325 MG per tablet Take 1 tablet by mouth Every 6 (Six) Hours As Needed for Severe Pain . 30 tablet 0   • triamterene-hydrochlorothiazide (MAXZIDE) 75-50 MG per tablet Take 1 tablet by mouth  Daily.       No current facility-administered medications on file prior to visit.       Allergies   Allergen Reactions   • Bactrim [Sulfamethoxazole-Trimethoprim] Hives        Review of Systems     Physical Exam  There were no vitals taken for this visit.    There is no height or weight on file to calculate BMI.    GENERAL APPEARANCE: awake, alert, oriented, in no acute distress  LUNGS:  breathing nonlabored  EXTREMITIES: no clubbing, cyanosis  PERIPHERAL PULSES: palpable dorsalis pedis and posterior tibial pulses bilaterally.    GAIT:  Not assessed           Right Knee Exam:  ----------  ALIGNMENT: neutral  ----------  RANGE OF MOTION:   °.  There is a 45° extensor lag.  LIGAMENTOUS STABILITY:   stable to varus and valgus stress at terminal extension and 30 degrees without any evidence of laxity  ----------  STRENGTH:  KNEE FLEXION 4/5  KNEE EXTENSION  2/5  ANKLE DORSIFLEXION  5/5  ANKLE PLANTARFLEXION  5/5  ----------  PAIN WITH PALPATION:suprapatellar region with palpable defect in the quadriceps tendon approximately 1-2 cm above the superior pole the patella  KNEE EFFUSION: yes, mild effusion  PAIN WITH KNEE ROM: yes  PATELLAR CREPITUS:  no  ----------  SENSATION TO LIGHT TOUCH:  DEEP PERONEAL/SUPERFICIAL PERONEAL/SURAL/SAPHENOUS/TIBIAL:    intact  ----------  EDEMA:  Prepatellar edema is much improved compared to visit 2 days ago   ERYTHEMA:    no  WOUNDS/INCISIONS:  yes, anterior midline incision is well healed with sutures in place.  _____________________________________________________________________  _____________________________________________________________________    RADIOGRAPHIC FINDINGS:   No new imaging today    Assessment/Plan:   Diagnosis Plan   1. Quadriceps tendon rupture, right, initial encounter     2. Status post knee surgery       It appears at this point the patient has a quadriceps tendon rupture that is secondary to the recent postoperative fall.  Given her excellent range of  motion prior to the fall, I believe this is an acute injury which is unrelated to her prior surgical intervention.  Patient has a significant extensor lag and I explained that it without surgical repair, it is unlikely that she will regain significant functional improvement in her knee.  I also explained that given the current treatment for acute infection, recurrent surgery increases the likelihood of potential persistent and chronic infection of the knee or failure of the IV antibiotic treatment.  She expressed understanding.  The plan is to proceed with exploration and repair of the quadriceps tendon on Monday.  We'll place her into a T ROM brace with range of motion limited 0-45° to protect the quad tendon and prevent further distraction of the tendon rupture.    The patient has clinical and radiographic evidence of acute quadriceps tendon rupture which is severely restricting the patient's activities as well as quality of life. I had an extensive discussion with the patient/family regarding treatment options taking into account the nature of the diagnosis, the patient's prior level of cognitive and physical function, medical comorbidities, and goals for treatment.  The recommendation at this time is to proceed with a surgical repair of right knee quadriceps tendon tear with the goal to improve patient function, decrease pain, and improve mobility. The risks, benefits, potential complications, and alternatives were discussed with the patient in detail. Risks included but were not limited to bleeding, infection, anesthesia risks, damage to neurovascular structures, recurrent injury, extensor lag, quadriceps weakness, loss of integrity of the repair construct, pain, continued pain, iatrogenic fracture, possible need for future surgery including the potential for amputation, blood clots, myocardial infarction, stroke, and death. Lena-operative blood management and the potential for blood transfusion were discussed  with risks and options clearly outlined. Specific details of the surgical procedure, hospitalization, recovery, rehabilitation, and long-term precautions were also presented. Pre-operative teaching was provided. Implant/prosthesis and equipment selection was outlined, and the many options available were explained; the final choice will be made at the time of the procedure to match the anatomy and condition of the bone, ligaments, tendons, and muscles. Given this instruction, the patient elected to proceed with the and primary repair of right quadriceps tendon tear. The patient will be seen by pre-admission testing for pre-operative blood work and risk assessment.         Stephon Garcia MD  01/04/18  9:08 AM

## 2018-01-04 NOTE — PAT
Valentín marr measurements:  Length: 22  Width: 16  ERAS education provided to patient per protocol  Too early for type and screen; please draw the morning of surgery

## 2018-01-05 ENCOUNTER — ANESTHESIA EVENT (OUTPATIENT)
Dept: PERIOP | Facility: HOSPITAL | Age: 63
End: 2018-01-05

## 2018-01-08 ENCOUNTER — HOSPITAL ENCOUNTER (OUTPATIENT)
Facility: HOSPITAL | Age: 63
LOS: 1 days | Discharge: HOME | End: 2018-01-10
Attending: ORTHOPAEDIC SURGERY | Admitting: ORTHOPAEDIC SURGERY

## 2018-01-08 ENCOUNTER — ANESTHESIA (OUTPATIENT)
Dept: PERIOP | Facility: HOSPITAL | Age: 63
End: 2018-01-08

## 2018-01-08 DIAGNOSIS — Z74.09 IMPAIRED FUNCTIONAL MOBILITY, BALANCE, GAIT, AND ENDURANCE: Primary | ICD-10-CM

## 2018-01-08 DIAGNOSIS — Z78.9 IMPAIRED MOBILITY AND ADLS: ICD-10-CM

## 2018-01-08 DIAGNOSIS — Z74.09 IMPAIRED MOBILITY AND ADLS: ICD-10-CM

## 2018-01-08 DIAGNOSIS — S76.111A QUADRICEPS TENDON RUPTURE, RIGHT, INITIAL ENCOUNTER: ICD-10-CM

## 2018-01-08 DIAGNOSIS — Z98.890 S/P KNEE SURGERY: ICD-10-CM

## 2018-01-08 PROBLEM — F41.9 ANXIETY: Status: ACTIVE | Noted: 2018-01-08

## 2018-01-08 PROBLEM — F32.A ANXIETY AND DEPRESSION: Status: ACTIVE | Noted: 2018-01-08

## 2018-01-08 PROBLEM — G89.18 ACUTE POSTOPERATIVE PAIN: Status: ACTIVE | Noted: 2018-01-08

## 2018-01-08 PROBLEM — Z72.0 TOBACCO ABUSE: Status: ACTIVE | Noted: 2018-01-08

## 2018-01-08 PROBLEM — E03.9 HYPOTHYROID: Status: ACTIVE | Noted: 2018-01-08

## 2018-01-08 LAB
ANION GAP SERPL CALCULATED.3IONS-SCNC: 7 MMOL/L (ref 3–11)
BUN BLD-MCNC: 14 MG/DL (ref 9–23)
BUN/CREAT SERPL: 17.5 (ref 7–25)
CALCIUM SPEC-SCNC: 8.9 MG/DL (ref 8.7–10.4)
CHLORIDE SERPL-SCNC: 103 MMOL/L (ref 99–109)
CO2 SERPL-SCNC: 29 MMOL/L (ref 20–31)
CREAT BLD-MCNC: 0.8 MG/DL (ref 0.6–1.3)
GFR SERPL CREATININE-BSD FRML MDRD: 73 ML/MIN/1.73
GLUCOSE BLD-MCNC: 144 MG/DL (ref 70–100)
HCT VFR BLD AUTO: 34.3 % (ref 34.5–44)
HGB BLD-MCNC: 10.7 G/DL (ref 11.5–15.5)
POTASSIUM BLD-SCNC: 4.4 MMOL/L (ref 3.5–5.5)
SODIUM BLD-SCNC: 139 MMOL/L (ref 132–146)

## 2018-01-08 PROCEDURE — 25010000003 CEFAZOLIN IN DEXTROSE 2-4 GM/100ML-% SOLUTION: Performed by: ORTHOPAEDIC SURGERY

## 2018-01-08 PROCEDURE — 94799 UNLISTED PULMONARY SVC/PX: CPT

## 2018-01-08 PROCEDURE — 27385 REPAIR OF THIGH MUSCLE: CPT | Performed by: ORTHOPAEDIC SURGERY

## 2018-01-08 PROCEDURE — 25010000002 DEXAMETHASONE PER 1 MG: Performed by: NURSE ANESTHETIST, CERTIFIED REGISTERED

## 2018-01-08 PROCEDURE — 85014 HEMATOCRIT: CPT | Performed by: ORTHOPAEDIC SURGERY

## 2018-01-08 PROCEDURE — 25010000002 FENTANYL CITRATE (PF) 100 MCG/2ML SOLUTION: Performed by: NURSE ANESTHETIST, CERTIFIED REGISTERED

## 2018-01-08 PROCEDURE — 25010000002 PROPOFOL 10 MG/ML EMULSION: Performed by: NURSE ANESTHETIST, CERTIFIED REGISTERED

## 2018-01-08 PROCEDURE — 27385 REPAIR OF THIGH MUSCLE: CPT | Performed by: PHYSICIAN ASSISTANT

## 2018-01-08 PROCEDURE — 25010000002 HYDROMORPHONE PER 4 MG: Performed by: ORTHOPAEDIC SURGERY

## 2018-01-08 PROCEDURE — 25010000002 HYDROMORPHONE PER 4 MG: Performed by: NURSE ANESTHETIST, CERTIFIED REGISTERED

## 2018-01-08 PROCEDURE — 80048 BASIC METABOLIC PNL TOTAL CA: CPT | Performed by: ORTHOPAEDIC SURGERY

## 2018-01-08 PROCEDURE — 25010000002 ONDANSETRON PER 1 MG: Performed by: NURSE ANESTHETIST, CERTIFIED REGISTERED

## 2018-01-08 PROCEDURE — 85018 HEMOGLOBIN: CPT | Performed by: ORTHOPAEDIC SURGERY

## 2018-01-08 RX ORDER — CEFAZOLIN SODIUM 2 G/100ML
2 INJECTION, SOLUTION INTRAVENOUS ONCE
Status: COMPLETED | OUTPATIENT
Start: 2018-01-08 | End: 2018-01-08

## 2018-01-08 RX ORDER — BISACODYL 10 MG
10 SUPPOSITORY, RECTAL RECTAL DAILY PRN
Status: DISCONTINUED | OUTPATIENT
Start: 2018-01-08 | End: 2018-01-10 | Stop reason: HOSPADM

## 2018-01-08 RX ORDER — MAGNESIUM HYDROXIDE 1200 MG/15ML
LIQUID ORAL AS NEEDED
Status: DISCONTINUED | OUTPATIENT
Start: 2018-01-08 | End: 2018-01-08 | Stop reason: HOSPADM

## 2018-01-08 RX ORDER — LEVOTHYROXINE SODIUM 0.1 MG/1
100 TABLET ORAL
Status: DISCONTINUED | OUTPATIENT
Start: 2018-01-09 | End: 2018-01-10 | Stop reason: HOSPADM

## 2018-01-08 RX ORDER — ALPRAZOLAM 1 MG/1
1 TABLET ORAL 2 TIMES DAILY PRN
Status: DISCONTINUED | OUTPATIENT
Start: 2018-01-08 | End: 2018-01-10 | Stop reason: HOSPADM

## 2018-01-08 RX ORDER — DOCUSATE SODIUM 100 MG/1
100 CAPSULE, LIQUID FILLED ORAL 2 TIMES DAILY PRN
Status: DISCONTINUED | OUTPATIENT
Start: 2018-01-08 | End: 2018-01-10 | Stop reason: HOSPADM

## 2018-01-08 RX ORDER — NICOTINE 21 MG/24HR
1 PATCH, TRANSDERMAL 24 HOURS TRANSDERMAL DAILY
Status: DISCONTINUED | OUTPATIENT
Start: 2018-01-08 | End: 2018-01-10 | Stop reason: HOSPADM

## 2018-01-08 RX ORDER — ONDANSETRON 2 MG/ML
INJECTION INTRAMUSCULAR; INTRAVENOUS AS NEEDED
Status: DISCONTINUED | OUTPATIENT
Start: 2018-01-08 | End: 2018-01-08 | Stop reason: SURG

## 2018-01-08 RX ORDER — FENTANYL CITRATE 50 UG/ML
50 INJECTION, SOLUTION INTRAMUSCULAR; INTRAVENOUS
Status: COMPLETED | OUTPATIENT
Start: 2018-01-08 | End: 2018-01-08

## 2018-01-08 RX ORDER — ONDANSETRON 2 MG/ML
4 INJECTION INTRAMUSCULAR; INTRAVENOUS EVERY 6 HOURS PRN
Status: DISCONTINUED | OUTPATIENT
Start: 2018-01-08 | End: 2018-01-10 | Stop reason: HOSPADM

## 2018-01-08 RX ORDER — OXYCODONE HYDROCHLORIDE AND ACETAMINOPHEN 5; 325 MG/1; MG/1
1 TABLET ORAL EVERY 4 HOURS PRN
Status: DISCONTINUED | OUTPATIENT
Start: 2018-01-08 | End: 2018-01-09

## 2018-01-08 RX ORDER — ROPIVACAINE HYDROCHLORIDE 2 MG/ML
12 INJECTION, SOLUTION EPIDURAL; INFILTRATION CONTINUOUS
Status: DISCONTINUED | OUTPATIENT
Start: 2018-01-08 | End: 2018-01-08

## 2018-01-08 RX ORDER — ACETAMINOPHEN 160 MG/5ML
650 SOLUTION ORAL EVERY 4 HOURS PRN
Status: DISCONTINUED | OUTPATIENT
Start: 2018-01-08 | End: 2018-01-10 | Stop reason: HOSPADM

## 2018-01-08 RX ORDER — ACETAMINOPHEN 650 MG/1
650 SUPPOSITORY RECTAL EVERY 4 HOURS PRN
Status: DISCONTINUED | OUTPATIENT
Start: 2018-01-08 | End: 2018-01-10 | Stop reason: HOSPADM

## 2018-01-08 RX ORDER — ACETAMINOPHEN 325 MG/1
650 TABLET ORAL EVERY 4 HOURS PRN
Status: DISCONTINUED | OUTPATIENT
Start: 2018-01-08 | End: 2018-01-10 | Stop reason: HOSPADM

## 2018-01-08 RX ORDER — FAMOTIDINE 10 MG/ML
20 INJECTION, SOLUTION INTRAVENOUS ONCE
Status: DISCONTINUED | OUTPATIENT
Start: 2018-01-08 | End: 2018-01-08

## 2018-01-08 RX ORDER — SODIUM CHLORIDE, SODIUM LACTATE, POTASSIUM CHLORIDE, CALCIUM CHLORIDE 600; 310; 30; 20 MG/100ML; MG/100ML; MG/100ML; MG/100ML
9 INJECTION, SOLUTION INTRAVENOUS CONTINUOUS
Status: DISCONTINUED | OUTPATIENT
Start: 2018-01-08 | End: 2018-01-10 | Stop reason: HOSPADM

## 2018-01-08 RX ORDER — BUPROPION HYDROCHLORIDE 150 MG/1
150 TABLET ORAL DAILY
Status: DISCONTINUED | OUTPATIENT
Start: 2018-01-09 | End: 2018-01-10 | Stop reason: HOSPADM

## 2018-01-08 RX ORDER — FAMOTIDINE 20 MG/1
20 TABLET, FILM COATED ORAL ONCE
Status: COMPLETED | OUTPATIENT
Start: 2018-01-08 | End: 2018-01-08

## 2018-01-08 RX ORDER — PROMETHAZINE HYDROCHLORIDE 25 MG/1
25 TABLET ORAL ONCE AS NEEDED
Status: DISCONTINUED | OUTPATIENT
Start: 2018-01-08 | End: 2018-01-08 | Stop reason: HOSPADM

## 2018-01-08 RX ORDER — ASPIRIN 325 MG
325 TABLET, DELAYED RELEASE (ENTERIC COATED) ORAL EVERY 12 HOURS SCHEDULED
Status: DISCONTINUED | OUTPATIENT
Start: 2018-01-09 | End: 2018-01-10 | Stop reason: HOSPADM

## 2018-01-08 RX ORDER — ONDANSETRON 2 MG/ML
4 INJECTION INTRAMUSCULAR; INTRAVENOUS ONCE AS NEEDED
Status: DISCONTINUED | OUTPATIENT
Start: 2018-01-08 | End: 2018-01-08 | Stop reason: HOSPADM

## 2018-01-08 RX ORDER — CEFAZOLIN SODIUM 2 G/100ML
2 INJECTION, SOLUTION INTRAVENOUS EVERY 8 HOURS
Status: COMPLETED | OUTPATIENT
Start: 2018-01-08 | End: 2018-01-09

## 2018-01-08 RX ORDER — MELOXICAM 7.5 MG/1
15 TABLET ORAL DAILY
Status: DISCONTINUED | OUTPATIENT
Start: 2018-01-09 | End: 2018-01-10 | Stop reason: HOSPADM

## 2018-01-08 RX ORDER — ACETAMINOPHEN 650 MG
TABLET, EXTENDED RELEASE ORAL AS NEEDED
Status: DISCONTINUED | OUTPATIENT
Start: 2018-01-08 | End: 2018-01-08 | Stop reason: HOSPADM

## 2018-01-08 RX ORDER — BISACODYL 5 MG/1
10 TABLET, DELAYED RELEASE ORAL DAILY PRN
Status: DISCONTINUED | OUTPATIENT
Start: 2018-01-08 | End: 2018-01-10 | Stop reason: HOSPADM

## 2018-01-08 RX ORDER — GABAPENTIN 400 MG/1
800 CAPSULE ORAL 4 TIMES DAILY
Status: DISCONTINUED | OUTPATIENT
Start: 2018-01-08 | End: 2018-01-10 | Stop reason: HOSPADM

## 2018-01-08 RX ORDER — PROMETHAZINE HYDROCHLORIDE 25 MG/ML
6.25 INJECTION, SOLUTION INTRAMUSCULAR; INTRAVENOUS ONCE AS NEEDED
Status: DISCONTINUED | OUTPATIENT
Start: 2018-01-08 | End: 2018-01-08 | Stop reason: HOSPADM

## 2018-01-08 RX ORDER — HYDROMORPHONE HYDROCHLORIDE 1 MG/ML
0.5 INJECTION, SOLUTION INTRAMUSCULAR; INTRAVENOUS; SUBCUTANEOUS
Status: COMPLETED | OUTPATIENT
Start: 2018-01-08 | End: 2018-01-08

## 2018-01-08 RX ORDER — PROPOFOL 10 MG/ML
VIAL (ML) INTRAVENOUS CONTINUOUS PRN
Status: DISCONTINUED | OUTPATIENT
Start: 2018-01-08 | End: 2018-01-08 | Stop reason: SURG

## 2018-01-08 RX ORDER — ONDANSETRON 4 MG/1
4 TABLET, FILM COATED ORAL EVERY 6 HOURS PRN
Status: DISCONTINUED | OUTPATIENT
Start: 2018-01-08 | End: 2018-01-10 | Stop reason: HOSPADM

## 2018-01-08 RX ORDER — SODIUM CHLORIDE 0.9 % (FLUSH) 0.9 %
1-10 SYRINGE (ML) INJECTION AS NEEDED
Status: DISCONTINUED | OUTPATIENT
Start: 2018-01-08 | End: 2018-01-10 | Stop reason: HOSPADM

## 2018-01-08 RX ORDER — NALOXONE HCL 0.4 MG/ML
0.1 VIAL (ML) INJECTION
Status: DISCONTINUED | OUTPATIENT
Start: 2018-01-08 | End: 2018-01-10 | Stop reason: HOSPADM

## 2018-01-08 RX ORDER — SODIUM CHLORIDE 9 MG/ML
100 INJECTION, SOLUTION INTRAVENOUS CONTINUOUS
Status: DISCONTINUED | OUTPATIENT
Start: 2018-01-08 | End: 2018-01-10 | Stop reason: HOSPADM

## 2018-01-08 RX ORDER — HYDROMORPHONE HYDROCHLORIDE 1 MG/ML
0.5 INJECTION, SOLUTION INTRAMUSCULAR; INTRAVENOUS; SUBCUTANEOUS
Status: DISCONTINUED | OUTPATIENT
Start: 2018-01-08 | End: 2018-01-10 | Stop reason: HOSPADM

## 2018-01-08 RX ORDER — LISINOPRIL 20 MG/1
20 TABLET ORAL DAILY
Status: DISCONTINUED | OUTPATIENT
Start: 2018-01-09 | End: 2018-01-10 | Stop reason: HOSPADM

## 2018-01-08 RX ORDER — PREGABALIN 75 MG/1
75 CAPSULE ORAL ONCE
Status: COMPLETED | OUTPATIENT
Start: 2018-01-08 | End: 2018-01-08

## 2018-01-08 RX ORDER — DEXAMETHASONE SODIUM PHOSPHATE 4 MG/ML
INJECTION, SOLUTION INTRA-ARTICULAR; INTRALESIONAL; INTRAMUSCULAR; INTRAVENOUS; SOFT TISSUE AS NEEDED
Status: DISCONTINUED | OUTPATIENT
Start: 2018-01-08 | End: 2018-01-08 | Stop reason: SURG

## 2018-01-08 RX ORDER — LIDOCAINE HYDROCHLORIDE 10 MG/ML
0.5 INJECTION, SOLUTION EPIDURAL; INFILTRATION; INTRACAUDAL; PERINEURAL ONCE AS NEEDED
Status: DISCONTINUED | OUTPATIENT
Start: 2018-01-08 | End: 2018-01-08 | Stop reason: HOSPADM

## 2018-01-08 RX ORDER — PROMETHAZINE HYDROCHLORIDE 25 MG/1
25 SUPPOSITORY RECTAL ONCE AS NEEDED
Status: DISCONTINUED | OUTPATIENT
Start: 2018-01-08 | End: 2018-01-08 | Stop reason: HOSPADM

## 2018-01-08 RX ORDER — HYDRALAZINE HYDROCHLORIDE 20 MG/ML
10 INJECTION INTRAMUSCULAR; INTRAVENOUS EVERY 6 HOURS PRN
Status: DISCONTINUED | OUTPATIENT
Start: 2018-01-08 | End: 2018-01-10 | Stop reason: HOSPADM

## 2018-01-08 RX ORDER — ACETAMINOPHEN 500 MG
1000 TABLET ORAL ONCE
Status: COMPLETED | OUTPATIENT
Start: 2018-01-08 | End: 2018-01-08

## 2018-01-08 RX ORDER — FENTANYL CITRATE 50 UG/ML
INJECTION, SOLUTION INTRAMUSCULAR; INTRAVENOUS AS NEEDED
Status: DISCONTINUED | OUTPATIENT
Start: 2018-01-08 | End: 2018-01-08 | Stop reason: SURG

## 2018-01-08 RX ORDER — SODIUM CHLORIDE 0.9 % (FLUSH) 0.9 %
1-10 SYRINGE (ML) INJECTION AS NEEDED
Status: DISCONTINUED | OUTPATIENT
Start: 2018-01-08 | End: 2018-01-08 | Stop reason: HOSPADM

## 2018-01-08 RX ORDER — PROPOFOL 10 MG/ML
VIAL (ML) INTRAVENOUS AS NEEDED
Status: DISCONTINUED | OUTPATIENT
Start: 2018-01-08 | End: 2018-01-08 | Stop reason: SURG

## 2018-01-08 RX ORDER — OXYCODONE HYDROCHLORIDE AND ACETAMINOPHEN 5; 325 MG/1; MG/1
2 TABLET ORAL EVERY 4 HOURS PRN
Status: DISCONTINUED | OUTPATIENT
Start: 2018-01-08 | End: 2018-01-09

## 2018-01-08 RX ADMIN — ACETAMINOPHEN 1000 MG: 500 TABLET ORAL at 12:11

## 2018-01-08 RX ADMIN — BUPIVACAINE HYDROCHLORIDE 20 ML: 2.5 INJECTION, SOLUTION EPIDURAL; INFILTRATION; INTRACAUDAL; PERINEURAL at 16:25

## 2018-01-08 RX ADMIN — SODIUM CHLORIDE 100 ML/HR: 9 INJECTION, SOLUTION INTRAVENOUS at 20:12

## 2018-01-08 RX ADMIN — SODIUM CHLORIDE, POTASSIUM CHLORIDE, SODIUM LACTATE AND CALCIUM CHLORIDE 9 ML/HR: 600; 310; 30; 20 INJECTION, SOLUTION INTRAVENOUS at 12:05

## 2018-01-08 RX ADMIN — OXYCODONE AND ACETAMINOPHEN 1 TABLET: 5; 325 TABLET ORAL at 18:27

## 2018-01-08 RX ADMIN — PROPOFOL 40 MG: 10 INJECTION, EMULSION INTRAVENOUS at 15:27

## 2018-01-08 RX ADMIN — FAMOTIDINE 20 MG: 20 TABLET, FILM COATED ORAL at 12:11

## 2018-01-08 RX ADMIN — HYDROMORPHONE HYDROCHLORIDE 0.5 MG: 1 INJECTION, SOLUTION INTRAMUSCULAR; INTRAVENOUS; SUBCUTANEOUS at 16:59

## 2018-01-08 RX ADMIN — PROPOFOL 200 MG: 10 INJECTION, EMULSION INTRAVENOUS at 14:33

## 2018-01-08 RX ADMIN — FENTANYL CITRATE 50 MCG: 50 INJECTION INTRAMUSCULAR; INTRAVENOUS at 16:37

## 2018-01-08 RX ADMIN — CEFAZOLIN SODIUM 2 G: 2 INJECTION, SOLUTION INTRAVENOUS at 22:00

## 2018-01-08 RX ADMIN — FENTANYL CITRATE 50 MCG: 50 INJECTION INTRAMUSCULAR; INTRAVENOUS at 17:56

## 2018-01-08 RX ADMIN — HYDROMORPHONE HYDROCHLORIDE 0.5 MG: 1 INJECTION, SOLUTION INTRAMUSCULAR; INTRAVENOUS; SUBCUTANEOUS at 16:40

## 2018-01-08 RX ADMIN — HYDROMORPHONE HYDROCHLORIDE 0.5 MG: 1 INJECTION, SOLUTION INTRAMUSCULAR; INTRAVENOUS; SUBCUTANEOUS at 17:09

## 2018-01-08 RX ADMIN — FENTANYL CITRATE 50 MCG: 50 INJECTION, SOLUTION INTRAMUSCULAR; INTRAVENOUS at 14:37

## 2018-01-08 RX ADMIN — FENTANYL CITRATE 50 MCG: 50 INJECTION INTRAMUSCULAR; INTRAVENOUS at 17:24

## 2018-01-08 RX ADMIN — FENTANYL CITRATE 50 MCG: 50 INJECTION INTRAMUSCULAR; INTRAVENOUS at 18:55

## 2018-01-08 RX ADMIN — HYDROMORPHONE HYDROCHLORIDE 0.5 MG: 2 INJECTION INTRAMUSCULAR; INTRAVENOUS; SUBCUTANEOUS at 20:47

## 2018-01-08 RX ADMIN — GABAPENTIN 800 MG: 400 CAPSULE ORAL at 22:09

## 2018-01-08 RX ADMIN — CEFAZOLIN SODIUM 2 G: 2 INJECTION, SOLUTION INTRAVENOUS at 14:30

## 2018-01-08 RX ADMIN — HYDROMORPHONE HYDROCHLORIDE 0.5 MG: 1 INJECTION, SOLUTION INTRAMUSCULAR; INTRAVENOUS; SUBCUTANEOUS at 17:17

## 2018-01-08 RX ADMIN — PREGABALIN 75 MG: 75 CAPSULE ORAL at 12:11

## 2018-01-08 RX ADMIN — DEXAMETHASONE SODIUM PHOSPHATE 8 MG: 4 INJECTION, SOLUTION INTRAMUSCULAR; INTRAVENOUS at 14:39

## 2018-01-08 RX ADMIN — FENTANYL CITRATE 50 MCG: 50 INJECTION, SOLUTION INTRAMUSCULAR; INTRAVENOUS at 15:01

## 2018-01-08 RX ADMIN — PROPOFOL 50 MCG/KG/MIN: 10 INJECTION, EMULSION INTRAVENOUS at 14:33

## 2018-01-08 RX ADMIN — FENTANYL CITRATE 50 MCG: 50 INJECTION INTRAMUSCULAR; INTRAVENOUS at 16:49

## 2018-01-08 RX ADMIN — OXYCODONE AND ACETAMINOPHEN 2 TABLET: 5; 325 TABLET ORAL at 22:09

## 2018-01-08 RX ADMIN — ALPRAZOLAM 1 MG: 1 TABLET ORAL at 19:01

## 2018-01-08 RX ADMIN — ONDANSETRON 4 MG: 2 INJECTION INTRAMUSCULAR; INTRAVENOUS at 15:55

## 2018-01-08 NOTE — OP NOTE
OPERATIVE REPORT     DATE OF PROCEDURE: 1/8/18     SURGEON: Stephon Garcia M.D.     ASSISTANT(S): Circulator: Suzanne Crystal RN  Scrub Person: Micheal Andersen; Tawana Dent  Nursing Assistant: Maxime Rodriguez  Assistant: Candi Sharpe PA-C    Note-PA was utilized during the case to facilitate positioning the patient, exposure, retraction, placement of final components and definitive closure.      PREOPERATIVE DIAGNOSIS: Right knee acute quadriceps tendon tear     POSTOPERATIVE DIAGNOSIS: same     PROCEDURE: Primary repair right knee quadriceps tendon tear     SURGICAL DETAILS:     APPROACH: Anterior midline     ANESTHESIA: General LMA plus regional     PREOPERATIVE ANTIBIOTICS: Ancef 2 g IV     ESTIMATED BLOOD LOSS: 50 cc     TOURNIQUET TIME: 60 min @ 300 mmHg     SPECIMENS: None     IMPLANTS: None     DRAINS: None     LOCAL INJECTION: None     MODIFIER(S): None     COMPLICATIONS: None apparent     INDICATIONS FOR PROCEDURE: Ms. Leon is a 62-year-old female who I first met approximately 3 weeks ago with an acute prosthetic knee infection.  Her previous surgery was performed in [place] approximately 2 years prior to the infection episode.  She was taken to the OR and underwent revision knee arthroplasty with irrigation and debridement, and revision to a thicker poly-due to instability of the total knee construct.  She initially did well postoperatively.  She was ambulating independently with only minor assistance from a walker.  She is also being treated with IV antibiotics for acute hematogenous infection of her knee prosthesis.  However, approximately 3 weeks after her surgery, she sustained a mechanical fall in which her knee bent backwards resulting in sharp pain at the superior pole the patella.  Afterwards she is unable to perform a straight leg raise.  She was also unable to ambulate with assistance of a walker secondary to him being unable to place weight on the extremity.  I saw her in  clinic and she was diagnosed with a quadriceps tendon rupture.  Due to the severity of this injury and the inability for her to perform a straight leg raise, I recommended surgical intervention to repair the quadriceps tendon in order to restore mobility.  Nonoperative treatment of this injury would result in a knee that remains unstable and unable to bear weight.  I explained these options as well as nonoperative treatment options with the patient.  After extensive discussion, the patient elected to proceed with surgical intervention.  The risks, benefits, alternatives, and potential complications of the this surgery were discussed with the patient in detail to include but not limited to infection, bleeding, anesthesia risks, nerve injury, vessel injury, pain, blood clots, possible need for blood transfusion, possible need for further procedures, myocardial infarction, stroke, and death.  Specific risks for this surgery also include recurrent injury, extensor lag, loss of tendon repair, weakness and instability of the knee. Specific details of the procedure, hospitalization, recovery, rehabilitation, and long-term precautions were also provided. Pre-operative teaching was provided. Surgical device selection was outlined, and the many options available were explained; final choices will be made at the time of the procedure to match the anatomy and condition of the bone, and soft tissue structures. Understanding of all topics was conveyed to me by the patient, and consent was given to proceed with a primarily right knee quadriceps tendon.     INTRAOPERATIVE FINDINGS: Complete rupture quadriceps tendon with extension and the medial and lateral retinacula     PROCEDURE: The patient was identified in the preoperative holding area. The operative site was confirmed and marked. A sequential compression device was placed on the nonoperative leg. The risks, benefits, and alternatives to surgery were again confirmed with the  patient and the patient wished to proceed. The patient was brought to the operating room and placed on the operating room table in the supine position. A huddle was performed with the patient and all vital surgical team members to confirm the correct operative site, procedure, anesthesia type, and operative plan with the patient. After anesthesia was performed, the patient was positioned in the supine position. All bony prominences were padded.  And nonsterile tourniquet was placed on the patient's operative thigh and a bump was placed under the operative hip.. Intravenous antibiotic prophylaxis was given and confirmed with the anesthesia team. The operative extremity was prepped and draped in the usual sterile fashion. A surgical time out was performed immediately preceding the incision with all personnel in the operating room to confirm patient identity, the correct operative site and extremity, correct radiographic studies, availability of appropriate surgical equipment and agreement on the planned procedure.     Attention was turned to the operative extremity which is elevated and exsanguinated using gravity exsanguination.  The previous midline knee incision was then utilized to dissect down through skin subcutaneous tissue down to level the retinacula.  Full-thickness flaps were created medially and laterally.  At this point, a complete tear of the water substandard was identified proximally 4 cm from the superior pole the patella.  The rupture also extended to the medial lateral retinacula resulting in a 8 cm defect.  The extent of the tear was further elucidated by dissecting out the retinacular tissues medially and laterally.  The knee was held in full extension and using Almita's, cursory apposition of the torn structures was performed.  The tear was found to be transverse in nature with an oblique extension into the medial lateral retinaculum.    The wound was copiously irrigated with 3 L of sterile saline  with intermittent sterile Betadine lavage.  Once this was complete, attention was turned to primary repair of the extensor construct.  Using a combination of #1 PDS suture and #2 Prolene, the medial and lateral retinacular tissues were repaired using figure-of-eight interrupted sutures.  Next attention was turned to the quadriceps tendon.  Using a modified Maurice technique, the medial edge of the quadriceps tendon was repaired to the prior medial arthrotomy edge using #2 Prolene.  This stitch approximated both the prior medial parapatellar arthrotomy as well as the 2 edges of the torn tendon.  The torn tendon was then reapproximated using modified Vincent stitch with #1 PDS.  Finally, a [place] stitch was utilized to reinforce the apposition of the 2 edges of the torn tendon.  With the repair completed, the knee was gently ranged and the knee was found to have a range of motion of 0-45° before excessive tension was placed on the quadriceps tendon repair.  This will be kept in mind during the rehabilitation process.    Satisfied with the repair, attention was turned to wound closure.  The wound was closed using 2-0 PDS suture for the subcutaneous layer followed by 3-0 nylon for skin.  Irrigation was performed between each layer.  Sterile silver impregnated dressing was then applied to the operative wound, followed by soft roll and Ace wrap.  A T ROM brace was then applied to the leg with the brace locked in extension at all times.  The patient was then awakened from general anesthesia and transferred to the PACU in stable condition.     No apparent complications occurred during the procedure Instrument, sponge and needle counts were correct x 2.     POST OPERATIVE PLAN:   Weight Bearing: Protected weightbearing as tolerated with T ROM brace locked in extension at all times.  No knee range of motion under any circumstances.   DVT prophylaxis: Enteric-coated aspirin 325 mg twice a day ×4 weeks   Therapy/Activity: PT/OT  for mobilization.  No knee range of motion under any circumstances   Wound Care: Dressing to remain in place for 7 days postop   Pain control: Oral and IV pain medications as needed   23 hour perioperative antibiotic prophylaxis   Social work for discharge planning   Follow up: Dr. Garcia 2 weeks for wound check.

## 2018-01-08 NOTE — BRIEF OP NOTE
QUADRICEPS TENDON REPAIR  Progress Note    Lesli Leon  1/8/2018    Pre-op Diagnosis:   Quadriceps tendon rupture, right, initial encounter [S76.111A]       Post-Op Diagnosis Codes:     * Quadriceps tendon rupture, right, initial encounter [S76.111A]    Procedure/CPT® Codes:  RI FIX QUAD/HAMSTR MUSC RUPT,PRIMARY [00064]    Procedure(s):  PRIMARY REPAIR OF RIGHT QUADRICEPS TENDON     Surgeon(s):  Stephon Garcia MD    Anesthesia: General LMA    Staff:   Circulator: Suzanne Crystal RN  Scrub Person: Micheal Dent  Nursing Assistant: Maxime Rodriguez  Assistant: Candi Sharpe PA-C    Estimated Blood Loss: 50 ml    Urine Voided: * No values recorded between 1/8/2018  2:30 PM and 1/8/2018  4:25 PM *    Specimens:                None      Drains:           Findings: Complete quadriceps tendon tear approximately 4 cm above superior pole patella extending into medial and lateral retinaculum    Complications: None apparent      Stephon Garcia MD     Date: 1/8/2018  Time: 4:25 PM

## 2018-01-08 NOTE — H&P
Patient Name: Lesli Leon  MRN: [id number]  : 1955  DOS: 2018    Attending: Stephon Garcia MD    Primary Care Provider: Phani Joaquin MD      Chief complaint:  Right knee pain/ right acute quadriceps tendon tear.    Subjective   Patient is a 62 y.o. female presented for primary repair right knee quadriceps tendon tear by Dr. Garcia under [place]. She tolerated surgery well and is admitted for further medical management.    She was at [facility] between December 10 and 2017.  During that hospitalization she was managed for sepsis related to infected knee.   She had incision and drainage and debridement/ revision knee arthroplasty, with a revision to a thicker probably due to instability of the total knee construct.  and she had a PICC line placed and she has been receiving IV antibiotics, Rocephin being followed by Dr. Smith with [facility].    Per 's note:( approximately 3 weeks after her surgery, she sustained a mechanical fall in which her knee bent backwards resulting in sharp pain at the superior pole the patella.  Afterwards she is unable to perform a straight leg raise.  She was also unable to ambulate with assistance of a walker secondary to him being unable to place weight on the extremity.  I saw her in clinic and she was diagnosed with a quadriceps tendon rupture. ).    She was seen in recovery room where she is doing fairly well.  Her pain is under control.  No complains of nausea or vomiting, no shortness breath, no chest pain.         Allergies:  Allergies   Allergen Reactions   • Bactrim [Sulfamethoxazole-Trimethoprim] Hives       Meds:  Prescriptions Prior to Admission   Medication Sig Dispense Refill Last Dose   • ALPRAZolam (XANAX) 1 MG tablet Take 1 mg by mouth 2 (Two) Times a Day.   2018 at 0900   • buPROPion XL (WELLBUTRIN XL) 150 MG 24 hr tablet Take 150 mg by mouth 2 (Two) Times a Day.   Past Month at Unknown  time   • chlorzoxazone (PARAFON FORTE) 500 MG tablet Take 500 mg by mouth 3 (Three) Times a Day As Needed for Muscle Spasms.   1/7/2018 at 1400   • DULoxetine (CYMBALTA) 30 MG capsule Take 90 mg by mouth Daily.   1/7/2018 at pm   • gabapentin (NEURONTIN) 800 MG tablet Take 800 mg by mouth 4 (Four) Times a Day.   1/8/2018 at 0530   • levothyroxine (SYNTHROID, LEVOTHROID) 100 MCG tablet Take 100 mcg by mouth Daily. Brand name.   1/8/2018 at 0530   • linaclotide (LINZESS) 290 MCG capsule capsule Take 290 mcg by mouth Daily.   Past Month at Unknown time   • lisinopril (PRINIVIL,ZESTRIL) 20 MG tablet Take 20 mg by mouth Daily.   1/8/2018 at 0530   • meloxicam (MOBIC) 15 MG tablet Take 15 mg by mouth Daily.   Past Month at Unknown time   • ondansetron (ZOFRAN) 4 MG tablet Take 1 tablet by mouth Every 6 (Six) Hours As Needed for Nausea or Vomiting. 20 tablet 0 1/7/2018 at 1400   • oxyCODONE-acetaminophen (PERCOCET) 5-325 MG per tablet Take 1 tablet by mouth Every 6 (Six) Hours As Needed for Severe Pain . 30 tablet 0 1/8/2018 at 0600   • aspirin  MG EC tablet Take 1 tablet by mouth Every 12 (Twelve) Hours. 14 tablet 0 1/1/2018   • triamterene-hydrochlorothiazide (MAXZIDE) 75-50 MG per tablet Take 1 tablet by mouth Daily.   Taking       History:   Past Medical History:   Diagnosis Date   • Anxiety and depression    • Arthritis    • Breast cancer 2015    RIGHT   • Disease of thyroid gland    • Hx of radiation therapy 2015    RT BREAST   • Hypertension    • Wears glasses      Past Surgical History:   Procedure Laterality Date   • BREAST BIOPSY Right 2015   • BREAST LUMPECTOMY Right 2015   • BREAST LUMPECTOMY     • JOINT REPLACEMENT     • KNEE ARTHROTOMY Right 12/10/2017    Procedure: INCISION AND DRAINAGE, POLY EXCHANGE RIGHT KNEE;  Surgeon: Stephon Garcia MD;  Location: [facility];  Service:      Family History   Problem Relation Age of Onset   • Breast cancer Mother      DX AGE UNKNOWN   • Breast cancer Sister       "DX AGE UNKNOWN   • Ovarian cancer Neg Hx      Social History   Substance Use Topics   • Smoking status: Current Every Day Smoker     Years: 5.00     Types: Electronic Cigarette   • Smokeless tobacco: Never Used   • Alcohol use No   .  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    Review of Systems  Pertinent items are noted in HPI, all other systems reviewed and negative    Vital Signs  /81 (BP Location: Left arm, Patient Position: Lying)  Pulse 94  Temp 97.8 °F (36.6 °C) (Temporal Artery )   Resp 14  Ht 162.6 cm (64\")  Wt 79.4 kg (175 lb)  SpO2 99%  BMI 30.04 kg/m2    Physical Exam:    General Appearance:    Alert, cooperative, in no acute distress   Head:    Normocephalic, without obvious abnormality, atraumatic   Eyes:            Lids and lashes normal, conjunctivae and sclerae normal, no   icterus, no pallor, corneas clear    Ears:    Ears appear intact with no abnormalities noted   Neck:   No adenopathy, supple, trachea midline, no thyromegaly    Lungs:     Clear to auscultation,respirations regular, even and                   unlabored    Heart:    Regular rhythm and normal rate, normal S1 and S2, no            murmur, no gallop    Abdomen:     Normal bowel sounds, no masses, no organomegaly, soft        non-tender, non-distended, no guarding, no rebound                 tenderness   Genitalia:    Deferred   Extremities:   CDI ACE on right knee . Knee in an immobilizer.    Pulses:   Pulses palpable and equal bilaterally   Skin:   No bleeding, bruising or rash   Neurologic:   Cranial nerves 2 - 12 grossly intact, sensation intact.    Flexion and dorsiflexion intact bilateral feet.        I reviewed the patient's new clinical results.         Results from last 7 days  Lab Units 01/04/18  1003 01/02/18  1208   WBC 10*3/mm3 6.16 7.32   HEMOGLOBIN g/dL 10.9* 11.1*   HEMATOCRIT % 35.3 36.4   PLATELETS 10*3/mm3 333 354       Results from last 7 days  Lab Units 01/04/18  1003 01/02/18  1208 "   SODIUM mmol/L 142 145   POTASSIUM mmol/L 4.6 4.8   CHLORIDE mmol/L 105 106   CO2 mmol/L 32.0* 32.0*   BUN mg/dL 16 21   CREATININE mg/dL 0.70 0.80   CALCIUM mg/dL 8.9 8.9   BILIRUBIN mg/dL 0.4 0.2*   ALK PHOS U/L 99 102*   ALT (SGPT) U/L 24 28   AST (SGOT) U/L 25 25   GLUCOSE mg/dL 70 89     Lab Results   Component Value Date    HGBA1C 5.20 01/04/2018       Assessment and Plan:   Principal Problem:    S/P primary repair right knee quadriceps tendon tear  Active Problems:    Hypertension    Quadriceps tendon rupture, right, initial encounter    Anxiety and depression    Hypothyroid    Tobacco abuse    Acute postoperative pain    S/p recent right total knee arthroplasty infection, status post debridement and revision.    Plan  1. PT/OT- early ambulation post op/ restriction per ortho.   2. Pain control-prns   3. IS-encourage  4. DVT proph- mechs/ASA  5. Bowel regimen  6. Resume home medications as appropriate  7. Monitor post-op labs  8. DC planning for home, likely tomorrow  9. Dr. Smith with [facility] well see the patient and help with deciding if further antibiotics are recommended at this point.  Patient was finishing course of ceftriaxone.      HTN  - Continue home lisinopril  - Hold maxzide for now  - Monitor BP q4 hrs  - Holding parameters for BP meds  - PRN hydralazine for SBP >170    Hypothyroid  - Continue home Synthroid    Anxiety and depression  - Continue home wellbutrin, cymbalta, xanax    Tobacco abuse( e cigarette)  - Nicotine patch daily    Seen and examined by me. Agree with above. Discussed with patient. danielle Ashley MD  01/08/18  6:42 PM

## 2018-01-08 NOTE — H&P (VIEW-ONLY)
Orthopaedic Clinic Note:  Knee Post Op    Chief Complaint   Patient presents with   • Post-op     3 weeks status post: INCISION AND DRAINAGE, POLY EXCHANGE RIGHT KNEE        HPI    Ms. Leon is 3  week(s) s/p Revision right knee arthroplasty for instability and acute hematogenous infection.  Patient returns for follow-up from 2 days ago.  She was doing well up until Monday afternoon when she sustained a mechanical fall and bent her knee backwards resulting in excruciating pain just above the patella.  She was unable to straighten her leg afterwards.  She was seen in clinic on Tuesday and had significant swelling over the anterior aspect of the knee making it difficult to assess for quadriceps function or defect.  Patient had significant extensor lag.  She returns to clinic today for repeat evaluation.  I have instructed her to work on trying to activate her quadriceps tendon to see if her extensor mechanism was intact but just had quad inhibition.  Patient states her pain has improved and is a 7/10 on the pain scale.  She still unable to bear weight secondary to weakness with knee extension.  She denies any fevers, chills, constitutional symptoms.  She continues to be on the IV antibiotics per the infectious disease team.  She states her pain is much improved, but her motion has failed to significantly improved.      Past Medical History:   Diagnosis Date   • Arthritis    • Breast cancer 2015    RIGHT   • Disease of thyroid gland    • Hx of radiation therapy 2015    RT BREAST   • Hypertension       Past Surgical History:   Procedure Laterality Date   • BREAST BIOPSY Right 2015   • BREAST LUMPECTOMY Right 2015   • JOINT REPLACEMENT     • KNEE ARTHROTOMY Right 12/10/2017    Procedure: INCISION AND DRAINAGE, POLY EXCHANGE RIGHT KNEE;  Surgeon: Stehpon Garcia MD;  Location: [facility];  Service:      Family History   Problem Relation Age of Onset   • Breast cancer Mother      DX AGE UNKNOWN   • Breast cancer Sister       DX AGE UNKNOWN   • Ovarian cancer Neg Hx       Social History     Social History   • Marital status:      Spouse name: N/A   • Number of children: N/A   • Years of education: N/A     Occupational History   • Not on file.     Social History Main Topics   • Smoking status: Current Every Day Smoker     Years: 5.00     Types: Electronic Cigarette   • Smokeless tobacco: Never Used   • Alcohol use No   • Drug use: No   • Sexual activity: Yes     Other Topics Concern   • Not on file     Social History Narrative      Current Outpatient Prescriptions on File Prior to Visit   Medication Sig Dispense Refill   • ALPRAZolam (XANAX) 1 MG tablet Take 1 mg by mouth 2 (Two) Times a Day.     • aspirin  MG EC tablet Take 1 tablet by mouth Every 12 (Twelve) Hours. 14 tablet 0   • buprenorphine-naloxone (SUBOXONE) 8-2 MG film film Place 1 film under the tongue 2 (Two) Times a Day.     • buPROPion XL (WELLBUTRIN XL) 150 MG 24 hr tablet Take 150 mg by mouth 2 (Two) Times a Day.     • chlorzoxazone (PARAFON FORTE) 500 MG tablet Take 500 mg by mouth 3 (Three) Times a Day As Needed for Muscle Spasms.     • DULoxetine (CYMBALTA) 30 MG capsule Take 90 mg by mouth Daily.     • gabapentin (NEURONTIN) 800 MG tablet Take 800 mg by mouth 4 (Four) Times a Day.     • levothyroxine (SYNTHROID, LEVOTHROID) 100 MCG tablet Take 100 mcg by mouth Daily. Brand name.     • linaclotide (LINZESS) 290 MCG capsule capsule Take 290 mcg by mouth Daily.     • lisinopril (PRINIVIL,ZESTRIL) 20 MG tablet Take 20 mg by mouth Daily.     • meloxicam (MOBIC) 15 MG tablet Take 15 mg by mouth Daily.     • ondansetron (ZOFRAN) 4 MG tablet Take 1 tablet by mouth Every 6 (Six) Hours As Needed for Nausea or Vomiting. 20 tablet 0   • oxyCODONE-acetaminophen (PERCOCET) 5-325 MG per tablet Take 1 tablet by mouth Every 6 (Six) Hours As Needed for Severe Pain . 30 tablet 0   • triamterene-hydrochlorothiazide (MAXZIDE) 75-50 MG per tablet Take 1 tablet by mouth  Daily.       No current facility-administered medications on file prior to visit.       Allergies   Allergen Reactions   • Bactrim [Sulfamethoxazole-Trimethoprim] Hives        Review of Systems     Physical Exam  There were no vitals taken for this visit.    There is no height or weight on file to calculate BMI.    GENERAL APPEARANCE: awake, alert, oriented, in no acute distress  LUNGS:  breathing nonlabored  EXTREMITIES: no clubbing, cyanosis  PERIPHERAL PULSES: palpable dorsalis pedis and posterior tibial pulses bilaterally.    GAIT:  Not assessed           Right Knee Exam:  ----------  ALIGNMENT: neutral  ----------  RANGE OF MOTION:   °.  There is a 45° extensor lag.  LIGAMENTOUS STABILITY:   stable to varus and valgus stress at terminal extension and 30 degrees without any evidence of laxity  ----------  STRENGTH:  KNEE FLEXION 4/5  KNEE EXTENSION  2/5  ANKLE DORSIFLEXION  5/5  ANKLE PLANTARFLEXION  5/5  ----------  PAIN WITH PALPATION:suprapatellar region with palpable defect in the quadriceps tendon approximately 1-2 cm above the superior pole the patella  KNEE EFFUSION: yes, mild effusion  PAIN WITH KNEE ROM: yes  PATELLAR CREPITUS:  no  ----------  SENSATION TO LIGHT TOUCH:  DEEP PERONEAL/SUPERFICIAL PERONEAL/SURAL/SAPHENOUS/TIBIAL:    intact  ----------  EDEMA:  Prepatellar edema is much improved compared to visit 2 days ago   ERYTHEMA:    no  WOUNDS/INCISIONS:  yes, anterior midline incision is well healed with sutures in place.  _____________________________________________________________________  _____________________________________________________________________    RADIOGRAPHIC FINDINGS:   No new imaging today    Assessment/Plan:   Diagnosis Plan   1. Quadriceps tendon rupture, right, initial encounter     2. Status post knee surgery       It appears at this point the patient has a quadriceps tendon rupture that is secondary to the recent postoperative fall.  Given her excellent range of  motion prior to the fall, I believe this is an acute injury which is unrelated to her prior surgical intervention.  Patient has a significant extensor lag and I explained that it without surgical repair, it is unlikely that she will regain significant functional improvement in her knee.  I also explained that given the current treatment for acute infection, recurrent surgery increases the likelihood of potential persistent and chronic infection of the knee or failure of the IV antibiotic treatment.  She expressed understanding.  The plan is to proceed with exploration and repair of the quadriceps tendon on Monday.  We'll place her into a T ROM brace with range of motion limited 0-45° to protect the quad tendon and prevent further distraction of the tendon rupture.    The patient has clinical and radiographic evidence of acute quadriceps tendon rupture which is severely restricting the patient's activities as well as quality of life. I had an extensive discussion with the patient/family regarding treatment options taking into account the nature of the diagnosis, the patient's prior level of cognitive and physical function, medical comorbidities, and goals for treatment.  The recommendation at this time is to proceed with a surgical repair of right knee quadriceps tendon tear with the goal to improve patient function, decrease pain, and improve mobility. The risks, benefits, potential complications, and alternatives were discussed with the patient in detail. Risks included but were not limited to bleeding, infection, anesthesia risks, damage to neurovascular structures, recurrent injury, extensor lag, quadriceps weakness, loss of integrity of the repair construct, pain, continued pain, iatrogenic fracture, possible need for future surgery including the potential for amputation, blood clots, myocardial infarction, stroke, and death. Lena-operative blood management and the potential for blood transfusion were discussed  with risks and options clearly outlined. Specific details of the surgical procedure, hospitalization, recovery, rehabilitation, and long-term precautions were also presented. Pre-operative teaching was provided. Implant/prosthesis and equipment selection was outlined, and the many options available were explained; the final choice will be made at the time of the procedure to match the anatomy and condition of the bone, ligaments, tendons, and muscles. Given this instruction, the patient elected to proceed with the and primary repair of right quadriceps tendon tear. The patient will be seen by pre-admission testing for pre-operative blood work and risk assessment.         Stephon Garcia MD  01/04/18  9:08 AM

## 2018-01-08 NOTE — ANESTHESIA POSTPROCEDURE EVALUATION
Patient: Lesli Leon    Procedure Summary     Date Anesthesia Start Anesthesia Stop Room / Location    01/08/18 1430 1635 [facility] / [facility]       Procedure Diagnosis Surgeon Provider    PRIMARY REPAIR OF RIGHT QUADRICEPS TENDON  (Right Thigh) Quadriceps tendon rupture, right, initial encounter  (Quadriceps tendon rupture, right, initial encounter [S76.111A]) MD Fabrice Godfrey MD          Anesthesia Type: general  Last vitals  BP   155/92 (01/08/18 1635)   Temp   98.9 °F (37.2 °C) (01/08/18 1635)   Pulse   76 (01/08/18 1635)   Resp   16 (01/08/18 1202)     SpO2   100 % (01/08/18 1635)     Post Anesthesia Care and Evaluation    Patient location during evaluation: PACU  Patient participation: complete - patient participated  Level of consciousness: awake and alert  Pain score: 0  Pain management: adequate  Airway patency: patent  Anesthetic complications: No anesthetic complications  PONV Status: none  Cardiovascular status: hemodynamically stable and acceptable  Respiratory status: nonlabored ventilation, acceptable and nasal cannula  Hydration status: acceptable

## 2018-01-08 NOTE — ANESTHESIA PROCEDURE NOTES
Peripheral Block    Patient location during procedure: post-op  Start time: 1/8/2018 4:32 PM  Stop time: 1/8/2018 4:25 PM  Reason for block: at surgeon's request and post-op pain management  Performed by  Anesthesiologist: DAR TEMPLETON  Preanesthetic Checklist  Completed: patient identified, site marked, surgical consent, pre-op evaluation, timeout performed, IV checked, risks and benefits discussed and monitors and equipment checked  Prep:  Pt Position: supine  Sterile barriers:cap, gloves, mask and sterile barriers  Prep: ChloraPrep  Patient monitoring: blood pressure monitoring, continuous pulse oximetry and EKG  Procedure  Performed under: general  Guidance:ultrasound guided  ULTRASOUND INTERPRETATION. Using ultrasound guidance a 20 G gauge needle was placed in close proximity to the nerve, at which point, under ultrasound guidance anesthetic was injected in the area of the nerve and spread of the anesthesia was seen on ultrasound in close proximity thereto.  There were no abnormalities seen on ultrasound; a digital image was taken; and the patient tolerated the procedure with no complications. Images:still images obtained    Laterality:right  Block Type:adductor canal block  Injection Technique:single-shot  Needle Type:Tuohy and echogenic  Needle Gauge:20 G    Medications  Local Injected:bupivacaine 0.25% Local Amount Injected:20 (ml)mL  Post Assessment  Injection Assessment: negative aspiration for heme, incremental injection and no paresthesia on injection  Patient Tolerance:comfortable throughout block  Complications:no

## 2018-01-08 NOTE — INTERVAL H&P NOTE
Pre-Op H&P (See Recent Office Note Attached for Full H&P)    Chief complaint: Right knee pain    Review of Systems:  General ROS:  no fever, chills, rashes, No change since last office visit  Cardiovascular ROS: no chest pain or dyspnea on exertion  Respiratory ROS: no cough, shortness of breath, or wheezing  GI:  +nausea now resolved.  No diarrhea/vomiting   ID:  SOPHIA PICC line.  Following with Dr. Landrum, receiving daily IV Rocephin with last dose this AM    Immunization History:   Influenza:  no  Pneumococcal:  no  Tetanus:  unknown    Meds:    No current facility-administered medications on file prior to encounter.      Current Outpatient Prescriptions on File Prior to Encounter   Medication Sig Dispense Refill   • ALPRAZolam (XANAX) 1 MG tablet Take 1 mg by mouth 2 (Two) Times a Day.     • buPROPion XL (WELLBUTRIN XL) 150 MG 24 hr tablet Take 150 mg by mouth 2 (Two) Times a Day.     • chlorzoxazone (PARAFON FORTE) 500 MG tablet Take 500 mg by mouth 3 (Three) Times a Day As Needed for Muscle Spasms.     • DULoxetine (CYMBALTA) 30 MG capsule Take 90 mg by mouth Daily.     • gabapentin (NEURONTIN) 800 MG tablet Take 800 mg by mouth 4 (Four) Times a Day.     • levothyroxine (SYNTHROID, LEVOTHROID) 100 MCG tablet Take 100 mcg by mouth Daily. Brand name.     • linaclotide (LINZESS) 290 MCG capsule capsule Take 290 mcg by mouth Daily.     • lisinopril (PRINIVIL,ZESTRIL) 20 MG tablet Take 20 mg by mouth Daily.     • meloxicam (MOBIC) 15 MG tablet Take 15 mg by mouth Daily.     • ondansetron (ZOFRAN) 4 MG tablet Take 1 tablet by mouth Every 6 (Six) Hours As Needed for Nausea or Vomiting. 20 tablet 0   • oxyCODONE-acetaminophen (PERCOCET) 5-325 MG per tablet Take 1 tablet by mouth Every 6 (Six) Hours As Needed for Severe Pain . 30 tablet 0   • aspirin  MG EC tablet Take 1 tablet by mouth Every 12 (Twelve) Hours. 14 tablet 0   • triamterene-hydrochlorothiazide (MAXZIDE) 75-50 MG per tablet Take 1 tablet by mouth  "Daily.         Vital Signs:  /65 (BP Location: Left arm, Patient Position: Lying)  Pulse 85  Temp 97.1 °F (36.2 °C) (Tympanic)   Resp 16  Ht 162.6 cm (64\")  Wt 79.4 kg (175 lb)  SpO2 96%  BMI 30.04 kg/m2    Physical Exam:    CV:  S1S2 regular rate and rhythm, no murmur               Resp:  Clear to auscultation; respirations regular, even and unlabored   Skin:  RUE PICC intact.  Right knee incision intact with mild edema, no erythema or drainage  Results Review:    I reviewed the patient's new clinical results.    Cancer Staging (if applicable)  Cancer Patient: __ yes _x_no __unknown; If yes, clinical stage T:__ N:__M:__, stage group or __N/A    Josseline Fischer, APRN  1/8/2018   12:40 PM    "

## 2018-01-08 NOTE — ANESTHESIA PROCEDURE NOTES
Airway  Urgency: elective    Date/Time: 1/8/2018 2:33 PM  End Time:1/8/2018 2:36 PM  Airway not difficult    General Information and Staff    Patient location during procedure: OR  Anesthesiologist: DAR TEMPLETON  CRNA: JOSE MARTIN HARGROVE    Indications and Patient Condition  Indications for airway management: airway protection    Preoxygenated: yes  Mask difficulty assessment: 1 - vent by mask    Final Airway Details  Final airway type: supraglottic airway      Successful airway: unique  Size 4  Cuff Pressure (cm H2O): 8  Airway Seal Pressure (cm H2O): 0    Number of attempts at approach: 1    Additional Comments  LMA placed without difficulty, ventilation with assist, equal breath sounds and symmetric chest rise and fall

## 2018-01-08 NOTE — ANESTHESIA PREPROCEDURE EVALUATION
Anesthesia Evaluation     Patient summary reviewed and Nursing notes reviewed   no history of anesthetic complications:  NPO Solid Status: > 8 hours  NPO Liquid Status: > 8 hours     Airway   Mallampati: II  TM distance: >3 FB  Neck ROM: full  no difficulty expected  Dental - normal exam     Pulmonary - negative pulmonary ROS and normal exam    breath sounds clear to auscultation  Cardiovascular - normal exam    ECG reviewed  Rhythm: regular  Rate: normal    (+) hypertension,     ROS comment: EF 65%    Neuro/Psych- negative ROS  GI/Hepatic/Renal/Endo    (+)  GERD well controlled, hypothyroidism,     Musculoskeletal     Abdominal    Substance History      OB/GYN          Other   (+) arthritis                                         Anesthesia Plan    ASA 3     general   (Discussed spinal vs. GA with pt.  Pt would prefer GA over spinal.  Adductor canal single shot block (no catheter due to current active infection) for post-operative analgesia per request of Dr. Garcia)  intravenous induction   Anesthetic plan and risks discussed with patient.    Plan discussed with CRNA.

## 2018-01-09 ENCOUNTER — TELEPHONE (OUTPATIENT)
Dept: ORTHOPEDIC SURGERY | Facility: CLINIC | Age: 63
End: 2018-01-09

## 2018-01-09 LAB
ANION GAP SERPL CALCULATED.3IONS-SCNC: 7 MMOL/L (ref 3–11)
BUN BLD-MCNC: 12 MG/DL (ref 9–23)
BUN/CREAT SERPL: 15 (ref 7–25)
CALCIUM SPEC-SCNC: 8.7 MG/DL (ref 8.7–10.4)
CHLORIDE SERPL-SCNC: 104 MMOL/L (ref 99–109)
CO2 SERPL-SCNC: 30 MMOL/L (ref 20–31)
CREAT BLD-MCNC: 0.8 MG/DL (ref 0.6–1.3)
CRP SERPL-MCNC: 2.13 MG/DL (ref 0–1)
DEPRECATED RDW RBC AUTO: 52.8 FL (ref 37–54)
ERYTHROCYTE [DISTWIDTH] IN BLOOD BY AUTOMATED COUNT: 14.7 % (ref 11.3–14.5)
GFR SERPL CREATININE-BSD FRML MDRD: 73 ML/MIN/1.73
GLUCOSE BLD-MCNC: 87 MG/DL (ref 70–100)
HCT VFR BLD AUTO: 32.5 % (ref 34.5–44)
HGB BLD-MCNC: 10.1 G/DL (ref 11.5–15.5)
MCH RBC QN AUTO: 30.3 PG (ref 27–31)
MCHC RBC AUTO-ENTMCNC: 31.1 G/DL (ref 32–36)
MCV RBC AUTO: 97.6 FL (ref 80–99)
PLATELET # BLD AUTO: 309 10*3/MM3 (ref 150–450)
PMV BLD AUTO: 9.9 FL (ref 6–12)
POTASSIUM BLD-SCNC: 4.2 MMOL/L (ref 3.5–5.5)
RBC # BLD AUTO: 3.33 10*6/MM3 (ref 3.89–5.14)
SODIUM BLD-SCNC: 141 MMOL/L (ref 132–146)
WBC NRBC COR # BLD: 7.21 10*3/MM3 (ref 3.5–10.8)

## 2018-01-09 PROCEDURE — 97165 OT EVAL LOW COMPLEX 30 MIN: CPT

## 2018-01-09 PROCEDURE — G8978 MOBILITY CURRENT STATUS: HCPCS

## 2018-01-09 PROCEDURE — 99024 POSTOP FOLLOW-UP VISIT: CPT | Performed by: ORTHOPAEDIC SURGERY

## 2018-01-09 PROCEDURE — 97162 PT EVAL MOD COMPLEX 30 MIN: CPT

## 2018-01-09 PROCEDURE — G8988 SELF CARE GOAL STATUS: HCPCS

## 2018-01-09 PROCEDURE — 25010000002 HYDROMORPHONE PER 4 MG: Performed by: ORTHOPAEDIC SURGERY

## 2018-01-09 PROCEDURE — 25010000003 CEFAZOLIN IN DEXTROSE 2-4 GM/100ML-% SOLUTION: Performed by: ORTHOPAEDIC SURGERY

## 2018-01-09 PROCEDURE — 86140 C-REACTIVE PROTEIN: CPT | Performed by: NURSE PRACTITIONER

## 2018-01-09 PROCEDURE — 85027 COMPLETE CBC AUTOMATED: CPT | Performed by: INTERNAL MEDICINE

## 2018-01-09 PROCEDURE — G8987 SELF CARE CURRENT STATUS: HCPCS

## 2018-01-09 PROCEDURE — 80048 BASIC METABOLIC PNL TOTAL CA: CPT | Performed by: ORTHOPAEDIC SURGERY

## 2018-01-09 PROCEDURE — 25010000003 CEFTRIAXONE PER 250 MG: Performed by: NURSE PRACTITIONER

## 2018-01-09 PROCEDURE — G8979 MOBILITY GOAL STATUS: HCPCS

## 2018-01-09 RX ORDER — OXYCODONE AND ACETAMINOPHEN 10; 325 MG/1; MG/1
1 TABLET ORAL EVERY 6 HOURS PRN
Qty: 40 TABLET | Refills: 0 | Status: SHIPPED | OUTPATIENT
Start: 2018-01-09 | End: 2018-11-01

## 2018-01-09 RX ORDER — MELOXICAM 15 MG/1
15 TABLET ORAL DAILY
Start: 2018-01-09 | End: 2018-03-07

## 2018-01-09 RX ORDER — DULOXETIN HYDROCHLORIDE 30 MG/1
30 CAPSULE, DELAYED RELEASE ORAL 3 TIMES DAILY
Status: DISCONTINUED | OUTPATIENT
Start: 2018-01-10 | End: 2018-01-10 | Stop reason: HOSPADM

## 2018-01-09 RX ORDER — OXYCODONE AND ACETAMINOPHEN 10; 325 MG/1; MG/1
1 TABLET ORAL EVERY 4 HOURS PRN
Status: DISCONTINUED | OUTPATIENT
Start: 2018-01-09 | End: 2018-01-10 | Stop reason: HOSPADM

## 2018-01-09 RX ORDER — CEFTRIAXONE SODIUM 2 G/50ML
2 INJECTION, SOLUTION INTRAVENOUS EVERY 24 HOURS
Status: DISCONTINUED | OUTPATIENT
Start: 2018-01-09 | End: 2018-01-10 | Stop reason: HOSPADM

## 2018-01-09 RX ADMIN — OXYCODONE AND ACETAMINOPHEN 2 TABLET: 5; 325 TABLET ORAL at 10:18

## 2018-01-09 RX ADMIN — HYDROMORPHONE HYDROCHLORIDE 0.5 MG: 2 INJECTION INTRAMUSCULAR; INTRAVENOUS; SUBCUTANEOUS at 19:36

## 2018-01-09 RX ADMIN — SODIUM CHLORIDE 100 ML/HR: 9 INJECTION, SOLUTION INTRAVENOUS at 04:29

## 2018-01-09 RX ADMIN — GABAPENTIN 800 MG: 400 CAPSULE ORAL at 20:17

## 2018-01-09 RX ADMIN — LISINOPRIL 20 MG: 20 TABLET ORAL at 08:20

## 2018-01-09 RX ADMIN — DULOXETINE HYDROCHLORIDE 30 MG: 30 CAPSULE, DELAYED RELEASE ORAL at 20:16

## 2018-01-09 RX ADMIN — GABAPENTIN 800 MG: 400 CAPSULE ORAL at 08:19

## 2018-01-09 RX ADMIN — OXYCODONE AND ACETAMINOPHEN 2 TABLET: 5; 325 TABLET ORAL at 06:09

## 2018-01-09 RX ADMIN — ALPRAZOLAM 1 MG: 1 TABLET ORAL at 20:17

## 2018-01-09 RX ADMIN — OXYCODONE AND ACETAMINOPHEN 2 TABLET: 5; 325 TABLET ORAL at 02:41

## 2018-01-09 RX ADMIN — CEFAZOLIN SODIUM 2 G: 2 INJECTION, SOLUTION INTRAVENOUS at 05:39

## 2018-01-09 RX ADMIN — MELOXICAM 15 MG: 7.5 TABLET ORAL at 09:02

## 2018-01-09 RX ADMIN — HYDROMORPHONE HYDROCHLORIDE 0.5 MG: 2 INJECTION INTRAMUSCULAR; INTRAVENOUS; SUBCUTANEOUS at 15:01

## 2018-01-09 RX ADMIN — ASPIRIN 325 MG: 325 TABLET, DELAYED RELEASE ORAL at 09:02

## 2018-01-09 RX ADMIN — BUPROPION HYDROCHLORIDE 150 MG: 150 TABLET, FILM COATED, EXTENDED RELEASE ORAL at 08:19

## 2018-01-09 RX ADMIN — CEFTRIAXONE SODIUM 2 G: 2 INJECTION, SOLUTION INTRAVENOUS at 09:02

## 2018-01-09 RX ADMIN — OXYCODONE AND ACETAMINOPHEN 1 TABLET: 5; 325 TABLET ORAL at 17:56

## 2018-01-09 RX ADMIN — OXYCODONE HYDROCHLORIDE AND ACETAMINOPHEN 1 TABLET: 10; 325 TABLET ORAL at 21:32

## 2018-01-09 RX ADMIN — HYDROMORPHONE HYDROCHLORIDE 0.5 MG: 2 INJECTION INTRAMUSCULAR; INTRAVENOUS; SUBCUTANEOUS at 16:51

## 2018-01-09 RX ADMIN — GABAPENTIN 800 MG: 400 CAPSULE ORAL at 17:58

## 2018-01-09 RX ADMIN — ALPRAZOLAM 1 MG: 1 TABLET ORAL at 08:27

## 2018-01-09 RX ADMIN — HYDROMORPHONE HYDROCHLORIDE 0.5 MG: 2 INJECTION INTRAMUSCULAR; INTRAVENOUS; SUBCUTANEOUS at 04:44

## 2018-01-09 RX ADMIN — OXYCODONE AND ACETAMINOPHEN 2 TABLET: 5; 325 TABLET ORAL at 13:50

## 2018-01-09 RX ADMIN — GABAPENTIN 800 MG: 400 CAPSULE ORAL at 11:59

## 2018-01-09 RX ADMIN — ASPIRIN 325 MG: 325 TABLET, DELAYED RELEASE ORAL at 20:17

## 2018-01-09 RX ADMIN — HYDROMORPHONE HYDROCHLORIDE 0.5 MG: 2 INJECTION INTRAMUSCULAR; INTRAVENOUS; SUBCUTANEOUS at 08:27

## 2018-01-09 RX ADMIN — LEVOTHYROXINE SODIUM 100 MCG: 100 TABLET ORAL at 05:39

## 2018-01-09 NOTE — PLAN OF CARE
Problem: Patient Care Overview (Adult)  Goal: Plan of Care Review  Outcome: Ongoing (interventions implemented as appropriate)   01/09/18 1437   Coping/Psychosocial Response Interventions   Plan Of Care Reviewed With patient   Outcome Evaluation   Outcome Summary/Follow up Plan Pt. CGA x 2 with RW, severe pain with mobility/weight bearing. Pt will need assist with ADL's, but has AE and assist at home.        Problem: Inpatient Occupational Therapy  Goal: Transfer Training Goal 1 LTG- OT  Outcome: Ongoing (interventions implemented as appropriate)   01/09/18 1437   Transfer Training OT LTG   Transfer Training OT LTG, Date Established 01/09/18   Transfer Training OT LTG, Time to Achieve 1 wk   Transfer Training OT LTG, Activity Type toilet;sit to stand/stand to sit;bed to chair /chair to bed   Transfer Training OT LTG, Daniels Level supervision required   Transfer Training OT LTG, Assist Device walker, rolling     Goal: Bathing Goal LTG- OT  Outcome: Ongoing (interventions implemented as appropriate)   01/09/18 1437   Bathing OT LTG   Bathing Goal OT LTG, Date Established 01/09/18   Bathing Goal OT LTG, Time to Achieve 1 wk   Bathing Goal OT LTG, Activity Type simulates;upper body bathing;lower body bathing   Bathing Goal OT LTG, Daniels Level minimum assist (75% patient effort)     Goal: LB Dressing Goal LTG- OT  Outcome: Ongoing (interventions implemented as appropriate)   01/09/18 1437   LB Dressing OT LTG   LB Dressing Goal OT LTG, Date Established 01/09/18   LB Dressing Goal OT LTG, Time to Achieve 1 wk   LB Dressing Goal OT LTG, Daniels Level minimum assist (75% patient effort)     Goal: Functional Mobility Goal LTG- OT  Outcome: Ongoing (interventions implemented as appropriate)   01/09/18 1437   Functional Mobility OT LTG   Functional Mobility Goal OT LTG, Date Established 01/09/18   Functional Mobility Goal OT LTG, Time to Achieve 1 wk   Functional Mobility Goal OT LTG, Daniels Level  contact guard   Functional Mobility Goal OT LTG, Distance to Achieve to the bathroom

## 2018-01-09 NOTE — PLAN OF CARE
Problem: Patient Care Overview (Adult)  Goal: Plan of Care Review  Outcome: Ongoing (interventions implemented as appropriate)   01/09/18 1618   Coping/Psychosocial Response Interventions   Plan Of Care Reviewed With patient   Patient Care Overview   Progress improving   Outcome Evaluation   Outcome Summary/Follow up Plan Patient have complains of severe pain and requesting to stay another night . Picc line dressing changed per protocol.        Problem: Perioperative Period (Adult)  Goal: Signs and Symptoms of Listed Potential Problems Will be Absent or Manageable (Perioperative Period)  Outcome: Ongoing (interventions implemented as appropriate)   01/09/18 1618   Perioperative Period   Problems Assessed (Perioperative Period) all   Problems Present (Perioperative Period) pain       Problem: Fall Risk (Adult)  Goal: Identify Related Risk Factors and Signs and Symptoms  Outcome: Ongoing (interventions implemented as appropriate)   01/09/18 1618   Fall Risk   Fall Risk: Related Risk Factors gait/mobility problems;history of falls   Fall Risk: Signs and Symptoms presence of risk factors     Goal: Absence of Falls  Outcome: Ongoing (interventions implemented as appropriate)   01/09/18 1618   Fall Risk (Adult)   Absence of Falls making progress toward outcome

## 2018-01-09 NOTE — PROGRESS NOTES
"IM progress note      Lesli Leon  [id number]  1955     LOS: 1 day     Attending: Stephon Garcia MD    Primary Care Provider: Phani Joaquin MD      Chief Complaint/Reason for visit:  Right knee pain    Subjective   Doing ok. States she is in too much pain to go home. Denies f/c/n/v/sob/cp.  ( above noted/agree)wy  Objective     Vital Signs  Blood pressure 155/72, pulse 85, temperature 97.2 °F (36.2 °C), temperature source Tympanic, resp. rate 20, height 162.6 cm (64\"), weight 79.4 kg (175 lb), SpO2 94 %.  Temp (24hrs), Av.3 °F (36.8 °C), Min:97.2 °F (36.2 °C), Max:99 °F (37.2 °C)      Nutrition: PO    Respiratory: RA    Physical Therapy: PT eval complete. Pt ambulated 15 feet with CGA and RW, limited by reported 15/10 pain and fatigue. Pt required modAx2 for bed mobility due to increased pain. Pt would benefit from stair training next session. Recommend d/c home with assist and HHPT.     Physical Exam:     General Appearance:    Alert, cooperative, in no acute distress   Head:    Normocephalic, without obvious abnormality, atraumatic    Lungs:     Normal effort, symmetric chest rise, no crepitus, clear to      auscultation bilaterally             Heart:    Regular rhythm and normal rate, normal S1 and S2   Abdomen:     Normal bowel sounds, no masses, no organomegaly, soft        non-tender, non-distended, no guarding, no rebound                tenderness   Extremities:   Right knee ACE wrap CDI. Hinged brace in place.    Pulses:   Pulses palpable and equal bilaterally   Skin:   No bleeding, bruising or rash. RUE PICC   Neurologic:   Moves all extremities with no obvious focal motor deficit.  Cranial nerves 2 - 12 grossly intact     Results Review:     I reviewed the patient's new clinical results.     Results from last 7 days  Lab Units 18  0651 18  2158 18  1003   WBC 10*3/mm3 7.21  --  6.16   HEMOGLOBIN g/dL 10.1* 10.7* 10.9*   HEMATOCRIT % 32.5* 34.3* 35.3   PLATELETS " 10*3/mm3 309  --  333       Results from last 7 days  Lab Units 01/09/18  0651 01/08/18  2158 01/04/18  1003   SODIUM mmol/L 141 139 142   POTASSIUM mmol/L 4.2 4.4 4.6   CHLORIDE mmol/L 104 103 105   CO2 mmol/L 30.0 29.0 32.0*   BUN mg/dL 12 14 16   CREATININE mg/dL 0.80 0.80 0.70   CALCIUM mg/dL 8.7 8.9 8.9   BILIRUBIN mg/dL  --   --  0.4   ALK PHOS U/L  --   --  99   ALT (SGPT) U/L  --   --  24   AST (SGOT) U/L  --   --  25   GLUCOSE mg/dL 87 144* 70     I reviewed the patient's new imaging including images and reports.    All medications reviewed.     aspirin 325 mg Oral Q12H   buPROPion  mg Oral Daily   ceftriaxone 2 g Intravenous Q24H   DULoxetine 90 mg Oral Nightly   gabapentin 800 mg Oral 4x Daily   levothyroxine 100 mcg Oral Q AM   lisinopril 20 mg Oral Daily   meloxicam 15 mg Oral Daily   nicotine 1 patch Transdermal Daily       Assessment/Plan   Principal Problem:    S/P primary repair right knee quadriceps tendon tear  Active Problems:    Hypertension    Quadriceps tendon rupture, right     Anxiety and depression    Hypothyroid    Tobacco abuse    Acute postoperative pain    Anemia, stable.     Plan  1. PT/OT- WBAT RLE  2. Pain control-prns   3. IS-encouraged  4. DVT proph- mechs/ASA  5. Bowel regimen  6. Monitor post-op labs  7. DC planning for home, likely tomorrow    HTN  - Continue home lisinopril  - Hold maxzide for now  - Monitor BP q4 hrs  - Holding parameters for BP meds  - PRN hydralazine for SBP >170     Hypothyroid  - Continue home Synthroid     Anxiety and depression  - Continue home wellbutrin, cymbalta, xanax     Tobacco abuse( e cigarette)  - Nicotine patch daily    [facility]- Dr. Smith - Patient was finishing course of ceftriaxone via PICC    Seen and examined by me. Agree with above. Discussed with patient.   Will press on with rehab and pain control. Hope  will be able to go home tomorrow.jg Ashley MD  01/09/18  4:20 PM

## 2018-01-09 NOTE — PROGRESS NOTES
"  SUBJECTIVE  Patient complaining of mild knee pain this morning.  Otherwise doing well.    OBJECTIVE  Temp (24hrs), Av.3 °F (36.8 °C), Min:97.1 °F (36.2 °C), Max:99 °F (37.2 °C)    Blood pressure 139/72, pulse 86, temperature 99 °F (37.2 °C), temperature source Oral, resp. rate 18, height 162.6 cm (64\"), weight 79.4 kg (175 lb), SpO2 98 %.    Lab Results (last 24 hours)     Procedure Component Value Units Date/Time    Hemoglobin & Hematocrit, Blood [[id number]]  (Abnormal) Collected:  18    Specimen:  Blood Updated:  180     Hemoglobin 10.7 (L) g/dL      Hematocrit 34.3 (L) %     Basic Metabolic Panel [[id number]]  (Abnormal) Collected:  18    Specimen:  Blood Updated:  18 2249     Glucose 144 (H) mg/dL      BUN 14 mg/dL      Creatinine 0.80 mg/dL      Sodium 139 mmol/L      Potassium 4.4 mmol/L      Chloride 103 mmol/L      CO2 29.0 mmol/L      Calcium 8.9 mg/dL      eGFR Non African Amer 73 mL/min/1.73      BUN/Creatinine Ratio 17.5     Anion Gap 7.0 mmol/L     Narrative:       [employer] Guidelines    Stage     Description        GFR  1         Normal or High     90+  2         Mild decrease      60-89  3         Moderate decrease  30-59  4         Severe decrease    15-29  5         Kidney failure     <15            PHYSICAL EXAM  Right lower extremity:Dressing clean, dry and intact.  T ROM brace in place locked in extension.  Intact EHL, FHL, tibialis anterior, and gastrocsoleus. Sensation intact to light touch to deep peroneal, superficial peroneal, sural, saphenous, tibial nerves. 2+ palpable DP and PT pulses.       Principal Problem:    S/P primary repair right knee quadriceps tendon tear  Active Problems:    Hypertension    Quadriceps tendon rupture, right, initial encounter    Anxiety and depression    Hypothyroid    Tobacco abuse    Acute postoperative pain      PLAN / DISPOSITION:  1 Day Post-Op primary repair right quadriceps tendon " tear    Protected weight bearing as tolerated right lower extremity, T ROM brace on at all times locked in extension.  Need to remain straight.  Do not allow knee range of motion.   Pain control  PT/OT for post op mobilization and medical equipment needs   23 hours perioperative antibiotic prophylaxis   TEDs and SCD's bilateral lower extremities   Aspirin for DVT prophylaxis    Social work for discharge planning.  Discharge home this afternoon  Dressing to remain in place for 7 days. May remove on POD#7. If no drainage, may shower on POD#10. No submerging wound in water. If drainage is noted, sterile dressing should be placed and wound checked daily. No showering until wound has remained dry for 72 consecutive hours.   Follow up in 2 weeks for re-assessment.      Stephon Garcia MD  01/09/18  7:23 AM

## 2018-01-09 NOTE — PLAN OF CARE
Problem: Patient Care Overview (Adult)  Goal: Plan of Care Review  Outcome: Ongoing (interventions implemented as appropriate)   01/09/18 0316   Coping/Psychosocial Response Interventions   Plan Of Care Reviewed With patient   Patient Care Overview   Progress improving   Outcome Evaluation   Outcome Summary/Follow up Plan vital signs stable, pain controlled with prn meds, possible d/c home 01/09/18       Problem: Mobility, Physical Impaired (Adult)  Goal: Identify Related Risk Factors and Signs and Symptoms  Outcome: Ongoing (interventions implemented as appropriate)      Problem: Fall Risk (Adult)  Goal: Identify Related Risk Factors and Signs and Symptoms  Outcome: Ongoing (interventions implemented as appropriate)

## 2018-01-09 NOTE — PROGRESS NOTES
Discharge Planning Assessment  [facility]     Patient Name: Lesli Leon  MRN: [id number]  Today's Date: 1/9/2018    Admit Date: 1/8/2018          Discharge Needs Assessment       01/09/18 1528    Living Environment    Lives With spouse;child(marianne), adult    Living Arrangements house    Home Accessibility bed and bath on same level;stairs to enter home;tub/shower is not walk in    Number of Stairs to Enter Home 1    Stair Railings at Home none    Type of Financial/Environmental Concern none    Transportation Available car;family or friend will provide    Living Environment    Provides Primary Care For no one    Quality Of Family Relationships supportive;helpful;involved    Able to Return to Prior Living Arrangements yes    Discharge Needs Assessment    Concerns To Be Addressed discharge planning concerns    Readmission Within The Last 30 Days other (see comments)   Admitted 12/10/17 for pulmonary infection; Unrelated to current hospitalization    Outpatient/Agency/Support Group Needs homecare agency (specify level of care)    Anticipated Changes Related to Illness inability to care for self    Equipment Currently Used at Home shower chair;raised toilet;walker, rolling;rollator;cane, straight;walker, standard    Equipment Needed After Discharge commode;wheelchair   wheelchair with attachment for RLE to elevate brace    Discharge Facility/Level Of Care Needs home with home health    Current Discharge Risk dependent with mobility/activities of daily living    Discharge Disposition still a patient            Discharge Plan       01/09/18 2989    Case Management/Social Work Plan    Plan Home with [facility]    Patient/Family In Agreement With Plan yes    Additional Comments Met with patient and spouse at bedside. Patient lives at home with her spouse and one adult child. She already has a rolling walker and standard walker at home. She states that Dr. Garcia wants her to have a wheelchair with an elevated leg rest since  she can't mobilize her right knee for several weeks. CM called Dr. Garcia's office [phone] and left message to confirm order. Patient does not have a preference for DME company. Patient is also interested in home health, and does not have preference which company. Referral called to Frant[facility] at [facility].         Discharge Placement     No information found        Expected Discharge Date and Time     Expected Discharge Date Expected Discharge Time    Jose 10, 2018               Demographic Summary       01/09/18 1525    Referral Information    Arrived From home or self-care    Referral Source admission list    Reason For Consult discharge planning    Record Reviewed medical record    Primary Care Physician Information    Name Phani Joaquin MD            Functional Status       01/09/18 1526    Functional Status Prior    Ambulation 1-->assistive equipment    Transferring 1-->assistive equipment    Toileting 1-->assistive equipment    Bathing 1-->assistive equipment    Dressing 1-->assistive equipment    Eating 1-->assistive equipment    Communication 0-->understands/communicates without difficulty    Swallowing 0-->swallows foods/liquids without difficulty    IADL    Medications independent    Meal Preparation independent    Housekeeping assistive equipment    Laundry assistive equipment    Shopping assistive equipment    Oral Care independent    Activity Tolerance    Usual Activity Tolerance moderate    Current Activity Tolerance fair    Employment/Financial    Financial Concerns none    Employment/Finance Comments [employer] P[employer]; Patient states she can afford all of her medications at this time.            Psychosocial     None            Abuse/Neglect     None            Legal     None            Substance Abuse     None            Patient Forms     None          Candi Garcia RN

## 2018-01-09 NOTE — THERAPY EVALUATION
Acute Care - Occupational Therapy Initial Evaluation   [place]     Patient Name: Lesli Leon  : 1955  MRN: [id number]  Today's Date: 2018  Onset of Illness/Injury or Date of Surgery Date: 18  Date of Referral to OT: 18  Referring Physician: MD Jose    Admit Date: 2018       ICD-10-CM ICD-9-CM   1. Impaired functional mobility, balance, gait, and endurance Z74.09 V49.89   2. Quadriceps tendon rupture, right, initial encounter S76.111A 843.8   3. Impaired mobility and ADLs Z74.09 799.89     Patient Active Problem List   Diagnosis   • Sepsis, probable right septic knee as source   • Hypertension   • Disease of thyroid gland   • Expressive aphasia   • Heart murmur   • Quadriceps tendon rupture, right, initial encounter   • S/P primary repair right knee quadriceps tendon tear   • Anxiety and depression   • Hypothyroid   • Tobacco abuse   • Acute postoperative pain     Past Medical History:   Diagnosis Date   • Anxiety and depression    • Arthritis    • Breast cancer 2015    RIGHT   • Disease of thyroid gland    • Hx of radiation therapy 2015    RT BREAST   • Hypertension    • Wears glasses      Past Surgical History:   Procedure Laterality Date   • BREAST BIOPSY Right 2015   • BREAST LUMPECTOMY Right 2015   • BREAST LUMPECTOMY     • JOINT REPLACEMENT     • KNEE ARTHROTOMY Right 12/10/2017    Procedure: INCISION AND DRAINAGE, POLY EXCHANGE RIGHT KNEE;  Surgeon: Stephon Garcia MD;  Location:  [facility];  Service:    • AR FIX QUAD/HAMSTR MUSC RUPT,PRIMARY Right 2018    Procedure: PRIMARY REPAIR OF RIGHT QUADRICEPS TENDON ;  Surgeon: Stephon Garcia MD;  Location:  [facility];  Service: Orthopedics          OT ASSESSMENT FLOWSHEET (last 72 hours)      OT Evaluation       18 1350 18 1137 18 1034 18 1033 18 1018    Rehab Evaluation    Document Type   evaluation  -LM evaluation  -AN     Subjective Information   agree to therapy;complains of;pain  -LM  agree to therapy;no complaints  -AN     Patient Effort, Rehab Treatment   good  -LM good  -AN     Symptoms Noted During/After Treatment   fatigue;increased pain  -LM increased pain  -AN     Symptoms Noted Comment   Increased pain to 15/10, notified RN  -LM Increased pain with weight bearing  -AN     General Information    Patient Profile Review   yes  -LM yes  -AN     Onset of Illness/Injury or Date of Surgery Date   01/08/18  -LM 01/08/18  -AN     Referring Physician   MD Jose  -LM MD Jose  -AN     General Observations   Pt received supine in bed, T ROM brace locked in extension, no visitors at bedside.  -LM Pt supine in bed, T Brace on R LE, locked in extension.  -AN     Pertinent History Of Current Problem   Pt is 3 weeks s/p revision of R knee arthroplasty for instability and acute hematogenous infection. Pt sustained mechanical fall on 1/8/18 and bent knee backwards, experienced pain above patella. Pt performed primary repair of R quadriceps tendon on 1/8/18.  -LM Pt is s/p TK revision and infection 3 wks ago. Pt with fall with hyperextension of K, rupture of quad tendon. s/p repair on 1/8/18  -AN     Precautions/Limitations   fall precautions;other (see comments)   T ROM brace locked in extension at all times  -LM fall precautions;brace on when up;other (see comments)   Pt with T Brace lock in extension at all times.   -AN     Prior Level of Function   independent:;all household mobility;community mobility;gait;transfer;bed mobility;min assist:;ADL's;bathing;dressing  -LM independent:;all household mobility;community mobility;min assist:;ADL's;mod assist:;home management  -AN     Equipment Currently Used at Home   shower chair;raised toilet;walker, rolling;rollator;cane, straight  -LM walker, rolling;rollator;shower chair;cane, straight;raised toilet   owns reacher, sockaid, shoe horn, long sponge  -AN     Plans/Goals Discussed With   patient;agreed upon  -LM patient;agreed upon  -AN     Risks Reviewed    patient:;LOB;nausea/vomiting;dizziness;increased discomfort;change in vital signs  -LM patient:;LOB;dizziness;increased discomfort;change in vital signs  -AN     Benefits Reviewed   patient:;improve function;increase independence;increase strength;increase balance;decrease pain  -LM patient:;improve function;increase independence;increase strength;increase balance  -AN     Barriers to Rehab   previous functional deficit  -LM previous functional deficit  -AN     Living Environment    Lives With   spouse;child(marianne), adult  -LM spouse;child(marianne), adult  -AN     Living Arrangements   house  -LM house  -AN     Home Accessibility   stairs to enter home;tub/shower is not walk in;bed and bath on same level  -LM stairs to enter home;tub/shower is not walk in  -AN     Number of Stairs to Enter Home   1  -LM 1  -AN     Stair Railings at Home   none  -LM      Living Environment Comment   Pt reports son or spouse will be available at all times to assist.  -LM Spouse is retired and available 24/7. Son works second shift, available days  -AN     Clinical Impression    Date of Referral to OT    01/08/18  -AN     OT Diagnosis    Decreased ADL I  -AN     Impairments Found (describe specific impairments)    gait, locomotion, and balance;joint integrity and mobility  -AN     Patient/Family Goals Statement    Agreeable to OT, pt wants to go home when her pain is under control   -AN     Criteria for Skilled Therapeutic Interventions Met    yes;treatment indicated  -AN     Rehab Potential    good, to achieve stated therapy goals  -AN     Therapy Frequency    daily  -AN     Anticipated Equipment Needs At Discharge    tub bench  -AN     Anticipated Discharge Disposition    home with assist;home with outpatient services  -AN     Pain Assessment    Pain Assessment   0-10  -LM 0-10  -AN     Pain Score   10  -LM 10  -AN     Post Pain Score   10   reported 15/10 pain  -LM 10  -AN     Pain Type   Acute pain  -LM Acute pain  -AN     Pain  Location   Knee  -LM Knee  -AN     Pain Orientation   Right  -LM Right  -AN     Pain Descriptors   Burning  -LM      Pain Intervention(s)   Cold applied;Repositioned;Ambulation/increased activity  -LM Cold applied;Repositioned;Ambulation/increased activity  -AN     Vision Assessment/Intervention    Visual Impairment    WFL with corrective lenses  -AN     Cognitive Assessment/Intervention    Current Cognitive/Communication Assessment   functional  -LM functional  -AN     Orientation Status   oriented x 4  -LM oriented x 4  -AN     Follows Commands/Answers Questions   100% of the time;needs cueing  -% of the time  -AN     Personal Safety   WNL/WFL  -LM WNL/WFL  -AN     ROM (Range of Motion)    General ROM   lower extremity range of motion deficits identified  -LM no range of motion deficits identified  -AN     MMT (Manual Muscle Testing)    General MMT Assessment   lower extremity strength deficits identified  -LM no strength deficits identified  -AN     General MMT Assessment Detail   Unable to formally assess right LE, grossly 3/5. Left LE functionally 4/5  -LM      Muscle Tone Assessment    RLE Muscle Tone Assessment mildly decreased tone  -SJ mildly decreased tone  -SJ   mildly decreased tone  -SJ    Mobility Assessment/Training    Extremity Weight-Bearing Status   right lower extremity  -LM      Right Lower Extremity Weight-Bearing   weight-bearing as tolerated  -LM      Bed Mobility, Assessment/Treatment    Bed Mob, Supine to Sit, [place]   moderate assist (50% patient effort);2 person assist required;verbal cues required  -LM verbal cues required;moderate assist (50% patient effort);2 person assist required  -AN     Bed Mob, Sit to Supine, [place]   not tested   Pt left UIC  -LM      Bed Mobility, Safety Issues   decreased use of legs for bridging/pushing  -LM decreased use of legs for bridging/pushing  -AN     Bed Mobility, Impairments   ROM decreased;strength decreased;pain  -LM strength  decreased;ROM decreased   LE's  -AN     Bed Mobility, Comment   Pt unable to lift right LE to move towards EOB. Required assist to bring right LE to bring off EOB and assist with balance at trunk when transitioning to upright. Verbal cues to scoot hips towards EOB to place feet flat on floor.  -LM Assist to lift R LE, discussed leg lift  -AN     Transfer Assessment/Treatment    Transfers, Sit-Stand [place]   contact guard assist;2 person assist required;verbal cues required  -LM verbal cues required;contact guard assist;2 person assist required  -AN     Transfers, Stand-Sit [place]   contact guard assist;2 person assist required;verbal cues required  -LM verbal cues required;contact guard assist;2 person assist required  -AN     Transfers, Sit-Stand-Sit, Assist Device   rolling walker  -LM rolling walker  -AN     Transfer, Safety Issues   sequencing ability decreased;step length decreased;weight-shifting ability decreased  -LM step length decreased;weight-shifting ability decreased  -AN     Transfer, Impairments   ROM decreased;strength decreased;pain  -LM impaired balance  -AN     Transfer, Comment   Verbal cues to push from bed when standing, reach for chair armrests when sitting, step out right LE prior to t/f.  -LM Cues for RW use, hand placement and  forwarding of R LE prior to sitting  -AN     Functional Mobility    Functional Mobility- Ind. Level    contact guard assist;2 person assist required;1 person + 1 person to manage equipment;verbal cues required  -AN     Functional Mobility- Device    rolling walker  -AN     Functional Mobility-Distance (Feet)    --   refer to PT  -AN     Functional Mobility- Safety Issues    step length decreased;sequencing ability decreased  -AN     Lower Body Bathing Assessment/Training    LB Bathing Assess/Train, Comment    simulate min assist  -AN     Lower Body Dressing Assessment/Training    LB Dressing Assess/Train, Comment    simulated mod assist without AE  -AN   "   Motor Skills/Interventions    Additional Documentation   Balance Skills Training (Group)  -LM Balance Skills Training (Group)  -AN     Balance Skills Training    Sitting-Level of Assistance   Independent  -LM Independent  -AN     Sitting-Balance Support   Right upper extremity supported;Left upper extremity supported;Feet supported  -LM Right upper extremity supported;Left upper extremity supported  -AN     Sitting-Balance Activities   Trunk control activities  -LM Trunk control activities  -AN     Standing-Level of Assistance   Contact guard  -LM Contact guard  -AN     Static Standing Balance Support   assistive device  -LM assistive device  -AN     Gait Balance-Level of Assistance   Contact guard  -LM      Gait Balance Support   assistive device  -LM      Therapy Exercises    Bilateral Lower Extremities   AROM:;15 reps;sitting;ankle pumps/circles  -      Sensory Assessment/Intervention    LLE Light Touch   WNL  -LM      RLE Light Touch   mild impairment   reported \"dull\" sensation R medial ankle  -LM      General Therapy Interventions    Planned Therapy Interventions    activity intolerance;ADL retraining;balance training;bed mobility training;strengthening;transfer training  -AN     Positioning and Restraints    Pre-Treatment Position   in bed  -LM in bed  -AN     Post Treatment Position   chair  -LM chair  -AN     In Chair   notified nsg;reclined;sitting;call light within reach;encouraged to call for assist;exit alarm on;legs elevated;with brace  -LM notified nsg;reclined;call light within reach;encouraged to call for assist  -AN     General LE Assessment    ROM   knee, right: LE ROM deficit  -      ROM Detail   R knee to be locked in extension at all times, unable to assess flexion. R ankle and R hip AROM WFL  -LM        01/09/18 0827 01/08/18 2114 01/08/18 174          Rehab Evaluation    Evaluation Not Performed   patient unavailable for evaluation   Still in PACU. Will check back in AM to complete " agustina.   -LR      General Information    Equipment Currently Used at Home  walker, rolling;other (see comments)   BRACE  -EN       Living Environment    Lives With  spouse;child(marianne), adult  -EN       Living Arrangements  house  -EN       Home Accessibility  stairs to enter home  -EN       Number of Stairs to Enter Home  1  -EN       Stair Railings at Home  none  -EN       Type of Financial/Environmental Concern  none  -EN       Transportation Available  car;family or friend will provide  -EN       Living Environment Comment  N/A  -EN       Functional Level Prior    Ambulation  1-->assistive equipment  -EN       Transferring  1-->assistive equipment  -EN       Toileting  1-->assistive equipment  -EN       Bathing  2-->assistive person  -EN       Dressing  2-->assistive person  -EN       Eating  0-->independent  -EN       Communication  0-->understands/communicates without difficulty  -EN       Swallowing  0-->swallows foods/liquids without difficulty  -EN       Prior Functional Level Comment  N/A  -EN       Muscle Tone Assessment    RLE Muscle Tone Assessment mildly decreased tone  -SJ        Sensory Assessment/Intervention    LLE Light Touch WNL  -SJ        RLE Light Touch WNL  -SJ          User Key  (r) = Recorded By, (t) = Taken By, (c) = Cosigned By    Initials Name Effective Dates    AN Lita Spencer, OT 06/22/15 -     LR Beena Adler, PT 06/19/15 -     EN Ally Booker, RN 06/16/16 -     LM Marilu Gonzalez, PT 06/09/17 -     SJ Joanne Carcamo, RN 06/09/17 -            Occupational Therapy Education     Title: PT OT SLP Therapies (Active)     Topic: Occupational Therapy (Active)     Point: ADL training (Done)    Description: Instruct learner(s) on proper safety adaptation and remediation techniques during self care or transfers.   Instruct in proper use of assistive devices.    Learning Progress Summary    Learner Readiness Method Response Comment Documented by Status   Patient Acceptance E,D  VU,NR,DU Educated on safety with transfers and mobility. Discussed AE use for ADL's. AN 01/09/18 1436 Done                      User Key     Initials Effective Dates Name Provider Type Discipline    AN 06/22/15 -  Lita Spencer, OT Occupational Therapist OT                  OT Recommendation and Plan  Anticipated Equipment Needs At Discharge: tub bench  Anticipated Discharge Disposition: home with assist, home with outpatient services  Planned Therapy Interventions: activity intolerance, ADL retraining, balance training, bed mobility training, strengthening, transfer training  Therapy Frequency: daily  Plan of Care Review  Plan Of Care Reviewed With: patient  Outcome Summary/Follow up Plan: Pt. CGA x 2 with RW, severe pain with mobility/weight bearing. Pt will need assist with ADL's, but has AE and assist at home.           OT Goals       01/09/18 1437          Transfer Training OT LTG    Transfer Training OT LTG, Date Established 01/09/18  -AN      Transfer Training OT LTG, Time to Achieve 1 wk  -AN      Transfer Training OT LTG, Activity Type toilet;sit to stand/stand to sit;bed to chair /chair to bed  -AN      Transfer Training OT LTG, [place] Level supervision required  -AN      Transfer Training OT LTG, Assist Device walker, rolling  -AN      Bathing OT LTG    Bathing Goal OT LTG, Date Established 01/09/18  -AN      Bathing Goal OT LTG, Time to Achieve 1 wk  -AN      Bathing Goal OT LTG, Activity Type simulates;upper body bathing;lower body bathing  -AN      Bathing Goal OT LTG, [place] Level minimum assist (75% patient effort)  -AN      LB Dressing OT LTG    LB Dressing Goal OT LTG, Date Established 01/09/18  -AN      LB Dressing Goal OT LTG, Time to Achieve 1 wk  -AN      LB Dressing Goal OT LTG, [place] Level minimum assist (75% patient effort)  -AN      Functional Mobility OT LTG    Functional Mobility Goal OT LTG, Date Established 01/09/18  -AN      Functional Mobility Goal OT LTG, Time to  Achieve 1 wk  -AN      Functional Mobility Goal OT LTG, [place] Level contact guard  -AN      Functional Mobility Goal OT LTG, Distance to Achieve to the bathroom  -AN        User Key  (r) = Recorded By, (t) = Taken By, (c) = Cosigned By    Initials Name Provider Type    CARMEN Spencer OT Occupational Therapist                Outcome Measures       01/09/18 1034 01/09/18 1033       How much help from another person do you currently need...    Turning from your back to your side while in flat bed without using bedrails? 2  -LM      Moving from lying on back to sitting on the side of a flat bed without bedrails? 2  -LM      Moving to and from a bed to a chair (including a wheelchair)? 3  -LM      Standing up from a chair using your arms (e.g., wheelchair, bedside chair)? 3  -LM      Climbing 3-5 steps with a railing? 2  -LM      To walk in hospital room? 3  -LM      AM-PAC 6 Clicks Score 15  -LM      How much help from another is currently needed...    Putting on and taking off regular lower body clothing?  2  -AN     Bathing (including washing, rinsing, and drying)  2  -AN     Toileting (which includes using toilet bed pan or urinal)  2  -AN     Putting on and taking off regular upper body clothing  3  -AN     Taking care of personal grooming (such as brushing teeth)  3  -AN     Eating meals  4  -AN     Score  16  -AN     Functional Assessment    Outcome Measure Options AM-PAC 6 Clicks Basic Mobility (PT)  -LM        User Key  (r) = Recorded By, (t) = Taken By, (c) = Cosigned By    Initials Name Provider Type    CARMEN Spencer OT Occupational Therapist    LM Marilu Gonzalez PT Physical Therapist          Time Calculation:   OT Start Time: 1033    Therapy Charges for Today     Code Description Service Date Service Provider Modifiers Qty    23686907901  OT SELFCARE CURRENT 1/9/2018 JESE Vaca CK 1    [id number]  OT SELFCARE PROJECTED 1/9/2018 JESE Vaca CJ 1    [id number]  OT  EVAL LOW COMPLEXITY 4 1/9/2018 Lita Spencer, OT GO 1          OT G-codes  OT Functional Scales Options: AM-PAC 6 Clicks Daily Activity (OT)  Score: 16  Functional Limitation: Self care  Self Care Current Status (): At least 40 percent but less than 60 percent impaired, limited or restricted  Self Care Goal Status (): At least 20 percent but less than 40 percent impaired, limited or restricted    Lita Spencer, OT  1/9/2018

## 2018-01-09 NOTE — PLAN OF CARE
Problem: Patient Care Overview (Adult)  Goal: Plan of Care Review  Outcome: Ongoing (interventions implemented as appropriate)   01/09/18 1137   Coping/Psychosocial Response Interventions   Plan Of Care Reviewed With patient   Patient Care Overview   Progress progress toward functional goals as expected   Outcome Evaluation   Outcome Summary/Follow up Plan PT eval complete. Pt ambulated 15 feet with CGA and RW, limited by reported 15/10 pain and fatigue. Pt required modAx2 for bed mobility due to increased pain. Pt would benefit from stair training next session. Recommend d/c home with assist and HHPT.        Problem: Inpatient Physical Therapy  Goal: Bed Mobility Goal LTG- PT  Outcome: Ongoing (interventions implemented as appropriate)   01/09/18 1137   Bed Mobility PT LTG   Bed Mobility PT LTG, Date Established 01/09/18   Bed Mobility PT LTG, Time to Achieve 5 days   Bed Mobility PT LTG, Activity Type supine to sit/sit to supine   Bed Mobility PT LTG, [place] Level supervision required     Goal: Transfer Training Goal 1 LTG- PT  Outcome: Ongoing (interventions implemented as appropriate)   01/09/18 1137   Transfer Training PT LTG   Transfer Training PT LTG, Date Established 01/09/18   Transfer Training PT LTG, Time to Achieve 5 days   Transfer Training PT LTG, Activity Type sit to stand/stand to sit   Transfer Training PT LTG, [place] Level conditional independence   Transfer Training PT LTG, Assist Device walker, rolling     Goal: Gait Training Goal LTG- PT  Outcome: Ongoing (interventions implemented as appropriate)   01/09/18 1137   Gait Training PT LTG   Gait Training Goal PT LTG, Date Established 01/09/18   Gait Training Goal PT LTG, Time to Achieve 5 days   Gait Training Goal PT LTG, [place] Level conditional independence   Gait Training Goal PT LTG, Assist Device walker, rolling   Gait Training Goal PT LTG, Distance to Achieve 100 feet     Goal: Stair Training Goal LTG- PT  Outcome: Ongoing  (interventions implemented as appropriate)   01/09/18 1137   Stair Training PT LTG   Stair Training Goal PT LTG, Date Established 01/09/18   Stair Training Goal PT LTG, Time to Achieve 5 days   Stair Training Goal PT LTG, Number of Steps 1  (backwards)   Stair Training Goal PT LTG, [place] Level conditional independence   Stair Training Goal PT LTG, Assist Device walker, rolling

## 2018-01-09 NOTE — CONSULTS
INFECTIOUS DISEASE CONSULT/INITIAL HOSPITAL VISIT    Lesli Leon  1955  [id number]    Date of Consult: 1/9/2018    Admission Date: 1/8/2018      Requesting Provider: Stephon Garcia MD  Evaluating Physician: Dom Landrum MD    Reason for Consultation: right prosthetic knee infection    History of present illness:    Patient is a 62 y.o. female followed by our service for Strep mitis/oralis  right prosthetic knee infection.  She fell at home about a week ago, and was seen in clinic by Dr. Garcia. Surgery was scheduled for yesterday, to repair the right knee quadriceps tendon tear.  She has been afebrile since admission.  We were consulted for antibiotic management.     Past Medical History:   Diagnosis Date   • Anxiety and depression    • Arthritis    • Breast cancer 2015    RIGHT   • Disease of thyroid gland    • Hx of radiation therapy 2015    RT BREAST   • Hypertension    • Wears glasses        Past Surgical History:   Procedure Laterality Date   • BREAST BIOPSY Right 2015   • BREAST LUMPECTOMY Right 2015   • BREAST LUMPECTOMY     • JOINT REPLACEMENT     • KNEE ARTHROTOMY Right 12/10/2017    Procedure: INCISION AND DRAINAGE, POLY EXCHANGE RIGHT KNEE;  Surgeon: Stephon Garcia MD;  Location:  [facility];  Service:    • ME FIX QUAD/HAMSTR MUSC RUPT,PRIMARY Right 1/8/2018    Procedure: PRIMARY REPAIR OF RIGHT QUADRICEPS TENDON ;  Surgeon: Stephon Garcia MD;  Location:  [facility];  Service: Orthopedics       Family History   Problem Relation Age of Onset   • Breast cancer Mother      DX AGE UNKNOWN   • Breast cancer Sister      DX AGE UNKNOWN   • Ovarian cancer Neg Hx        Social History     Social History   • Marital status:      Spouse name: N/A   • Number of children: N/A   • Years of education: N/A     Occupational History   • Not on file.     Social History Main Topics   • Smoking status: Current Every Day Smoker     Years: 5.00     Types: Electronic Cigarette   • Smokeless  tobacco: Never Used   • Alcohol use No   • Drug use: No   • Sexual activity: Defer     Other Topics Concern   • Not on file     Social History Narrative       Allergies   Allergen Reactions   • Bactrim [Sulfamethoxazole-Trimethoprim] Hives         Medication:    Current Facility-Administered Medications:   •  acetaminophen (TYLENOL) tablet 650 mg, 650 mg, Oral, Q4H PRN **OR** acetaminophen (TYLENOL) 160 MG/5ML solution 650 mg, 650 mg, Oral, Q4H PRN **OR** acetaminophen (TYLENOL) suppository 650 mg, 650 mg, Rectal, Q4H PRN, Stephon Garcia MD  •  ALPRAZolam (XANAX) tablet 1 mg, 1 mg, Oral, BID PRN, Se Ashley MD, 1 mg at 01/09/18 0827  •  aspirin EC tablet 325 mg, 325 mg, Oral, Q12H, Stephon Garcia MD, 325 mg at 01/09/18 0902  •  bisacodyl (DULCOLAX) EC tablet 10 mg, 10 mg, Oral, Daily PRN, Stephon Garcia MD  •  bisacodyl (DULCOLAX) suppository 10 mg, 10 mg, Rectal, Daily PRN, Stephon Garcia MD  •  buPROPion XL (WELLBUTRIN XL) 24 hr tablet 150 mg, 150 mg, Oral, Daily, BECKY Newton, 150 mg at 01/09/18 0819  •  cefTRIAXone (ROCEPHIN) IVPB 2 g, 2 g, Intravenous, Q24H, BECKY Manzano, Last Rate: 100 mL/hr at 01/09/18 0902, 2 g at 01/09/18 0902  •  docusate sodium (COLACE) capsule 100 mg, 100 mg, Oral, BID PRN, Stephon Garcia MD  •  DULoxetine (CYMBALTA) DR capsule 90 mg, 90 mg, Oral, Nightly, BECKY Newton  •  gabapentin (NEURONTIN) capsule 800 mg, 800 mg, Oral, 4x Daily, Se Ashley MD, 800 mg at 01/09/18 1758  •  hydrALAZINE (APRESOLINE) injection 10 mg, 10 mg, Intravenous, Q6H PRN, Se Ashley MD  •  HYDROmorphone (DILAUDID) injection 0.5 mg, 0.5 mg, Intravenous, Q2H PRN, 0.5 mg at 01/09/18 1651 **AND** naloxone (NARCAN) injection 0.1 mg, 0.1 mg, Intravenous, Q5 Min PRN, Stephon Garcia MD  •  lactated ringers infusion, 9 mL/hr, Intravenous, Continuous, Fabrice Dacosta MD, Last Rate: 9 mL/hr at 01/08/18 1205, 9 mL/hr at 01/08/18 1205  •   levothyroxine (SYNTHROID, LEVOTHROID) tablet 100 mcg, 100 mcg, Oral, Q AM, Mayra Scruggs, APRN, 100 mcg at 01/09/18 0539  •  lisinopril (PRINIVIL,ZESTRIL) tablet 20 mg, 20 mg, Oral, Daily, Mayra Scruggs, APRN, 20 mg at 01/09/18 0820  •  meloxicam (MOBIC) tablet 15 mg, 15 mg, Oral, Daily, Stephon Garcia MD, 15 mg at 01/09/18 0902  •  nicotine (NICODERM CQ) 21 MG/24HR patch 1 patch, 1 patch, Transdermal, Daily, Se Ashley MD  •  ondansetron (ZOFRAN) tablet 4 mg, 4 mg, Oral, Q6H PRN **OR** ondansetron (ZOFRAN) injection 4 mg, 4 mg, Intravenous, Q6H PRN, Stephon Garcia MD  •  ondansetron (ZOFRAN) injection 4 mg, 4 mg, Intravenous, Q6H PRN, Se Ashley MD  •  oxyCODONE-acetaminophen (PERCOCET) 5-325 MG per tablet 1 tablet, 1 tablet, Oral, Q4H PRN, Stephon Garcia MD, 1 tablet at 01/08/18 1827  •  oxyCODONE-acetaminophen (PERCOCET) 5-325 MG per tablet 2 tablet, 2 tablet, Oral, Q4H PRN, Stephon Garcia MD, 1 tablet at 01/09/18 1756  •  sodium chloride 0.9 % bolus 500 mL, 500 mL, Intravenous, TID PRN, Se Ashley MD  •  sodium chloride 0.9 % flush 1-10 mL, 1-10 mL, Intravenous, PRN, Stephon Garcia MD  •  sodium chloride 0.9 % infusion, 100 mL/hr, Intravenous, Continuous, Stephon Garcia MD  •  sodium chloride 0.9 % infusion, 100 mL/hr, Intravenous, Continuous, Stephon Garcia MD, Last Rate: 100 mL/hr at 01/09/18 0429, 100 mL/hr at 01/09/18 0429    Antibiotics:  Anti-Infectives     Ordered     Dose/Rate Route Frequency Start Stop    01/09/18 0740  cefTRIAXone (ROCEPHIN) IVPB 2 g     Ordering Provider:  BECKY Manzano    2 g  100 mL/hr over 30 Minutes Intravenous Every 24 Hours 01/09/18 0900 01/13/18 0859    01/08/18 2111  ceFAZolin in dextrose (ANCEF) IVPB solution 2 g     Ordering Provider:  Stephon Garcia MD    2 g  over 30 Minutes Intravenous Every 8 Hours 01/08/18 2200 01/09/18 0609    01/08/18 1150  ceFAZolin in dextrose (ANCEF) IVPB solution 2 g      Ordering Provider:  Stephon Garcia MD    2 g  over 30 Minutes Intravenous Once 18 1230 18 1430            Review of Systems:  Constitutional-- No Fever, chills or sweats.  Appetite good, and no malaise. No fatigue.  HEENT-- No new vision, hearing or throat complaints.  No epistaxis or oral sores.  Denies odynophagia or dysphagia. No headache, photophobia or neck stiffness.  CV-- No chest pain, palpitation or syncope  Resp-- No SOB/cough/Hemoptysis  GI- No nausea, vomiting, or diarrhea.  No hematochezia, melena, or hematemesis. Denies jaundice or chronic liver disease.  -- No dysuria, hematuria, or flank pain.  Denies hesitancy, urgency or flank pain.  Lymph- no swollen lymph nodes in neck/axilla or groin.   Heme- No active bruising or bleeding; no Hx of DVT or PE.  MS-- complains of right knee pain   Neuro-- No acute focal weakness or numbness in the arms or legs.  No seizures.  Skin--No rashes or lesions      Physical Exam:   Vital Signs  Temp (24hrs), Av.2 °F (36.8 °C), Min:97.2 °F (36.2 °C), Max:99 °F (37.2 °C)    Temp  Min: 97.2 °F (36.2 °C)  Max: 99 °F (37.2 °C)  BP  Min: 133/71  Max: 156/77  Pulse  Min: 71  Max: 99  Resp  Min: 14  Max: 20  SpO2  Min: 94 %  Max: 100 %    GENERAL: Awake and alert, in no acute distress.   HEENT: Normocephalic, atraumatic.  PERRL. EOMI. No conjunctival injection. No icterus. Oropharynx clear without evidence of thrush or exudate. No evidence of peridontal disease.    NECK: Supple without nuchal rigidity. No mass.  LYMPH: No cervical, axillary or inguinal lymphadenopathy.  HEART: RRR; No murmur, rubs, gallops.   LUNGS: Clear to auscultation bilaterally without wheezing, rales, rhonchi. Normal respiratory effort. Nonlabored. No dullness.  ABDOMEN: Soft, nontender, nondistended. Positive bowel sounds. No rebound or guarding. NO mass or HSM.  EXT:  No cyanosis, clubbing or edema. No cord.  : Genitalia generally unremarkable.  Without Jimenez catheter.  MSK:  right knee surgical dressing in place    SKIN: Warm and dry without cutaneous eruptions on Inspection/palpation.    NEURO: Oriented to PPT. No focal deficits on motor/sensory exam at arms/legs.  PSYCHIATRIC: Normal insight and judgement. Cooperative with PE  Right PICC without redness     Laboratory Data      Results from last 7 days  Lab Units 18  0651 18  2158 18  1003   WBC 10*3/mm3 7.21  --  6.16   HEMOGLOBIN g/dL 10.1* 10.7* 10.9*   HEMATOCRIT % 32.5* 34.3* 35.3   PLATELETS 10*3/mm3 309  --  333       Results from last 7 days  Lab Units 18  0651   SODIUM mmol/L 141   POTASSIUM mmol/L 4.2   CHLORIDE mmol/L 104   CO2 mmol/L 30.0   BUN mg/dL 12   CREATININE mg/dL 0.80   GLUCOSE mg/dL 87   CALCIUM mg/dL 8.7       Results from last 7 days  Lab Units 18  1003   ALK PHOS U/L 99   BILIRUBIN mg/dL 0.4   ALT (SGPT) U/L 24   AST (SGOT) U/L 25           Results from last 7 days  Lab Units 18  0651   CRP mg/dL 2.13*                 Estimated Creatinine Clearance: 74.4 mL/min (by C-G formula based on Cr of 0.8).      Microbiology:         12/10:  Tissue: Strep mitis/oralis  Anaerobic culture: Staph epidermidis                   Radiology:  Imaging Results (last 72 hours)     ** No results found for the last 72 hours. **            Impression:   1.  Strep mitis right prosthetic knee infection, s/p revision  2.  Right quadricep tendon tear, s/p repair    PLAN/RECOMMENDATIONS:   Thank you for asking us to see Lesli Leon, I recommend the followin.  Continue Rocephin 2 g IV daily  2.  PICC line dressing change today  3.  Discharge to home when her pain is controlled    Dr. Landrum has obtained the history, performed the physical exam and formulated the above treatment plan.        Dom Landrum MD  2018  6:37 PM

## 2018-01-09 NOTE — THERAPY EVALUATION
Acute Care - Physical Therapy Initial Evaluation  [facility]     Patient Name: Lesli Leon  : 1955  MRN: [id number]  Today's Date: 2018   Onset of Illness/Injury or Date of Surgery Date: 18  Date of Referral to PT: 18  Referring Physician: MD Jose      Admit Date: 2018     Visit Dx:    ICD-10-CM ICD-9-CM   1. Impaired functional mobility, balance, gait, and endurance Z74.09 V49.89   2. Quadriceps tendon rupture, right, initial encounter S76.111A 843.8     Patient Active Problem List   Diagnosis   • Sepsis, probable right septic knee as source   • Hypertension   • Disease of thyroid gland   • Expressive aphasia   • Heart murmur   • Quadriceps tendon rupture, right, initial encounter   • S/P primary repair right knee quadriceps tendon tear   • Anxiety and depression   • Hypothyroid   • Tobacco abuse   • Acute postoperative pain     Past Medical History:   Diagnosis Date   • Anxiety and depression    • Arthritis    • Breast cancer 2015    RIGHT   • Disease of thyroid gland    • Hx of radiation therapy 2015    RT BREAST   • Hypertension    • Wears glasses      Past Surgical History:   Procedure Laterality Date   • BREAST BIOPSY Right 2015   • BREAST LUMPECTOMY Right 2015   • BREAST LUMPECTOMY     • JOINT REPLACEMENT     • KNEE ARTHROTOMY Right 12/10/2017    Procedure: INCISION AND DRAINAGE, POLY EXCHANGE RIGHT KNEE;  Surgeon: Stephon Garcia MD;  Location: [facility];  Service:    • VA FIX QUAD/HAMSTR MUSC RUPT,PRIMARY Right 2018    Procedure: PRIMARY REPAIR OF RIGHT QUADRICEPS TENDON ;  Surgeon: Stephon Garcia MD;  Location: [facility];  Service: Orthopedics          PT ASSESSMENT (last 72 hours)      PT Evaluation       18 1137 18 1034    Rehab Evaluation    Document Type  evaluation  -LM    Subjective Information  agree to therapy;complains of;pain  -LM    Patient Effort, Rehab Treatment  good  -LM    Symptoms Noted During/After Treatment  fatigue;increased  pain  -LM    Symptoms Noted Comment  Increased pain to 15/10, notified RN  -LM    General Information    Patient Profile Review  yes  -LM    Onset of Illness/Injury or Date of Surgery Date  01/08/18  -LM    Referring Physician  MD Jose  -LM    General Observations  Pt received supine in bed, T ROM brace locked in extension, no visitors at bedside.  -LM    Pertinent History Of Current Problem  Pt is 3 weeks s/p revision of R knee arthroplasty for instability and acute hematogenous infection. Pt sustained mechanical fall on 1/8/18 and bent knee backwards, experienced pain above patella. Pt performed primary repair of R quadriceps tendon on 1/8/18.  -LM    Precautions/Limitations  fall precautions;other (see comments)   T ROM brace locked in extension at all times  -LM    Prior Level of Function  independent:;all household mobility;community mobility;gait;transfer;bed mobility;min assist:;ADL's;bathing;dressing  -LM    Equipment Currently Used at Home  shower chair;raised toilet;walker, rolling;rollator;cane, straight  -LM    Plans/Goals Discussed With  patient;agreed upon  -LM    Risks Reviewed  patient:;LOB;nausea/vomiting;dizziness;increased discomfort;change in vital signs  -LM    Benefits Reviewed  patient:;improve function;increase independence;increase strength;increase balance;decrease pain  -LM    Barriers to Rehab  previous functional deficit  -LM    Living Environment    Lives With  spouse;child(marianne), adult  -LM    Living Arrangements  house  -LM    Home Accessibility  stairs to enter home;tub/shower is not walk in;bed and bath on same level  -LM    Number of Stairs to Enter Home  1  -LM    Stair Railings at Home  none  -LM    Living Environment Comment  Pt reports son or spouse will be available at all times to assist.  -LM    Clinical Impression    Date of Referral to PT  01/08/18  -LM    PT Diagnosis  s/p primary repair of R quadriceps tendon  -LM    Prognosis  good  -LM    Patient/Family Goals  Statement  decrease pain  -LM    Criteria for Skilled Therapeutic Interventions Met  yes;treatment indicated  -LM    Rehab Potential  good, to achieve stated therapy goals  -LM    Pain Assessment    Pain Assessment  0-10  -LM    Pain Score  10  -LM    Post Pain Score  10   reported 15/10 pain  -LM    Pain Type  Acute pain  -LM    Pain Location  Knee  -LM    Pain Orientation  Right  -LM    Pain Descriptors  Burning  -LM    Pain Intervention(s)  Cold applied;Repositioned;Ambulation/increased activity  -LM    Cognitive Assessment/Intervention    Current Cognitive/Communication Assessment  functional  -LM    Orientation Status  oriented x 4  -LM    Follows Commands/Answers Questions  100% of the time;needs cueing  -LM    Personal Safety  WNL/WFL  -LM    ROM (Range of Motion)    General ROM  lower extremity range of motion deficits identified  -LM    General LE Assessment    ROM  knee, right: LE ROM deficit  -LM    ROM Detail  R knee to be locked in extension at all times, unable to assess flexion. R ankle and R hip AROM WFL  -LM    MMT (Manual Muscle Testing)    General MMT Assessment  lower extremity strength deficits identified  -LM    General MMT Assessment Detail  Unable to formally assess right LE, grossly 3/5. Left LE functionally 4/5  -LM    Muscle Tone Assessment    RLE Muscle Tone Assessment mildly decreased tone  -SJ     Mobility Assessment/Training    Extremity Weight-Bearing Status  right lower extremity  -LM    Right Lower Extremity Weight-Bearing  weight-bearing as tolerated  -LM    Bed Mobility, Assessment/Treatment    Bed Mob, Supine to Sit, [place]  moderate assist (50% patient effort);2 person assist required;verbal cues required  -LM    Bed Mob, Sit to Supine, [place]  not tested   Pt left UIC  -LM    Bed Mobility, Safety Issues  decreased use of legs for bridging/pushing  -LM    Bed Mobility, Impairments  ROM decreased;strength decreased;pain  -LM    Bed Mobility, Comment  Pt unable to lift  right LE to move towards EOB. Required assist to bring right LE to bring off EOB and assist with balance at trunk when transitioning to upright. Verbal cues to scoot hips towards EOB to place feet flat on floor.  -LM    Transfer Assessment/Treatment    Transfers, Sit-Stand [place]  contact guard assist;2 person assist required;verbal cues required  -LM    Transfers, Stand-Sit [place]  contact guard assist;2 person assist required;verbal cues required  -LM    Transfers, Sit-Stand-Sit, Assist Device  rolling walker  -LM    Transfer, Safety Issues  sequencing ability decreased;step length decreased;weight-shifting ability decreased  -LM    Transfer, Impairments  ROM decreased;strength decreased;pain  -LM    Transfer, Comment  Verbal cues to push from bed when standing, reach for chair armrests when sitting, step out right LE prior to t/f.  -LM    Gait Assessment/Treatment    Gait, [place] Level  contact guard assist;2 person assist required;verbal cues required  -LM    Gait, Assistive Device  rolling walker  -LM    Gait, Distance (Feet)  15  -LM    Gait, Gait Pattern Analysis  swing-to gait  -LM    Gait, Gait Deviations  right:;antalgic;franklin decreased;decreased heel strike;forward flexed posture;step length decreased;weight-shifting ability decreased  -LM    Gait, Safety Issues  sequencing ability decreased;step length decreased;weight-shifting ability decreased  -LM    Gait, Impairments  ROM decreased;strength decreased;pain  -LM    Gait, Comment  Pt ambulated with step-to pattern at slow pace. Pt exhibited abduction at hip when advancing right LE forward due to locked brace in extension. Verbal cues for upright posture, WB status in right LE, remain within RW. Gait limited by pain, bedside chair brought behind patient.  -LM    Motor Skills/Interventions    Additional Documentation  Balance Skills Training (Group)  -LM    Balance Skills Training    Sitting-Level of Assistance  Independent  -LM     "Sitting-Balance Support  Right upper extremity supported;Left upper extremity supported;Feet supported  -LM    Sitting-Balance Activities  Trunk control activities  -LM    Standing-Level of Assistance  Contact guard  -LM    Static Standing Balance Support  assistive device  -LM    Gait Balance-Level of Assistance  Contact guard  -LM    Gait Balance Support  assistive device  -LM    Therapy Exercises    Bilateral Lower Extremities  AROM:;15 reps;sitting;ankle pumps/circles  -LM    Sensory Assessment/Intervention    LLE Light Touch  WNL  -LM    RLE Light Touch  mild impairment   reported \"dull\" sensation R medial ankle  -LM    Positioning and Restraints    Pre-Treatment Position  in bed  -LM    Post Treatment Position  chair  -LM    In Chair  notified nsg;reclined;sitting;call light within reach;encouraged to call for assist;exit alarm on;legs elevated;with brace  -LM      01/09/18 1018 01/09/18 0827    Muscle Tone Assessment    RLE Muscle Tone Assessment mildly decreased tone  -SJ mildly decreased tone  -SJ    Sensory Assessment/Intervention    LLE Light Touch  WNL  -SJ    RLE Light Touch  WNL  -SJ      01/08/18 2114 01/08/18 1746    Rehab Evaluation    Evaluation Not Performed  patient unavailable for evaluation   Still in PACU. Will check back in AM to complete eval.   -LR    General Information    Equipment Currently Used at Home walker, rolling;other (see comments)   BRACE  -EN     Living Environment    Lives With spouse;child(marianne), adult  -EN     Living Arrangements house  -EN     Home Accessibility stairs to enter home  -EN     Number of Stairs to Enter Home 1  -EN     Stair Railings at Home none  -EN     Type of Financial/Environmental Concern none  -EN     Transportation Available car;family or friend will provide  -EN     Living Environment Comment N/A  -EN       User Key  (r) = Recorded By, (t) = Taken By, (c) = Cosigned By    Initials Name Provider Type    LR Beena Adler, PT Physical Therapist    " SEBASTIAN Booker, RN Registered Nurse    ASHLEE Gonzalez, PT Physical Therapist     Joanne Carcamo, RN Registered Nurse          Physical Therapy Education     Title: PT OT SLP Therapies (Active)     Topic: Physical Therapy (Active)     Point: Mobility training (Active)    Learning Progress Summary    Learner Readiness Method Response Comment Documented by Status   Patient Acceptance E,D NR Reviewed benefits of activity, WB status, T ROM brace to be locked in extension at all times, safety with mobility.  01/09/18 1135 Active               Point: Home exercise program (Active)    Learning Progress Summary    Learner Readiness Method Response Comment Documented by Status   Patient Acceptance E,D NR Reviewed benefits of activity, WB status, T ROM brace to be locked in extension at all times, safety with mobility.  01/09/18 1135 Active               Point: Body mechanics (Active)    Learning Progress Summary    Learner Readiness Method Response Comment Documented by Status   Patient Acceptance E,D NR Reviewed benefits of activity, WB status, T ROM brace to be locked in extension at all times, safety with mobility.  01/09/18 1135 Active               Point: Precautions (Active)    Learning Progress Summary    Learner Readiness Method Response Comment Documented by Status   Patient Acceptance E,D NR Reviewed benefits of activity, WB status, T ROM brace to be locked in extension at all times, safety with mobility.  01/09/18 1135 Active                      User Key     Initials Effective Dates Name Provider Type Discipline     06/09/17 -  Marilu Gonzalez, PT Physical Therapist PT                PT Recommendation and Plan  Anticipated Equipment Needs At Discharge: other (see comments) (none)  Anticipated Discharge Disposition: home with assist, home with home health  Demonstrates Need for Referral to Another Service: home health care  Planned Therapy Interventions: balance training, bed mobility  training, gait training, home exercise program, patient/family education, ROM (Range of Motion), stair training, strengthening, transfer training  PT Frequency: daily  Plan of Care Review  Plan Of Care Reviewed With: patient  Progress: progress toward functional goals as expected  Outcome Summary/Follow up Plan: PT agustina complete. Pt ambulated 15 feet with CGA and RW, limited by reported 15/10 pain and fatigue. Pt required modAx2 for bed mobility due to increased pain. Pt would benefit from stair training next session. Recommend d/c home with assist and HHPT.           IP PT Goals       01/09/18 1137          Bed Mobility PT LTG    Bed Mobility PT LTG, Date Established 01/09/18  -LM      Bed Mobility PT LTG, Time to Achieve 5 days  -LM      Bed Mobility PT LTG, Activity Type supine to sit/sit to supine  -LM      Bed Mobility PT LTG, [place] Level supervision required  -LM      Transfer Training PT LTG    Transfer Training PT LTG, Date Established 01/09/18  -LM      Transfer Training PT LTG, Time to Achieve 5 days  -LM      Transfer Training PT LTG, Activity Type sit to stand/stand to sit  -LM      Transfer Training PT LTG, [place] Level conditional independence  -LM      Transfer Training PT LTG, Assist Device walker, rolling  -LM      Gait Training PT LTG    Gait Training Goal PT LTG, Date Established 01/09/18  -LM      Gait Training Goal PT LTG, Time to Achieve 5 days  -LM      Gait Training Goal PT LTG, [place] Level conditional independence  -LM      Gait Training Goal PT LTG, Assist Device walker, rolling  -LM      Gait Training Goal PT LTG, Distance to Achieve 100 feet  -LM      Stair Training PT LTG    Stair Training Goal PT LTG, Date Established 01/09/18  -LM      Stair Training Goal PT LTG, Time to Achieve 5 days  -LM      Stair Training Goal PT LTG, Number of Steps 1   backwards  -LM      Stair Training Goal PT LTG, [place] Level conditional independence  -LM      Stair Training Goal PT  LTG, Assist Device walker, rolling  -LM        User Key  (r) = Recorded By, (t) = Taken By, (c) = Cosigned By    Initials Name Provider Type    ASHLEE Gonzalez PT Physical Therapist                Outcome Measures       01/09/18 1034          How much help from another person do you currently need...    Turning from your back to your side while in flat bed without using bedrails? 2  -LM      Moving from lying on back to sitting on the side of a flat bed without bedrails? 2  -LM      Moving to and from a bed to a chair (including a wheelchair)? 3  -LM      Standing up from a chair using your arms (e.g., wheelchair, bedside chair)? 3  -LM      Climbing 3-5 steps with a railing? 2  -LM      To walk in hospital room? 3  -LM      AM-PAC 6 Clicks Score 15  -LM      Functional Assessment    Outcome Measure Options AM-PAC 6 Clicks Basic Mobility (PT)  -LM        User Key  (r) = Recorded By, (t) = Taken By, (c) = Cosigned By    Initials Name Provider Type    ASHLEE Gonzalez PT Physical Therapist           Time Calculation:         PT Charges       01/09/18 1143          Time Calculation    Start Time 1034  -LM      PT Received On 01/09/18  -LM      PT Goal Re-Cert Due Date 01/19/18  -LM      Time Calculation- PT    Total Timed Code Minutes- PT 0 minute(s)  -LM        User Key  (r) = Recorded By, (t) = Taken By, (c) = Cosigned By    Initials Name Provider Type    ASHLEE Gonzalez PT Physical Therapist          Therapy Charges for Today     Code Description Service Date Service Provider Modifiers Qty    71265260359 HC PT MOBILITY CURRENT 1/9/2018 Marilu Gonzalez, PT GP, CK 1    42843696381 HC PT MOBILITY PROJECTED 1/9/2018 Marilu Gonzalez, PT GP, CJ 1    37514246201 HC PT EVAL MOD COMPLEXITY 4 1/9/2018 Marilu Gonzalez, PT GP 1          PT G-Codes  PT Professional Judgement Used?: Yes  Outcome Measure Options: AM-PAC 6 Clicks Basic Mobility (PT)  Score: 15  Functional Limitation: Mobility: Walking and moving  around  Mobility: Walking and Moving Around Current Status (): At least 40 percent but less than 60 percent impaired, limited or restricted  Mobility: Walking and Moving Around Goal Status (): At least 20 percent but less than 40 percent impaired, limited or restricted      Marilu Gonzalez, PT  1/9/2018

## 2018-01-10 VITALS
HEIGHT: 64 IN | HEART RATE: 78 BPM | WEIGHT: 175 LBS | RESPIRATION RATE: 16 BRPM | DIASTOLIC BLOOD PRESSURE: 75 MMHG | SYSTOLIC BLOOD PRESSURE: 147 MMHG | OXYGEN SATURATION: 97 % | TEMPERATURE: 97.2 F | BODY MASS INDEX: 29.88 KG/M2

## 2018-01-10 LAB
ANION GAP SERPL CALCULATED.3IONS-SCNC: 5 MMOL/L (ref 3–11)
BASOPHILS # BLD AUTO: 0.04 10*3/MM3 (ref 0–0.2)
BASOPHILS NFR BLD AUTO: 0.6 % (ref 0–1)
BUN BLD-MCNC: 11 MG/DL (ref 9–23)
BUN/CREAT SERPL: 18.3 (ref 7–25)
CALCIUM SPEC-SCNC: 8.6 MG/DL (ref 8.7–10.4)
CHLORIDE SERPL-SCNC: 104 MMOL/L (ref 99–109)
CO2 SERPL-SCNC: 31 MMOL/L (ref 20–31)
CREAT BLD-MCNC: 0.6 MG/DL (ref 0.6–1.3)
DEPRECATED RDW RBC AUTO: 54.2 FL (ref 37–54)
EOSINOPHIL # BLD AUTO: 0.47 10*3/MM3 (ref 0–0.3)
EOSINOPHIL NFR BLD AUTO: 6.8 % (ref 0–3)
ERYTHROCYTE [DISTWIDTH] IN BLOOD BY AUTOMATED COUNT: 14.9 % (ref 11.3–14.5)
ERYTHROCYTE [SEDIMENTATION RATE] IN BLOOD: 23 MM/HR (ref 0–30)
GFR SERPL CREATININE-BSD FRML MDRD: 101 ML/MIN/1.73
GLUCOSE BLD-MCNC: 94 MG/DL (ref 70–100)
HCT VFR BLD AUTO: 31.5 % (ref 34.5–44)
HGB BLD-MCNC: 9.8 G/DL (ref 11.5–15.5)
IMM GRANULOCYTES # BLD: 0.01 10*3/MM3 (ref 0–0.03)
IMM GRANULOCYTES NFR BLD: 0.1 % (ref 0–0.6)
LYMPHOCYTES # BLD AUTO: 1.17 10*3/MM3 (ref 0.6–4.8)
LYMPHOCYTES NFR BLD AUTO: 17 % (ref 24–44)
MCH RBC QN AUTO: 30.5 PG (ref 27–31)
MCHC RBC AUTO-ENTMCNC: 31.1 G/DL (ref 32–36)
MCV RBC AUTO: 98.1 FL (ref 80–99)
MONOCYTES # BLD AUTO: 0.87 10*3/MM3 (ref 0–1)
MONOCYTES NFR BLD AUTO: 12.6 % (ref 0–12)
NEUTROPHILS # BLD AUTO: 4.33 10*3/MM3 (ref 1.5–8.3)
NEUTROPHILS NFR BLD AUTO: 62.9 % (ref 41–71)
PLATELET # BLD AUTO: 305 10*3/MM3 (ref 150–450)
PMV BLD AUTO: 9.6 FL (ref 6–12)
POTASSIUM BLD-SCNC: 3.7 MMOL/L (ref 3.5–5.5)
RBC # BLD AUTO: 3.21 10*6/MM3 (ref 3.89–5.14)
SODIUM BLD-SCNC: 140 MMOL/L (ref 132–146)
WBC NRBC COR # BLD: 6.89 10*3/MM3 (ref 3.5–10.8)

## 2018-01-10 PROCEDURE — 25010000003 CEFTRIAXONE PER 250 MG: Performed by: NURSE PRACTITIONER

## 2018-01-10 PROCEDURE — 85025 COMPLETE CBC W/AUTO DIFF WBC: CPT | Performed by: NURSE PRACTITIONER

## 2018-01-10 PROCEDURE — 97110 THERAPEUTIC EXERCISES: CPT

## 2018-01-10 PROCEDURE — G8979 MOBILITY GOAL STATUS: HCPCS

## 2018-01-10 PROCEDURE — 80048 BASIC METABOLIC PNL TOTAL CA: CPT | Performed by: ORTHOPAEDIC SURGERY

## 2018-01-10 PROCEDURE — G8978 MOBILITY CURRENT STATUS: HCPCS

## 2018-01-10 PROCEDURE — 97116 GAIT TRAINING THERAPY: CPT

## 2018-01-10 PROCEDURE — 99024 POSTOP FOLLOW-UP VISIT: CPT | Performed by: ORTHOPAEDIC SURGERY

## 2018-01-10 PROCEDURE — G8980 MOBILITY D/C STATUS: HCPCS

## 2018-01-10 PROCEDURE — 94799 UNLISTED PULMONARY SVC/PX: CPT

## 2018-01-10 RX ORDER — CEFTRIAXONE SODIUM 2 G/50ML
2 INJECTION, SOLUTION INTRAVENOUS EVERY 24 HOURS
Qty: 100 ML | Refills: 0
Start: 2018-01-11 | End: 2018-01-13

## 2018-01-10 RX ADMIN — MELOXICAM 15 MG: 7.5 TABLET ORAL at 08:08

## 2018-01-10 RX ADMIN — BISACODYL 10 MG: 5 TABLET, COATED ORAL at 08:08

## 2018-01-10 RX ADMIN — CEFTRIAXONE SODIUM 2 G: 2 INJECTION, SOLUTION INTRAVENOUS at 08:11

## 2018-01-10 RX ADMIN — LEVOTHYROXINE SODIUM 100 MCG: 100 TABLET ORAL at 05:46

## 2018-01-10 RX ADMIN — OXYCODONE HYDROCHLORIDE AND ACETAMINOPHEN 1 TABLET: 10; 325 TABLET ORAL at 03:53

## 2018-01-10 RX ADMIN — OXYCODONE HYDROCHLORIDE AND ACETAMINOPHEN 1 TABLET: 10; 325 TABLET ORAL at 11:56

## 2018-01-10 RX ADMIN — LISINOPRIL 20 MG: 20 TABLET ORAL at 08:08

## 2018-01-10 RX ADMIN — DULOXETINE HYDROCHLORIDE 30 MG: 30 CAPSULE, DELAYED RELEASE ORAL at 05:46

## 2018-01-10 RX ADMIN — OXYCODONE HYDROCHLORIDE AND ACETAMINOPHEN 1 TABLET: 10; 325 TABLET ORAL at 08:08

## 2018-01-10 RX ADMIN — GABAPENTIN 800 MG: 400 CAPSULE ORAL at 08:08

## 2018-01-10 RX ADMIN — ASPIRIN 325 MG: 325 TABLET, DELAYED RELEASE ORAL at 08:08

## 2018-01-10 RX ADMIN — GABAPENTIN 800 MG: 400 CAPSULE ORAL at 11:56

## 2018-01-10 RX ADMIN — ALPRAZOLAM 1 MG: 1 TABLET ORAL at 08:09

## 2018-01-10 RX ADMIN — BUPROPION HYDROCHLORIDE 150 MG: 150 TABLET, FILM COATED, EXTENDED RELEASE ORAL at 08:08

## 2018-01-10 NOTE — PROGRESS NOTES
"  SUBJECTIVE  Knee pain much improved this morning.  Doing well.    OBJECTIVE  Temp (24hrs), Av.1 °F (36.7 °C), Min:97.2 °F (36.2 °C), Max:98.8 °F (37.1 °C)    Blood pressure 159/83, pulse 89, temperature 98.3 °F (36.8 °C), temperature source Oral, resp. rate 16, height 162.6 cm (64\"), weight 79.4 kg (175 lb), SpO2 95 %.    Lab Results (last 24 hours)     Procedure Component Value Units Date/Time    CBC (No Diff) [[id number]]  (Abnormal) Collected:  18    Specimen:  Blood Updated:  18 075     WBC 7.21 10*3/mm3      RBC 3.33 (L) 10*6/mm3      Hemoglobin 10.1 (L) g/dL      Hematocrit 32.5 (L) %      MCV 97.6 fL      MCH 30.3 pg      MCHC 31.1 (L) g/dL      RDW 14.7 (H) %      RDW-SD 52.8 fl      MPV 9.9 fL      Platelets 309 10*3/mm3     Basic Metabolic Panel [[id number]]  (Normal) Collected:  18    Specimen:  Blood Updated:  18     Glucose 87 mg/dL      BUN 12 mg/dL      Creatinine 0.80 mg/dL      Sodium 141 mmol/L      Potassium 4.2 mmol/L      Chloride 104 mmol/L      CO2 30.0 mmol/L      Calcium 8.7 mg/dL      eGFR Non African Amer 73 mL/min/1.73      BUN/Creatinine Ratio 15.0     Anion Gap 7.0 mmol/L     Narrative:       National Kid[employer] Guidelines    Stage     Description        GFR  1         Normal or High     90+  2         Mild decrease      60-89  3         Moderate decrease  30-59  4         Severe decrease    15-29  5         Kidney failure     <15    C-reactive Protein [[id number]]  (Abnormal) Collected:  18    Specimen:  Blood Updated:  18 1032     C-Reactive Protein 2.13 (H) mg/dL     CBC & Differential [[id number]] Collected:  01/10/18 0414    Specimen:  Blood Updated:  01/10/18 0502    Narrative:       The following orders were created for panel order CBC & Differential.  Procedure                               Abnormality         Status                     ---------                               -----------         ------             "         CBC Auto Differential[[id number]]        Abnormal            Final result                 Please view results for these tests on the individual orders.    CBC Auto Differential [[id number]]  (Abnormal) Collected:  01/10/18 0414    Specimen:  Blood Updated:  01/10/18 0502     WBC 6.89 10*3/mm3      RBC 3.21 (L) 10*6/mm3      Hemoglobin 9.8 (L) g/dL      Hematocrit 31.5 (L) %      MCV 98.1 fL      MCH 30.5 pg      MCHC 31.1 (L) g/dL      RDW 14.9 (H) %      RDW-SD 54.2 (H) fl      MPV 9.6 fL      Platelets 305 10*3/mm3      Neutrophil % 62.9 %      Lymphocyte % 17.0 (L) %      Monocyte % 12.6 (H) %      Eosinophil % 6.8 (H) %      Basophil % 0.6 %      Immature Grans % 0.1 %      Neutrophils, Absolute 4.33 10*3/mm3      Lymphocytes, Absolute 1.17 10*3/mm3      Monocytes, Absolute 0.87 10*3/mm3      Eosinophils, Absolute 0.47 (H) 10*3/mm3      Basophils, Absolute 0.04 10*3/mm3      Immature Grans, Absolute 0.01 10*3/mm3     Sedimentation Rate [[id number]]  (Normal) Collected:  01/10/18 0414    Specimen:  Blood Updated:  01/10/18 0519     Sed Rate 23 mm/hr     Basic Metabolic Panel [[id number]]  (Abnormal) Collected:  01/10/18 0414    Specimen:  Blood Updated:  01/10/18 0536     Glucose 94 mg/dL      BUN 11 mg/dL      Creatinine 0.60 mg/dL      Sodium 140 mmol/L      Potassium 3.7 mmol/L      Chloride 104 mmol/L      CO2 31.0 mmol/L      Calcium 8.6 (L) mg/dL      eGFR Non African Amer 101 mL/min/1.73      BUN/Creatinine Ratio 18.3     Anion Gap 5.0 mmol/L     Narrative:       National Kid[employer] Foundation Guidelines    Stage     Description        GFR  1         Normal or High     90+  2         Mild decrease      60-89  3         Moderate decrease  30-59  4         Severe decrease    15-29  5         Kidney failure     <15            PHYSICAL EXAM  Right lower extremity:Dressing clean, dry and intact.  T ROM brace in place locked in extension.  Intact EHL, FHL, tibialis anterior, and gastrocsoleus. Sensation intact  to light touch to deep peroneal, superficial peroneal, sural, saphenous, tibial nerves. 2+ palpable DP and PT pulses.       Principal Problem:    S/P primary repair right knee quadriceps tendon tear  Active Problems:    Hypertension    Quadriceps tendon rupture, right, initial encounter    Anxiety and depression    Hypothyroid    Tobacco abuse    Acute postoperative pain      PLAN / DISPOSITION:  2 Day Post-Op primary repair right quadriceps tendon tear    Protected weight bearing as tolerated right lower extremity, T ROM brace on at all times locked in extension.  Knee to remain straight.  Do not allow knee range of motion.   Pain control  PT/OT for post op mobilization and medical equipment needs    TEDs and SCD's bilateral lower extremities   Aspirin for DVT prophylaxis    Social work for discharge planning.  Discharge home this afternoon  Dressing to remain in place for 7 days. May remove on POD#7. If no drainage, may shower on POD#10. No submerging wound in water. If drainage is noted, sterile dressing should be placed and wound checked daily. No showering until wound has remained dry for 72 consecutive hours.   Follow up in 2 weeks for re-assessment.      Stephon Garcia MD  01/10/18  7:00 AM

## 2018-01-10 NOTE — THERAPY DISCHARGE NOTE
Acute Care - Physical Therapy Treatment Note/Discharge  [facility]     Patient Name: Lesli Leon  : 1955  MRN: [id number]  Today's Date: 1/10/2018  Onset of Illness/Injury or Date of Surgery Date: 18  Date of Referral to PT: 18  Referring Physician: MD Jose    Admit Date: 2018    Visit Dx:    ICD-10-CM ICD-9-CM   1. Impaired functional mobility, balance, gait, and endurance Z74.09 V49.89   2. Quadriceps tendon rupture, right, initial encounter S76.111A 843.8   3. Impaired mobility and ADLs Z74.09 799.89   4. S/P primary repair right knee quadriceps tendon tear Z98.890 V45.89     Patient Active Problem List   Diagnosis   • Sepsis, probable right septic knee as source   • Hypertension   • Disease of thyroid gland   • Expressive aphasia   • Heart murmur   • Quadriceps tendon rupture, right, initial encounter   • S/P primary repair right knee quadriceps tendon tear   • Anxiety and depression   • Hypothyroid   • Tobacco abuse   • Acute postoperative pain       Physical Therapy Education     Title: PT OT SLP Therapies (Done)     Topic: Physical Therapy (Done)     Point: Mobility training (Done)    Learning Progress Summary    Learner Readiness Method Response Comment Documented by Status   Patient Acceptance E,D NR,VU Reviewed benefits of activity, T ROM brace locked in extension at all times, no ROM at knee during ambulation, safety with mobility, demonstrated stair sequencing.  01/10/18 1149 Done    Acceptance E,D NR Reviewed benefits of activity, WB status, T ROM brace to be locked in extension at all times, safety with mobility.  18 1135 Active   Family Acceptance E,D NR,VU Reviewed benefits of activity, T ROM brace locked in extension at all times, no ROM at knee during ambulation, safety with mobility, demonstrated stair sequencing.  01/10/18 1149 Done               Point: Home exercise program (Done)    Learning Progress Summary    Learner Readiness Method Response  Comment Documented by Status   Patient Acceptance E,D NR,VU Reviewed benefits of activity, T ROM brace locked in extension at all times, no ROM at knee during ambulation, safety with mobility, demonstrated stair sequencing.  01/10/18 1149 Done    Acceptance ED NR Reviewed benefits of activity, WB status, T ROM brace to be locked in extension at all times, safety with mobility.  01/09/18 1135 Active   Family Acceptance ED NR,VU Reviewed benefits of activity, T ROM brace locked in extension at all times, no ROM at knee during ambulation, safety with mobility, demonstrated stair sequencing.  01/10/18 1149 Done               Point: Body mechanics (Done)    Learning Progress Summary    Learner Readiness Method Response Comment Documented by Status   Patient Acceptance ED NR,VU Reviewed benefits of activity, T ROM brace locked in extension at all times, no ROM at knee during ambulation, safety with mobility, demonstrated stair sequencing.  01/10/18 1149 Done    Acceptance ED NR Reviewed benefits of activity, WB status, T ROM brace to be locked in extension at all times, safety with mobility.  01/09/18 1135 Active   Family Acceptance ED NR,VU Reviewed benefits of activity, T ROM brace locked in extension at all times, no ROM at knee during ambulation, safety with mobility, demonstrated stair sequencing.  01/10/18 1149 Done               Point: Precautions (Done)    Learning Progress Summary    Learner Readiness Method Response Comment Documented by Status   Patient Acceptance ED NR,VU Reviewed benefits of activity, T ROM brace locked in extension at all times, no ROM at knee during ambulation, safety with mobility, demonstrated stair sequencing.  01/10/18 1149 Done    Acceptance ED NR Reviewed benefits of activity, WB status, T ROM brace to be locked in extension at all times, safety with mobility.  01/09/18 1135 Active   Family Acceptance ED NR,VU Reviewed benefits of activity, T ROM brace locked  in extension at all times, no ROM at knee during ambulation, safety with mobility, demonstrated stair sequencing. LM 01/10/18 1149 Done                      User Key     Initials Effective Dates Name Provider Type Discipline    LM 06/09/17 -  Marilu Gonzalez, PT Physical Therapist PT                    IP PT Goals       01/10/18 1150 01/09/18 1137       Bed Mobility PT LTG    Bed Mobility PT LTG, Date Established  01/09/18  -LM     Bed Mobility PT LTG, Time to Achieve  5 days  -LM     Bed Mobility PT LTG, Activity Type  supine to sit/sit to supine  -LM     Bed Mobility PT LTG, [place] Level  supervision required  -LM     Bed Mobility PT LTG, Date Goal Reviewed 01/10/18  -LM      Bed Mobility PT LTG, Outcome goal not met  -LM      Bed Mobility PT LTG, Reason Goal Not Met discharged from facility  -LM      Transfer Training PT LTG    Transfer Training PT LTG, Date Established  01/09/18  -LM     Transfer Training PT LTG, Time to Achieve  5 days  -LM     Transfer Training PT LTG, Activity Type  sit to stand/stand to sit  -LM     Transfer Training PT LTG, [place] Level  conditional independence  -LM     Transfer Training PT LTG, Assist Device  walker, rolling  -LM     Transfer Training PT  LTG, Date Goal Reviewed 01/10/18  -LM      Transfer Training PT LTG, Outcome goal not met  -LM      Transfer Training PT LTG, Reason Goal Not Met discharged from facility  -LM      Gait Training PT LTG    Gait Training Goal PT LTG, Date Established  01/09/18  -LM     Gait Training Goal PT LTG, Time to Achieve  5 days  -LM     Gait Training Goal PT LTG, [place] Level  conditional independence  -LM     Gait Training Goal PT LTG, Assist Device  walker, rolling  -LM     Gait Training Goal PT LTG, Distance to Achieve  100 feet  -LM     Gait Training Goal PT LTG, Date Goal Reviewed 01/10/18  -LM      Gait Training Goal PT LTG, Outcome goal not met  -LM      Gait Training Goal PT LTG, Reason Goal Not Met discharged from  facility  -LM      Stair Training PT LTG    Stair Training Goal PT LTG, Date Established  01/09/18  -LM     Stair Training Goal PT LTG, Time to Achieve  5 days  -LM     Stair Training Goal PT LTG, Number of Steps  1   backwards  -LM     Stair Training Goal PT LTG, [place] Level  conditional independence  -LM     Stair Training Goal PT LTG, Assist Device  walker, rolling  -LM     Stair Training Goal PT LTG, Date Goal Reviewed 01/10/18  -LM      Stair Training Goal PT LTG, Outcome goal not met  -LM      Stair Training Goal PT LTG, Reason Goal Not Met discharged from facility  -LM        User Key  (r) = Recorded By, (t) = Taken By, (c) = Cosigned By    Initials Name Provider Type    LM Marilu Gonzalez, PT Physical Therapist              Adult Rehabilitation Note       01/10/18 1045          Rehab Assessment/Intervention    Discipline physical therapist  -LM      Document Type therapy note (daily note);discharge summary  -LM      Subjective Information agree to therapy;complains of;pain  -LM      Patient Effort, Rehab Treatment good  -LM      Symptoms Noted During/After Treatment fatigue;increased pain  -LM      Symptoms Noted Comment Increased pain during ambulation, subsided with rest.  -LM      Precautions/Limitations fall precautions;other (see comments)   T ROM brace locked in extension at all times  -LM      Recorded by [LM] Marilu Gonzalez, PT      Pain Assessment    Pain Assessment 0-10  -LM      Pain Score 6  -LM      Post Pain Score 6  -LM      Pain Type Acute pain  -LM      Pain Location Knee  -LM      Pain Orientation Right  -LM      Pain Intervention(s) Repositioned;Ambulation/increased activity  -LM      Recorded by [LM] Marilu Gonzalez PT      Cognitive Assessment/Intervention    Current Cognitive/Communication Assessment functional  -LM      Orientation Status oriented x 4  -LM      Follows Commands/Answers Questions 100% of the time;able to follow single-step instructions;needs cueing  -LM       Personal Safety WNL/WFL  -LM      Recorded by [LM] Marilu Gonzalez, PT      Mobility Assessment/Training    Extremity Weight-Bearing Status right lower extremity  -LM      Right Lower Extremity Weight-Bearing weight-bearing as tolerated  -LM      Recorded by [LM] Marilu Gonzalez PT      Bed Mobility, Assessment/Treatment    Bed Mob, Supine to Sit, [place] minimum assist (75% patient effort);verbal cues required  -LM      Bed Mob, Sit to Supine, [place] not tested   Pt left UIC  -LM      Bed Mobility, Safety Issues decreased use of legs for bridging/pushing  -LM      Bed Mobility, Impairments ROM decreased;strength decreased;pain  -LM      Bed Mobility, Comment Verbal cues for sequencing. Pt required assist to hold right LE in extension while transitioning to EOB. Pt able to move trunk into upright sitting without physical assist.  -LM      Recorded by [LM] Marilu Gonzalez PT      Transfer Assessment/Treatment    Transfers, Sit-Stand [place] contact guard assist;verbal cues required  -LM      Transfers, Stand-Sit [place] contact guard assist;verbal cues required  -LM      Transfers, Sit-Stand-Sit, Assist Device rolling walker  -LM      Transfer, Safety Issues sequencing ability decreased;step length decreased;weight-shifting ability decreased  -LM      Transfer, Impairments ROM decreased;strength decreased;pain  -LM      Transfer, Comment Verbal cues to scoot towards EOB prior to standing to maintain right knee extension. Cues to push from bed when standing, reach for chair armrests when sitting, and step out right LE prior to t/f.  -LM      Recorded by [LM] Marilu Gonzalez, PT      Gait Assessment/Treatment    Gait, [place] Level contact guard assist;verbal cues required  -LM      Gait, Assistive Device rolling walker  -LM      Gait, Distance (Feet) 25  -LM      Gait, Gait Pattern Analysis swing-to gait  -LM      Gait, Gait Deviations right:;antalgic;franklin decreased;decreased heel  strike;forward flexed posture;step length decreased;weight-shifting ability decreased  -LM      Gait, Safety Issues sequencing ability decreased;step length decreased;weight-shifting ability decreased  -LM      Gait, Impairments ROM decreased;strength decreased;pain  -LM      Gait, Comment Pt ambulated with step-to pattern at slow pace. Pt able to maintain right knee extension at all times during ambulation. Pt demo slight abduction of right hip to advance right LE forward due to locked brace. Verbal cues for upright posture, decrease shoulder elevation, and decrease bilateral UE weight bearing. Bedside chair brought behind pt due to increased pain, gait limited by pain and fatigue.  -LM      Recorded by [LM] Marilu Gonzalez PT      Stairs Assessment/Treatment    Stairs, Comment Pt declined stair training for x1 step to get into home. Pt states that spouse will use wheelchair to lift patient into home. Demonstrated correct technique to climb x1 step backwards with RW while maintaining right LE knee extension.  -LM      Recorded by [LM] Marilu Gonzalez PT      Therapy Exercises    Bilateral Lower Extremities AROM:;15 reps;sitting;ankle pumps/circles;glut sets  -LM      Recorded by [LM] Marilu Gonzalez PT      Positioning and Restraints    Pre-Treatment Position in bed  -LM      Post Treatment Position chair  -LM      In Chair notified nsg;reclined;sitting;call light within reach;encouraged to call for assist;exit alarm on;with family/caregiver;with brace;legs elevated  -LM      Recorded by [LM] Marilu Gonzalez PT        User Key  (r) = Recorded By, (t) = Taken By, (c) = Cosigned By    Initials Name Effective Dates    ASHLEE Gonzalez PT 06/09/17 -           PT Recommendation and Plan  Anticipated Equipment Needs At Discharge: other (see comments) (none)  Anticipated Discharge Disposition: home with assist, home with home health  Demonstrates Need for Referral to Another Service: home health care  Planned  Therapy Interventions: balance training, bed mobility training, gait training, home exercise program, patient/family education, ROM (Range of Motion), stair training, strengthening, transfer training  PT Frequency: daily  Plan of Care Review  Plan Of Care Reviewed With: patient, family  Progress: progress toward functional goals as expected  Outcome Summary/Follow up Plan: Pt increased ambulation distance to 25 feet with CGA and RW, able to maintain knee extension throughout gait distance with T ROM brace locked in extension. Pt anticipating d/c home with assist and HHPT today.           Outcome Measures       01/10/18 1045 01/09/18 1034 01/09/18 1033    How much help from another person do you currently need...    Turning from your back to your side while in flat bed without using bedrails? 3  -LM 2  -LM     Moving from lying on back to sitting on the side of a flat bed without bedrails? 3  -LM 2  -LM     Moving to and from a bed to a chair (including a wheelchair)? 3  -LM 3  -LM     Standing up from a chair using your arms (e.g., wheelchair, bedside chair)? 3  -LM 3  -LM     Climbing 3-5 steps with a railing? 2  -LM 2  -LM     To walk in hospital room? 3  -LM 3  -LM     AM-PAC 6 Clicks Score 17  -LM 15  -LM     How much help from another is currently needed...    Putting on and taking off regular lower body clothing?   2  -AN    Bathing (including washing, rinsing, and drying)   2  -AN    Toileting (which includes using toilet bed pan or urinal)   2  -AN    Putting on and taking off regular upper body clothing   3  -AN    Taking care of personal grooming (such as brushing teeth)   3  -AN    Eating meals   4  -AN    Score   16  -AN    Functional Assessment    Outcome Measure Options AM-PAC 6 Clicks Basic Mobility (PT)  -LM AM-PAC 6 Clicks Basic Mobility (PT)  -LM       User Key  (r) = Recorded By, (t) = Taken By, (c) = Cosigned By    Initials Name Provider Type    CARMEN Spencer, OT Occupational Therapist    LM  Marilu Gonzalez PT Physical Therapist           Time Calculation:         PT Charges       01/10/18 1152          Time Calculation    Start Time 1045  -LM      PT Received On 01/10/18  -LM      PT Goal Re-Cert Due Date 01/19/18  -LM      Time Calculation- PT    Total Timed Code Minutes- PT 25 minute(s)  -LM        User Key  (r) = Recorded By, (t) = Taken By, (c) = Cosigned By    Initials Name Provider Type     Marilu Gonzalez PT Physical Therapist          Therapy Charges for Today     Code Description Service Date Service Provider Modifiers Qty    99542662563 HC PT MOBILITY CURRENT 1/9/2018 Marilu Gonzalez, PT GP, CK 1    44190268278 HC PT MOBILITY PROJECTED 1/9/2018 Marilu Gonzalez, PT GP, CJ 1    76054841417 HC PT EVAL MOD COMPLEXITY 4 1/9/2018 Marilu Gonzalez, PT GP 1    53231608636 HC PT MOBILITY CURRENT 1/10/2018 Marilu Gonzalez, PT GP, CK 1    68468044960 HC PT MOBILITY PROJECTED 1/10/2018 Marilu Gonzalez, PT GP, CJ 1    12913389751 HC PT MOBILITY DISCHARGE 1/10/2018 Marilu Gonzalez, PT GP, CK 1    84533827195 HC GAIT TRAINING EA 15 MIN 1/10/2018 Marilu Gonzalez, PT GP 1    19897336539 HC PT THER PROC EA 15 MIN 1/10/2018 Marilu Gonzalez, PT GP 1          PT G-Codes  PT Professional Judgement Used?: Yes  Outcome Measure Options: AM-PAC 6 Clicks Basic Mobility (PT)  Score: 17  Functional Limitation: Mobility: Walking and moving around  Mobility: Walking and Moving Around Current Status (): At least 40 percent but less than 60 percent impaired, limited or restricted  Mobility: Walking and Moving Around Goal Status (): At least 20 percent but less than 40 percent impaired, limited or restricted  Mobility: Walking and Moving Around Discharge Status (): At least 40 percent but less than 60 percent impaired, limited or restricted    PT Discharge Summary  Anticipated Discharge Disposition: home with assist, home with home health  Reason for Discharge: Discharge from facility  Outcomes Achieved:  Patient able to partially acheive established goals  Discharge Destination: Home with assist, Home with home health    Marilu Gonzalez, PT  1/10/2018

## 2018-01-10 NOTE — PROGRESS NOTES
[facility] Progress Note    Admission Date: 2018    Lesli Leon  1955  [id number]    Date: 1/10/2018    Meds:    Anti-Infectives     Ordered     Dose/Rate Route Frequency Start Stop    01/10/18 1109  cefTRIAXone (ROCEPHIN) 40 MG/ML IVPB     Ordering Provider:  BECKY Newton    2 g  100 mL/hr over 30 Minutes Intravenous Every 24 Hours 18 0000 18 2359    18 2111  ceFAZolin in dextrose (ANCEF) IVPB solution 2 g     Ordering Provider:  Stephon Garcia MD    2 g  over 30 Minutes Intravenous Every 8 Hours 18 2200 18 0609    18 1150  ceFAZolin in dextrose (ANCEF) IVPB solution 2 g     Ordering Provider:  Stephon Garcia MD    2 g  over 30 Minutes Intravenous Once 18 1230 18 1430          CC:  Right prosthetic knee infection    SUBJECTIVE: 18: Patient is a 62 y.o. female followed by our service for Strep mitis/oralis  right prosthetic knee infection.  She fell at home about a week ago, and was seen in clinic by Dr. Garcia. Surgery was scheduled for yesterday, to repair the right knee quadriceps tendon tear.  She has been afebrile since admission.  We were consulted for antibiotic management.   1/10/18;  Pain much better today, and she is going home.  Home health has been consulted.  Remains afebrile.        ROS:  No f/c/s. No n/v/d. No rash. No new ADR to Abx.     PE:   Vital Signs  Temp (24hrs), Av.1 °F (36.7 °C), Min:97.2 °F (36.2 °C), Max:98.8 °F (37.1 °C)    Temp  Min: 97.2 °F (36.2 °C)  Max: 98.8 °F (37.1 °C)  BP  Min: 131/68  Max: 159/83  Pulse  Min: 78  Max: 99  Resp  Min: 16  Max: 18  SpO2  Min: 95 %  Max: 98 %    GENERAL: Awake and alert, in no acute distress.   HEENT:  No conjunctival injection. No icterus. Oropharynx clear without evidence of thrush or exudate.  NECK: Supple, no JVD     HEART: RRR; No murmur, rubs, gallops.   LUNGS: Clear to auscultation bilaterally without wheezing, rales, rhonchi. Normal respiratory effort.  Nonlabored. No dullness.  ABDOMEN: Soft, nontender, nondistended. Positive bowel sounds. No rebound or guarding. NO mass or HSM.  EXT:  No cyanosis, right knee dressing in place   : Genitalia generally unremarkable.  Without Jimenez catheter.  SKIN: Warm and dry without cutaneous eruptions on Inspection/palpation.    NEURO: Alert and oriented x 3, Motor 5/5 bilaterally  Right PICC without redness     Laboratory Data      Results from last 7 days  Lab Units 01/10/18  0414 01/09/18  0651 01/08/18  2158 01/04/18  1003   WBC 10*3/mm3 6.89 7.21  --  6.16   HEMOGLOBIN g/dL 9.8* 10.1* 10.7* 10.9*   HEMATOCRIT % 31.5* 32.5* 34.3* 35.3   PLATELETS 10*3/mm3 305 309  --  333       Results from last 7 days  Lab Units 01/10/18  0414   SODIUM mmol/L 140   POTASSIUM mmol/L 3.7   CHLORIDE mmol/L 104   CO2 mmol/L 31.0   BUN mg/dL 11   CREATININE mg/dL 0.60   GLUCOSE mg/dL 94   CALCIUM mg/dL 8.6*       Results from last 7 days  Lab Units 01/04/18  1003   ALK PHOS U/L 99   BILIRUBIN mg/dL 0.4   ALT (SGPT) U/L 24   AST (SGOT) U/L 25       Results from last 7 days  Lab Units 01/10/18  0414   SED RATE mm/hr 23       Results from last 7 days  Lab Units 01/09/18  0651   CRP mg/dL 2.13*                 Estimated Creatinine Clearance: 99.1 mL/min (by C-G formula based on Cr of 0.6).    Microbiology:                            Radiology:  Imaging Results (last 72 hours)     ** No results found for the last 72 hours. **          I personally read the radiographic studies     IMPRESSION:  1.  Strep mitis right prosthetic knee infection, s/p revision  2.  Right quadricep tendon tear, s/p repair    RECOMMENDATIONS;  1.  Rocephin 2g IV daily   2.  Discharge to home today     Dr. Landrum has obtained the history, performed the physical exam and formulated the above treatment plan.   Dom Landrum MD  1/10/2018  3:24 PM    [facility]: We coordinated her care today.  We arrange for her follow-up.    Outpatient orders:  1. Follow up appt with   Lucita made for 1/16/18  1:45pm  2. Stat labs at next appt with CBC, CMP, ESR, CRP  3.  Home antibiotics- we will provide:  Rocephin 2g IV daily

## 2018-01-10 NOTE — PLAN OF CARE
Problem: Patient Care Overview (Adult)  Goal: Plan of Care Review  Outcome: Ongoing (interventions implemented as appropriate)   01/10/18 0435   Coping/Psychosocial Response Interventions   Plan Of Care Reviewed With patient   Patient Care Overview   Progress improving   Outcome Evaluation   Outcome Summary/Follow up Plan vital signs stable, pain controlled with prn meds, tolerating ambulation       Problem: Mobility, Physical Impaired (Adult)  Goal: Identify Related Risk Factors and Signs and Symptoms  Outcome: Ongoing (interventions implemented as appropriate)      Problem: Fall Risk (Adult)  Goal: Identify Related Risk Factors and Signs and Symptoms  Outcome: Ongoing (interventions implemented as appropriate)

## 2018-01-10 NOTE — PROGRESS NOTES
Discharge Planning Assessment  [facility]     Patient Name: Lesli Leon  MRN: [id number]  Today's Date: 1/10/2018    Admit Date: 1/8/2018          Discharge Needs Assessment     None            Discharge Plan       01/10/18 1231    Case Management/Social Work Plan    Plan Home with [facility]    Additional Comments Bedside commode and wheelchair with elevated legrest were delivered to patient's room by [employer] today. Called discharge date to [facility] with [facility]. Patient's spouse will transport her home. Candi Garcia RN [phone]        Discharge Placement     No information found        Expected Discharge Date and Time     Expected Discharge Date Expected Discharge Time    Jose 10, 2018               Demographic Summary     None            Functional Status     None            Psychosocial     None            Abuse/Neglect     None            Legal     None            Substance Abuse     None            Patient Forms     None          Candi Garcia RN

## 2018-01-10 NOTE — DISCHARGE PLACEMENT REQUEST
"Lesli Leon (62 y.o. Female)     Candi Garcia RN  P: [phone]      Date of Birth Social Security Number Address Home Phone MRN    1955  [address] [address]  [id number]    Anabaptist Marital Status          Jew        Admission Date Admission Type Admitting Provider Attending Provider Department, Room/Bed    1/8/18 Elective Stephon Garcia MD Luckett, Matthew R, MD [facility] 3G, S362/1    Discharge Date Discharge Disposition Discharge Destination                      Attending Provider: Stephon Garcia MD     Allergies:  Bactrim [Sulfamethoxazole-trimethoprim]    Isolation:  None   Infection:  None   Code Status:  FULL    Ht:  162.6 cm (64\")   Wt:  79.4 kg (175 lb)    Admission Cmt:  None   Principal Problem:  S/P primary repair right knee quadriceps tendon tear [Z98.890]                 Active Insurance as of 1/8/2018     Primary Coverage     Payor Plan Insurance Group Employer/Plan Group    [employer] BLUE CROSS [employer] [facility] [id number]     Payor Plan Address Payor Plan Phone Number Effective From Effective To    [address] [phone] 12/1/2017     [place] [address]       Subscriber Name Subscriber Birth Date Member ID       LESLI LEON 1955 [id number]                 Emergency Contacts      (Rel.) Home Phone Work Phone Mobile Phone    Urban Leon (Spouse) -- -- [phone]    Jason Leon (Son) [phone] -- --           Operative/Procedure Notes (most recent note)      Stephon Garcia MD at 1/8/2018  4:34 PM  Version 1 of 1         OPERATIVE REPORT     DATE OF PROCEDURE: 1/8/18     SURGEON: Stephon Garcia M.D.     ASSISTANT(S): Circulator: Suzanne Crystal RN  Scrub Person: Micheal Andersen; Tawana Dent  Nursing Assistant: Maxime Rodriguez  Assistant: Candi Sharpe PA-C    Note-PA was utilized during the case to facilitate positioning the patient, exposure, retraction, " placement of final components and definitive closure.      PREOPERATIVE DIAGNOSIS: Right knee acute quadriceps tendon tear     POSTOPERATIVE DIAGNOSIS: same     PROCEDURE: Primary repair right knee quadriceps tendon tear     SURGICAL DETAILS:     APPROACH: Anterior midline     ANESTHESIA: General LMA plus regional     PREOPERATIVE ANTIBIOTICS: Ancef 2 g IV     ESTIMATED BLOOD LOSS: 50 cc     TOURNIQUET TIME: 60 min @ 300 mmHg     SPECIMENS: None     IMPLANTS: None     DRAINS: None     LOCAL INJECTION: None     MODIFIER(S): None     COMPLICATIONS: None apparent     INDICATIONS FOR PROCEDURE: Ms. Leon is a 62-year-old female who I first met approximately 3 weeks ago with an acute prosthetic knee infection.  Her previous surgery was performed in [place] approximately 2 years prior to the infection episode.  She was taken to the OR and underwent revision knee arthroplasty with irrigation and debridement, and revision to a thicker poly-due to instability of the total knee construct.  She initially did well postoperatively.  She was ambulating independently with only minor assistance from a walker.  She is also being treated with IV antibiotics for acute hematogenous infection of her knee prosthesis.  However, approximately 3 weeks after her surgery, she sustained a mechanical fall in which her knee bent backwards resulting in sharp pain at the superior pole the patella.  Afterwards she is unable to perform a straight leg raise.  She was also unable to ambulate with assistance of a walker secondary to him being unable to place weight on the extremity.  I saw her in clinic and she was diagnosed with a quadriceps tendon rupture.  Due to the severity of this injury and the inability for her to perform a straight leg raise, I recommended surgical intervention to repair the quadriceps tendon in order to restore mobility.  Nonoperative treatment of this injury would result in a knee that remains unstable and unable to  bear weight.  I explained these options as well as nonoperative treatment options with the patient.  After extensive discussion, the patient elected to proceed with surgical intervention.  The risks, benefits, alternatives, and potential complications of the this surgery were discussed with the patient in detail to include but not limited to infection, bleeding, anesthesia risks, nerve injury, vessel injury, pain, blood clots, possible need for blood transfusion, possible need for further procedures, myocardial infarction, stroke, and death.  Specific risks for this surgery also include recurrent injury, extensor lag, loss of tendon repair, weakness and instability of the knee. Specific details of the procedure, hospitalization, recovery, rehabilitation, and long-term precautions were also provided. Pre-operative teaching was provided. Surgical device selection was outlined, and the many options available were explained; final choices will be made at the time of the procedure to match the anatomy and condition of the bone, and soft tissue structures. Understanding of all topics was conveyed to me by the patient, and consent was given to proceed with a primarily right knee quadriceps tendon.     INTRAOPERATIVE FINDINGS: Complete rupture quadriceps tendon with extension and the medial and lateral retinacula     PROCEDURE: The patient was identified in the preoperative holding area. The operative site was confirmed and marked. A sequential compression device was placed on the nonoperative leg. The risks, benefits, and alternatives to surgery were again confirmed with the patient and the patient wished to proceed. The patient was brought to the operating room and placed on the operating room table in the supine position. A huddle was performed with the patient and all vital surgical team members to confirm the correct operative site, procedure, anesthesia type, and operative plan with the patient. After anesthesia was  performed, the patient was positioned in the supine position. All bony prominences were padded.  And nonsterile tourniquet was placed on the patient's operative thigh and a bump was placed under the operative hip.. Intravenous antibiotic prophylaxis was given and confirmed with the anesthesia team. The operative extremity was prepped and draped in the usual sterile fashion. A surgical time out was performed immediately preceding the incision with all personnel in the operating room to confirm patient identity, the correct operative site and extremity, correct radiographic studies, availability of appropriate surgical equipment and agreement on the planned procedure.     Attention was turned to the operative extremity which is elevated and exsanguinated using gravity exsanguination.  The previous midline knee incision was then utilized to dissect down through skin subcutaneous tissue down to level the retinacula.  Full-thickness flaps were created medially and laterally.  At this point, a complete tear of the water substandard was identified proximally 4 cm from the superior pole the patella.  The rupture also extended to the medial lateral retinacula resulting in a 8 cm defect.  The extent of the tear was further elucidated by dissecting out the retinacular tissues medially and laterally.  The knee was held in full extension and using Almita's, cursory apposition of the torn structures was performed.  The tear was found to be transverse in nature with an oblique extension into the medial lateral retinaculum.    The wound was copiously irrigated with 3 L of sterile saline with intermittent sterile Betadine lavage.  Once this was complete, attention was turned to primary repair of the extensor construct.  Using a combination of #1 PDS suture and #2 Prolene, the medial and lateral retinacular tissues were repaired using figure-of-eight interrupted sutures.  Next attention was turned to the quadriceps tendon.  Using a  modified Maurice technique, the medial edge of the quadriceps tendon was repaired to the prior medial arthrotomy edge using #2 Prolene.  This stitch approximated both the prior medial parapatellar arthrotomy as well as the 2 edges of the torn tendon.  The torn tendon was then reapproximated using modified Vincent stitch with #1 PDS.  Finally, a [place]rak[place] stitch was utilized to reinforce the apposition of the 2 edges of the torn tendon.  With the repair completed, the knee was gently ranged and the knee was found to have a range of motion of 0-45° before excessive tension was placed on the quadriceps tendon repair.  This will be kept in mind during the rehabilitation process.    Satisfied with the repair, attention was turned to wound closure.  The wound was closed using 2-0 PDS suture for the subcutaneous layer followed by 3-0 nylon for skin.  Irrigation was performed between each layer.  Sterile silver impregnated dressing was then applied to the operative wound, followed by soft roll and Ace wrap.  A T ROM brace was then applied to the leg with the brace locked in extension at all times.  The patient was then awakened from general anesthesia and transferred to the PACU in stable condition.     No apparent complications occurred during the procedure Instrument, sponge and needle counts were correct x 2.     POST OPERATIVE PLAN:   Weight Bearing: Protected weightbearing as tolerated with T ROM brace locked in extension at all times.  No knee range of motion under any circumstances.   DVT prophylaxis: Enteric-coated aspirin 325 mg twice a day ×4 weeks   Therapy/Activity: PT/OT for mobilization.  No knee range of motion under any circumstances   Wound Care: Dressing to remain in place for 7 days postop   Pain control: Oral and IV pain medications as needed   23 hour perioperative antibiotic prophylaxis   Social work for discharge planning   Follow up: Dr. Garcia 2 weeks for wound check.       Electronically signed by  "Stephon Garcia MD at 2018  4:54 PM           Physician Progress Notes (last 24 hours) (Notes from 2018 10:20 AM through 1/10/2018 10:20 AM)      Se Ashely MD at 2018  3:58 PM  Version 2 of 2         IM progress note      Lesli Leon  [id number]  1955     LOS: 1 day     Attending: Stephon Garcia MD    Primary Care Provider: Phani Joaquin MD      Chief Complaint/Reason for visit:  Right knee pain    Subjective   Doing ok. States she is in too much pain to go home. Denies f/c/n/v/sob/cp.  ( above noted/agree)wy  Objective     Vital Signs  Blood pressure 155/72, pulse 85, temperature 97.2 °F (36.2 °C), temperature source Tympanic, resp. rate 20, height 162.6 cm (64\"), weight 79.4 kg (175 lb), SpO2 94 %.  Temp (24hrs), Av.3 °F (36.8 °C), Min:97.2 °F (36.2 °C), Max:99 °F (37.2 °C)      Nutrition: PO    Respiratory: RA    Physical Therapy: PT eval complete. Pt ambulated 15 feet with CGA and RW, limited by reported 15/10 pain and fatigue. Pt required modAx2 for bed mobility due to increased pain. Pt would benefit from stair training next session. Recommend d/c home with assist and HHPT.     Physical Exam:     General Appearance:    Alert, cooperative, in no acute distress   Head:    Normocephalic, without obvious abnormality, atraumatic    Lungs:     Normal effort, symmetric chest rise, no crepitus, clear to      auscultation bilaterally             Heart:    Regular rhythm and normal rate, normal S1 and S2   Abdomen:     Normal bowel sounds, no masses, no organomegaly, soft        non-tender, non-distended, no guarding, no rebound                tenderness   Extremities:   Right knee ACE wrap CDI. Hinged brace in place.    Pulses:   Pulses palpable and equal bilaterally   Skin:   No bleeding, bruising or rash. RUE PICC   Neurologic:   Moves all extremities with no obvious focal motor deficit.  Cranial nerves 2 - 12 grossly intact     Results Review:     I reviewed the " patient's new clinical results.     Results from last 7 days  Lab Units 01/09/18  0651 01/08/18  2158 01/04/18  1003   WBC 10*3/mm3 7.21  --  6.16   HEMOGLOBIN g/dL 10.1* 10.7* 10.9*   HEMATOCRIT % 32.5* 34.3* 35.3   PLATELETS 10*3/mm3 309  --  333       Results from last 7 days  Lab Units 01/09/18  0651 01/08/18 2158 01/04/18  1003   SODIUM mmol/L 141 139 142   POTASSIUM mmol/L 4.2 4.4 4.6   CHLORIDE mmol/L 104 103 105   CO2 mmol/L 30.0 29.0 32.0*   BUN mg/dL 12 14 16   CREATININE mg/dL 0.80 0.80 0.70   CALCIUM mg/dL 8.7 8.9 8.9   BILIRUBIN mg/dL  --   --  0.4   ALK PHOS U/L  --   --  99   ALT (SGPT) U/L  --   --  24   AST (SGOT) U/L  --   --  25   GLUCOSE mg/dL 87 144* 70     I reviewed the patient's new imaging including images and reports.    All medications reviewed.     aspirin 325 mg Oral Q12H   buPROPion  mg Oral Daily   ceftriaxone 2 g Intravenous Q24H   DULoxetine 90 mg Oral Nightly   gabapentin 800 mg Oral 4x Daily   levothyroxine 100 mcg Oral Q AM   lisinopril 20 mg Oral Daily   meloxicam 15 mg Oral Daily   nicotine 1 patch Transdermal Daily       Assessment/Plan   Principal Problem:    S/P primary repair right knee quadriceps tendon tear  Active Problems:    Hypertension    Quadriceps tendon rupture, right     Anxiety and depression    Hypothyroid    Tobacco abuse    Acute postoperative pain    Anemia, stable.     Plan  1. PT/OT- WBAT RLE  2. Pain control-prns   3. IS-encouraged  4. DVT proph- mechs/ASA  5. Bowel regimen  6. Monitor post-op labs  7. DC planning for home, likely tomorrow    HTN  - Continue home lisinopril  - Hold maxzide for now  - Monitor BP q4 hrs  - Holding parameters for BP meds  - PRN hydralazine for SBP >170     Hypothyroid  - Continue home Synthroid     Anxiety and depression  - Continue home wellbutrin, cymbalta, xanax     Tobacco abuse( e cigarette)  - Nicotine patch daily    [facility]- Dr. Smith - Patient was finishing course of ceftriaxone via PICC    Seen and examined  "by me. Agree with above. Discussed with patient.   Will press on with rehab and pain control. Hope  will be able to go home tomorrow.jg Ashley MD  18  4:20 PM     Electronically signed by Se Ashley MD at 2018  4:21 PM      BECKY Newton at 2018  3:58 PM  Version 1 of 2         IM progress note      Lesli Leon  [id number]  1955     LOS: 1 day     Attending: Stephon Garcia MD    Primary Care Provider: Phani Joaquin MD      Chief Complaint/Reason for visit:  Right knee pain    Subjective   Doing ok. States she is in too much pain to go home. Denies f/c/n/v/sob/cp.    Objective     Vital Signs  Blood pressure 155/72, pulse 85, temperature 97.2 °F (36.2 °C), temperature source Tympanic, resp. rate 20, height 162.6 cm (64\"), weight 79.4 kg (175 lb), SpO2 94 %.  Temp (24hrs), Av.3 °F (36.8 °C), Min:97.2 °F (36.2 °C), Max:99 °F (37.2 °C)      Nutrition: PO    Respiratory: RA    Physical Therapy: PT eval complete. Pt ambulated 15 feet with CGA and RW, limited by reported 15/10 pain and fatigue. Pt required modAx2 for bed mobility due to increased pain. Pt would benefit from stair training next session. Recommend d/c home with assist and HHPT.     Physical Exam:     General Appearance:    Alert, cooperative, in no acute distress   Head:    Normocephalic, without obvious abnormality, atraumatic    Lungs:     Normal effort, symmetric chest rise, no crepitus, clear to      auscultation bilaterally             Heart:    Regular rhythm and normal rate, normal S1 and S2   Abdomen:     Normal bowel sounds, no masses, no organomegaly, soft        non-tender, non-distended, no guarding, no rebound                tenderness   Extremities:   Right knee ACE wrap CDI. Hinged brace in place.    Pulses:   Pulses palpable and equal bilaterally   Skin:   No bleeding, bruising or rash. RUE PICC   Neurologic:   Moves all extremities with no obvious focal motor " deficit.  Cranial nerves 2 - 12 grossly intact     Results Review:     I reviewed the patient's new clinical results.     Results from last 7 days  Lab Units 01/09/18  0651 01/08/18 2158 01/04/18  1003   WBC 10*3/mm3 7.21  --  6.16   HEMOGLOBIN g/dL 10.1* 10.7* 10.9*   HEMATOCRIT % 32.5* 34.3* 35.3   PLATELETS 10*3/mm3 309  --  333       Results from last 7 days  Lab Units 01/09/18  0651 01/08/18 2158 01/04/18  1003   SODIUM mmol/L 141 139 142   POTASSIUM mmol/L 4.2 4.4 4.6   CHLORIDE mmol/L 104 103 105   CO2 mmol/L 30.0 29.0 32.0*   BUN mg/dL 12 14 16   CREATININE mg/dL 0.80 0.80 0.70   CALCIUM mg/dL 8.7 8.9 8.9   BILIRUBIN mg/dL  --   --  0.4   ALK PHOS U/L  --   --  99   ALT (SGPT) U/L  --   --  24   AST (SGOT) U/L  --   --  25   GLUCOSE mg/dL 87 144* 70     I reviewed the patient's new imaging including images and reports.    All medications reviewed.     aspirin 325 mg Oral Q12H   buPROPion  mg Oral Daily   ceftriaxone 2 g Intravenous Q24H   DULoxetine 90 mg Oral Nightly   gabapentin 800 mg Oral 4x Daily   levothyroxine 100 mcg Oral Q AM   lisinopril 20 mg Oral Daily   meloxicam 15 mg Oral Daily   nicotine 1 patch Transdermal Daily       Assessment/Plan   Principal Problem:    S/P primary repair right knee quadriceps tendon tear  Active Problems:    Quadriceps tendon rupture, right, initial encounter    Hypertension    Anxiety and depression    Hypothyroid    Tobacco abuse    Acute postoperative pain      Plan  1. PT/OT- WBAT RLE  2. Pain control-prns   3. IS-encouraged  4. DVT proph- mechs/ASA  5. Bowel regimen  6. Monitor post-op labs  7. DC planning for home, likely tomorrow    HTN  - Continue home lisinopril  - Hold maxzide for now  - Monitor BP q4 hrs  - Holding parameters for BP meds  - PRN hydralazine for SBP >170     Hypothyroid  - Continue home Synthroid     Anxiety and depression  - Continue home wellbutrin, cymbalta, xanax     Tobacco abuse( e cigarette)  - Nicotine patch daily    LIDANNELISE Lang  "Sarah - Patient was finishing course of ceftriaxone via PICC      BECKY Newton  18  3:58 PM     Electronically signed by BECKY Newton at 2018  4:01 PM      Stephon Garcia MD at 1/10/2018  7:00 AM  Version 1 of 1           SUBJECTIVE  Knee pain much improved this morning.  Doing well.    OBJECTIVE  Temp (24hrs), Av.1 °F (36.7 °C), Min:97.2 °F (36.2 °C), Max:98.8 °F (37.1 °C)    Blood pressure 159/83, pulse 89, temperature 98.3 °F (36.8 °C), temperature source Oral, resp. rate 16, height 162.6 cm (64\"), weight 79.4 kg (175 lb), SpO2 95 %.    Lab Results (last 24 hours)     Procedure Component Value Units Date/Time    CBC (No Diff) [[id number]]  (Abnormal) Collected:  1851    Specimen:  Blood Updated:  18 0755     WBC 7.21 10*3/mm3      RBC 3.33 (L) 10*6/mm3      Hemoglobin 10.1 (L) g/dL      Hematocrit 32.5 (L) %      MCV 97.6 fL      MCH 30.3 pg      MCHC 31.1 (L) g/dL      RDW 14.7 (H) %      RDW-SD 52.8 fl      MPV 9.9 fL      Platelets 309 10*3/mm3     Basic Metabolic Panel [[id number]]  (Normal) Collected:  1851    Specimen:  Blood Updated:  1815     Glucose 87 mg/dL      BUN 12 mg/dL      Creatinine 0.80 mg/dL      Sodium 141 mmol/L      Potassium 4.2 mmol/L      Chloride 104 mmol/L      CO2 30.0 mmol/L      Calcium 8.7 mg/dL      eGFR Non African Amer 73 mL/min/1.73      BUN/Creatinine Ratio 15.0     Anion Gap 7.0 mmol/L     Narrative:       National Kidney Foundation Guidelines    Stage     Description        GFR  1         Normal or High     90+  2         Mild decrease      60-89  3         Moderate decrease  30-59  4         Severe decrease    15-29  5         Kidney failure     <15    C-reactive Protein [[id number]]  (Abnormal) Collected:  18    Specimen:  Blood Updated:  18 1032     C-Reactive Protein 2.13 (H) mg/dL     CBC & Differential [[id number]] Collected:  01/10/18 0414    Specimen:  Blood Updated:  01/10/18 0502 "    Narrative:       The following orders were created for panel order CBC & Differential.  Procedure                               Abnormality         Status                     ---------                               -----------         ------                     CBC Auto Differential[[id number]]        Abnormal            Final result                 Please view results for these tests on the individual orders.    CBC Auto Differential [[id number]]  (Abnormal) Collected:  01/10/18 0414    Specimen:  Blood Updated:  01/10/18 0502     WBC 6.89 10*3/mm3      RBC 3.21 (L) 10*6/mm3      Hemoglobin 9.8 (L) g/dL      Hematocrit 31.5 (L) %      MCV 98.1 fL      MCH 30.5 pg      MCHC 31.1 (L) g/dL      RDW 14.9 (H) %      RDW-SD 54.2 (H) fl      MPV 9.6 fL      Platelets 305 10*3/mm3      Neutrophil % 62.9 %      Lymphocyte % 17.0 (L) %      Monocyte % 12.6 (H) %      Eosinophil % 6.8 (H) %      Basophil % 0.6 %      Immature Grans % 0.1 %      Neutrophils, Absolute 4.33 10*3/mm3      Lymphocytes, Absolute 1.17 10*3/mm3      Monocytes, Absolute 0.87 10*3/mm3      Eosinophils, Absolute 0.47 (H) 10*3/mm3      Basophils, Absolute 0.04 10*3/mm3      Immature Grans, Absolute 0.01 10*3/mm3     Sedimentation Rate [[id number]]  (Normal) Collected:  01/10/18 0414    Specimen:  Blood Updated:  01/10/18 0519     Sed Rate 23 mm/hr     Basic Metabolic Panel [[id number]]  (Abnormal) Collected:  01/10/18 0414    Specimen:  Blood Updated:  01/10/18 0536     Glucose 94 mg/dL      BUN 11 mg/dL      Creatinine 0.60 mg/dL      Sodium 140 mmol/L      Potassium 3.7 mmol/L      Chloride 104 mmol/L      CO2 31.0 mmol/L      Calcium 8.6 (L) mg/dL      eGFR Non African Amer 101 mL/min/1.73      BUN/Creatinine Ratio 18.3     Anion Gap 5.0 mmol/L     Narrative:       National Kidney Foundation Guidelines    Stage     Description        GFR  1         Normal or High     90+  2         Mild decrease      60-89  3         Moderate decrease  30-59  4          Severe decrease    15-29  5         Kidney failure     <15            PHYSICAL EXAM  Right lower extremity:Dressing clean, dry and intact.  T ROM brace in place locked in extension.  Intact EHL, FHL, tibialis anterior, and gastrocsoleus. Sensation intact to light touch to deep peroneal, superficial peroneal, sural, saphenous, tibial nerves. 2+ palpable DP and PT pulses.       Principal Problem:    S/P primary repair right knee quadriceps tendon tear  Active Problems:    Hypertension    Quadriceps tendon rupture, right, initial encounter    Anxiety and depression    Hypothyroid    Tobacco abuse    Acute postoperative pain      PLAN / DISPOSITION:  2 Day Post-Op primary repair right quadriceps tendon tear    Protected weight bearing as tolerated right lower extremity, T ROM brace on at all times locked in extension.  Knee to remain straight.  Do not allow knee range of motion.   Pain control  PT/OT for post op mobilization and medical equipment needs    TEDs and SCD's bilateral lower extremities   Aspirin for DVT prophylaxis    Social work for discharge planning.  Discharge home this afternoon  Dressing to remain in place for 7 days. May remove on POD#7. If no drainage, may shower on POD#10. No submerging wound in water. If drainage is noted, sterile dressing should be placed and wound checked daily. No showering until wound has remained dry for 72 consecutive hours.   Follow up in 2 weeks for re-assessment.      Stephon Garcia MD  01/10/18  7:00 AM           Electronically signed by Stephon Garcia MD at 1/10/2018  7:01 AM          [address] [address]  Phone:  [phone]  Fax:   Date Ordered: 2018         Patient:  Lesli Leon MRN:  [id number]   [address] [facility] [address] :  1955  SSN:    Phone: No phone on record Sex:  F     Weight: 79.4 kg (175 lb)         Ht Readings from Last 1 Encounters:   18 162.6 cm  "(64\")         Commode Chair                    (Order ID: [id number])    Diagnosis:  Quadriceps tendon rupture, right, initial encounter (S76.111A [ICD-10-CM] 843.8 [ICD-9-CM])  Impaired functional mobility, balance, gait, and endurance (Z74.09 [ICD-10-CM] V49.89 [ICD-9-CM])  Impaired mobility and ADLs (Z74.09 [ICD-10-CM] 799.89 [ICD-9-CM])  S/P knee surgery (Z98.890 [ICD-10-CM] V45.89 [ICD-9-CM])   Quantity:  1     Equipment:  Bedside Commode Chair w/Fixed Arms  Length of Need (99 Months = Lifetime): 99 Months = Lifetime            Verbal Order Mode: Telephone with readback   Authorizing Provider: Stephon Garcia MD  Authorizing Provider's NPI: [id number]     Order Entered By: Candi Garcia RN 2018  3:55 PM     Electronically signed by: Stephon Garcia MD 1/10/2018  6:57 AM         [address] [address]  Phone:  [phone]  Fax:   Date Ordered: Jose 10, 2018         Patient:  Lesli Leon MRN:  [id number]   [address] [facility] [address] :  1955  SSN:    Phone: No phone on record Sex:  F     Weight: 79.4 kg (175 lb)         Ht Readings from Last 1 Encounters:   18 162.6 cm (64\")         Standard Wheelchair                        (Order ID: [id number])    Diagnosis:  Quadriceps tendon rupture, right, initial encounter (S76.111A [ICD-10-CM] 843.8 [ICD-9-CM])  Impaired functional mobility, balance, gait, and endurance (Z74.09 [ICD-10-CM] V49.89 [ICD-9-CM])   Quantity:  1     Wheelchair accessories:  Manual W/C Seat Widths 24-27 inches  Wheelchair accessories:  Elevating Leg Rest (pair)  Length of Need (99 Months = Lifetime): 99 Months = Lifetime            Verbal Order Mode: Telephone with readback   Authorizing Provider: Stephon Garcia MD  Authorizing Provider's NPI: [id number]     Order Entered By: Candi Garcia RN 1/10/2018 10:11 AM     Electronically signed by: Stephon Garcia MD 1/10/2018 " 10:14 AM

## 2018-01-10 NOTE — PLAN OF CARE
Problem: Patient Care Overview (Adult)  Goal: Plan of Care Review  Outcome: Ongoing (interventions implemented as appropriate)   01/10/18 1150   Coping/Psychosocial Response Interventions   Plan Of Care Reviewed With patient;family   Patient Care Overview   Progress progress toward functional goals as expected   Outcome Evaluation   Outcome Summary/Follow up Plan Pt increased ambulation distance to 25 feet with CGA and RW, able to maintain knee extension throughout gait distance with T ROM brace locked in extension. Pt anticipating d/c home with assist and HHPT today.        Problem: Inpatient Physical Therapy  Goal: Bed Mobility Goal LTG- PT  Outcome: Unable to achieve outcome(s) by discharge Date Met: 01/10/18   01/09/18 1137 01/10/18 1150   Bed Mobility PT LTG   Bed Mobility PT LTG, Date Established 01/09/18 --    Bed Mobility PT LTG, Time to Achieve 5 days --    Bed Mobility PT LTG, Activity Type supine to sit/sit to supine --    Bed Mobility PT LTG, [place] Level supervision required --    Bed Mobility PT LTG, Date Goal Reviewed --  01/10/18   Bed Mobility PT LTG, Outcome --  goal not met   Bed Mobility PT LTG, Reason Goal Not Met --  discharged from facility     Goal: Transfer Training Goal 1 LTG- PT  Outcome: Unable to achieve outcome(s) by discharge Date Met: 01/10/18   01/09/18 1137 01/10/18 1150   Transfer Training PT LTG   Transfer Training PT LTG, Date Established 01/09/18 --    Transfer Training PT LTG, Time to Achieve 5 days --    Transfer Training PT LTG, Activity Type sit to stand/stand to sit --    Transfer Training PT LTG, [place] Level conditional independence --    Transfer Training PT LTG, Assist Device walker, rolling --    Transfer Training PT LTG, Date Goal Reviewed --  01/10/18   Transfer Training PT LTG, Outcome --  goal not met   Transfer Training PT LTG, Reason Goal Not Met --  discharged from facility     Goal: Gait Training Goal LTG- PT  Outcome: Unable to achieve outcome(s)  by discharge Date Met: 01/10/18   01/09/18 1137 01/10/18 1150   Gait Training PT LTG   Gait Training Goal PT LTG, Date Established 01/09/18 --    Gait Training Goal PT LTG, Time to Achieve 5 days --    Gait Training Goal PT LTG, [place] Level conditional independence --    Gait Training Goal PT LTG, Assist Device walker, rolling --    Gait Training Goal PT LTG, Distance to Achieve 100 feet --    Gait Training Goal PT LTG, Date Goal Reviewed --  01/10/18   Gait Training Goal PT LTG, Outcome --  goal not met   Gait Training Goal PT LTG, Reason Goal Not Met --  discharged from facility     Goal: Stair Training Goal LTG- PT  Outcome: Unable to achieve outcome(s) by discharge Date Met: 01/10/18   01/09/18 1137 01/10/18 1150   Stair Training PT LTG   Stair Training Goal PT LTG, Date Established 01/09/18 --    Stair Training Goal PT LTG, Time to Achieve 5 days --    Stair Training Goal PT LTG, Number of Steps 1  (backwards) --    Stair Training Goal PT LTG, [place] Level conditional independence --    Stair Training Goal PT LTG, Assist Device walker, rolling --    Stair Training Goal PT LTG, Date Goal Reviewed --  01/10/18   Stair Training Goal PT LTG, Outcome --  goal not met   Stair Training Goal PT LTG, Reason Goal Not Met --  discharged from facility

## 2018-01-10 NOTE — DISCHARGE SUMMARY
Patient Name: Lesli Leon  MRN: [id number]  : 1955  DOS: 1/10/2018    Attending: No att. providers found    Primary Care Provider: Phani Joaquin MD    Date of Admission:.2018 11:33 AM    Date of Discharge:  1/10/2018    Discharge Diagnosis: Principal Problem:    S/P primary repair right knee quadriceps tendon tear  Active Problems:    Hypertension    Quadriceps tendon rupture, right, initial encounter    Anxiety and depression    Hypothyroid    Tobacco abuse    Acute postoperative pain    Consults:  [facility]: Dom Landrum MD.    Hospital Course  Patient is a 62 y.o. female presented for primary repair right knee quadriceps tendon tear by Dr. Garcia under GA. She tolerated surgery well and was admitted for further medical management.     She was at [facility] between December 10 and 2017.  During that hospitalization she was managed for sepsis related to infected knee.   She had incision and drainage and debridement/ revision knee arthroplasty, with a revision to a thicker poly due to instability of the total knee construct.  and she had a PICC line placed and she has been receiving IV antibiotics, Rocephin being followed by Dr. Smith with infectious disease consultants.     Per 's note:( approximately 3 weeks after her surgery, she sustained a mechanical fall in which her knee bent backwards resulting in sharp pain at the superior pole the patella.  Afterwards she is unable to perform a straight leg raise.  She was also unable to ambulate with assistance of a walker secondary to him being unable to place weight on the extremity.  I saw her in clinic and she was diagnosed with a quadriceps tendon rupture. ).      After admit, patient was provided pain medications as needed for pain control, and after adjustment of regimen , she is doing well.      She was seen by PT and OT and has progressed well over her stay.  She used an IS for atelectasis prophylaxis  "and aspirin along with mechanicals for DVT prophylaxis.  Home medications were resumed as appropriate, and labs were monitored and remained fairly stable.     With the progress she has made, she is ready for DC home today.    Discussed with patient regarding plan and she shows understanding and agreement.    Patient will have HHPT following discharge.      Procedures Performed  DATE OF PROCEDURE: 1/8/18      SURGEON: Stephon Garcia M.D.      PREOPERATIVE DIAGNOSIS: Right knee acute quadriceps tendon tear      POSTOPERATIVE DIAGNOSIS: same      PROCEDURE: Primary repair right knee quadriceps tendon tear        Pertinent Test Results:    I reviewed the patient's new clinical results.     Results from last 7 days  Lab Units 01/10/18  0414 01/09/18  0651 01/08/18 2158 01/04/18  1003   WBC 10*3/mm3 6.89 7.21  --  6.16   HEMOGLOBIN g/dL 9.8* 10.1* 10.7* 10.9*   HEMATOCRIT % 31.5* 32.5* 34.3* 35.3   PLATELETS 10*3/mm3 305 309  --  333       Results from last 7 days  Lab Units 01/10/18  0414 01/09/18  0651 01/08/18 2158 01/04/18  1003   SODIUM mmol/L 140 141 139 142   POTASSIUM mmol/L 3.7 4.2 4.4 4.6   CHLORIDE mmol/L 104 104 103 105   CO2 mmol/L 31.0 30.0 29.0 32.0*   BUN mg/dL 11 12 14 16   CREATININE mg/dL 0.60 0.80 0.80 0.70   CALCIUM mg/dL 8.6* 8.7 8.9 8.9   BILIRUBIN mg/dL  --   --   --  0.4   ALK PHOS U/L  --   --   --  99   ALT (SGPT) U/L  --   --   --  24   AST (SGOT) U/L  --   --   --  25   GLUCOSE mg/dL 94 87 144* 70     I reviewed the patient's new imaging including images and reports.    Discharge Assessment:    Vital Signs  /75  Pulse 78  Temp 97.2 °F (36.2 °C) (Temporal Artery )   Resp 16  Ht 162.6 cm (64\")  Wt 79.4 kg (175 lb)  SpO2 97%  BMI 30.04 kg/m2  No data recorded.      General Appearance:    Alert, cooperative, in no acute distress   Lungs:     Clear to auscultation,respirations regular, even and                   unlabored    Heart:    Regular rhythm and normal rate, normal S1 " and S2   Abdomen:     Normal bowel sounds, no masses, no organomegaly, soft        non-tender, non-distended, no guarding, no rebound                 tenderness   Extremities:   Right knee ACE wrap CDI. Hinged brace in place.   Pulses:   Pulses palpable and equal bilaterally   Skin:   No bleeding, bruising or rash. RUE PICC   Neurologic:   Cranial nerves 2 - 12 grossly intact, sensation intact       Discharge Disposition: Home    Discharge Medications   Lesli Leon   Home Medication Instructions RAINER:[id number]    Printed on:01/11/18 0627   Medication Information                      ALPRAZolam (XANAX) 1 MG tablet  Take 1 mg by mouth 2 (Two) Times a Day.             aspirin 325 MG EC tablet  Take 1 tablet by mouth Every 12 (Twelve) Hours for 1 month             buPROPion XL (WELLBUTRIN XL) 150 MG 24 hr tablet  Take 150 mg by mouth 2 (Two) Times a Day.             cefTRIAXone (ROCEPHIN) 40 MG/ML IVPB  Infuse 50 mL into a venous catheter Daily for 2 doses. Indications: Infection of the Skin and/or Related Soft Tissue             chlorzoxazone (PARAFON FORTE) 500 MG tablet  Take 500 mg by mouth 3 (Three) Times a Day As Needed for Muscle Spasms.             DULoxetine (CYMBALTA) 30 MG capsule  Take 30 mg by mouth 3 (Three) Times a Day.             gabapentin (NEURONTIN) 800 MG tablet  Take 800 mg by mouth 4 (Four) Times a Day.             levothyroxine (SYNTHROID, LEVOTHROID) 100 MCG tablet  Take 100 mcg by mouth Daily. Brand name.             linaclotide (LINZESS) 290 MCG capsule capsule  Take 290 mcg by mouth Daily.             lisinopril (PRINIVIL,ZESTRIL) 20 MG tablet  Take 20 mg by mouth Daily.             meloxicam (MOBIC) 15 MG tablet  Take 1 tablet by mouth Daily. Must take an hour after aspirin if needed.             ondansetron (ZOFRAN) 4 MG tablet  Take 1 tablet by mouth Every 6 (Six) Hours As Needed for Nausea or Vomiting.             oxyCODONE-acetaminophen (PERCOCET)  MG per tablet  Take 1  tablet by mouth Every 6 (Six) Hours As Needed for Moderate Pain .             triamterene-hydrochlorothiazide (MAXZIDE) 75-50 MG per tablet  Take 1 tablet by mouth Daily.                 Discharge Diet: regular diet    Activity at Discharge: WBAT RLE, T ROM brace on at all times locked in extension.    Follow-up Appointments  Dr. Garcia per his orders  Dr. Landrum per his orders    Seen and examined by me. Agree with above. Discussed with patient. jg Ashley MD  01/11/18  9:33 AM

## 2018-01-12 ENCOUNTER — TELEPHONE (OUTPATIENT)
Dept: ORTHOPEDIC SURGERY | Facility: CLINIC | Age: 63
End: 2018-01-12

## 2018-01-12 NOTE — THERAPY DISCHARGE NOTE
Acute Care - Occupational Therapy Discharge Summary  [facility]     Patient Name: Lesli Leon  : 1955  MRN: [id number]    Today's Date: 2018  Onset of Illness/Injury or Date of Surgery Date: 18    Date of Referral to OT: 18  Referring Physician: MD Jose      Admit Date: 2018        OT Recommendation and Plan    Visit Dx:    ICD-10-CM ICD-9-CM   1. Impaired functional mobility, balance, gait, and endurance Z74.09 V49.89   2. Quadriceps tendon rupture, right, initial encounter S76.111A 843.8   3. Impaired mobility and ADLs Z74.09 799.89   4. S/P primary repair right knee quadriceps tendon tear Z98.890 V45.89                     OT Goals       18 1437          Transfer Training OT LTG    Transfer Training OT LTG, Date Established 18  -AN      Transfer Training OT LTG, Time to Achieve 1 wk  -AN      Transfer Training OT LTG, Activity Type toilet;sit to stand/stand to sit;bed to chair /chair to bed  -AN      Transfer Training OT LTG, [place] Level supervision required  -AN      Transfer Training OT LTG, Assist Device walker, rolling  -AN      Bathing OT LTG    Bathing Goal OT LTG, Date Established 18  -AN      Bathing Goal OT LTG, Time to Achieve 1 wk  -AN      Bathing Goal OT LTG, Activity Type simulates;upper body bathing;lower body bathing  -AN      Bathing Goal OT LTG, [place] Level minimum assist (75% patient effort)  -AN      LB Dressing OT LTG    LB Dressing Goal OT LTG, Date Established 18  -AN      LB Dressing Goal OT LTG, Time to Achieve 1 wk  -AN      LB Dressing Goal OT LTG, [place] Level minimum assist (75% patient effort)  -AN      Functional Mobility OT LTG    Functional Mobility Goal OT LTG, Date Established 18  -AN      Functional Mobility Goal OT LTG, Time to Achieve 1 wk  -AN      Functional Mobility Goal OT LTG, [place] Level contact guard  -AN      Functional Mobility Goal OT LTG, Distance to Achieve to the  bathroom  -AN        User Key  (r) = Recorded By, (t) = Taken By, (c) = Cosigned By    Initials Name Provider Type    CARMEN Spencer, OT Occupational Therapist                Outcome Measures       01/10/18 1045          How much help from another person do you currently need...    Turning from your back to your side while in flat bed without using bedrails? 3  -LM      Moving from lying on back to sitting on the side of a flat bed without bedrails? 3  -LM      Moving to and from a bed to a chair (including a wheelchair)? 3  -LM      Standing up from a chair using your arms (e.g., wheelchair, bedside chair)? 3  -LM      Climbing 3-5 steps with a railing? 2  -LM      To walk in hospital room? 3  -LM      AM-PAC 6 Clicks Score 17  -LM      Functional Assessment    Outcome Measure Options AM-PAC 6 Clicks Basic Mobility (PT)  -LM        User Key  (r) = Recorded By, (t) = Taken By, (c) = Cosigned By    Initials Name Provider Type    ASHLEE Gonzalez, PT Physical Therapist              OT Discharge Summary  Reason for Discharge: Discharge from facility  Outcomes Achieved: Refer to plan of care for updates on goals achieved  Discharge Destination: Home with home health      Melony Jaramillo OT  1/12/2018

## 2018-01-17 ENCOUNTER — TELEPHONE (OUTPATIENT)
Dept: ORTHOPEDIC SURGERY | Facility: CLINIC | Age: 63
End: 2018-01-17

## 2018-01-17 ENCOUNTER — TRANSCRIBE ORDERS (OUTPATIENT)
Dept: LAB | Facility: HOSPITAL | Age: 63
End: 2018-01-17

## 2018-01-17 ENCOUNTER — LAB (OUTPATIENT)
Dept: LAB | Facility: HOSPITAL | Age: 63
End: 2018-01-17

## 2018-01-17 DIAGNOSIS — T84.53XD INFECTION OF TOTAL RIGHT KNEE REPLACEMENT, SUBSEQUENT ENCOUNTER: ICD-10-CM

## 2018-01-17 DIAGNOSIS — T84.53XD INFECTION OF TOTAL RIGHT KNEE REPLACEMENT, SUBSEQUENT ENCOUNTER: Primary | ICD-10-CM

## 2018-01-17 LAB
ALBUMIN SERPL-MCNC: 3.7 G/DL (ref 3.2–4.8)
ALBUMIN/GLOB SERPL: 1.5 G/DL (ref 1.5–2.5)
ALP SERPL-CCNC: 161 U/L (ref 25–100)
ALT SERPL W P-5'-P-CCNC: 96 U/L (ref 7–40)
ANION GAP SERPL CALCULATED.3IONS-SCNC: 6 MMOL/L (ref 3–11)
AST SERPL-CCNC: 73 U/L (ref 0–33)
BASOPHILS # BLD AUTO: 0.06 10*3/MM3 (ref 0–0.2)
BASOPHILS NFR BLD AUTO: 0.9 % (ref 0–1)
BILIRUB SERPL-MCNC: 0.3 MG/DL (ref 0.3–1.2)
BUN BLD-MCNC: 14 MG/DL (ref 9–23)
BUN/CREAT SERPL: 20 (ref 7–25)
CALCIUM SPEC-SCNC: 9.3 MG/DL (ref 8.7–10.4)
CHLORIDE SERPL-SCNC: 103 MMOL/L (ref 99–109)
CO2 SERPL-SCNC: 32 MMOL/L (ref 20–31)
CREAT BLD-MCNC: 0.7 MG/DL (ref 0.6–1.3)
CRP SERPL-MCNC: 0.4 MG/DL (ref 0–1)
DEPRECATED RDW RBC AUTO: 52.3 FL (ref 37–54)
EOSINOPHIL # BLD AUTO: 0.35 10*3/MM3 (ref 0–0.3)
EOSINOPHIL NFR BLD AUTO: 5.5 % (ref 0–3)
ERYTHROCYTE [DISTWIDTH] IN BLOOD BY AUTOMATED COUNT: 14.6 % (ref 11.3–14.5)
ERYTHROCYTE [SEDIMENTATION RATE] IN BLOOD: 23 MM/HR (ref 0–30)
GFR SERPL CREATININE-BSD FRML MDRD: 85 ML/MIN/1.73
GLOBULIN UR ELPH-MCNC: 2.4 GM/DL
GLUCOSE BLD-MCNC: 73 MG/DL (ref 70–100)
HCT VFR BLD AUTO: 35.4 % (ref 34.5–44)
HGB BLD-MCNC: 10.8 G/DL (ref 11.5–15.5)
IMM GRANULOCYTES # BLD: 0.01 10*3/MM3 (ref 0–0.03)
IMM GRANULOCYTES NFR BLD: 0.2 % (ref 0–0.6)
LYMPHOCYTES # BLD AUTO: 1.08 10*3/MM3 (ref 0.6–4.8)
LYMPHOCYTES NFR BLD AUTO: 17.1 % (ref 24–44)
MCH RBC QN AUTO: 29.9 PG (ref 27–31)
MCHC RBC AUTO-ENTMCNC: 30.5 G/DL (ref 32–36)
MCV RBC AUTO: 98.1 FL (ref 80–99)
MONOCYTES # BLD AUTO: 0.79 10*3/MM3 (ref 0–1)
MONOCYTES NFR BLD AUTO: 12.5 % (ref 0–12)
NEUTROPHILS # BLD AUTO: 4.03 10*3/MM3 (ref 1.5–8.3)
NEUTROPHILS NFR BLD AUTO: 63.8 % (ref 41–71)
PLATELET # BLD AUTO: 416 10*3/MM3 (ref 150–450)
PMV BLD AUTO: 9.3 FL (ref 6–12)
POTASSIUM BLD-SCNC: 5.4 MMOL/L (ref 3.5–5.5)
PROT SERPL-MCNC: 6.1 G/DL (ref 5.7–8.2)
RBC # BLD AUTO: 3.61 10*6/MM3 (ref 3.89–5.14)
SODIUM BLD-SCNC: 141 MMOL/L (ref 132–146)
WBC NRBC COR # BLD: 6.32 10*3/MM3 (ref 3.5–10.8)

## 2018-01-17 PROCEDURE — 85025 COMPLETE CBC W/AUTO DIFF WBC: CPT

## 2018-01-17 PROCEDURE — 36415 COLL VENOUS BLD VENIPUNCTURE: CPT | Performed by: INTERNAL MEDICINE

## 2018-01-17 PROCEDURE — 86140 C-REACTIVE PROTEIN: CPT | Performed by: INTERNAL MEDICINE

## 2018-01-17 PROCEDURE — 85652 RBC SED RATE AUTOMATED: CPT | Performed by: INTERNAL MEDICINE

## 2018-01-17 PROCEDURE — 80053 COMPREHEN METABOLIC PANEL: CPT | Performed by: INTERNAL MEDICINE

## 2018-01-18 RX ORDER — OXYCODONE AND ACETAMINOPHEN 7.5; 325 MG/1; MG/1
1 TABLET ORAL EVERY 6 HOURS PRN
Qty: 60 TABLET | Refills: 0 | Status: SHIPPED | OUTPATIENT
Start: 2018-01-18 | End: 2018-01-30 | Stop reason: SDUPTHER

## 2018-01-18 NOTE — TELEPHONE ENCOUNTER
Called patient. Informed her that script was ready. She has her rx release with her and will send it with son today.     Mayra

## 2018-01-21 LAB
FUNGUS WND CULT: NORMAL
MYCOBACTERIUM SPEC CULT: NORMAL
NIGHT BLUE STAIN TISS: NORMAL

## 2018-01-23 ENCOUNTER — OFFICE VISIT (OUTPATIENT)
Dept: ORTHOPEDIC SURGERY | Facility: CLINIC | Age: 63
End: 2018-01-23

## 2018-01-23 ENCOUNTER — LAB (OUTPATIENT)
Dept: LAB | Facility: HOSPITAL | Age: 63
End: 2018-01-23

## 2018-01-23 ENCOUNTER — TRANSCRIBE ORDERS (OUTPATIENT)
Dept: LAB | Facility: HOSPITAL | Age: 63
End: 2018-01-23

## 2018-01-23 DIAGNOSIS — B95.4 BACTERIAL INFECTION DUE TO STREPTOCOCCUS, GROUP G: ICD-10-CM

## 2018-01-23 DIAGNOSIS — L03.115 CELLULITIS OF RIGHT FOOT: ICD-10-CM

## 2018-01-23 DIAGNOSIS — N17.0 ACUTE KIDNEY FAILURE WITH LESION OF TUBULAR NECROSIS (HCC): ICD-10-CM

## 2018-01-23 DIAGNOSIS — T84.53XD INFECTION OF TOTAL RIGHT KNEE REPLACEMENT, SUBSEQUENT ENCOUNTER: ICD-10-CM

## 2018-01-23 DIAGNOSIS — S76.111A QUADRICEPS TENDON RUPTURE, RIGHT, INITIAL ENCOUNTER: Primary | ICD-10-CM

## 2018-01-23 DIAGNOSIS — E66.09 EXOGENOUS OBESITY: ICD-10-CM

## 2018-01-23 DIAGNOSIS — Z98.890 STATUS POST KNEE SURGERY: ICD-10-CM

## 2018-01-23 DIAGNOSIS — T84.53XD INFECTION OF TOTAL RIGHT KNEE REPLACEMENT, SUBSEQUENT ENCOUNTER: Primary | ICD-10-CM

## 2018-01-23 LAB
ALBUMIN SERPL-MCNC: 4.1 G/DL (ref 3.2–4.8)
ALBUMIN/GLOB SERPL: 1.9 G/DL (ref 1.5–2.5)
ALP SERPL-CCNC: 140 U/L (ref 25–100)
ALT SERPL W P-5'-P-CCNC: 44 U/L (ref 7–40)
ANION GAP SERPL CALCULATED.3IONS-SCNC: 7 MMOL/L (ref 3–11)
AST SERPL-CCNC: 32 U/L (ref 0–33)
BASOPHILS # BLD AUTO: 0.04 10*3/MM3 (ref 0–0.2)
BASOPHILS NFR BLD AUTO: 0.5 % (ref 0–1)
BILIRUB SERPL-MCNC: 0.3 MG/DL (ref 0.3–1.2)
BUN BLD-MCNC: 19 MG/DL (ref 9–23)
BUN/CREAT SERPL: 23.8 (ref 7–25)
CALCIUM SPEC-SCNC: 9.5 MG/DL (ref 8.7–10.4)
CHLORIDE SERPL-SCNC: 102 MMOL/L (ref 99–109)
CO2 SERPL-SCNC: 30 MMOL/L (ref 20–31)
CREAT BLD-MCNC: 0.8 MG/DL (ref 0.6–1.3)
CRP SERPL-MCNC: 0.25 MG/DL (ref 0–1)
DEPRECATED RDW RBC AUTO: 51.9 FL (ref 37–54)
EOSINOPHIL # BLD AUTO: 0.3 10*3/MM3 (ref 0–0.3)
EOSINOPHIL NFR BLD AUTO: 3.8 % (ref 0–3)
ERYTHROCYTE [DISTWIDTH] IN BLOOD BY AUTOMATED COUNT: 14.6 % (ref 11.3–14.5)
ERYTHROCYTE [SEDIMENTATION RATE] IN BLOOD: 34 MM/HR (ref 0–30)
GFR SERPL CREATININE-BSD FRML MDRD: 73 ML/MIN/1.73
GLOBULIN UR ELPH-MCNC: 2.2 GM/DL
GLUCOSE BLD-MCNC: 94 MG/DL (ref 70–100)
HCT VFR BLD AUTO: 38.1 % (ref 34.5–44)
HGB BLD-MCNC: 11.8 G/DL (ref 11.5–15.5)
IMM GRANULOCYTES # BLD: 0.02 10*3/MM3 (ref 0–0.03)
IMM GRANULOCYTES NFR BLD: 0.3 % (ref 0–0.6)
LYMPHOCYTES # BLD AUTO: 1.39 10*3/MM3 (ref 0.6–4.8)
LYMPHOCYTES NFR BLD AUTO: 17.4 % (ref 24–44)
MCH RBC QN AUTO: 30.4 PG (ref 27–31)
MCHC RBC AUTO-ENTMCNC: 31 G/DL (ref 32–36)
MCV RBC AUTO: 98.2 FL (ref 80–99)
MONOCYTES # BLD AUTO: 0.6 10*3/MM3 (ref 0–1)
MONOCYTES NFR BLD AUTO: 7.5 % (ref 0–12)
NEUTROPHILS # BLD AUTO: 5.64 10*3/MM3 (ref 1.5–8.3)
NEUTROPHILS NFR BLD AUTO: 70.5 % (ref 41–71)
PLATELET # BLD AUTO: 412 10*3/MM3 (ref 150–450)
PMV BLD AUTO: 9.4 FL (ref 6–12)
POTASSIUM BLD-SCNC: 5.4 MMOL/L (ref 3.5–5.5)
PROT SERPL-MCNC: 6.3 G/DL (ref 5.7–8.2)
RBC # BLD AUTO: 3.88 10*6/MM3 (ref 3.89–5.14)
SODIUM BLD-SCNC: 139 MMOL/L (ref 132–146)
WBC NRBC COR # BLD: 7.99 10*3/MM3 (ref 3.5–10.8)

## 2018-01-23 PROCEDURE — 86140 C-REACTIVE PROTEIN: CPT | Performed by: INTERNAL MEDICINE

## 2018-01-23 PROCEDURE — 99024 POSTOP FOLLOW-UP VISIT: CPT | Performed by: ORTHOPAEDIC SURGERY

## 2018-01-23 PROCEDURE — 80053 COMPREHEN METABOLIC PANEL: CPT | Performed by: INTERNAL MEDICINE

## 2018-01-23 PROCEDURE — 85652 RBC SED RATE AUTOMATED: CPT | Performed by: INTERNAL MEDICINE

## 2018-01-23 PROCEDURE — 85025 COMPLETE CBC W/AUTO DIFF WBC: CPT

## 2018-01-23 PROCEDURE — 36415 COLL VENOUS BLD VENIPUNCTURE: CPT | Performed by: INTERNAL MEDICINE

## 2018-01-23 NOTE — PROGRESS NOTES
Orthopaedic Clinic Note:  Knee Post Op    Chief Complaint   Patient presents with   • Right Knee - Follow-up     6 weeks s/p I&D, poly exchange (R) knee 12/10/2017, (R) Quad tendon repair 01/08/2018        HPI    Ms. Leon is 3  week(s) s/p Reconstruction and primary repair of quadriceps tendon rupture. Rates pain 7/10. She is ambulating with a walker and has been wearing her brace locked in extension as instructed.  She is taking Percocet for pain control.  She states that while she has been weightbearing on the extremity, her ambulation has been limited as she is concerned about damaging the construct.  She denies any fevers, chills, constitutional symptoms.  Overall she is doing better.      Past Medical History:   Diagnosis Date   • Anxiety and depression    • Arthritis    • Breast cancer 2015    RIGHT   • Disease of thyroid gland    • Hx of radiation therapy 2015    RT BREAST   • Hypertension    • Wears glasses       Past Surgical History:   Procedure Laterality Date   • BREAST BIOPSY Right 2015   • BREAST LUMPECTOMY Right 2015   • BREAST LUMPECTOMY     • JOINT REPLACEMENT     • KNEE ARTHROTOMY Right 12/10/2017    Procedure: INCISION AND DRAINAGE, POLY EXCHANGE RIGHT KNEE;  Surgeon: Stephon Garcia MD;  Location:  [facility];  Service:    • AL FIX QUAD/HAMSTR MUSC RUPT,PRIMARY Right 1/8/2018    Procedure: PRIMARY REPAIR OF RIGHT QUADRICEPS TENDON ;  Surgeon: Stephon Garcia MD;  Location:  [facility];  Service: Orthopedics     Family History   Problem Relation Age of Onset   • Breast cancer Mother      DX AGE UNKNOWN   • Breast cancer Sister      DX AGE UNKNOWN   • Ovarian cancer Neg Hx       Social History     Social History   • Marital status:      Spouse name: N/A   • Number of children: N/A   • Years of education: N/A     Occupational History   • Not on file.     Social History Main Topics   • Smoking status: Current Every Day Smoker     Years: 5.00     Types: Electronic Cigarette   • Smokeless  tobacco: Never Used   • Alcohol use No   • Drug use: No   • Sexual activity: Defer     Other Topics Concern   • Not on file     Social History Narrative      Current Outpatient Prescriptions on File Prior to Visit   Medication Sig Dispense Refill   • ALPRAZolam (XANAX) 1 MG tablet Take 1 mg by mouth 2 (Two) Times a Day.     • aspirin 325 MG EC tablet Take 1 tablet by mouth Every 12 (Twelve) Hours for 1 month 60 tablet 0   • buPROPion XL (WELLBUTRIN XL) 150 MG 24 hr tablet Take 150 mg by mouth 2 (Two) Times a Day.     • chlorzoxazone (PARAFON FORTE) 500 MG tablet Take 500 mg by mouth 3 (Three) Times a Day As Needed for Muscle Spasms.     • DULoxetine (CYMBALTA) 30 MG capsule Take 30 mg by mouth 3 (Three) Times a Day.     • gabapentin (NEURONTIN) 800 MG tablet Take 800 mg by mouth 4 (Four) Times a Day.     • levothyroxine (SYNTHROID, LEVOTHROID) 100 MCG tablet Take 100 mcg by mouth Daily. Brand name.     • linaclotide (LINZESS) 290 MCG capsule capsule Take 290 mcg by mouth Daily.     • lisinopril (PRINIVIL,ZESTRIL) 20 MG tablet Take 20 mg by mouth Daily.     • meloxicam (MOBIC) 15 MG tablet Take 1 tablet by mouth Daily. Must take an hour after aspirin if needed.     • ondansetron (ZOFRAN) 4 MG tablet Take 1 tablet by mouth Every 6 (Six) Hours As Needed for Nausea or Vomiting. 20 tablet 0   • oxyCODONE-acetaminophen (PERCOCET)  MG per tablet Take 1 tablet by mouth Every 6 (Six) Hours As Needed for Moderate Pain . 40 tablet 0   • oxyCODONE-acetaminophen (PERCOCET) 7.5-325 MG per tablet Take 1 tablet by mouth Every 6 (Six) Hours As Needed for Moderate Pain . 60 tablet 0   • triamterene-hydrochlorothiazide (MAXZIDE) 75-50 MG per tablet Take 1 tablet by mouth Daily.       No current facility-administered medications on file prior to visit.       Allergies   Allergen Reactions   • Bactrim [Sulfamethoxazole-Trimethoprim] Hives        Review of Systems     Physical Exam  There were no vitals taken for this  visit.    There is no height or weight on file to calculate BMI.    GENERAL APPEARANCE: awake, alert, oriented, in no acute distress  LUNGS:  breathing nonlabored  EXTREMITIES: no clubbing, cyanosis  PERIPHERAL PULSES: palpable dorsalis pedis and posterior tibial pulses bilaterally.    GAIT:  Not assessed           Right Knee Exam:  ----------  ALIGNMENT: neutral  ----------  RANGE OF MOTION:   not performed secondary to nature of quadriceps tendon rupture  LIGAMENTOUS STABILITY:   stable to varus and valgus stress at terminal extension and 30 degrees without any evidence of laxity  ----------  STRENGTH:  KNEE FLEXION Deferred/5  KNEE EXTENSION  Deferred/5  ANKLE DORSIFLEXION  5/5  ANKLE PLANTARFLEXION  5/5  ----------  PAIN WITH PALPATION:anterior knee and quad tendon  KNEE EFFUSION: no  PAIN WITH KNEE ROM: Deferred   PATELLAR CREPITUS:  Not tested  ----------  SENSATION TO LIGHT TOUCH:  DEEP PERONEAL/SUPERFICIAL PERONEAL/SURAL/SAPHENOUS/TIBIAL:    intact  ----------  EDEMA:  no  ERYTHEMA:    no  WOUNDS/INCISIONS:  yes, anterior midline incision is well healed with sutures in place  _____________________________________________________________________  _____________________________________________________________________    RADIOGRAPHIC FINDINGS:   No new imaging today    Assessment/Plan:   Diagnosis Plan   1. Quadriceps tendon rupture, right, initial encounter     2. Status post knee surgery       Patient is doing well 3 weeks after quadriceps tendon primary repair.  We will continue immobilization with a knee brace locked in extension for an additional 3 weeks.  We'll discontinue sutures today.  I will see her back in 3 weeks with x-rays 2 views of right knee upon return.  Provided that she is able to perform a straight leg raise at that time, we will likely initiate progressive knee range of motion in the T ROM brace.    Stephon Garcia MD  01/23/18  1:16 PM

## 2018-01-30 ENCOUNTER — TELEPHONE (OUTPATIENT)
Dept: ORTHOPEDIC SURGERY | Facility: CLINIC | Age: 63
End: 2018-01-30

## 2018-01-30 RX ORDER — OXYCODONE AND ACETAMINOPHEN 7.5; 325 MG/1; MG/1
1 TABLET ORAL EVERY 6 HOURS PRN
Qty: 60 TABLET | Refills: 0 | Status: SHIPPED | OUTPATIENT
Start: 2018-01-30 | End: 2018-11-01

## 2018-01-30 NOTE — TELEPHONE ENCOUNTER
Called patients pharmacy to let them know that Dr. Garcia said it was ok to fill her script a few days early, I also spoke with Ms. Leon to let her know that I called her Pharmacy.

## 2018-01-30 NOTE — TELEPHONE ENCOUNTER
I spoke with pt. Informed her that  refilled Percocet. She advised me that her  will be by to pick-up. I advised her to have him bring photo id. Pt verbalized understanding.   -TMT 1/30/18

## 2018-01-30 NOTE — TELEPHONE ENCOUNTER
THE PATIENT IS CALLING BECAUSE THE PHARMACY IS TELLING HER SHE CAN NOT FILL HER RX SHE WAS GIVEN TODAY FOR PERCOCET BECAUSE IT IS TOO EARLY. SHE WOULD LIKE SOMEONE FROM THE OFFICE TO CALL AND TELL THEM IT IS OK FOR HER TO HAVE IT. HER PHARMACY IS [facility].

## 2018-01-30 NOTE — TELEPHONE ENCOUNTER
PATIENT WOULD LIKE ANOTHER REFILL OF PERCOCET 7.5. SHE HAD RIGHT  KNEE SURGERY ON [id number]. SHE CAN BE REACHED [phone].

## 2018-02-13 ENCOUNTER — TRANSCRIBE ORDERS (OUTPATIENT)
Dept: LAB | Facility: HOSPITAL | Age: 63
End: 2018-02-13

## 2018-02-13 ENCOUNTER — OFFICE VISIT (OUTPATIENT)
Dept: ORTHOPEDIC SURGERY | Facility: CLINIC | Age: 63
End: 2018-02-13

## 2018-02-13 ENCOUNTER — APPOINTMENT (OUTPATIENT)
Dept: LAB | Facility: HOSPITAL | Age: 63
End: 2018-02-13

## 2018-02-13 DIAGNOSIS — T84.53XD INFECTION OF TOTAL RIGHT KNEE REPLACEMENT, SUBSEQUENT ENCOUNTER: Primary | ICD-10-CM

## 2018-02-13 DIAGNOSIS — Z98.890 STATUS POST KNEE SURGERY: ICD-10-CM

## 2018-02-13 DIAGNOSIS — L03.115 CELLULITIS OF RIGHT FOOT: ICD-10-CM

## 2018-02-13 DIAGNOSIS — I10 ESSENTIAL HYPERTENSION, MALIGNANT: ICD-10-CM

## 2018-02-13 DIAGNOSIS — B95.4 BACTERIAL INFECTION DUE TO STREPTOCOCCUS, GROUP G: ICD-10-CM

## 2018-02-13 DIAGNOSIS — S76.111D RUPTURE OF RIGHT QUADRICEPS TENDON, SUBSEQUENT ENCOUNTER: Primary | ICD-10-CM

## 2018-02-13 DIAGNOSIS — N17.0 ACUTE KIDNEY FAILURE WITH LESION OF TUBULAR NECROSIS (HCC): ICD-10-CM

## 2018-02-13 LAB
ALBUMIN SERPL-MCNC: 4.4 G/DL (ref 3.2–4.8)
ALBUMIN/GLOB SERPL: 1.8 G/DL (ref 1.5–2.5)
ALP SERPL-CCNC: 106 U/L (ref 25–100)
ALT SERPL W P-5'-P-CCNC: 19 U/L (ref 7–40)
ANION GAP SERPL CALCULATED.3IONS-SCNC: 9 MMOL/L (ref 3–11)
AST SERPL-CCNC: 22 U/L (ref 0–33)
BASOPHILS # BLD AUTO: 0.06 10*3/MM3 (ref 0–0.2)
BASOPHILS NFR BLD AUTO: 0.7 % (ref 0–1)
BILIRUB SERPL-MCNC: 0.4 MG/DL (ref 0.3–1.2)
BUN BLD-MCNC: 14 MG/DL (ref 9–23)
BUN/CREAT SERPL: 17.5 (ref 7–25)
CALCIUM SPEC-SCNC: 9.1 MG/DL (ref 8.7–10.4)
CHLORIDE SERPL-SCNC: 103 MMOL/L (ref 99–109)
CO2 SERPL-SCNC: 28 MMOL/L (ref 20–31)
CREAT BLD-MCNC: 0.8 MG/DL (ref 0.6–1.3)
CRP SERPL-MCNC: 0.2 MG/DL (ref 0–1)
DEPRECATED RDW RBC AUTO: 48.9 FL (ref 37–54)
EOSINOPHIL # BLD AUTO: 0.21 10*3/MM3 (ref 0–0.3)
EOSINOPHIL NFR BLD AUTO: 2.5 % (ref 0–3)
ERYTHROCYTE [DISTWIDTH] IN BLOOD BY AUTOMATED COUNT: 13.9 % (ref 11.3–14.5)
ERYTHROCYTE [SEDIMENTATION RATE] IN BLOOD: 40 MM/HR (ref 0–30)
GFR SERPL CREATININE-BSD FRML MDRD: 73 ML/MIN/1.73
GLOBULIN UR ELPH-MCNC: 2.4 GM/DL
GLUCOSE BLD-MCNC: 95 MG/DL (ref 70–100)
HCT VFR BLD AUTO: 40.5 % (ref 34.5–44)
HGB BLD-MCNC: 13 G/DL (ref 11.5–15.5)
IMM GRANULOCYTES # BLD: 0.02 10*3/MM3 (ref 0–0.03)
IMM GRANULOCYTES NFR BLD: 0.2 % (ref 0–0.6)
LYMPHOCYTES # BLD AUTO: 1.58 10*3/MM3 (ref 0.6–4.8)
LYMPHOCYTES NFR BLD AUTO: 19.2 % (ref 24–44)
MCH RBC QN AUTO: 30.8 PG (ref 27–31)
MCHC RBC AUTO-ENTMCNC: 32.1 G/DL (ref 32–36)
MCV RBC AUTO: 96 FL (ref 80–99)
MONOCYTES # BLD AUTO: 0.58 10*3/MM3 (ref 0–1)
MONOCYTES NFR BLD AUTO: 7 % (ref 0–12)
NEUTROPHILS # BLD AUTO: 5.8 10*3/MM3 (ref 1.5–8.3)
NEUTROPHILS NFR BLD AUTO: 70.4 % (ref 41–71)
PLATELET # BLD AUTO: 370 10*3/MM3 (ref 150–450)
PMV BLD AUTO: 9.4 FL (ref 6–12)
POTASSIUM BLD-SCNC: 4 MMOL/L (ref 3.5–5.5)
PROT SERPL-MCNC: 6.8 G/DL (ref 5.7–8.2)
RBC # BLD AUTO: 4.22 10*6/MM3 (ref 3.89–5.14)
SODIUM BLD-SCNC: 140 MMOL/L (ref 132–146)
WBC NRBC COR # BLD: 8.25 10*3/MM3 (ref 3.5–10.8)

## 2018-02-13 PROCEDURE — 85025 COMPLETE CBC W/AUTO DIFF WBC: CPT | Performed by: INTERNAL MEDICINE

## 2018-02-13 PROCEDURE — 99024 POSTOP FOLLOW-UP VISIT: CPT | Performed by: ORTHOPAEDIC SURGERY

## 2018-02-13 PROCEDURE — 86140 C-REACTIVE PROTEIN: CPT | Performed by: INTERNAL MEDICINE

## 2018-02-13 PROCEDURE — 80053 COMPREHEN METABOLIC PANEL: CPT | Performed by: INTERNAL MEDICINE

## 2018-02-13 PROCEDURE — 36415 COLL VENOUS BLD VENIPUNCTURE: CPT | Performed by: INTERNAL MEDICINE

## 2018-02-13 PROCEDURE — 85652 RBC SED RATE AUTOMATED: CPT | Performed by: INTERNAL MEDICINE

## 2018-02-13 RX ORDER — HYDROCODONE BITARTRATE AND ACETAMINOPHEN 5; 325 MG/1; MG/1
1 TABLET ORAL EVERY 6 HOURS PRN
Qty: 60 TABLET | Refills: 0 | Status: SHIPPED | OUTPATIENT
Start: 2018-02-13 | End: 2018-11-01

## 2018-02-13 RX ORDER — CEPHALEXIN 500 MG/1
CAPSULE ORAL
Refills: 0 | COMMUNITY
Start: 2018-01-23 | End: 2018-11-01

## 2018-02-13 NOTE — PROGRESS NOTES
Orthopaedic Clinic Note:  Knee Post Op    Chief Complaint   Patient presents with   • Post-op     3 week follow up, 2 months status post: I&D, poly exchange Right knee 12/10/2017, 6 weeks status post: Right Quad tendon repair 01/08/2018        HPI    Ms. Leon is 6  week(s) s/p Quadriceps tendon repair for acute quadriceps tendon rupture.  She is still taking oral Keflex for her infected right knee arthroplasty.  She is mobilizing primarily with a wheelchair today.  She is taking Percocet for pain control.  She rates her pain a 7/10 on the pain scale.  She denies any swelling of the knee.  She has been limited weightbearing as instructed.  Overall she feels she is doing slightly better.    Past Medical History:   Diagnosis Date   • Anxiety and depression    • Arthritis    • Breast cancer 2015    RIGHT   • Disease of thyroid gland    • Hx of radiation therapy 2015    RT BREAST   • Hypertension    • Wears glasses       Past Surgical History:   Procedure Laterality Date   • BREAST BIOPSY Right 2015   • BREAST LUMPECTOMY Right 2015   • BREAST LUMPECTOMY     • JOINT REPLACEMENT     • KNEE ARTHROTOMY Right 12/10/2017    Procedure: INCISION AND DRAINAGE, POLY EXCHANGE RIGHT KNEE;  Surgeon: Stephon Garcia MD;  Location:  [facility];  Service:    • [place] FIX QUAD/HAMSTR MUSC RUPT,PRIMARY Right 1/8/2018    Procedure: PRIMARY REPAIR OF RIGHT QUADRICEPS TENDON ;  Surgeon: Stephon aGrcia MD;  Location:  [facility];  Service: Orthopedics     Family History   Problem Relation Age of Onset   • Breast cancer Mother      DX AGE UNKNOWN   • Breast cancer Sister      DX AGE UNKNOWN   • Ovarian cancer Neg Hx       Social History     Social History   • Marital status:      Spouse name: N/A   • Number of children: N/A   • Years of education: N/A     Occupational History   • Not on file.     Social History Main Topics   • Smoking status: Current Every Day Smoker     Years: 5.00     Types: Electronic Cigarette   • Smokeless  tobacco: Never Used   • Alcohol use No   • Drug use: No   • Sexual activity: Defer     Other Topics Concern   • Not on file     Social History Narrative      Current Outpatient Prescriptions on File Prior to Visit   Medication Sig Dispense Refill   • ALPRAZolam (XANAX) 1 MG tablet Take 1 mg by mouth 2 (Two) Times a Day.     • aspirin 325 MG EC tablet Take 1 tablet by mouth Every 12 (Twelve) Hours for 1 month 60 tablet 0   • buPROPion XL (WELLBUTRIN XL) 150 MG 24 hr tablet Take 150 mg by mouth 2 (Two) Times a Day.     • chlorzoxazone (PARAFON FORTE) 500 MG tablet Take 500 mg by mouth 3 (Three) Times a Day As Needed for Muscle Spasms.     • DULoxetine (CYMBALTA) 30 MG capsule Take 30 mg by mouth 3 (Three) Times a Day.     • gabapentin (NEURONTIN) 800 MG tablet Take 800 mg by mouth 4 (Four) Times a Day.     • levothyroxine (SYNTHROID, LEVOTHROID) 100 MCG tablet Take 100 mcg by mouth Daily. Brand name.     • linaclotide (LINZESS) 290 MCG capsule capsule Take 290 mcg by mouth Daily.     • lisinopril (PRINIVIL,ZESTRIL) 20 MG tablet Take 20 mg by mouth Daily.     • meloxicam (MOBIC) 15 MG tablet Take 1 tablet by mouth Daily. Must take an hour after aspirin if needed.     • ondansetron (ZOFRAN) 4 MG tablet Take 1 tablet by mouth Every 6 (Six) Hours As Needed for Nausea or Vomiting. 20 tablet 0   • oxyCODONE-acetaminophen (PERCOCET)  MG per tablet Take 1 tablet by mouth Every 6 (Six) Hours As Needed for Moderate Pain . 40 tablet 0   • oxyCODONE-acetaminophen (PERCOCET) 7.5-325 MG per tablet Take 1 tablet by mouth Every 6 (Six) Hours As Needed for Moderate Pain . 60 tablet 0   • triamterene-hydrochlorothiazide (MAXZIDE) 75-50 MG per tablet Take 1 tablet by mouth Daily.       No current facility-administered medications on file prior to visit.       Allergies   Allergen Reactions   • Bactrim [Sulfamethoxazole-Trimethoprim] Hives        Review of Systems     Physical Exam  There were no vitals taken for this  visit.    There is no height or weight on file to calculate BMI.    GENERAL APPEARANCE: awake, alert, oriented, in no acute distress  LUNGS:  breathing nonlabored  EXTREMITIES: no clubbing, cyanosis  PERIPHERAL PULSES: palpable dorsalis pedis and posterior tibial pulses bilaterally.    GAIT:  Not assessed           Right Knee Exam:  ----------  ALIGNMENT: neutral  ----------  RANGE OF MOTION:  Decreased (0 - 40 degrees) Patient able to perform a straight leg raise with no extensor lag  LIGAMENTOUS STABILITY:   stable to varus and valgus stress at terminal extension and 30 degrees without any evidence of laxity  ----------  STRENGTH:  KNEE FLEXION 5/5  KNEE EXTENSION  4/5  ANKLE DORSIFLEXION  5/5  ANKLE PLANTARFLEXION  5/5  ----------  PAIN WITH PALPATION:denies tenderness to palpation about the knee  KNEE EFFUSION: no  PAIN WITH KNEE ROM: no  PATELLAR CREPITUS:  no  ----------  SENSATION TO LIGHT TOUCH:  DEEP PERONEAL/SUPERFICIAL PERONEAL/SURAL/SAPHENOUS/TIBIAL:    intact  ----------  EDEMA:  no  ERYTHEMA:    no  WOUNDS/INCISIONS:  yes, anterior midline incision is well-healed with no erythema or drainage      _____________________________________________________________________  _____________________________________________________________________    RADIOGRAPHIC FINDINGS:   Indication: Status post revision right knee arthroplasty and quadriceps tendon repair    Comparison: Todays xrays were compared to previous xrays from 1/2/18     Knee films: Demonstrate well positioned knee arthroplasty components in satisfactory alignment without evidence of wear, loosening, subsidence, fracture, or osteolysis and No significant changes compared to prior radiographs.       Assessment/Plan:   Diagnosis Plan   1. Rupture of right quadriceps tendon, subsequent encounter     2. Status post knee surgery       Patient is doing well 6 weeks status post extensor mechanism repair for acute quadriceps tendon rupture.  She is able to  perform straight leg raise without assist which verifies appropriate tension on the extensor mechanism.  At this point, we will unlock the brace to initiate gradual increase in range of motion.  We will initiate 0-40° of motion currently and increase by 10° every 3-5 days as tolerated.  I instructed her that when she is up walking she is to have the brace set at 30° until her quadriceps strength returns.  I'll see the patient back in 3 weeks for repeat assessment in knee range of motion.  No x-rays needed upon return.    Stephon Garcia MD  02/13/18  2:01 PM

## 2018-02-14 ENCOUNTER — TELEPHONE (OUTPATIENT)
Dept: ORTHOPEDIC SURGERY | Facility: CLINIC | Age: 63
End: 2018-02-14

## 2018-02-15 NOTE — TELEPHONE ENCOUNTER
Called Ana and let her know to DC PT at this time. Patient is aware that she is to not be doing any PT at this time.     Mayra

## 2018-02-19 ENCOUNTER — TELEPHONE (OUTPATIENT)
Dept: ORTHOPEDIC SURGERY | Facility: CLINIC | Age: 63
End: 2018-02-19

## 2018-02-19 RX ORDER — OXYCODONE HYDROCHLORIDE AND ACETAMINOPHEN 5; 325 MG/1; MG/1
1 TABLET ORAL EVERY 8 HOURS PRN
Qty: 40 TABLET | Refills: 0 | Status: SHIPPED | OUTPATIENT
Start: 2018-02-19 | End: 2018-11-01

## 2018-02-19 NOTE — TELEPHONE ENCOUNTER
Patient called and I let her know that her prescripttion was ready to be picked up and I let her know what Dr. Garcia replied in this message.

## 2018-02-19 NOTE — TELEPHONE ENCOUNTER
"PATIENT WAS PRESCRIBED NORCO 5.325 MG AND SHE STATES THAT IT MAKES HER STOMACH UPSET, LIKE \" IT WANTS TO EAT IT'S SELF\". SHE CAN BE REACHED [phone]  "

## 2018-03-07 ENCOUNTER — OFFICE VISIT (OUTPATIENT)
Dept: ORTHOPEDIC SURGERY | Facility: CLINIC | Age: 63
End: 2018-03-07

## 2018-03-07 VITALS
DIASTOLIC BLOOD PRESSURE: 76 MMHG | SYSTOLIC BLOOD PRESSURE: 157 MMHG | BODY MASS INDEX: 32.03 KG/M2 | WEIGHT: 187.61 LBS | HEIGHT: 64 IN | HEART RATE: 109 BPM

## 2018-03-07 DIAGNOSIS — S76.111D RUPTURE OF RIGHT QUADRICEPS TENDON, SUBSEQUENT ENCOUNTER: Primary | ICD-10-CM

## 2018-03-07 DIAGNOSIS — Z98.890 STATUS POST KNEE SURGERY: ICD-10-CM

## 2018-03-07 PROCEDURE — 99024 POSTOP FOLLOW-UP VISIT: CPT | Performed by: ORTHOPAEDIC SURGERY

## 2018-03-07 RX ORDER — BUPRENORPHINE HYDROCHLORIDE, NALOXONE HYDROCHLORIDE 8; 2 MG/1; MG/1
FILM, SOLUBLE BUCCAL; SUBLINGUAL
Refills: 0 | Status: ON HOLD | COMMUNITY
Start: 2018-03-01 | End: 2019-02-21 | Stop reason: SDUPTHER

## 2018-03-07 NOTE — PROGRESS NOTES
Orthopaedic Clinic Note: Knee Established Patient    Chief Complaint   Patient presents with   • Follow-up     3 week - Rupture of right quadriceps tendon        HPI    It has been 3  week(s) since Ms. Leon's last visit. She returns to clinic today for Follow-up of right quadriceps tendon repair.  She has been working on progressive knee range of motion the T ROM brace.  She rates her pain a 6/10 on the pain scale.  Overall she is happy with her progress and she is achieved 90° of motion with full extension in the brace.  She denies any complications.  Overall she is doing better.  She continues to remain on oral Keflex for treatment of her prosthetic joint infection    Past Medical History:   Diagnosis Date   • Anxiety and depression    • Arthritis    • Breast cancer 2015    RIGHT   • Disease of thyroid gland    • Hx of radiation therapy 2015    RT BREAST   • Hypertension    • Wears glasses       Past Surgical History:   Procedure Laterality Date   • BREAST BIOPSY Right 2015   • BREAST LUMPECTOMY Right 2015   • BREAST LUMPECTOMY     • JOINT REPLACEMENT     • KNEE ARTHROTOMY Right 12/10/2017    Procedure: INCISION AND DRAINAGE, POLY EXCHANGE RIGHT KNEE;  Surgeon: Stephon Garcia MD;  Location:  [facility];  Service:    • [place] FIX QUAD/HAMSTR MUSC RUPT,PRIMARY Right 1/8/2018    Procedure: PRIMARY REPAIR OF RIGHT QUADRICEPS TENDON ;  Surgeon: Stephon Garcia MD;  Location:  [facility];  Service: Orthopedics      Family History   Problem Relation Age of Onset   • Breast cancer Mother      DX AGE UNKNOWN   • Breast cancer Sister      DX AGE UNKNOWN   • Ovarian cancer Neg Hx      Social History     Social History   • Marital status:      Spouse name: N/A   • Number of children: N/A   • Years of education: N/A     Occupational History   • Not on file.     Social History Main Topics   • Smoking status: Current Every Day Smoker     Years: 5.00     Types: Electronic Cigarette   • Smokeless tobacco: Never Used   •  Alcohol use No   • Drug use: No   • Sexual activity: Defer     Other Topics Concern   • Not on file     Social History Narrative      Current Outpatient Prescriptions on File Prior to Visit   Medication Sig Dispense Refill   • ALPRAZolam (XANAX) 1 MG tablet Take 1 mg by mouth 2 (Two) Times a Day.     • aspirin 325 MG EC tablet Take 1 tablet by mouth Every 12 (Twelve) Hours for 1 month 60 tablet 0   • buPROPion XL (WELLBUTRIN XL) 150 MG 24 hr tablet Take 150 mg by mouth 2 (Two) Times a Day.     • cephalexin (KEFLEX) 500 MG capsule take 1 capsule by mouth four times a day  0   • chlorzoxazone (PARAFON FORTE) 500 MG tablet Take 500 mg by mouth 3 (Three) Times a Day As Needed for Muscle Spasms.     • DULoxetine (CYMBALTA) 30 MG capsule Take 30 mg by mouth 3 (Three) Times a Day.     • gabapentin (NEURONTIN) 800 MG tablet Take 800 mg by mouth 4 (Four) Times a Day.     • levothyroxine (SYNTHROID, LEVOTHROID) 100 MCG tablet Take 100 mcg by mouth Daily. Brand name.     • linaclotide (LINZESS) 290 MCG capsule capsule Take 290 mcg by mouth As Needed.     • lisinopril (PRINIVIL,ZESTRIL) 20 MG tablet Take 20 mg by mouth Daily.     • triamterene-hydrochlorothiazide (MAXZIDE) 75-50 MG per tablet Take 1 tablet by mouth Daily.     • HYDROcodone-acetaminophen (NORCO) 5-325 MG per tablet Take 1 tablet by mouth Every 6 (Six) Hours As Needed (pain). 1-2 oral Q4H PRN pain 60 tablet 0   • oxyCODONE-acetaminophen (PERCOCET)  MG per tablet Take 1 tablet by mouth Every 6 (Six) Hours As Needed for Moderate Pain . 40 tablet 0   • oxyCODONE-acetaminophen (PERCOCET) 5-325 MG per tablet Take 1 tablet by mouth Every 8 (Eight) Hours As Needed (pain). 40 tablet 0   • oxyCODONE-acetaminophen (PERCOCET) 7.5-325 MG per tablet Take 1 tablet by mouth Every 6 (Six) Hours As Needed for Moderate Pain . 60 tablet 0   • [DISCONTINUED] meloxicam (MOBIC) 15 MG tablet Take 1 tablet by mouth Daily. Must take an hour after aspirin if needed.     •  "[DISCONTINUED] ondansetron (ZOFRAN) 4 MG tablet Take 1 tablet by mouth Every 6 (Six) Hours As Needed for Nausea or Vomiting. 20 tablet 0     No current facility-administered medications on file prior to visit.       Allergies   Allergen Reactions   • Bactrim [Sulfamethoxazole-Trimethoprim] Hives        Review of Systems     Physical Exam  Blood pressure 157/76, pulse 109, height 162.6 cm (64.02\"), weight 85.1 kg (187 lb 9.8 oz).    Body mass index is 32.19 kg/(m^2).    GENERAL APPEARANCE: awake, alert, oriented, in no acute distress  LUNGS:  breathing nonlabored  EXTREMITIES: no clubbing, cyanosis  PERIPHERAL PULSES: palpable dorsalis pedis and posterior tibial pulses bilaterally.    GAIT:  Normal  with walker        ----------  Right Knee Exam:  ----------  ALIGNMENT: neutral  ----------  RANGE OF MOTION:  Decreased (0 - 90 degrees)   LIGAMENTOUS STABILITY:   stable to varus and valgus stress at terminal extension and 30 degrees without any evidence of laxity  ----------  STRENGTH:  KNEE FLEXION 4/5  KNEE EXTENSION  4/5  ANKLE DORSIFLEXION  5/5  ANKLE PLANTARFLEXION  5/5  ----------  PAIN WITH PALPATION:denies tenderness to palpation about the knee  KNEE EFFUSION: no  PAIN WITH KNEE ROM: no  PATELLAR CREPITUS:  no  ----------  SENSATION TO LIGHT TOUCH:  DEEP PERONEAL/SUPERFICIAL PERONEAL/SURAL/SAPHENOUS/TIBIAL:    intact  ----------  EDEMA:  no  ERYTHEMA:    no  WOUNDS/INCISIONS:  yes, anterior midline incision is well-healed with no erythema or drainage  _____________________________________________________________________  _____________________________________________________________________    RADIOGRAPHIC FINDINGS:   No new imaging today.    Assessment/Plan:  No diagnosis found.  Patient is continue working on knee range of motion.  I will see her back in 4 weeks for repeat evaluation.  When she achieves 120° in knee brace of flexion, she can discontinue the knee brace and wean from the walker as tolerated.  " She expressed understanding all questions were answered her satisfaction.  We will need x-rays 2 views of right knee upon return.    HAYDE Mason(R)  03/07/18  2:00 PM

## 2018-03-15 ENCOUNTER — TELEPHONE (OUTPATIENT)
Dept: ORTHOPEDIC SURGERY | Facility: CLINIC | Age: 63
End: 2018-03-15

## 2018-03-15 NOTE — TELEPHONE ENCOUNTER
Pt called stating that she has worked her was from 90 to 120 degrees with her brace and can't remember if she is supposed to continue wearing it after 120 or not?

## 2018-03-27 ENCOUNTER — APPOINTMENT (OUTPATIENT)
Dept: LAB | Facility: HOSPITAL | Age: 63
End: 2018-03-27

## 2018-03-27 ENCOUNTER — TRANSCRIBE ORDERS (OUTPATIENT)
Dept: LAB | Facility: HOSPITAL | Age: 63
End: 2018-03-27

## 2018-03-27 DIAGNOSIS — L03.115 CELLULITIS OF RIGHT FOOT: ICD-10-CM

## 2018-03-27 DIAGNOSIS — T84.53XD INFECTION OF TOTAL RIGHT KNEE REPLACEMENT, SUBSEQUENT ENCOUNTER: Primary | ICD-10-CM

## 2018-03-27 DIAGNOSIS — B95.4 BACTERIAL INFECTION DUE TO STREPTOCOCCUS, GROUP G: ICD-10-CM

## 2018-03-27 DIAGNOSIS — E66.09 EXOGENOUS OBESITY: ICD-10-CM

## 2018-03-27 LAB
ALBUMIN SERPL-MCNC: 3.9 G/DL (ref 3.2–4.8)
ALBUMIN/GLOB SERPL: 1.6 G/DL (ref 1.5–2.5)
ALP SERPL-CCNC: 116 U/L (ref 25–100)
ALT SERPL W P-5'-P-CCNC: 13 U/L (ref 7–40)
ANION GAP SERPL CALCULATED.3IONS-SCNC: 6 MMOL/L (ref 3–11)
AST SERPL-CCNC: 22 U/L (ref 0–33)
BASOPHILS # BLD AUTO: 0.05 10*3/MM3 (ref 0–0.2)
BASOPHILS NFR BLD AUTO: 0.8 % (ref 0–1)
BILIRUB SERPL-MCNC: 0.2 MG/DL (ref 0.3–1.2)
BUN BLD-MCNC: 24 MG/DL (ref 9–23)
BUN/CREAT SERPL: 24 (ref 7–25)
CALCIUM SPEC-SCNC: 9.1 MG/DL (ref 8.7–10.4)
CHLORIDE SERPL-SCNC: 99 MMOL/L (ref 99–109)
CO2 SERPL-SCNC: 33 MMOL/L (ref 20–31)
CREAT BLD-MCNC: 1 MG/DL (ref 0.6–1.3)
CRP SERPL-MCNC: 0.95 MG/DL (ref 0–1)
DEPRECATED RDW RBC AUTO: 45.6 FL (ref 37–54)
EOSINOPHIL # BLD AUTO: 0.56 10*3/MM3 (ref 0–0.3)
EOSINOPHIL NFR BLD AUTO: 8.6 % (ref 0–3)
ERYTHROCYTE [DISTWIDTH] IN BLOOD BY AUTOMATED COUNT: 13.1 % (ref 11.3–14.5)
ERYTHROCYTE [SEDIMENTATION RATE] IN BLOOD: 46 MM/HR (ref 0–30)
GFR SERPL CREATININE-BSD FRML MDRD: 56 ML/MIN/1.73
GLOBULIN UR ELPH-MCNC: 2.4 GM/DL
GLUCOSE BLD-MCNC: 81 MG/DL (ref 70–100)
HCT VFR BLD AUTO: 34.4 % (ref 34.5–44)
HGB BLD-MCNC: 10.6 G/DL (ref 11.5–15.5)
IMM GRANULOCYTES # BLD: 0.02 10*3/MM3 (ref 0–0.03)
IMM GRANULOCYTES NFR BLD: 0.3 % (ref 0–0.6)
LYMPHOCYTES # BLD AUTO: 1.29 10*3/MM3 (ref 0.6–4.8)
LYMPHOCYTES NFR BLD AUTO: 19.7 % (ref 24–44)
MCH RBC QN AUTO: 29.5 PG (ref 27–31)
MCHC RBC AUTO-ENTMCNC: 30.8 G/DL (ref 32–36)
MCV RBC AUTO: 95.8 FL (ref 80–99)
MONOCYTES # BLD AUTO: 0.63 10*3/MM3 (ref 0–1)
MONOCYTES NFR BLD AUTO: 9.6 % (ref 0–12)
NEUTROPHILS # BLD AUTO: 3.99 10*3/MM3 (ref 1.5–8.3)
NEUTROPHILS NFR BLD AUTO: 61 % (ref 41–71)
PLATELET # BLD AUTO: 371 10*3/MM3 (ref 150–450)
PMV BLD AUTO: 9.6 FL (ref 6–12)
POTASSIUM BLD-SCNC: 4.8 MMOL/L (ref 3.5–5.5)
PROT SERPL-MCNC: 6.3 G/DL (ref 5.7–8.2)
RBC # BLD AUTO: 3.59 10*6/MM3 (ref 3.89–5.14)
SODIUM BLD-SCNC: 138 MMOL/L (ref 132–146)
WBC NRBC COR # BLD: 6.54 10*3/MM3 (ref 3.5–10.8)

## 2018-03-27 PROCEDURE — 85025 COMPLETE CBC W/AUTO DIFF WBC: CPT | Performed by: INTERNAL MEDICINE

## 2018-03-27 PROCEDURE — 80053 COMPREHEN METABOLIC PANEL: CPT | Performed by: INTERNAL MEDICINE

## 2018-03-27 PROCEDURE — 36415 COLL VENOUS BLD VENIPUNCTURE: CPT | Performed by: INTERNAL MEDICINE

## 2018-03-27 PROCEDURE — 86140 C-REACTIVE PROTEIN: CPT | Performed by: INTERNAL MEDICINE

## 2018-04-26 ENCOUNTER — OFFICE VISIT (OUTPATIENT)
Dept: ORTHOPEDIC SURGERY | Facility: CLINIC | Age: 63
End: 2018-04-26

## 2018-04-26 VITALS
HEIGHT: 64 IN | WEIGHT: 187.39 LBS | SYSTOLIC BLOOD PRESSURE: 132 MMHG | BODY MASS INDEX: 31.99 KG/M2 | DIASTOLIC BLOOD PRESSURE: 68 MMHG

## 2018-04-26 DIAGNOSIS — S76.111D RUPTURE OF RIGHT QUADRICEPS TENDON, SUBSEQUENT ENCOUNTER: Primary | ICD-10-CM

## 2018-04-26 PROCEDURE — 99213 OFFICE O/P EST LOW 20 MIN: CPT | Performed by: ORTHOPAEDIC SURGERY

## 2018-04-26 RX ORDER — CLOTRIMAZOLE AND BETAMETHASONE DIPROPIONATE 10; .64 MG/G; MG/G
CREAM TOPICAL
Refills: 0 | COMMUNITY
Start: 2018-04-11

## 2018-04-26 RX ORDER — FUROSEMIDE 20 MG/1
20 TABLET ORAL 2 TIMES DAILY
Refills: 0 | COMMUNITY
Start: 2018-04-11

## 2018-04-26 RX ORDER — LUBIPROSTONE 8 UG/1
8 CAPSULE, GELATIN COATED ORAL 2 TIMES DAILY
Refills: 0 | COMMUNITY
Start: 2018-04-23 | End: 2018-11-27

## 2018-04-26 NOTE — PROGRESS NOTES
Orthopaedic Clinic Note: Knee Established Patient    Chief Complaint   Patient presents with   • Follow-up     7 week f/u: 4 months s/p I&D POLY EXCHANGE RIGHT KNEE - 12/10/2017, 3 months s/p RIGHT QUAD TENDON REPAIR - 01/08/2018        HPI    Patient returns 4 months status post I&D poly-exchange and status post right quadriceps tendon repair 3 months ago.  She is doing well in a building with the assistance of a cane.  The use of the cane is primarily due to pain associated with her left knee.  She denies any pain in her right knee.  She is extremely happy with her outcome.  She denies any residual pain or swelling.  She continues take oral antibiotics of Keflex 500 mg 4 times a day per the infectious disease doctor.  She has an additional 3 months oral treatment of this in order to treat the infection.  She denies any weakness in the knee.  She denies fevers chills or constitutional symptoms.  Overall she is doing much better.     Past Medical History:   Diagnosis Date   • Anxiety and depression    • Arthritis    • Breast cancer 2015    RIGHT   • Disease of thyroid gland    • Hx of radiation therapy 2015    RT BREAST   • Hypertension    • Wears glasses       Past Surgical History:   Procedure Laterality Date   • BREAST BIOPSY Right 2015   • BREAST LUMPECTOMY Right 2015   • BREAST LUMPECTOMY     • JOINT REPLACEMENT     • KNEE ARTHROTOMY Right 12/10/2017    Procedure: INCISION AND DRAINAGE, POLY EXCHANGE RIGHT KNEE;  Surgeon: Stephon Garcia MD;  Location:  [facility];  Service:    • [place] FIX QUAD/HAMSTR MUSC RUPT,PRIMARY Right 1/8/2018    Procedure: PRIMARY REPAIR OF RIGHT QUADRICEPS TENDON ;  Surgeon: Stephon Garcia MD;  Location:  [facility];  Service: Orthopedics      Family History   Problem Relation Age of Onset   • Breast cancer Mother      DX AGE UNKNOWN   • Breast cancer Sister      DX AGE UNKNOWN   • Ovarian cancer Neg Hx      Social History     Social History   • Marital status:      Spouse name:  N/A   • Number of children: N/A   • Years of education: N/A     Occupational History   • Not on file.     Social History Main Topics   • Smoking status: Current Every Day Smoker     Years: 5.00     Types: Electronic Cigarette   • Smokeless tobacco: Never Used   • Alcohol use No   • Drug use: No   • Sexual activity: Defer     Other Topics Concern   • Not on file     Social History Narrative   • No narrative on file      Current Outpatient Prescriptions on File Prior to Visit   Medication Sig Dispense Refill   • ALPRAZolam (XANAX) 1 MG tablet Take 1 mg by mouth 2 (Two) Times a Day.     • aspirin 325 MG EC tablet Take 1 tablet by mouth Every 12 (Twelve) Hours for 1 month 60 tablet 0   • buPROPion XL (WELLBUTRIN XL) 150 MG 24 hr tablet Take 150 mg by mouth 2 (Two) Times a Day.     • cephalexin (KEFLEX) 500 MG capsule take 1 capsule by mouth four times a day  0   • chlorzoxazone (PARAFON FORTE) 500 MG tablet Take 500 mg by mouth 3 (Three) Times a Day As Needed for Muscle Spasms.     • DULoxetine (CYMBALTA) 30 MG capsule Take 30 mg by mouth 3 (Three) Times a Day.     • gabapentin (NEURONTIN) 800 MG tablet Take 800 mg by mouth 4 (Four) Times a Day.     • HYDROcodone-acetaminophen (NORCO) 5-325 MG per tablet Take 1 tablet by mouth Every 6 (Six) Hours As Needed (pain). 1-2 oral Q4H PRN pain 60 tablet 0   • levothyroxine (SYNTHROID, LEVOTHROID) 100 MCG tablet Take 100 mcg by mouth Daily. Brand name.     • linaclotide (LINZESS) 290 MCG capsule capsule Take 290 mcg by mouth As Needed.     • lisinopril (PRINIVIL,ZESTRIL) 20 MG tablet Take 20 mg by mouth Daily.     • oxyCODONE-acetaminophen (PERCOCET)  MG per tablet Take 1 tablet by mouth Every 6 (Six) Hours As Needed for Moderate Pain . 40 tablet 0   • oxyCODONE-acetaminophen (PERCOCET) 5-325 MG per tablet Take 1 tablet by mouth Every 8 (Eight) Hours As Needed (pain). 40 tablet 0   • oxyCODONE-acetaminophen (PERCOCET) 7.5-325 MG per tablet Take 1 tablet by mouth Every 6  "(Six) Hours As Needed for Moderate Pain . 60 tablet 0   • SUBOXONE 8-2 MG film film take 2 FILMs under the tongue once daily  0   • triamterene-hydrochlorothiazide (MAXZIDE) 75-50 MG per tablet Take 1 tablet by mouth Daily.       No current facility-administered medications on file prior to visit.       Allergies   Allergen Reactions   • Bactrim [Sulfamethoxazole-Trimethoprim] Hives        Review of Systems   Constitutional: Negative.    HENT: Positive for dental problem and sinus pressure.    Eyes: Negative.    Respiratory: Negative.    Cardiovascular: Positive for leg swelling.   Gastrointestinal: Positive for constipation.   Endocrine: Negative.    Genitourinary: Negative.    Musculoskeletal: Positive for arthralgias (Right knee ) and back pain.   Skin: Negative.    Allergic/Immunologic: Negative.    Neurological: Negative.    Hematological: Negative.    Psychiatric/Behavioral: The patient is nervous/anxious.         Physical Exam  Blood pressure 132/68, height 162.6 cm (64.02\"), weight 85 kg (187 lb 6.3 oz).    Body mass index is 32.15 kg/m².    GENERAL APPEARANCE: awake, alert, oriented, in no acute distress  LUNGS:  breathing nonlabored  EXTREMITIES: no clubbing, cyanosis  PERIPHERAL PULSES: palpable dorsalis pedis and posterior tibial pulses bilaterally.    GAIT:  Antalgic        ----------  Right Knee Exam:  ----------  ALIGNMENT: neutral  ----------  RANGE OF MOTION:  Decreased (3 - 130 degrees) , no evidence of extensor lag  LIGAMENTOUS STABILITY:   stable to varus and valgus stress at terminal extension and 30 degrees without any evidence of laxity  ----------  STRENGTH:  KNEE FLEXION 5/5  KNEE EXTENSION  5/5  ANKLE DORSIFLEXION  5/5  ANKLE PLANTARFLEXION  5/5  ----------  PAIN WITH PALPATION:denies tenderness to palpation about the knee  KNEE EFFUSION: no  PAIN WITH KNEE ROM: no  PATELLAR CREPITUS:  no  ----------  SENSATION TO LIGHT TOUCH:  DEEP PERONEAL/SUPERFICIAL PERONEAL/SURAL/SAPHENOUS/TIBIAL:    " intact  ----------  EDEMA:  no  ERYTHEMA:    no  WOUNDS/INCISIONS:  yes, anterior midline incision is well-healed with no erythema or drainage  _____________________________________________________________________  _____________________________________________________________________    RADIOGRAPHIC FINDINGS:   Indication: Status post I&D and poly-exchange right knee with quadriceps tendon repair     Comparison: Todays xrays were compared to previous xrays from 2/13/18     Knee films: Demonstrate well positioned knee arthroplasty components in satisfactory alignment without evidence of wear, loosening, subsidence, fracture, or osteolysis and No significant changes compared to prior radiographs.    Assessment/Plan:   Diagnosis Plan   1. Rupture of right quadriceps tendon, subsequent encounter       Patient is doing well 3 months status post quadriceps tendon reconstruction.  She has no evidence of extensor lag.  I recommended continued activity as tolerated.  I will see her back in 3 months with repeat x-rays right knee 2 views and left knee 4 views.  At that time we will likely consider discussing proceeding with left total knee arthroplasty.      Cheryl Vidal  04/26/18  2:02 PM

## 2018-05-08 ENCOUNTER — LAB (OUTPATIENT)
Dept: LAB | Facility: HOSPITAL | Age: 63
End: 2018-05-08

## 2018-05-08 ENCOUNTER — TRANSCRIBE ORDERS (OUTPATIENT)
Dept: LAB | Facility: HOSPITAL | Age: 63
End: 2018-05-08

## 2018-05-08 DIAGNOSIS — R60.9 EDEMA, UNSPECIFIED TYPE: ICD-10-CM

## 2018-05-08 DIAGNOSIS — R21 RASH AND OTHER NONSPECIFIC SKIN ERUPTION: ICD-10-CM

## 2018-05-08 DIAGNOSIS — S76.111A QUADRICEPS TENDON RUPTURE, RIGHT, INITIAL ENCOUNTER: ICD-10-CM

## 2018-05-08 DIAGNOSIS — T84.53XD INFECTION OF TOTAL RIGHT KNEE REPLACEMENT, SUBSEQUENT ENCOUNTER: ICD-10-CM

## 2018-05-08 DIAGNOSIS — B95.4 BACTERIAL INFECTION DUE TO STREPTOCOCCUS, GROUP G: ICD-10-CM

## 2018-05-08 DIAGNOSIS — L03.115 CELLULITIS OF RIGHT FOOT: ICD-10-CM

## 2018-05-08 DIAGNOSIS — R21 RASH AND OTHER NONSPECIFIC SKIN ERUPTION: Primary | ICD-10-CM

## 2018-05-08 LAB
ALBUMIN SERPL-MCNC: 4 G/DL (ref 3.2–4.8)
ALBUMIN/GLOB SERPL: 1.7 G/DL (ref 1.5–2.5)
ALP SERPL-CCNC: 111 U/L (ref 25–100)
ALT SERPL W P-5'-P-CCNC: 13 U/L (ref 7–40)
ANION GAP SERPL CALCULATED.3IONS-SCNC: 4 MMOL/L (ref 3–11)
AST SERPL-CCNC: 21 U/L (ref 0–33)
BASOPHILS # BLD AUTO: 0.05 10*3/MM3 (ref 0–0.2)
BASOPHILS NFR BLD AUTO: 0.8 % (ref 0–1)
BILIRUB SERPL-MCNC: 0.4 MG/DL (ref 0.3–1.2)
BUN BLD-MCNC: 17 MG/DL (ref 9–23)
BUN/CREAT SERPL: 15.5 (ref 7–25)
CALCIUM SPEC-SCNC: 9.1 MG/DL (ref 8.7–10.4)
CHLORIDE SERPL-SCNC: 102 MMOL/L (ref 99–109)
CO2 SERPL-SCNC: 33 MMOL/L (ref 20–31)
CREAT BLD-MCNC: 1.1 MG/DL (ref 0.6–1.3)
CRP SERPL-MCNC: 0.66 MG/DL (ref 0–1)
DEPRECATED RDW RBC AUTO: 45.7 FL (ref 37–54)
EOSINOPHIL # BLD AUTO: 0.44 10*3/MM3 (ref 0–0.3)
EOSINOPHIL NFR BLD AUTO: 7.1 % (ref 0–3)
ERYTHROCYTE [DISTWIDTH] IN BLOOD BY AUTOMATED COUNT: 13.1 % (ref 11.3–14.5)
ERYTHROCYTE [SEDIMENTATION RATE] IN BLOOD: 22 MM/HR (ref 0–30)
GFR SERPL CREATININE-BSD FRML MDRD: 50 ML/MIN/1.73
GLOBULIN UR ELPH-MCNC: 2.4 GM/DL
GLUCOSE BLD-MCNC: 75 MG/DL (ref 70–100)
HCT VFR BLD AUTO: 37.8 % (ref 34.5–44)
HGB BLD-MCNC: 11.7 G/DL (ref 11.5–15.5)
IMM GRANULOCYTES # BLD: 0 10*3/MM3 (ref 0–0.03)
IMM GRANULOCYTES NFR BLD: 0 % (ref 0–0.6)
LYMPHOCYTES # BLD AUTO: 1.32 10*3/MM3 (ref 0.6–4.8)
LYMPHOCYTES NFR BLD AUTO: 21.2 % (ref 24–44)
MCH RBC QN AUTO: 29.5 PG (ref 27–31)
MCHC RBC AUTO-ENTMCNC: 31 G/DL (ref 32–36)
MCV RBC AUTO: 95.5 FL (ref 80–99)
MONOCYTES # BLD AUTO: 0.72 10*3/MM3 (ref 0–1)
MONOCYTES NFR BLD AUTO: 11.6 % (ref 0–12)
NEUTROPHILS # BLD AUTO: 3.69 10*3/MM3 (ref 1.5–8.3)
NEUTROPHILS NFR BLD AUTO: 59.3 % (ref 41–71)
PLATELET # BLD AUTO: 302 10*3/MM3 (ref 150–450)
PMV BLD AUTO: 10.5 FL (ref 6–12)
POTASSIUM BLD-SCNC: 5.4 MMOL/L (ref 3.5–5.5)
PROT SERPL-MCNC: 6.4 G/DL (ref 5.7–8.2)
RBC # BLD AUTO: 3.96 10*6/MM3 (ref 3.89–5.14)
SODIUM BLD-SCNC: 139 MMOL/L (ref 132–146)
WBC NRBC COR # BLD: 6.22 10*3/MM3 (ref 3.5–10.8)

## 2018-05-08 PROCEDURE — 85652 RBC SED RATE AUTOMATED: CPT | Performed by: INTERNAL MEDICINE

## 2018-05-08 PROCEDURE — 36415 COLL VENOUS BLD VENIPUNCTURE: CPT | Performed by: INTERNAL MEDICINE

## 2018-05-08 PROCEDURE — 86140 C-REACTIVE PROTEIN: CPT | Performed by: INTERNAL MEDICINE

## 2018-05-08 PROCEDURE — 80053 COMPREHEN METABOLIC PANEL: CPT | Performed by: INTERNAL MEDICINE

## 2018-05-08 PROCEDURE — 85025 COMPLETE CBC W/AUTO DIFF WBC: CPT

## 2018-07-10 ENCOUNTER — LAB (OUTPATIENT)
Dept: LAB | Facility: HOSPITAL | Age: 63
End: 2018-07-10

## 2018-07-10 ENCOUNTER — TRANSCRIBE ORDERS (OUTPATIENT)
Dept: LAB | Facility: HOSPITAL | Age: 63
End: 2018-07-10

## 2018-07-10 DIAGNOSIS — I10 ESSENTIAL HYPERTENSION, MALIGNANT: ICD-10-CM

## 2018-07-10 DIAGNOSIS — R60.9 EDEMA, UNSPECIFIED TYPE: ICD-10-CM

## 2018-07-10 DIAGNOSIS — N17.0 ACUTE KIDNEY FAILURE WITH LESION OF TUBULAR NECROSIS (HCC): ICD-10-CM

## 2018-07-10 DIAGNOSIS — L03.115 CELLULITIS OF RIGHT FOOT: ICD-10-CM

## 2018-07-10 DIAGNOSIS — R21 RASH AND OTHER NONSPECIFIC SKIN ERUPTION: ICD-10-CM

## 2018-07-10 DIAGNOSIS — T84.53XD INFECTION OF TOTAL RIGHT KNEE REPLACEMENT, SUBSEQUENT ENCOUNTER: ICD-10-CM

## 2018-07-10 DIAGNOSIS — R60.9 EDEMA, UNSPECIFIED TYPE: Primary | ICD-10-CM

## 2018-07-10 DIAGNOSIS — B95.4 BACTERIAL INFECTION DUE TO STREPTOCOCCUS, GROUP G: ICD-10-CM

## 2018-07-10 DIAGNOSIS — Z96.651 PRESENCE OF RIGHT ARTIFICIAL KNEE JOINT: ICD-10-CM

## 2018-07-10 DIAGNOSIS — F17.210 CIGARETTE SMOKER: ICD-10-CM

## 2018-07-10 LAB
ALBUMIN SERPL-MCNC: 4.29 G/DL (ref 3.2–4.8)
ALBUMIN/GLOB SERPL: 1.7 G/DL (ref 1.5–2.5)
ALP SERPL-CCNC: 125 U/L (ref 25–100)
ALT SERPL W P-5'-P-CCNC: 18 U/L (ref 7–40)
ANION GAP SERPL CALCULATED.3IONS-SCNC: 3 MMOL/L (ref 3–11)
AST SERPL-CCNC: 28 U/L (ref 0–33)
BILIRUB SERPL-MCNC: 0.4 MG/DL (ref 0.3–1.2)
BUN BLD-MCNC: 14 MG/DL (ref 9–23)
BUN/CREAT SERPL: 17.3 (ref 7–25)
CALCIUM SPEC-SCNC: 9.1 MG/DL (ref 8.7–10.4)
CHLORIDE SERPL-SCNC: 105 MMOL/L (ref 99–109)
CO2 SERPL-SCNC: 34 MMOL/L (ref 20–31)
CREAT BLD-MCNC: 0.81 MG/DL (ref 0.6–1.3)
CRP SERPL-MCNC: 0.39 MG/DL (ref 0–1)
DEPRECATED RDW RBC AUTO: 46 FL (ref 37–54)
ERYTHROCYTE [DISTWIDTH] IN BLOOD BY AUTOMATED COUNT: 13.5 % (ref 11.3–14.5)
ERYTHROCYTE [SEDIMENTATION RATE] IN BLOOD: 28 MM/HR (ref 0–30)
GFR SERPL CREATININE-BSD FRML MDRD: 72 ML/MIN/1.73
GLOBULIN UR ELPH-MCNC: 2.5 GM/DL
GLUCOSE BLD-MCNC: 84 MG/DL (ref 70–100)
HCT VFR BLD AUTO: 39.3 % (ref 34.5–44)
HGB BLD-MCNC: 12.4 G/DL (ref 11.5–15.5)
MCH RBC QN AUTO: 29.4 PG (ref 27–31)
MCHC RBC AUTO-ENTMCNC: 31.6 G/DL (ref 32–36)
MCV RBC AUTO: 93.1 FL (ref 80–99)
PLATELET # BLD AUTO: 279 10*3/MM3 (ref 150–450)
PMV BLD AUTO: 10.8 FL (ref 6–12)
POTASSIUM BLD-SCNC: 5.2 MMOL/L (ref 3.5–5.5)
PROT SERPL-MCNC: 6.8 G/DL (ref 5.7–8.2)
RBC # BLD AUTO: 4.22 10*6/MM3 (ref 3.89–5.14)
SODIUM BLD-SCNC: 142 MMOL/L (ref 132–146)
WBC NRBC COR # BLD: 5.83 10*3/MM3 (ref 3.5–10.8)

## 2018-07-10 PROCEDURE — 85027 COMPLETE CBC AUTOMATED: CPT

## 2018-07-10 PROCEDURE — 85652 RBC SED RATE AUTOMATED: CPT

## 2018-07-10 PROCEDURE — 36415 COLL VENOUS BLD VENIPUNCTURE: CPT

## 2018-07-10 PROCEDURE — 80053 COMPREHEN METABOLIC PANEL: CPT

## 2018-07-10 PROCEDURE — 86140 C-REACTIVE PROTEIN: CPT

## 2018-08-17 ENCOUNTER — OFFICE VISIT (OUTPATIENT)
Dept: ORTHOPEDIC SURGERY | Facility: CLINIC | Age: 63
End: 2018-08-17

## 2018-08-17 VITALS — WEIGHT: 188.27 LBS | HEIGHT: 64 IN | BODY MASS INDEX: 32.14 KG/M2 | HEART RATE: 91 BPM | OXYGEN SATURATION: 98 %

## 2018-08-17 DIAGNOSIS — S76.111D RUPTURE OF RIGHT QUADRICEPS TENDON, SUBSEQUENT ENCOUNTER: Primary | ICD-10-CM

## 2018-08-17 DIAGNOSIS — M17.12 PRIMARY OSTEOARTHRITIS OF LEFT KNEE: ICD-10-CM

## 2018-08-17 PROCEDURE — 99214 OFFICE O/P EST MOD 30 MIN: CPT | Performed by: ORTHOPAEDIC SURGERY

## 2018-08-17 PROCEDURE — 99406 BEHAV CHNG SMOKING 3-10 MIN: CPT | Performed by: ORTHOPAEDIC SURGERY

## 2018-08-17 PROCEDURE — 20610 DRAIN/INJ JOINT/BURSA W/O US: CPT | Performed by: ORTHOPAEDIC SURGERY

## 2018-08-17 RX ORDER — TRIAMCINOLONE ACETONIDE 40 MG/ML
80 INJECTION, SUSPENSION INTRA-ARTICULAR; INTRAMUSCULAR
Status: COMPLETED | OUTPATIENT
Start: 2018-08-17 | End: 2018-08-17

## 2018-08-17 RX ORDER — BUPIVACAINE HYDROCHLORIDE 2.5 MG/ML
3 INJECTION, SOLUTION INFILTRATION; PERINEURAL
Status: COMPLETED | OUTPATIENT
Start: 2018-08-17 | End: 2018-08-17

## 2018-08-17 RX ORDER — LIDOCAINE HYDROCHLORIDE 10 MG/ML
3 INJECTION, SOLUTION INFILTRATION; PERINEURAL
Status: COMPLETED | OUTPATIENT
Start: 2018-08-17 | End: 2018-08-17

## 2018-08-17 RX ADMIN — TRIAMCINOLONE ACETONIDE 80 MG: 40 INJECTION, SUSPENSION INTRA-ARTICULAR; INTRAMUSCULAR at 12:07

## 2018-08-17 RX ADMIN — BUPIVACAINE HYDROCHLORIDE 3 ML: 2.5 INJECTION, SOLUTION INFILTRATION; PERINEURAL at 12:07

## 2018-08-17 RX ADMIN — LIDOCAINE HYDROCHLORIDE 3 ML: 10 INJECTION, SOLUTION INFILTRATION; PERINEURAL at 12:07

## 2018-08-17 NOTE — PROGRESS NOTES
Procedure   Large Joint Arthrocentesis  Date/Time: 8/17/2018 12:07 PM  Consent given by: patient  Site marked: site marked  Timeout: Immediately prior to procedure a time out was called to verify the correct patient, procedure, equipment, support staff and site/side marked as required   Supporting Documentation  Indications: pain   Procedure Details  Location: knee - L knee  Preparation: Patient was prepped and draped in the usual sterile fashion  Needle size: 22 G  Approach: anterolateral  Medications administered: 3 mL bupivacaine 0.25 %; 3 mL lidocaine 1 %; 80 mg triamcinolone acetonide 40 MG/ML  Patient tolerance: patient tolerated the procedure well with no immediate complications

## 2018-08-17 NOTE — PROGRESS NOTES
Orthopaedic Clinic Note: Knee Established Patient    Chief Complaint   Patient presents with   • Right Knee - Follow-up     4 month f/u: 8 months s/p I&D POLY EXCHANGE RIGHT KNEE - 12/10/2017, 7 months s/p RIGHT QUAD TENDON REPAIR - 01/08/2018   • Left Knee - Pain        HPI    It has been 4  month(s) since Ms. Leon's last visit. She returns to clinic today for follow-up of revision right knee arthroplasty for acute infection and new problem of left knee pain.  She currently rates her pain 0/10 on the pain scale for the right knee.  Her left knee is 8/10 on the pain scale.  She is ambulating with the assistance of a cane occasionally.  She's been taking Celebrex for pain control.  Her left knee hurts with walking, standing, climbing stairs.  Sitting and resting improve her pain.  She also has a compound cream that she has been applying to the knee in addition to the Celebrex to help with her symptoms.  Despite these interventions, she is having limitations in daily activities secondary to knee pain.    Past Medical History:   Diagnosis Date   • Anxiety and depression    • Arthritis    • Breast cancer (CMS/HCC) 2015    RIGHT   • Disease of thyroid gland    • Hx of radiation therapy 2015    RT BREAST   • Hypertension    • Wears glasses       Past Surgical History:   Procedure Laterality Date   • BREAST BIOPSY Right 2015   • BREAST LUMPECTOMY Right 2015   • BREAST LUMPECTOMY     • JOINT REPLACEMENT     • KNEE ARTHROTOMY Right 12/10/2017    Procedure: INCISION AND DRAINAGE, POLY EXCHANGE RIGHT KNEE;  Surgeon: Stephon Garcia MD;  Location:  [facility];  Service:    • MI FIX QUAD/HAMSTR MUSC RUPT,PRIMARY Right 1/8/2018    Procedure: PRIMARY REPAIR OF RIGHT QUADRICEPS TENDON ;  Surgeon: Stephon Garcia MD;  Location:  [facility];  Service: Orthopedics      Family History   Problem Relation Age of Onset   • Breast cancer Mother         DX AGE UNKNOWN   • Breast cancer Sister         DX AGE UNKNOWN   • Ovarian cancer  Neg Hx      Social History     Social History   • Marital status:      Spouse name: N/A   • Number of children: N/A   • Years of education: N/A     Occupational History   • Not on file.     Social History Main Topics   • Smoking status: Current Every Day Smoker     Years: 5.00     Types: Electronic Cigarette   • Smokeless tobacco: Never Used   • Alcohol use No   • Drug use: No   • Sexual activity: Defer     Other Topics Concern   • Not on file     Social History Narrative   • No narrative on file      Current Outpatient Prescriptions on File Prior to Visit   Medication Sig Dispense Refill   • ALPRAZolam (XANAX) 1 MG tablet Take 1 mg by mouth 2 (Two) Times a Day.     • AMITIZA 8 MCG capsule Take 8 mcg by mouth 2 (Two) Times a Day.  0   • aspirin 325 MG EC tablet Take 1 tablet by mouth Every 12 (Twelve) Hours for 1 month 60 tablet 0   • buPROPion XL (WELLBUTRIN XL) 150 MG 24 hr tablet Take 150 mg by mouth 2 (Two) Times a Day.     • CELEBREX 200 MG capsule Take 200 mg by mouth Daily.  0   • cephalexin (KEFLEX) 500 MG capsule take 1 capsule by mouth four times a day  0   • chlorzoxazone (PARAFON FORTE) 500 MG tablet Take 500 mg by mouth 3 (Three) Times a Day As Needed for Muscle Spasms.     • clotrimazole-betamethasone (LOTRISONE) 1-0.05 % cream apply to affected area once daily  0   • DULoxetine (CYMBALTA) 30 MG capsule Take 30 mg by mouth 3 (Three) Times a Day.     • furosemide (LASIX) 20 MG tablet Take 20 mg by mouth 2 (Two) Times a Day.  0   • gabapentin (NEURONTIN) 800 MG tablet Take 800 mg by mouth 4 (Four) Times a Day.     • HYDROcodone-acetaminophen (NORCO) 5-325 MG per tablet Take 1 tablet by mouth Every 6 (Six) Hours As Needed (pain). 1-2 oral Q4H PRN pain 60 tablet 0   • levothyroxine (SYNTHROID, LEVOTHROID) 100 MCG tablet Take 100 mcg by mouth Daily. Brand name.     • linaclotide (LINZESS) 290 MCG capsule capsule Take 290 mcg by mouth As Needed.     • lisinopril (PRINIVIL,ZESTRIL) 20 MG tablet Take 20  "mg by mouth Daily.     • oxyCODONE-acetaminophen (PERCOCET)  MG per tablet Take 1 tablet by mouth Every 6 (Six) Hours As Needed for Moderate Pain . 40 tablet 0   • oxyCODONE-acetaminophen (PERCOCET) 5-325 MG per tablet Take 1 tablet by mouth Every 8 (Eight) Hours As Needed (pain). 40 tablet 0   • oxyCODONE-acetaminophen (PERCOCET) 7.5-325 MG per tablet Take 1 tablet by mouth Every 6 (Six) Hours As Needed for Moderate Pain . 60 tablet 0   • SUBOXONE 8-2 MG film film take 2 FILMs under the tongue once daily  0   • triamterene-hydrochlorothiazide (MAXZIDE) 75-50 MG per tablet Take 1 tablet by mouth Daily.       No current facility-administered medications on file prior to visit.       Allergies   Allergen Reactions   • Bactrim [Sulfamethoxazole-Trimethoprim] Hives        Review of Systems   Constitutional: Negative.    HENT: Negative.    Eyes: Negative.    Respiratory: Negative.    Cardiovascular: Negative.    Gastrointestinal: Negative.    Endocrine: Negative.    Genitourinary: Negative.    Musculoskeletal: Positive for arthralgias.   Skin: Negative.    Allergic/Immunologic: Negative.    Neurological: Negative.    Hematological: Negative.    Psychiatric/Behavioral: Negative.         Physical Exam  Pulse 91, height 162.6 cm (64.02\"), weight 85.4 kg (188 lb 4.4 oz), SpO2 98 %.    Body mass index is 32.3 kg/m².    GENERAL APPEARANCE: awake, alert, oriented, in no acute distress  LUNGS:  breathing nonlabored  EXTREMITIES: no clubbing, cyanosis  PERIPHERAL PULSES: palpable dorsalis pedis and posterior tibial pulses bilaterally.    GAIT:  Antalgic        ----------  Right Knee Exam:  ----------  ALIGNMENT: neutral  ----------  RANGE OF MOTION:  Decreased (2 - 130 degrees) no extensor lag noted extensor lag  LIGAMENTOUS STABILITY:   stable to varus and valgus stress at terminal extension and 30 degrees without any evidence of laxity  ----------  STRENGTH:  KNEE FLEXION 5/5  KNEE EXTENSION  5/5  ANKLE DORSIFLEXION  " 5/5  ANKLE PLANTARFLEXION  5/5  ----------  PAIN WITH PALPATION:denies tenderness to palpation about the knee  KNEE EFFUSION: no  PAIN WITH KNEE ROM: no  PATELLAR CREPITUS:  no  ----------  SENSATION TO LIGHT TOUCH:  DEEP PERONEAL/SUPERFICIAL PERONEAL/SURAL/SAPHENOUS/TIBIAL:    intact  ----------  EDEMA:  no  ERYTHEMA:    no  WOUNDS/INCISIONS:  yes, well-healed anterior midline knee incision  ---------------------------------------------------  Left Knee Exam:  ----------  ALIGNMENT: 5 degrees varus, correctible to 3° varus   ----------  RANGE OF MOTION:  Decreased (5 - 125 degrees)   LIGAMENTOUS STABILITY:   stable to varus and valgus stress at terminal extension and 30 degrees; slight retensioning of the MCL is appreciated with valgus stress at 30 degrees consistent with medial compartment degeneration  ----------  STRENGTH:  KNEE FLEXION 5/5  KNEE EXTENSION  4/5  ANKLE DORSIFLEXION  5/5  ANKLE PLANTARFLEXION  5/5  ----------  PAIN WITH PALPATION:global  KNEE EFFUSION: yes, mild effusion  PAIN WITH KNEE ROM: yes  PATELLAR CREPITUS:  yes, painful and symptomatic  ----------  SENSATION TO LIGHT TOUCH:  DEEP PERONEAL/SUPERFICIAL PERONEAL/SURAL/SAPHENOUS/TIBIAL:    intact  ----------  EDEMA:  no  ERYTHEMA:    no  WOUNDS/INCISIONS:  no    _____________________________________________________________________  _____________________________________________________________________    RADIOGRAPHIC FINDINGS:   Indication: Status post revision right knee arthroplasty, left knee pain     Comparison: Todays xrays were compared to previous xrays from 4/26/18 of right knee, no left knee comparison radiographs     Right Knee films: Demonstrate well positioned knee arthroplasty components in satisfactory alignment without evidence of wear, loosening, subsidence, fracture, or osteolysis and No significant changes compared to prior radiographs.    Left knee 4 views: Radiographs demonstrate moderate to severe tricompartmental  osteoarthritis with genu varum alignment and bone-on-bone articulation medial compartment.  Periarticular osteophytes are visualized tricompartmentally.  No acute bony injury or fracture    Assessment/Plan:   Diagnosis Plan   1. Rupture of right quadriceps tendon, subsequent encounter  XR Knee 1 or 2 View Right   2. Primary osteoarthritis of left knee  XR Knee 4+ View Left    Large Joint Arthrocentesis     Patient is doing well status post revision right knee arthroplasty.  She can continue activity as tolerated.  She is still on oral suppressive antibiotics for the acute infection.  In regards to left knee, she has end-stage osteoarthritis.  Unfortunately she is using nicotine and is not a candidate for surgical intervention at this time.  I discussed conservative treatment measures.  She is agreeable to proceeding with cortisone injection in the left knee.  I will see her back in 2 months for discussion of surgical intervention if she has stopped smoking.    I spent approximately 5-10 minutes counseling the patient regarding the adverse effects of tobacco use.  Included in this counseling session were treatment options for cessation including quitting cold turkey, medication, nicotine step-down programs, and smoking cessation programs.  Furthermore, I explained to the patient the importance of abstaining from nicotine usage as nicotine is known to increase the risk of complications associated with surgery including but not limited to infection, decreased wound healing, slowed soft tissue healing, and increased surgery associated pain.  As a result of this counseling session, the patient will weigh the options and determine how to proceed with achieving tobacco cessation in preparation for surgery.      Stephon Garcia MD  08/17/18  12:16 PM

## 2018-09-20 ENCOUNTER — LAB (OUTPATIENT)
Dept: LAB | Facility: HOSPITAL | Age: 63
End: 2018-09-20

## 2018-09-20 ENCOUNTER — TRANSCRIBE ORDERS (OUTPATIENT)
Dept: LAB | Facility: HOSPITAL | Age: 63
End: 2018-09-20

## 2018-09-20 DIAGNOSIS — R21 RASH AND OTHER NONSPECIFIC SKIN ERUPTION: Primary | ICD-10-CM

## 2018-09-20 DIAGNOSIS — B95.4 BACTERIAL INFECTION DUE TO STREPTOCOCCUS, GROUP G: ICD-10-CM

## 2018-09-20 DIAGNOSIS — T84.53XD INFECTION OF TOTAL RIGHT KNEE REPLACEMENT, SUBSEQUENT ENCOUNTER: ICD-10-CM

## 2018-09-20 DIAGNOSIS — R21 RASH AND OTHER NONSPECIFIC SKIN ERUPTION: ICD-10-CM

## 2018-09-20 DIAGNOSIS — R60.9 EDEMA, UNSPECIFIED TYPE: ICD-10-CM

## 2018-09-20 LAB
ALBUMIN SERPL-MCNC: 4.43 G/DL (ref 3.2–4.8)
ALBUMIN/GLOB SERPL: 2 G/DL (ref 1.5–2.5)
ALP SERPL-CCNC: 137 U/L (ref 25–100)
ALT SERPL W P-5'-P-CCNC: 20 U/L (ref 7–40)
ANION GAP SERPL CALCULATED.3IONS-SCNC: 6 MMOL/L (ref 3–11)
AST SERPL-CCNC: 27 U/L (ref 0–33)
BILIRUB SERPL-MCNC: 0.3 MG/DL (ref 0.3–1.2)
BUN BLD-MCNC: 16 MG/DL (ref 9–23)
BUN/CREAT SERPL: 17 (ref 7–25)
CALCIUM SPEC-SCNC: 9.4 MG/DL (ref 8.7–10.4)
CHLORIDE SERPL-SCNC: 101 MMOL/L (ref 99–109)
CO2 SERPL-SCNC: 34 MMOL/L (ref 20–31)
CREAT BLD-MCNC: 0.94 MG/DL (ref 0.6–1.3)
CRP SERPL-MCNC: 0.68 MG/DL (ref 0–1)
DEPRECATED RDW RBC AUTO: 46.8 FL (ref 37–54)
ERYTHROCYTE [DISTWIDTH] IN BLOOD BY AUTOMATED COUNT: 13.6 % (ref 11.3–14.5)
ERYTHROCYTE [SEDIMENTATION RATE] IN BLOOD: 33 MM/HR (ref 0–30)
GFR SERPL CREATININE-BSD FRML MDRD: 60 ML/MIN/1.73
GLOBULIN UR ELPH-MCNC: 2.2 GM/DL
GLUCOSE BLD-MCNC: 121 MG/DL (ref 70–100)
HCT VFR BLD AUTO: 40.4 % (ref 34.5–44)
HGB BLD-MCNC: 12.7 G/DL (ref 11.5–15.5)
MCH RBC QN AUTO: 29.3 PG (ref 27–31)
MCHC RBC AUTO-ENTMCNC: 31.4 G/DL (ref 32–36)
MCV RBC AUTO: 93.3 FL (ref 80–99)
PLATELET # BLD AUTO: 342 10*3/MM3 (ref 150–450)
PMV BLD AUTO: 9.7 FL (ref 6–12)
POTASSIUM BLD-SCNC: 4.3 MMOL/L (ref 3.5–5.5)
PROT SERPL-MCNC: 6.6 G/DL (ref 5.7–8.2)
RBC # BLD AUTO: 4.33 10*6/MM3 (ref 3.89–5.14)
SODIUM BLD-SCNC: 141 MMOL/L (ref 132–146)
WBC NRBC COR # BLD: 7.54 10*3/MM3 (ref 3.5–10.8)

## 2018-09-20 PROCEDURE — 36415 COLL VENOUS BLD VENIPUNCTURE: CPT

## 2018-09-20 PROCEDURE — 85027 COMPLETE CBC AUTOMATED: CPT

## 2018-09-20 PROCEDURE — 80053 COMPREHEN METABOLIC PANEL: CPT

## 2018-09-20 PROCEDURE — 85652 RBC SED RATE AUTOMATED: CPT

## 2018-09-20 PROCEDURE — 86140 C-REACTIVE PROTEIN: CPT

## 2018-11-01 ENCOUNTER — OFFICE VISIT (OUTPATIENT)
Dept: ORTHOPEDIC SURGERY | Facility: CLINIC | Age: 63
End: 2018-11-01

## 2018-11-01 VITALS — WEIGHT: 188.27 LBS | HEART RATE: 103 BPM | BODY MASS INDEX: 32.14 KG/M2 | HEIGHT: 64 IN | OXYGEN SATURATION: 98 %

## 2018-11-01 DIAGNOSIS — M17.12 PRIMARY OSTEOARTHRITIS OF LEFT KNEE: Primary | ICD-10-CM

## 2018-11-01 DIAGNOSIS — Z72.0 TOBACCO ABUSE: ICD-10-CM

## 2018-11-01 PROCEDURE — 99406 BEHAV CHNG SMOKING 3-10 MIN: CPT | Performed by: ORTHOPAEDIC SURGERY

## 2018-11-01 PROCEDURE — 99213 OFFICE O/P EST LOW 20 MIN: CPT | Performed by: ORTHOPAEDIC SURGERY

## 2018-11-01 RX ORDER — NICOTINE 21 MG/24HR
PATCH, TRANSDERMAL 24 HOURS TRANSDERMAL
Refills: 0 | COMMUNITY
Start: 2018-10-02 | End: 2018-11-27

## 2018-11-01 RX ORDER — BUSPIRONE HYDROCHLORIDE 15 MG/1
15 TABLET ORAL 2 TIMES DAILY
Refills: 0 | COMMUNITY
Start: 2018-10-25

## 2018-11-01 RX ORDER — MELOXICAM 7.5 MG/1
15 TABLET ORAL ONCE
Status: CANCELLED | OUTPATIENT
Start: 2018-11-01 | End: 2018-11-01

## 2018-11-01 RX ORDER — ACETAMINOPHEN 325 MG/1
1000 TABLET ORAL ONCE
Status: CANCELLED | OUTPATIENT
Start: 2018-11-01 | End: 2018-11-01

## 2018-11-01 RX ORDER — OXYCODONE HCL 10 MG/1
10 TABLET, FILM COATED, EXTENDED RELEASE ORAL ONCE
Status: CANCELLED | OUTPATIENT
Start: 2018-11-01 | End: 2018-11-01

## 2018-11-01 NOTE — PROGRESS NOTES
Orthopaedic Clinic Note: Knee Established Patient    Chief Complaint   Patient presents with   • Right Knee - Follow-up     2.5mos month f/u: 11 months s/p I&D POLY EXCHANGE RIGHT KNEE - 12/10/2017, 9 months s/p RIGHT QUAD TENDON REPAIR - 01/08/2018   • Left Knee - Follow-up     2.5 months- primary osteoarthritis of left knee        HPI    It has been 2.5 month(s) since Ms. Leon's last visit. She returns to clinic today for follow-up of left knee pain.  She has known end-stage osteoarthritis left knee.  She is rating her pain an 8/10 on the pain scale.  She is using a brace as well as anti-inflammatories with minimal relief.  She is having significant difficulty ambulating as a result of her ongoing left knee pain.  Her right knee is functioning well with no limitations and no pain in that knee.  She continues to use nicotine and has failed conservative treatment of the left knee with prior cortisone injections.  Overall she is doing worse.  Sitting and resting eases her pain.  Standing and weightbearing increases her pain    Past Medical History:   Diagnosis Date   • Anxiety and depression    • Arthritis    • Breast cancer (CMS/HCC) 2015    RIGHT   • Disease of thyroid gland    • Hx of radiation therapy 2015    RT BREAST   • Hypertension    • Wears glasses       Past Surgical History:   Procedure Laterality Date   • BREAST BIOPSY Right 2015   • BREAST LUMPECTOMY Right 2015   • BREAST LUMPECTOMY     • JOINT REPLACEMENT     • KNEE ARTHROTOMY Right 12/10/2017    Procedure: INCISION AND DRAINAGE, POLY EXCHANGE RIGHT KNEE;  Surgeon: Stephon Garcia MD;  Location:  [facility];  Service:    • NH FIX QUAD/HAMSTR MUSC RUPT,PRIMARY Right 1/8/2018    Procedure: PRIMARY REPAIR OF RIGHT QUADRICEPS TENDON ;  Surgeon: Stephon Garcia MD;  Location:  [facility];  Service: Orthopedics      Family History   Problem Relation Age of Onset   • Breast cancer Mother         DX AGE UNKNOWN   • Breast cancer Sister         DX AGE  UNKNOWN   • Ovarian cancer Neg Hx      Social History     Social History   • Marital status:      Spouse name: N/A   • Number of children: N/A   • Years of education: N/A     Occupational History   • Not on file.     Social History Main Topics   • Smoking status: Current Every Day Smoker     Years: 5.00     Types: Electronic Cigarette   • Smokeless tobacco: Never Used   • Alcohol use No   • Drug use: No   • Sexual activity: Defer     Other Topics Concern   • Not on file     Social History Narrative   • No narrative on file      Current Outpatient Prescriptions on File Prior to Visit   Medication Sig Dispense Refill   • ALPRAZolam (XANAX) 1 MG tablet Take 1 mg by mouth 2 (Two) Times a Day.     • AMITIZA 8 MCG capsule Take 8 mcg by mouth 2 (Two) Times a Day.  0   • aspirin 325 MG EC tablet Take 1 tablet by mouth Every 12 (Twelve) Hours for 1 month 60 tablet 0   • buPROPion XL (WELLBUTRIN XL) 150 MG 24 hr tablet Take 150 mg by mouth 2 (Two) Times a Day.     • CELEBREX 200 MG capsule Take 200 mg by mouth Daily.  0   • chlorzoxazone (PARAFON FORTE) 500 MG tablet Take 500 mg by mouth 3 (Three) Times a Day As Needed for Muscle Spasms.     • clotrimazole-betamethasone (LOTRISONE) 1-0.05 % cream apply to affected area once daily  0   • DULoxetine (CYMBALTA) 30 MG capsule Take 30 mg by mouth 3 (Three) Times a Day.     • furosemide (LASIX) 20 MG tablet Take 20 mg by mouth 2 (Two) Times a Day.  0   • gabapentin (NEURONTIN) 800 MG tablet Take 800 mg by mouth 4 (Four) Times a Day.     • levothyroxine (SYNTHROID, LEVOTHROID) 100 MCG tablet Take 100 mcg by mouth Daily. Brand name.     • linaclotide (LINZESS) 290 MCG capsule capsule Take 290 mcg by mouth As Needed.     • lisinopril (PRINIVIL,ZESTRIL) 20 MG tablet Take 20 mg by mouth Daily.     • SUBOXONE 8-2 MG film film take 2 FILMs under the tongue once daily  0   • triamterene-hydrochlorothiazide (MAXZIDE) 75-50 MG per tablet Take 1 tablet by mouth Daily.     •  "[DISCONTINUED] cephalexin (KEFLEX) 500 MG capsule take 1 capsule by mouth four times a day  0   • [DISCONTINUED] HYDROcodone-acetaminophen (NORCO) 5-325 MG per tablet Take 1 tablet by mouth Every 6 (Six) Hours As Needed (pain). 1-2 oral Q4H PRN pain 60 tablet 0   • [DISCONTINUED] oxyCODONE-acetaminophen (PERCOCET)  MG per tablet Take 1 tablet by mouth Every 6 (Six) Hours As Needed for Moderate Pain . 40 tablet 0   • [DISCONTINUED] oxyCODONE-acetaminophen (PERCOCET) 5-325 MG per tablet Take 1 tablet by mouth Every 8 (Eight) Hours As Needed (pain). 40 tablet 0   • [DISCONTINUED] oxyCODONE-acetaminophen (PERCOCET) 7.5-325 MG per tablet Take 1 tablet by mouth Every 6 (Six) Hours As Needed for Moderate Pain . 60 tablet 0     No current facility-administered medications on file prior to visit.       Allergies   Allergen Reactions   • Bactrim [Sulfamethoxazole-Trimethoprim] Hives        Review of Systems   Constitutional: Negative.    HENT: Negative.    Eyes: Negative.    Respiratory: Negative.    Cardiovascular: Negative.    Gastrointestinal: Negative.    Endocrine: Negative.    Genitourinary: Negative.    Musculoskeletal: Positive for arthralgias.   Skin: Negative.    Allergic/Immunologic: Negative.    Neurological: Negative.    Hematological: Negative.    Psychiatric/Behavioral: Negative.         Physical Exam  Pulse 103, height 162.6 cm (64.02\"), weight 85.4 kg (188 lb 4.4 oz), SpO2 98 %.    Body mass index is 32.3 kg/m².    GENERAL APPEARANCE: awake, alert, oriented, in no acute distress  LUNGS:  breathing nonlabored  EXTREMITIES: no clubbing, cyanosis  PERIPHERAL PULSES: palpable dorsalis pedis and posterior tibial pulses bilaterally.    GAIT:  Antalgic        ----------  Right Knee Exam:  ----------  ALIGNMENT: neutral  ----------  RANGE OF MOTION:  Decreased (3 - 120 degrees) with no extensor lag  LIGAMENTOUS STABILITY:   stable to varus and valgus stress at terminal extension and 30 degrees without any " evidence of laxity  ----------  STRENGTH:  KNEE FLEXION 5/5  KNEE EXTENSION  5/5  ANKLE DORSIFLEXION  5/5  ANKLE PLANTARFLEXION  5/5  ----------  PAIN WITH PALPATION:denies tenderness to palpation about the knee  KNEE EFFUSION: no  PAIN WITH KNEE ROM: no  PATELLAR CREPITUS:  no  ----------  SENSATION TO LIGHT TOUCH:  DEEP PERONEAL/SUPERFICIAL PERONEAL/SURAL/SAPHENOUS/TIBIAL:    intact  ----------  EDEMA:  no  ERYTHEMA:    no  WOUNDS/INCISIONS:  yes, well-healed anterior midline incision  -----------------------------------------  Left Knee Exam:  ----------  ALIGNMENT: 5 degrees varus, correctible to neutral  ----------  RANGE OF MOTION:  Decreased (5 - 120 degrees) with no extensor lag  LIGAMENTOUS STABILITY:   stable to varus and valgus stress at terminal extension and 30 degrees; slight retensioning of the MCL is appreciated with valgus stress at 30 degrees consistent with medial compartment degeneration  ----------  STRENGTH:  KNEE FLEXION 5/5  KNEE EXTENSION  5/5  ANKLE DORSIFLEXION  5/5  ANKLE PLANTARFLEXION  5/5  ----------  PAIN WITH PALPATION:medial joint line and global  KNEE EFFUSION: yes, moderate effusion  PAIN WITH KNEE ROM: yes  PATELLAR CREPITUS:  no  ----------  SENSATION TO LIGHT TOUCH:  DEEP PERONEAL/SUPERFICIAL PERONEAL/SURAL/SAPHENOUS/TIBIAL:    intact  ----------  EDEMA:  yes, trace 1+ pitting edema at ankle   ERYTHEMA:    no  WOUNDS/INCISIONS:  no    _____________________________________________________________________  _____________________________________________________________________    RADIOGRAPHIC FINDINGS:   Indication: Left knee pain     Comparison: Todays xrays were compared to previous xrays from 8/17/19     Knee films: Demonstrate severe varus arthritis with medial compartment bone-on-bone articulation and bony erosion.  Lena-articular osteophytes are visualized in all compartments.  No significant changes compared to prior radiographs.    Assessment/Plan:   Diagnosis Plan   1.  Primary osteoarthritis of left knee  XR Knee 4+ View Left    Case Request    Pregnancy, Urine - Urine, Clean Catch    CBC and Differential    Basic metabolic panel    Protime-INR    APTT    Hemoglobin A1c    Urinalysis With Culture If Indicated - Urine, Clean Catch    ECG 12 Lead    Nicotine & Metabolite, Quant    Tranexamic Acid 1,000 mg in sodium chloride 0.9 % 100 mL    Tranexamic Acid 1,000 mg in sodium chloride 0.9 % 100 mL    ceFAZolin (ANCEF) 2 g in sodium chloride 0.9 % 100 mL IVPB    acetaminophen (TYLENOL) tablet 975 mg    meloxicam (MOBIC) tablet 15 mg    mupirocin (BACTROBAN) 2 % nasal ointment 1 application    oxyCODONE (oxyCONTIN) 12 hr tablet 10 mg    Case Request   2. Tobacco abuse       Patient continues use nicotine products.  Unfortunately, her pain is refractory to conservative treatment in regards to the left knee.  I discussed that as long as she is agreeable to nicotine cessation, we'll schedule her for left total knee arthroplasty in early December.  She is agreeable to this.    I spent approximately 5-10 minutes counseling the patient regarding the adverse effects of tobacco use.  Included in this counseling session were treatment options for cessation including quitting cold turkey, medication, nicotine step-down programs, and smoking cessation programs.  Furthermore, I explained to the patient the importance of abstaining from nicotine usage as nicotine is known to increase the risk of complications associated with surgery including but not limited to infection, decreased wound healing, slowed soft tissue healing, and increased surgery associated pain.  As a result of this counseling session, the patient will weigh the options and determine how to proceed with achieving tobacco cessation in preparation for surgery.    The patient has clinical and radiographic evidence of end-stage left knee joint degeneration. Conservative measures have been tried for 3 months or longer, but have failed to  adequately treat or improve the patient's symptoms. Pain is restricting the patient's daily activities as well as quality of life. The recommendation at this time is to proceed with a left total knee arthroplasty with the goal to improve patient function and pain. The risks, benefits, potential complications, and alternatives were discussed with the patient in detail. Risks included but were not limited to bleeding, infection, anesthesia risks, damage to neurovascular structures, osteolysis, aseptic loosening, instability, dislocation, pain, continued pain, iatrogenic fracture, possible need for future surgery including the potential for amputation, blood clots, myocardial infarction, stroke, and death. Lena-operative blood management and the potential for blood transfusion were discussed with risks and options clearly outlined. Specific details of the surgical procedure, hospitalization, recovery, rehabilitation, and long-term precautions were also presented. Pre-operative teaching was provided. Implant/prosthesis selection was outlined, and the many options available were explained; the final choice will be made at the time of the procedure to match the anatomy and condition of the bone, ligaments, tendons, and muscles. Given this instruction, the patient elected to proceed with the left total knee arthroplasty. The patient will be seen by pre-admission testing for pre-operative optimization and risk assessment and will be scheduled for surgery once this is completed.    The patient is considered standard risk for DVT based on patient risk factors and will be placed on aspirin postoperatively for DVT prophylaxis.      Stephon Garcia MD  11/01/18  2:25 PM

## 2018-11-27 ENCOUNTER — APPOINTMENT (OUTPATIENT)
Dept: PREADMISSION TESTING | Facility: HOSPITAL | Age: 63
End: 2018-11-27

## 2018-11-27 VITALS — HEIGHT: 64 IN | WEIGHT: 195.77 LBS | BODY MASS INDEX: 33.42 KG/M2

## 2018-11-27 DIAGNOSIS — M17.12 PRIMARY OSTEOARTHRITIS OF LEFT KNEE: ICD-10-CM

## 2018-11-27 LAB
ANION GAP SERPL CALCULATED.3IONS-SCNC: 5 MMOL/L (ref 3–11)
APTT PPP: 28.2 SECONDS (ref 24–31)
BACTERIA UR QL AUTO: ABNORMAL /HPF
BASOPHILS # BLD AUTO: 0.05 10*3/MM3 (ref 0–0.2)
BASOPHILS NFR BLD AUTO: 0.9 % (ref 0–1)
BILIRUB UR QL STRIP: NEGATIVE
BUN BLD-MCNC: 17 MG/DL (ref 9–23)
BUN/CREAT SERPL: 19.1 (ref 7–25)
CALCIUM SPEC-SCNC: 9 MG/DL (ref 8.7–10.4)
CHLORIDE SERPL-SCNC: 102 MMOL/L (ref 99–109)
CLARITY UR: CLEAR
CO2 SERPL-SCNC: 32 MMOL/L (ref 20–31)
COLOR UR: YELLOW
CREAT BLD-MCNC: 0.89 MG/DL (ref 0.6–1.3)
DEPRECATED RDW RBC AUTO: 45.9 FL (ref 37–54)
EOSINOPHIL # BLD AUTO: 0.4 10*3/MM3 (ref 0–0.3)
EOSINOPHIL NFR BLD AUTO: 6.8 % (ref 0–3)
ERYTHROCYTE [DISTWIDTH] IN BLOOD BY AUTOMATED COUNT: 13.2 % (ref 11.3–14.5)
GFR SERPL CREATININE-BSD FRML MDRD: 64 ML/MIN/1.73
GLUCOSE BLD-MCNC: 75 MG/DL (ref 70–100)
GLUCOSE UR STRIP-MCNC: NEGATIVE MG/DL
HBA1C MFR BLD: 5.4 % (ref 4.8–5.6)
HCT VFR BLD AUTO: 38.8 % (ref 34.5–44)
HGB BLD-MCNC: 12.2 G/DL (ref 11.5–15.5)
HGB UR QL STRIP.AUTO: ABNORMAL
HYALINE CASTS UR QL AUTO: ABNORMAL /LPF
IMM GRANULOCYTES # BLD: 0 10*3/MM3 (ref 0–0.03)
IMM GRANULOCYTES NFR BLD: 0 % (ref 0–0.6)
INR PPP: 1 (ref 0.91–1.09)
KETONES UR QL STRIP: ABNORMAL
LEUKOCYTE ESTERASE UR QL STRIP.AUTO: NEGATIVE
LYMPHOCYTES # BLD AUTO: 1.39 10*3/MM3 (ref 0.6–4.8)
LYMPHOCYTES NFR BLD AUTO: 23.7 % (ref 24–44)
MCH RBC QN AUTO: 30 PG (ref 27–31)
MCHC RBC AUTO-ENTMCNC: 31.4 G/DL (ref 32–36)
MCV RBC AUTO: 95.3 FL (ref 80–99)
MONOCYTES # BLD AUTO: 0.49 10*3/MM3 (ref 0–1)
MONOCYTES NFR BLD AUTO: 8.3 % (ref 0–12)
NEUTROPHILS # BLD AUTO: 3.54 10*3/MM3 (ref 1.5–8.3)
NEUTROPHILS NFR BLD AUTO: 60.3 % (ref 41–71)
NITRITE UR QL STRIP: NEGATIVE
PH UR STRIP.AUTO: 5.5 [PH] (ref 5–8)
PLATELET # BLD AUTO: 257 10*3/MM3 (ref 150–450)
PMV BLD AUTO: 9.5 FL (ref 6–12)
POTASSIUM BLD-SCNC: 4.7 MMOL/L (ref 3.5–5.5)
PROT UR QL STRIP: NEGATIVE
PROTHROMBIN TIME: 10.5 SECONDS (ref 9.6–11.5)
RBC # BLD AUTO: 4.07 10*6/MM3 (ref 3.89–5.14)
RBC # UR: ABNORMAL /HPF
REF LAB TEST METHOD: ABNORMAL
SODIUM BLD-SCNC: 139 MMOL/L (ref 132–146)
SP GR UR STRIP: 1.02 (ref 1–1.03)
SQUAMOUS #/AREA URNS HPF: ABNORMAL /HPF
UROBILINOGEN UR QL STRIP: ABNORMAL
WBC NRBC COR # BLD: 5.87 10*3/MM3 (ref 3.5–10.8)
WBC UR QL AUTO: ABNORMAL /HPF

## 2018-11-27 PROCEDURE — 93010 ELECTROCARDIOGRAM REPORT: CPT | Performed by: INTERNAL MEDICINE

## 2018-11-27 PROCEDURE — 93005 ELECTROCARDIOGRAM TRACING: CPT

## 2018-11-27 PROCEDURE — 85730 THROMBOPLASTIN TIME PARTIAL: CPT | Performed by: ORTHOPAEDIC SURGERY

## 2018-11-27 PROCEDURE — 36415 COLL VENOUS BLD VENIPUNCTURE: CPT

## 2018-11-27 PROCEDURE — 80048 BASIC METABOLIC PNL TOTAL CA: CPT | Performed by: ORTHOPAEDIC SURGERY

## 2018-11-27 PROCEDURE — 85610 PROTHROMBIN TIME: CPT | Performed by: ORTHOPAEDIC SURGERY

## 2018-11-27 PROCEDURE — G0480 DRUG TEST DEF 1-7 CLASSES: HCPCS | Performed by: ORTHOPAEDIC SURGERY

## 2018-11-27 PROCEDURE — 85025 COMPLETE CBC W/AUTO DIFF WBC: CPT | Performed by: ORTHOPAEDIC SURGERY

## 2018-11-27 PROCEDURE — 81001 URINALYSIS AUTO W/SCOPE: CPT | Performed by: ORTHOPAEDIC SURGERY

## 2018-11-27 PROCEDURE — 83036 HEMOGLOBIN GLYCOSYLATED A1C: CPT | Performed by: ORTHOPAEDIC SURGERY

## 2018-11-27 RX ORDER — ASPIRIN 81 MG/1
81 TABLET ORAL DAILY
Status: ON HOLD | COMMUNITY
End: 2019-02-21 | Stop reason: SDUPTHER

## 2018-11-27 ASSESSMENT — KOOS JR
KOOS JR SCORE: 50.012
KOOS JR SCORE: 15

## 2018-11-27 NOTE — DISCHARGE INSTRUCTIONS

## 2018-11-27 NOTE — PAT
Patient to apply Chlorhexadine wipes  to surgical area (as instructed) the night before procedure and the AM of procedure. Wipes provided.    Verified with patient that they received a prescription for Bactroban and Chlorhexidine shower from the office.  Reinforced with them to fill the prescription if they haven't already.  Verbal and written instructions given regarding proper use of the Bactroban and Chlorhexidine to patient and/or famlily during PAT visit. Patient/family also instructed to complete checklist and return it to Pre-op on the day of surgery.  Patient and/or family verbalized understanding.    Patient attended joint replacement class today during PAT visit.

## 2018-12-01 LAB
COTININE UR-MCNC: 130.2 NG/ML
NICOTINE SERPL-MCNC: 5.7 NG/ML

## 2019-01-22 ENCOUNTER — TELEPHONE (OUTPATIENT)
Dept: ORTHOPEDIC SURGERY | Facility: CLINIC | Age: 64
End: 2019-01-22

## 2019-01-22 NOTE — TELEPHONE ENCOUNTER
RETURNED PATIENT'S CALL TO RESCHEDULE SURGERY WITH DR MA, NO ANSWER, LEFT VOICEMAIL TO RETURN MY CALL TO SCHEDULE.

## 2019-01-24 ENCOUNTER — TELEPHONE (OUTPATIENT)
Dept: ORTHOPEDIC SURGERY | Facility: CLINIC | Age: 64
End: 2019-01-24

## 2019-01-24 ENCOUNTER — PREP FOR SURGERY (OUTPATIENT)
Dept: OTHER | Facility: HOSPITAL | Age: 64
End: 2019-01-24

## 2019-01-24 DIAGNOSIS — M17.12 OSTEOARTHRITIS OF LEFT KNEE: Primary | ICD-10-CM

## 2019-01-24 RX ORDER — ACETAMINOPHEN 500 MG
1000 TABLET ORAL ONCE
Status: CANCELLED | OUTPATIENT
Start: 2019-01-24 | End: 2019-01-24

## 2019-01-24 RX ORDER — OXYCODONE HCL 10 MG/1
10 TABLET, FILM COATED, EXTENDED RELEASE ORAL ONCE
Status: CANCELLED | OUTPATIENT
Start: 2019-01-24 | End: 2019-01-24

## 2019-01-24 RX ORDER — MELOXICAM 7.5 MG/1
15 TABLET ORAL ONCE
Status: CANCELLED | OUTPATIENT
Start: 2019-01-24 | End: 2019-01-24

## 2019-01-24 RX ORDER — CEFAZOLIN SODIUM 2 G/100ML
2 INJECTION, SOLUTION INTRAVENOUS ONCE
Status: CANCELLED | OUTPATIENT
Start: 2019-01-24 | End: 2019-01-24

## 2019-01-24 NOTE — TELEPHONE ENCOUNTER
CALLED PATIENT TO OFFER EARLIER SURGERY DATE WITH DR MA, NO ANSWER, LEFT VOICEMAIL TO RETURN MY CALL.

## 2019-02-12 PROBLEM — M17.12 OSTEOARTHRITIS OF LEFT KNEE: Status: ACTIVE | Noted: 2019-02-12

## 2019-02-14 ENCOUNTER — APPOINTMENT (OUTPATIENT)
Dept: PREADMISSION TESTING | Facility: HOSPITAL | Age: 64
End: 2019-02-14

## 2019-02-14 VITALS — BODY MASS INDEX: 33.01 KG/M2 | WEIGHT: 193.34 LBS | HEIGHT: 64 IN

## 2019-02-14 DIAGNOSIS — M17.12 OSTEOARTHRITIS OF LEFT KNEE: ICD-10-CM

## 2019-02-14 LAB
ANION GAP SERPL CALCULATED.3IONS-SCNC: 7 MMOL/L (ref 3–11)
BACTERIA UR QL AUTO: NORMAL /HPF
BASOPHILS # BLD AUTO: 0.04 10*3/MM3 (ref 0–0.2)
BASOPHILS NFR BLD AUTO: 0.7 % (ref 0–1)
BILIRUB UR QL STRIP: NEGATIVE
BUN BLD-MCNC: 15 MG/DL (ref 9–23)
BUN/CREAT SERPL: 14.4 (ref 7–25)
CALCIUM SPEC-SCNC: 9.2 MG/DL (ref 8.7–10.4)
CHLORIDE SERPL-SCNC: 102 MMOL/L (ref 99–109)
CLARITY UR: CLEAR
CO2 SERPL-SCNC: 33 MMOL/L (ref 20–31)
COLOR UR: YELLOW
CREAT BLD-MCNC: 1.04 MG/DL (ref 0.6–1.3)
DEPRECATED RDW RBC AUTO: 45.9 FL (ref 37–54)
EOSINOPHIL # BLD AUTO: 0.45 10*3/MM3 (ref 0–0.3)
EOSINOPHIL NFR BLD AUTO: 7.9 % (ref 0–3)
ERYTHROCYTE [DISTWIDTH] IN BLOOD BY AUTOMATED COUNT: 13.1 % (ref 11.3–14.5)
GFR SERPL CREATININE-BSD FRML MDRD: 54 ML/MIN/1.73
GLUCOSE BLD-MCNC: 90 MG/DL (ref 70–100)
GLUCOSE UR STRIP-MCNC: NEGATIVE MG/DL
HBA1C MFR BLD: 5 % (ref 4.8–5.6)
HCT VFR BLD AUTO: 39.1 % (ref 34.5–44)
HGB BLD-MCNC: 12.3 G/DL (ref 11.5–15.5)
HGB UR QL STRIP.AUTO: ABNORMAL
HYALINE CASTS UR QL AUTO: NORMAL /LPF
IMM GRANULOCYTES # BLD AUTO: 0 10*3/MM3 (ref 0–0.05)
IMM GRANULOCYTES NFR BLD AUTO: 0 % (ref 0–0.6)
INR PPP: 1.1 (ref 0.85–1.16)
KETONES UR QL STRIP: NEGATIVE
LEUKOCYTE ESTERASE UR QL STRIP.AUTO: NEGATIVE
LYMPHOCYTES # BLD AUTO: 1.32 10*3/MM3 (ref 0.6–4.8)
LYMPHOCYTES NFR BLD AUTO: 23.3 % (ref 24–44)
MCH RBC QN AUTO: 29.7 PG (ref 27–31)
MCHC RBC AUTO-ENTMCNC: 31.5 G/DL (ref 32–36)
MCV RBC AUTO: 94.4 FL (ref 80–99)
MONOCYTES # BLD AUTO: 0.48 10*3/MM3 (ref 0–1)
MONOCYTES NFR BLD AUTO: 8.5 % (ref 0–12)
NEUTROPHILS # BLD AUTO: 3.38 10*3/MM3 (ref 1.5–8.3)
NEUTROPHILS NFR BLD AUTO: 59.6 % (ref 41–71)
NITRITE UR QL STRIP: NEGATIVE
PH UR STRIP.AUTO: <=5 [PH] (ref 5–8)
PLATELET # BLD AUTO: 272 10*3/MM3 (ref 150–450)
PMV BLD AUTO: 10.7 FL (ref 6–12)
POTASSIUM BLD-SCNC: 3.9 MMOL/L (ref 3.5–5.5)
PROT UR QL STRIP: NEGATIVE
PROTHROMBIN TIME: 13.7 SECONDS (ref 11.2–14.3)
RBC # BLD AUTO: 4.14 10*6/MM3 (ref 3.89–5.14)
RBC # UR: NORMAL /HPF
REF LAB TEST METHOD: NORMAL
SODIUM BLD-SCNC: 142 MMOL/L (ref 132–146)
SP GR UR STRIP: 1.01 (ref 1–1.03)
SQUAMOUS #/AREA URNS HPF: NORMAL /HPF
UROBILINOGEN UR QL STRIP: ABNORMAL
WBC NRBC COR # BLD: 5.67 10*3/MM3 (ref 3.5–10.8)
WBC UR QL AUTO: NORMAL /HPF

## 2019-02-14 PROCEDURE — 81001 URINALYSIS AUTO W/SCOPE: CPT | Performed by: ORTHOPAEDIC SURGERY

## 2019-02-14 PROCEDURE — 80048 BASIC METABOLIC PNL TOTAL CA: CPT | Performed by: ORTHOPAEDIC SURGERY

## 2019-02-14 PROCEDURE — 36415 COLL VENOUS BLD VENIPUNCTURE: CPT

## 2019-02-14 PROCEDURE — 85610 PROTHROMBIN TIME: CPT | Performed by: ORTHOPAEDIC SURGERY

## 2019-02-14 PROCEDURE — 85025 COMPLETE CBC W/AUTO DIFF WBC: CPT | Performed by: ORTHOPAEDIC SURGERY

## 2019-02-14 PROCEDURE — 83036 HEMOGLOBIN GLYCOSYLATED A1C: CPT | Performed by: ORTHOPAEDIC SURGERY

## 2019-02-14 RX ORDER — CEPHALEXIN 500 MG/1
500 CAPSULE ORAL 2 TIMES DAILY
COMMUNITY

## 2019-02-14 ASSESSMENT — KOOS JR
KOOS JR SCORE: 20.941
KOOS JR SCORE: 25

## 2019-02-14 NOTE — PAT
Patient to apply to surgical area (as instructed) the night before procedure and the AM of procedure.    Patient instructed to drink 20 ounces (or until full) of Gatorade or 20 ounces of G2 (if diabetic) and complete 3 hours before your surgery start time. (NO RED Gatorade or G2)    Patient verbalized understanding.    Patient attended class last month.

## 2019-02-19 ENCOUNTER — ANESTHESIA EVENT (OUTPATIENT)
Dept: PERIOP | Facility: HOSPITAL | Age: 64
End: 2019-02-19

## 2019-02-19 RX ORDER — FAMOTIDINE 10 MG/ML
20 INJECTION, SOLUTION INTRAVENOUS ONCE
Status: CANCELLED | OUTPATIENT
Start: 2019-02-19 | End: 2019-02-19

## 2019-02-20 ENCOUNTER — ANESTHESIA (OUTPATIENT)
Dept: PERIOP | Facility: HOSPITAL | Age: 64
End: 2019-02-20

## 2019-02-20 ENCOUNTER — HOSPITAL ENCOUNTER (OUTPATIENT)
Facility: HOSPITAL | Age: 64
Discharge: HOME | End: 2019-02-21
Attending: ORTHOPAEDIC SURGERY | Admitting: ORTHOPAEDIC SURGERY

## 2019-02-20 ENCOUNTER — APPOINTMENT (OUTPATIENT)
Dept: GENERAL RADIOLOGY | Facility: HOSPITAL | Age: 64
End: 2019-02-20

## 2019-02-20 DIAGNOSIS — Z74.09 IMPAIRED MOBILITY AND ADLS: ICD-10-CM

## 2019-02-20 DIAGNOSIS — Z74.09 IMPAIRED FUNCTIONAL MOBILITY, BALANCE, GAIT, AND ENDURANCE: Primary | ICD-10-CM

## 2019-02-20 DIAGNOSIS — Z78.9 IMPAIRED MOBILITY AND ADLS: ICD-10-CM

## 2019-02-20 DIAGNOSIS — M17.12 OSTEOARTHRITIS OF LEFT KNEE: ICD-10-CM

## 2019-02-20 PROBLEM — Z96.652 STATUS POST TOTAL LEFT KNEE REPLACEMENT: Status: ACTIVE | Noted: 2019-02-20

## 2019-02-20 PROBLEM — M17.10 ARTHRITIS OF KNEE: Status: ACTIVE | Noted: 2019-02-20

## 2019-02-20 LAB
GLUCOSE BLDC GLUCOMTR-MCNC: 77 MG/DL (ref 70–130)
POTASSIUM BLDA-SCNC: 4.18 MMOL/L (ref 3.5–5.3)

## 2019-02-20 PROCEDURE — 25010000003 CEFAZOLIN IN DEXTROSE 2-4 GM/100ML-% SOLUTION: Performed by: ORTHOPAEDIC SURGERY

## 2019-02-20 PROCEDURE — 25010000002 ROPIVACAINE PER 1 MG: Performed by: ORTHOPAEDIC SURGERY

## 2019-02-20 PROCEDURE — C1713 ANCHOR/SCREW BN/BN,TIS/BN: HCPCS | Performed by: ORTHOPAEDIC SURGERY

## 2019-02-20 PROCEDURE — 27447 TOTAL KNEE ARTHROPLASTY: CPT | Performed by: ORTHOPAEDIC SURGERY

## 2019-02-20 PROCEDURE — 84132 ASSAY OF SERUM POTASSIUM: CPT | Performed by: ANESTHESIOLOGY

## 2019-02-20 PROCEDURE — 25010000002 KETOROLAC TROMETHAMINE PER 15 MG: Performed by: ORTHOPAEDIC SURGERY

## 2019-02-20 PROCEDURE — 97161 PT EVAL LOW COMPLEX 20 MIN: CPT

## 2019-02-20 PROCEDURE — 97116 GAIT TRAINING THERAPY: CPT

## 2019-02-20 PROCEDURE — 82962 GLUCOSE BLOOD TEST: CPT

## 2019-02-20 PROCEDURE — 25010000002 ROPIVACINE HCL-NACL: Performed by: NURSE ANESTHETIST, CERTIFIED REGISTERED

## 2019-02-20 PROCEDURE — 25010000002 MORPHINE SULFATE (PF) 2 MG/ML SOLUTION 1 ML SYRINGE: Performed by: ORTHOPAEDIC SURGERY

## 2019-02-20 PROCEDURE — 25010000002 DEXAMETHASONE PER 1 MG: Performed by: NURSE ANESTHETIST, CERTIFIED REGISTERED

## 2019-02-20 PROCEDURE — C1755 CATHETER, INTRASPINAL: HCPCS | Performed by: ORTHOPAEDIC SURGERY

## 2019-02-20 PROCEDURE — 25010000002 PROPOFOL 1000 MG/ML EMULSION: Performed by: NURSE ANESTHETIST, CERTIFIED REGISTERED

## 2019-02-20 PROCEDURE — C1776 JOINT DEVICE (IMPLANTABLE): HCPCS | Performed by: ORTHOPAEDIC SURGERY

## 2019-02-20 PROCEDURE — 73560 X-RAY EXAM OF KNEE 1 OR 2: CPT

## 2019-02-20 PROCEDURE — 25010000002 ONDANSETRON PER 1 MG: Performed by: NURSE ANESTHETIST, CERTIFIED REGISTERED

## 2019-02-20 DEVICE — COMP FEM TRIATH PS CMT NO3 LT: Type: IMPLANTABLE DEVICE | Site: KNEE | Status: FUNCTIONAL

## 2019-02-20 DEVICE — INSRT TIB/KN TRIATHLON PS X3 NMBR3 11MM: Type: IMPLANTABLE DEVICE | Site: KNEE | Status: FUNCTIONAL

## 2019-02-20 DEVICE — PEG FEM FIX TRIATHLON DIST MOD PK/2: Type: IMPLANTABLE DEVICE | Site: KNEE | Status: FUNCTIONAL

## 2019-02-20 DEVICE — IMPLANTABLE DEVICE: Type: IMPLANTABLE DEVICE | Site: PATELLA | Status: FUNCTIONAL

## 2019-02-20 DEVICE — BASEPLT TIB TRIATH TS NO3: Type: IMPLANTABLE DEVICE | Site: KNEE | Status: FUNCTIONAL

## 2019-02-20 DEVICE — CMT BONE SIMPLEX/P FULL DOSE 10/PK: Type: IMPLANTABLE DEVICE | Status: FUNCTIONAL

## 2019-02-20 DEVICE — TOTL KN HI DEMAND STRYKER: Type: IMPLANTABLE DEVICE | Site: KNEE | Status: FUNCTIONAL

## 2019-02-20 RX ORDER — SODIUM CHLORIDE 0.9 % (FLUSH) 0.9 %
3-10 SYRINGE (ML) INJECTION AS NEEDED
Status: DISCONTINUED | OUTPATIENT
Start: 2019-02-20 | End: 2019-02-20 | Stop reason: HOSPADM

## 2019-02-20 RX ORDER — SODIUM CHLORIDE 9 MG/ML
100 INJECTION, SOLUTION INTRAVENOUS CONTINUOUS
Status: DISCONTINUED | OUTPATIENT
Start: 2019-02-20 | End: 2019-02-21 | Stop reason: HOSPADM

## 2019-02-20 RX ORDER — DEXAMETHASONE SODIUM PHOSPHATE 4 MG/ML
INJECTION, SOLUTION INTRA-ARTICULAR; INTRALESIONAL; INTRAMUSCULAR; INTRAVENOUS; SOFT TISSUE AS NEEDED
Status: DISCONTINUED | OUTPATIENT
Start: 2019-02-20 | End: 2019-02-20 | Stop reason: SURG

## 2019-02-20 RX ORDER — CEFAZOLIN SODIUM 2 G/100ML
2 INJECTION, SOLUTION INTRAVENOUS EVERY 8 HOURS
Status: COMPLETED | OUTPATIENT
Start: 2019-02-20 | End: 2019-02-21

## 2019-02-20 RX ORDER — MELOXICAM 7.5 MG/1
15 TABLET ORAL DAILY
Status: DISCONTINUED | OUTPATIENT
Start: 2019-02-20 | End: 2019-02-21 | Stop reason: HOSPADM

## 2019-02-20 RX ORDER — LEVOTHYROXINE SODIUM 0.1 MG/1
100 TABLET ORAL
Status: DISCONTINUED | OUTPATIENT
Start: 2019-02-21 | End: 2019-02-21 | Stop reason: HOSPADM

## 2019-02-20 RX ORDER — ACETAMINOPHEN 650 MG/1
650 SUPPOSITORY RECTAL EVERY 4 HOURS PRN
Status: DISCONTINUED | OUTPATIENT
Start: 2019-02-20 | End: 2019-02-21 | Stop reason: HOSPADM

## 2019-02-20 RX ORDER — HYDROMORPHONE HYDROCHLORIDE 1 MG/ML
0.5 INJECTION, SOLUTION INTRAMUSCULAR; INTRAVENOUS; SUBCUTANEOUS
Status: DISCONTINUED | OUTPATIENT
Start: 2019-02-20 | End: 2019-02-21 | Stop reason: HOSPADM

## 2019-02-20 RX ORDER — LABETALOL HYDROCHLORIDE 5 MG/ML
10 INJECTION, SOLUTION INTRAVENOUS EVERY 4 HOURS PRN
Status: DISCONTINUED | OUTPATIENT
Start: 2019-02-20 | End: 2019-02-21 | Stop reason: HOSPADM

## 2019-02-20 RX ORDER — SODIUM CHLORIDE 0.9 % (FLUSH) 0.9 %
3 SYRINGE (ML) INJECTION EVERY 12 HOURS SCHEDULED
Status: DISCONTINUED | OUTPATIENT
Start: 2019-02-20 | End: 2019-02-20 | Stop reason: HOSPADM

## 2019-02-20 RX ORDER — OXYCODONE HYDROCHLORIDE AND ACETAMINOPHEN 5; 325 MG/1; MG/1
1 TABLET ORAL EVERY 4 HOURS PRN
Status: DISCONTINUED | OUTPATIENT
Start: 2019-02-20 | End: 2019-02-21 | Stop reason: HOSPADM

## 2019-02-20 RX ORDER — DOCUSATE SODIUM 100 MG/1
100 CAPSULE, LIQUID FILLED ORAL 2 TIMES DAILY PRN
Status: DISCONTINUED | OUTPATIENT
Start: 2019-02-20 | End: 2019-02-21 | Stop reason: HOSPADM

## 2019-02-20 RX ORDER — BUPIVACAINE HYDROCHLORIDE 2.5 MG/ML
INJECTION, SOLUTION EPIDURAL; INFILTRATION; INTRACAUDAL
Status: COMPLETED | OUTPATIENT
Start: 2019-02-20 | End: 2019-02-20

## 2019-02-20 RX ORDER — SODIUM CHLORIDE 0.9 % (FLUSH) 0.9 %
3-10 SYRINGE (ML) INJECTION AS NEEDED
Status: DISCONTINUED | OUTPATIENT
Start: 2019-02-20 | End: 2019-02-21 | Stop reason: HOSPADM

## 2019-02-20 RX ORDER — CEFAZOLIN SODIUM 2 G/100ML
2 INJECTION, SOLUTION INTRAVENOUS ONCE
Status: COMPLETED | OUTPATIENT
Start: 2019-02-20 | End: 2019-02-20

## 2019-02-20 RX ORDER — BUSPIRONE HYDROCHLORIDE 10 MG/1
15 TABLET ORAL 2 TIMES DAILY
Status: DISCONTINUED | OUTPATIENT
Start: 2019-02-20 | End: 2019-02-21 | Stop reason: HOSPADM

## 2019-02-20 RX ORDER — ONDANSETRON 2 MG/ML
4 INJECTION INTRAMUSCULAR; INTRAVENOUS ONCE AS NEEDED
Status: COMPLETED | OUTPATIENT
Start: 2019-02-20 | End: 2019-02-20

## 2019-02-20 RX ORDER — ONDANSETRON 2 MG/ML
4 INJECTION INTRAMUSCULAR; INTRAVENOUS EVERY 6 HOURS PRN
Status: DISCONTINUED | OUTPATIENT
Start: 2019-02-20 | End: 2019-02-21 | Stop reason: HOSPADM

## 2019-02-20 RX ORDER — MELOXICAM 7.5 MG/1
15 TABLET ORAL ONCE
Status: COMPLETED | OUTPATIENT
Start: 2019-02-20 | End: 2019-02-20

## 2019-02-20 RX ORDER — SODIUM CHLORIDE, SODIUM LACTATE, POTASSIUM CHLORIDE, CALCIUM CHLORIDE 600; 310; 30; 20 MG/100ML; MG/100ML; MG/100ML; MG/100ML
9 INJECTION, SOLUTION INTRAVENOUS CONTINUOUS
Status: DISCONTINUED | OUTPATIENT
Start: 2019-02-20 | End: 2019-02-21 | Stop reason: HOSPADM

## 2019-02-20 RX ORDER — DULOXETIN HYDROCHLORIDE 30 MG/1
30 CAPSULE, DELAYED RELEASE ORAL 3 TIMES DAILY
Status: DISCONTINUED | OUTPATIENT
Start: 2019-02-20 | End: 2019-02-21 | Stop reason: HOSPADM

## 2019-02-20 RX ORDER — FUROSEMIDE 20 MG/1
20 TABLET ORAL
Status: DISCONTINUED | OUTPATIENT
Start: 2019-02-20 | End: 2019-02-21 | Stop reason: HOSPADM

## 2019-02-20 RX ORDER — LIDOCAINE HYDROCHLORIDE 10 MG/ML
0.5 INJECTION, SOLUTION EPIDURAL; INFILTRATION; INTRACAUDAL; PERINEURAL ONCE AS NEEDED
Status: COMPLETED | OUTPATIENT
Start: 2019-02-20 | End: 2019-02-20

## 2019-02-20 RX ORDER — BISACODYL 5 MG/1
10 TABLET, DELAYED RELEASE ORAL DAILY PRN
Status: DISCONTINUED | OUTPATIENT
Start: 2019-02-20 | End: 2019-02-21 | Stop reason: HOSPADM

## 2019-02-20 RX ORDER — FAMOTIDINE 20 MG/1
20 TABLET, FILM COATED ORAL ONCE
Status: COMPLETED | OUTPATIENT
Start: 2019-02-20 | End: 2019-02-20

## 2019-02-20 RX ORDER — TRANEXAMIC ACID 100 MG/ML
INJECTION, SOLUTION INTRAVENOUS AS NEEDED
Status: DISCONTINUED | OUTPATIENT
Start: 2019-02-20 | End: 2019-02-20 | Stop reason: SURG

## 2019-02-20 RX ORDER — ASPIRIN 325 MG
325 TABLET, DELAYED RELEASE (ENTERIC COATED) ORAL EVERY 12 HOURS SCHEDULED
Status: DISCONTINUED | OUTPATIENT
Start: 2019-02-21 | End: 2019-02-21 | Stop reason: HOSPADM

## 2019-02-20 RX ORDER — ACETAMINOPHEN 500 MG
1000 TABLET ORAL ONCE
Status: COMPLETED | OUTPATIENT
Start: 2019-02-20 | End: 2019-02-20

## 2019-02-20 RX ORDER — OXYCODONE HYDROCHLORIDE AND ACETAMINOPHEN 5; 325 MG/1; MG/1
2 TABLET ORAL EVERY 4 HOURS PRN
Status: DISCONTINUED | OUTPATIENT
Start: 2019-02-20 | End: 2019-02-21 | Stop reason: HOSPADM

## 2019-02-20 RX ORDER — ACETAMINOPHEN 325 MG/1
650 TABLET ORAL EVERY 4 HOURS PRN
Status: DISCONTINUED | OUTPATIENT
Start: 2019-02-20 | End: 2019-02-21 | Stop reason: HOSPADM

## 2019-02-20 RX ORDER — OXYCODONE HCL 10 MG/1
10 TABLET, FILM COATED, EXTENDED RELEASE ORAL ONCE
Status: COMPLETED | OUTPATIENT
Start: 2019-02-20 | End: 2019-02-20

## 2019-02-20 RX ORDER — NALOXONE HCL 0.4 MG/ML
0.1 VIAL (ML) INJECTION
Status: DISCONTINUED | OUTPATIENT
Start: 2019-02-20 | End: 2019-02-21 | Stop reason: HOSPADM

## 2019-02-20 RX ORDER — ALPRAZOLAM 1 MG/1
1 TABLET ORAL DAILY PRN
Status: DISCONTINUED | OUTPATIENT
Start: 2019-02-20 | End: 2019-02-21 | Stop reason: HOSPADM

## 2019-02-20 RX ORDER — ACETAMINOPHEN 160 MG/5ML
650 SOLUTION ORAL EVERY 4 HOURS PRN
Status: DISCONTINUED | OUTPATIENT
Start: 2019-02-20 | End: 2019-02-21 | Stop reason: HOSPADM

## 2019-02-20 RX ORDER — BISACODYL 10 MG
10 SUPPOSITORY, RECTAL RECTAL DAILY PRN
Status: DISCONTINUED | OUTPATIENT
Start: 2019-02-20 | End: 2019-02-21 | Stop reason: HOSPADM

## 2019-02-20 RX ORDER — SODIUM CHLORIDE 0.9 % (FLUSH) 0.9 %
3 SYRINGE (ML) INJECTION EVERY 12 HOURS SCHEDULED
Status: DISCONTINUED | OUTPATIENT
Start: 2019-02-20 | End: 2019-02-21 | Stop reason: HOSPADM

## 2019-02-20 RX ORDER — GABAPENTIN 400 MG/1
800 CAPSULE ORAL 4 TIMES DAILY
Status: DISCONTINUED | OUTPATIENT
Start: 2019-02-20 | End: 2019-02-21 | Stop reason: HOSPADM

## 2019-02-20 RX ORDER — FENTANYL CITRATE 50 UG/ML
50 INJECTION, SOLUTION INTRAMUSCULAR; INTRAVENOUS
Status: DISCONTINUED | OUTPATIENT
Start: 2019-02-20 | End: 2019-02-20 | Stop reason: HOSPADM

## 2019-02-20 RX ORDER — ONDANSETRON 4 MG/1
4 TABLET, FILM COATED ORAL EVERY 6 HOURS PRN
Status: DISCONTINUED | OUTPATIENT
Start: 2019-02-20 | End: 2019-02-21 | Stop reason: HOSPADM

## 2019-02-20 RX ORDER — MAGNESIUM HYDROXIDE 1200 MG/15ML
LIQUID ORAL AS NEEDED
Status: DISCONTINUED | OUTPATIENT
Start: 2019-02-20 | End: 2019-02-20 | Stop reason: HOSPADM

## 2019-02-20 RX ORDER — LISINOPRIL 20 MG/1
20 TABLET ORAL DAILY
Status: DISCONTINUED | OUTPATIENT
Start: 2019-02-21 | End: 2019-02-21 | Stop reason: HOSPADM

## 2019-02-20 RX ORDER — BUPIVACAINE HYDROCHLORIDE 5 MG/ML
INJECTION, SOLUTION PERINEURAL
Status: COMPLETED | OUTPATIENT
Start: 2019-02-20 | End: 2019-02-20

## 2019-02-20 RX ADMIN — EPHEDRINE SULFATE 10 MG: 50 INJECTION INTRAMUSCULAR; INTRAVENOUS; SUBCUTANEOUS at 08:30

## 2019-02-20 RX ADMIN — ACETAMINOPHEN 1000 MG: 500 TABLET ORAL at 06:36

## 2019-02-20 RX ADMIN — BUSPIRONE HYDROCHLORIDE 15 MG: 10 TABLET ORAL at 12:36

## 2019-02-20 RX ADMIN — FUROSEMIDE 20 MG: 20 TABLET ORAL at 17:59

## 2019-02-20 RX ADMIN — CEFAZOLIN SODIUM 2 G: 2 INJECTION, SOLUTION INTRAVENOUS at 07:21

## 2019-02-20 RX ADMIN — SODIUM CHLORIDE, POTASSIUM CHLORIDE, SODIUM LACTATE AND CALCIUM CHLORIDE: 600; 310; 30; 20 INJECTION, SOLUTION INTRAVENOUS at 08:44

## 2019-02-20 RX ADMIN — OXYCODONE HYDROCHLORIDE 10 MG: 10 TABLET, FILM COATED, EXTENDED RELEASE ORAL at 06:36

## 2019-02-20 RX ADMIN — ROPIVACAINE HYDROCHLORIDE 10 ML/HR: 5 INJECTION, SOLUTION EPIDURAL; INFILTRATION; PERINEURAL at 10:18

## 2019-02-20 RX ADMIN — FUROSEMIDE 20 MG: 20 TABLET ORAL at 12:37

## 2019-02-20 RX ADMIN — MUPIROCIN 1 APPLICATION: 20 OINTMENT TOPICAL at 06:36

## 2019-02-20 RX ADMIN — ONDANSETRON 4 MG: 2 INJECTION INTRAMUSCULAR; INTRAVENOUS at 09:03

## 2019-02-20 RX ADMIN — GABAPENTIN 800 MG: 400 CAPSULE ORAL at 20:51

## 2019-02-20 RX ADMIN — TRANEXAMIC ACID 1000 MG: 100 INJECTION, SOLUTION INTRAVENOUS at 07:28

## 2019-02-20 RX ADMIN — PROPOFOL 85 MCG/KG/MIN: 10 INJECTION, EMULSION INTRAVENOUS at 07:30

## 2019-02-20 RX ADMIN — EPHEDRINE SULFATE 10 MG: 50 INJECTION INTRAMUSCULAR; INTRAVENOUS; SUBCUTANEOUS at 08:38

## 2019-02-20 RX ADMIN — BUPIVACAINE HYDROCHLORIDE 20 ML: 2.5 INJECTION, SOLUTION EPIDURAL; INFILTRATION; INTRACAUDAL; PERINEURAL at 10:14

## 2019-02-20 RX ADMIN — MELOXICAM 15 MG: 7.5 TABLET ORAL at 12:37

## 2019-02-20 RX ADMIN — LIDOCAINE HYDROCHLORIDE 0.5 ML: 10 INJECTION, SOLUTION EPIDURAL; INFILTRATION; INTRACAUDAL; PERINEURAL at 06:30

## 2019-02-20 RX ADMIN — DEXAMETHASONE SODIUM PHOSPHATE 8 MG: 4 INJECTION, SOLUTION INTRAMUSCULAR; INTRAVENOUS at 07:49

## 2019-02-20 RX ADMIN — CEFAZOLIN SODIUM 2 G: 2 INJECTION, SOLUTION INTRAVENOUS at 15:13

## 2019-02-20 RX ADMIN — MELOXICAM 15 MG: 7.5 TABLET ORAL at 06:36

## 2019-02-20 RX ADMIN — BUSPIRONE HYDROCHLORIDE 15 MG: 10 TABLET ORAL at 20:51

## 2019-02-20 RX ADMIN — SODIUM CHLORIDE, POTASSIUM CHLORIDE, SODIUM LACTATE AND CALCIUM CHLORIDE 9 ML/HR: 600; 310; 30; 20 INJECTION, SOLUTION INTRAVENOUS at 06:30

## 2019-02-20 RX ADMIN — EPHEDRINE SULFATE 10 MG: 50 INJECTION INTRAMUSCULAR; INTRAVENOUS; SUBCUTANEOUS at 08:00

## 2019-02-20 RX ADMIN — SODIUM CHLORIDE 100 ML/HR: 9 INJECTION, SOLUTION INTRAVENOUS at 10:40

## 2019-02-20 RX ADMIN — TRANEXAMIC ACID 1000 MG: 100 INJECTION, SOLUTION INTRAVENOUS at 09:13

## 2019-02-20 RX ADMIN — DULOXETINE 30 MG: 30 CAPSULE, DELAYED RELEASE ORAL at 15:13

## 2019-02-20 RX ADMIN — SODIUM CHLORIDE, POTASSIUM CHLORIDE, SODIUM LACTATE AND CALCIUM CHLORIDE: 600; 310; 30; 20 INJECTION, SOLUTION INTRAVENOUS at 07:21

## 2019-02-20 RX ADMIN — OXYCODONE AND ACETAMINOPHEN 2 TABLET: 5; 325 TABLET ORAL at 15:10

## 2019-02-20 RX ADMIN — EPHEDRINE SULFATE 10 MG: 50 INJECTION INTRAMUSCULAR; INTRAVENOUS; SUBCUTANEOUS at 08:11

## 2019-02-20 RX ADMIN — GABAPENTIN 800 MG: 400 CAPSULE ORAL at 17:59

## 2019-02-20 RX ADMIN — DULOXETINE 30 MG: 30 CAPSULE, DELAYED RELEASE ORAL at 20:51

## 2019-02-20 RX ADMIN — FAMOTIDINE 20 MG: 20 TABLET, FILM COATED ORAL at 06:36

## 2019-02-20 RX ADMIN — BUPIVACAINE HYDROCHLORIDE 2.5 ML: 5 INJECTION, SOLUTION PERINEURAL at 07:27

## 2019-02-20 RX ADMIN — EPHEDRINE SULFATE 10 MG: 50 INJECTION INTRAMUSCULAR; INTRAVENOUS; SUBCUTANEOUS at 08:27

## 2019-02-20 RX ADMIN — OXYCODONE AND ACETAMINOPHEN 2 TABLET: 5; 325 TABLET ORAL at 20:55

## 2019-02-20 NOTE — THERAPY EVALUATION
Acute Care - Physical Therapy Initial Evaluation  [facility]     Patient Name: Lesli Leon  : 1955  MRN: [id number]  Today's Date: 2019   Onset of Illness/Injury or Date of Surgery: 19  Date of Referral to PT: 19  Referring Physician: MD Jose      Admit Date: 2019    Visit Dx:     ICD-10-CM ICD-9-CM   1. Impaired functional mobility, balance, gait, and endurance Z74.09 V49.89   2. Osteoarthritis of left knee M17.12 715.96     Patient Active Problem List   Diagnosis   • Sepsis, probable right septic knee as source   • Hypertension   • Disease of thyroid gland   • Expressive aphasia   • Heart murmur   • Quadriceps tendon rupture, right, initial encounter   • S/P primary repair right knee quadriceps tendon tear   • Anxiety and depression   • Hypothyroid   • Tobacco abuse   • Acute postoperative pain   • Primary osteoarthritis of left knee   • Osteoarthritis of left knee   • Arthritis of knee   • Status post total left knee replacement   • Impaired functional mobility, balance, gait, and endurance     Past Medical History:   Diagnosis Date   • Anxiety and depression    • Arthritis    • Breast cancer (CMS/HCC) 2015    RIGHT   • Disease of thyroid gland    • History of constipation    • History of IBS    • History of panic attacks    • Hx of radiation therapy 2015    RT BREAST   • Hypertension    • Neuropathy    • Plantar fasciitis    • Scoliosis    • Septic joint of right knee joint (CMS/HCC) 2017   • Wears dentures     top   • Wears glasses      Past Surgical History:   Procedure Laterality Date   • BREAST BIOPSY Right 2015   • BREAST LUMPECTOMY Right 2015   • BREAST LUMPECTOMY     • ENDOSCOPY     • JOINT REPLACEMENT Right     knee   • KNEE ARTHROTOMY Right 12/10/2017    Procedure: INCISION AND DRAINAGE, POLY EXCHANGE RIGHT KNEE;  Surgeon: Stephon Garcia MD;  Location: [facility];  Service:    • VA FIX QUAD/HAMSTR MUSC RUPT,PRIMARY Right 2018    Procedure: PRIMARY  REPAIR OF RIGHT QUADRICEPS TENDON ;  Surgeon: Stephon Garcia MD;  Location: [facility] OR;  Service: Orthopedics        PT ASSESSMENT (last 12 hours)      Physical Therapy Evaluation     Row Name 02/20/19 1424          PT Evaluation Time/Intention    Subjective Information  complains of;numbness reports mild numbness remains in LEs from spinal  -LR     Document Type  evaluation  -LR     Mode of Treatment  physical therapy;individual therapy  -LR     Patient Effort  excellent  -LR     Symptoms Noted During/After Treatment  increased pain  -LR     Comment  Notified RN.   -LR     Row Name 02/20/19 1424          General Information    Patient Profile Reviewed?  yes  -LR     Onset of Illness/Injury or Date of Surgery  02/20/19  -LR     Referring Physician  MD Jose  -LR     Patient Observations  alert;cooperative;agree to therapy  -LR     General Observations of Patient  Patient supine in bed upon arrival. IV, SCDs, L knee ace bandaged, L adductor canal nerve catheter, ice pack to L knee.   -LR     Prior Level of Function  min assist:;all household mobility;gait;transfer;bed mobility;ADL's;home management;cooking;cleaning;using stairs;dependent:;community mobility;shopping;independent:;driving no long distance mobility, used RW at all times.   -LR     Equipment Currently Used at Home  grab bar;shower chair;commode, bedside;walker, rolling;cane, straight used RW at all times.   -LR     Pertinent History of Current Functional Problem  Patient presents for surgical management of persistent and progressive L knee pain and dysfunction that has failed to improve with conservative management.   -LR     Existing Precautions/Restrictions  fall;other (see comments) L adductor canal nerve catheter, scoliosis  -LR     Limitations/Impairments  -- none  -LR     Equipment Issued to Patient  -- none  -LR     Equipment Ordered for Patient  -- none  -LR     Risks Reviewed  patient:;LOB;nausea/vomiting;dizziness;increased  discomfort;lines disloged  -LR     Benefits Reviewed  patient:;improve function;increase independence;increase strength;increase balance;decrease pain;decrease risk of DVT;increase knowledge  -LR     Barriers to Rehab  previous functional deficit;medically complex  -LR     Row Name 02/20/19 1424          Relationship/Environment    Primary Source of Support/Comfort  spouse;child(marianne)  and daughter available to assist at all times at [facility]  -LR     Lives With  spouse  -LR     Row Name 02/20/19 1424          Resource/Environmental Concerns    Current Living Arrangements  home/apartment/condo  -LR     Resource/Environmental Concerns  none  -LR     Transportation Concerns  car, none  -LR     Row Name 02/20/19 1424          Home Main Entrance    Number of Stairs, Main Entrance  one  -LR     Stair Railings, Main Entrance  none  -LR     Row Name 02/20/19 1424          Cognitive Assessment/Intervention- PT/OT    Orientation Status (Cognition)  oriented x 4  -LR     Follows Commands (Cognition)  WFL;follows one step commands;over 90% accuracy;verbal cues/prompting required;repetition of directions required  -LR     Safety Deficit (Cognitive)  mild deficit;safety precautions awareness;safety precautions follow-through/compliance  -LR     Row Name 02/20/19 1424          Safety Issues, Functional Mobility    Safety Issues Affecting Function (Mobility)  safety precaution awareness;safety precautions follow-through/compliance;sequencing abilities;positioning of assistive device  -LR     Row Name 02/20/19 1424          Mobility Assessment/Treatment    Extremity Weight-bearing Status  left lower extremity  -LR     Left Lower Extremity (Weight-bearing Status)  weight-bearing as tolerated (WBAT)  -LR     Row Name 02/20/19 1424          Bed Mobility Assessment/Treatment    Bed Mobility Assessment/Treatment  supine-sit  -LR     Supine-Sit [place] (Bed Mobility)  verbal cues;contact guard  -LR     Bed Mobility, Safety Issues   decreased use of legs for bridging/pushing  -LR     Assistive Device (Bed Mobility)  head of bed elevated;bed rails  -LR     Comment (Bed Mobility)  Verbal cues to move LEs towards EOB and to push up from bed to raise trunk into sitting and to scoot hips out to get feet on floor. CGA to L LE, increased time to perform. Denied dizziness upon sitting up.   -LR     Row Name 02/20/19 1424          Transfer Assessment/Treatment    Transfer Assessment/Treatment  sit-stand transfer;stand-sit transfer;toilet transfer  -LR     Comment (Transfers)  Verbal cues to push up from bed to stand and to reach back for chair to lower into sitting. Verbal cues to use grab bars for UE support and to step L LE out before t/f for comfort.   -LR     Sit-Stand [place] (Transfers)  verbal cues;contact guard;2 person assist  -LR     Stand-Sit [place] (Transfers)  verbal cues;contact guard;2 person assist  -LR     [place] Level (Toilet Transfer)  verbal cues;contact guard;2 person assist  -LR     Assistive Device (Toilet Transfer)  commode, bedside with drop arms;walker, front-wheeled  -LR     Row Name 02/20/19 1424          Sit-Stand Transfer    Assistive Device (Sit-Stand Transfers)  walker, front-wheeled  -LR     Row Name 02/20/19 1424          Stand-Sit Transfer    Assistive Device (Stand-Sit Transfers)  walker, front-wheeled  -LR     Row Name 02/20/19 1424          Toilet Transfer    Type (Toilet Transfer)  sit-stand;stand-sit  -LR     Row Name 02/20/19 1424          Gait/Stairs Assessment/Training    40981 - Gait Training Minutes   8  -LR     [place] Level (Gait)  verbal cues;contact guard;2 person assist  -LR     Assistive Device (Gait)  walker, front-wheeled  -LR     Distance in Feet (Gait)  250  -LR     Pattern (Gait)  step-through  -LR     Deviations/Abnormal Patterns (Gait)  left sided deviations;antalgic;bilateral deviations;franklin decreased;gait speed decreased;stride length decreased  -LR     Bilateral Gait  Deviations  forward flexed posture;heel strike decreased  -LR     Left Sided Gait Deviations  weight shift ability decreased  -LR     Comment (Gait/Stairs)  Patient ambulated with step through gait pattern at slow pace. Verbal cues for increased L LE weight bearing/stance phase, decreased UE weight bearing, and increased L knee flexion during swing phase. Improved with cues for correction. Gait limited by pain and fatigue.   -LR     Row Name 02/20/19 1424          General ROM    RT Lower Ext  Comment  -LR     LT Lower Ext  Comment  -LR     Row Name 02/20/19 1424          Right Lower Ext    RT Lower Extremity Comments  R LE AROM WFL  -LR     Row Name 02/20/19 1424          Left Lower Ext    LT Lower Extremity Comments  L knee AROM impaired 25%, will formally measure ROM POD#1  -     Row Name 02/20/19 1424          MMT (Manual Muscle Testing)    Rt Lower Ext  Rt Hip WFL;Rt Knee WFL;Rt Ankle WFL  -LR     Lt Lower Ext  Lt Hip WFL;Lt Ankle WFL;Comments  -LR     Row Name 02/20/19 1424          MMT Left Lower Ext    Lt Lower Extremity Comments   L knee functionally 4-/5, independent with SLR, no knee buckling with gait.   -     Row Name 02/20/19 1424          Motor Assessment/Intervention    Additional Documentation  Therapeutic Exercise (Group);Therapeutic Exercise Interventions (Group)  -     Row Name 02/20/19 1424          Therapeutic Exercise    26173 - PT Therapeutic Exercise Minutes  2  -LR     Row Name 02/20/19 1424          Therapeutic Exercise    Lower Extremity (Therapeutic Exercise)  gluteal sets;quad sets, left  -LR     Lower Extremity Range of Motion (Therapeutic Exercise)  ankle dorsiflexion/plantar flexion, left  -LR     Exercise Type (Therapeutic Exercise)  AROM (active range of motion);isometric contraction, static;isotonic contraction, concentric  -LR     Position (Therapeutic Exercise)  supine  -LR     Sets/Reps (Therapeutic Exercise)  x10 reps each  -LR     Comment (Therapeutic Exercise)  cues for  technique; able to actively DF bilaterally  -LR     Row Name 02/20/19 1424          Sensory Assessment/Intervention    Sensory General Assessment  light touch sensation deficits identified;other (see comments) mild numbness in LEs d/t spinal  -LR     Row Name 02/20/19 1424          Light Touch Sensation Assessment    Left Lower Extremity: Light Touch Sensation Assessment  mild impairment, 75% or more correct responses  -LR     Right Lower Extremity: Light Touch Sensation Assessment  mild impairment, 75% or more correct responses  -LR     Row Name 02/20/19 1424          Vision Assessment/Intervention    Visual Impairment/Limitations  WFL  -LR     Row Name 02/20/19 1424          Pain Assessment    Additional Documentation  Pain Scale: Numbers Pre/Post-Treatment (Group)  -LR     Row Name 02/20/19 1424          Pain Scale: Numbers Pre/Post-Treatment    Pain Scale: Numbers, Pretreatment  0/10 - no pain  -LR     Pain Scale: Numbers, Post-Treatment  6/10  -LR     Pain Location - Side  Left  -LR     Pain Location - Orientation  anterior  -LR     Pain Location  knee  -LR     Pain Intervention(s)  Repositioned;Ambulation/increased activity;Cold pack  -LR     Row Name             Wound 02/20/19 0755 Left knee incision    Wound - Properties Group Date first assessed: 02/20/19  -MR Time first assessed: 0755  -MR Side: Left  -MR Location: knee  -MR Type: incision  -MR    Row Name 02/20/19 1424          Coping    Observed Emotional State  accepting;cooperative  -LR     Verbalized Emotional State  acceptance  -LR     Row Name 02/20/19 1424          Plan of Care Review    Plan of Care Reviewed With  patient  -LR     Row Name 02/20/19 1424          Physical Therapy Clinical Impression    Date of Referral to PT  02/20/19  -LR     PT Diagnosis (PT Clinical Impression)  s/p elective L TKA   -LR     Prognosis (PT Clinical Impression)  good  -LR     Patient/Family Goals Statement (PT Clinical Impression)  go home, decrease pain  -LR      Criteria for Skilled Interventions Met (PT Clinical Impression)  yes;treatment indicated  -LR     Rehab Potential (PT Clinical Summary)  good, to achieve stated therapy goals  -LR     Care Plan Review (PT)  evaluation/treatment results reviewed;care plan/treatment goals reviewed;risks/benefits reviewed;current/potential barriers reviewed;patient/other agree to care plan  -LR     Row Name 02/20/19 1424          Physical Therapy Goals    Bed Mobility Goal Selection (PT)  bed mobility, PT goal 1  -LR     Transfer Goal Selection (PT)  transfer, PT goal 1  -LR     Gait Training Goal Selection (PT)  gait training, PT goal 1  -LR     ROM Goal Selection (PT)  ROM, PT goal 1  -LR     Stairs Goal Selection (PT)  stairs, PT goal 1  -LR     Additional Documentation  Stairs Goal Selection (PT) (Row);ROM Goal Selection (PT) (Row)  -LR     Row Name 02/20/19 1424          Bed Mobility Goal 1 (PT)    Activity/Assistive Device (Bed Mobility Goal 1, PT)  sit to supine/supine to sit  -LR     [place] Level/Cues Needed (Bed Mobility Goal 1, PT)  conditional independence  -LR     Time Frame (Bed Mobility Goal 1, PT)  long term goal (LTG);3 days  -LR     Progress/Outcomes (Bed Mobility Goal 1, PT)  goal ongoing  -LR     Row Name 02/20/19 1424          Transfer Goal 1 (PT)    Activity/Assistive Device (Transfer Goal 1, PT)  sit-to-stand/stand-to-sit;walker, rolling  -LR     [place] Level/Cues Needed (Transfer Goal 1, PT)  conditional independence  -LR     Time Frame (Transfer Goal 1, PT)  long term goal (LTG);3 days  -LR     Progress/Outcome (Transfer Goal 1, PT)  goal ongoing  -     Row Name 02/20/19 1424          Gait Training Goal 1 (PT)    Activity/Assistive Device (Gait Training Goal 1, PT)  gait (walking locomotion);walker, rolling  -LR     [place] Level (Gait Training Goal 1, PT)  conditional independence  -LR     Distance (Gait Goal 1, PT)  500 feet  -LR     Time Frame (Gait Training Goal 1, PT)  long term goal  (LTG);3 days  -LR     Progress/Outcome (Gait Training Goal 1, PT)  goal ongoing  -LR     Row Name 02/20/19 1424          ROM Goal 1 (PT)    ROM Goal 1 (PT)  0-90 degrees AAROM L knee  -LR     Time Frame (ROM Goal 1, PT)  long term goal (LTG);3 days  -LR     Progress/Outcome (ROM Goal 1, PT)  goal ongoing  -LR     Row Name 02/20/19 1424          Stairs Goal 1 (PT)    Activity/Assistive Device (Stairs Goal 1, PT)  ascending stairs;descending stairs;step-to-step;walker, rolling;other (see comments) backwards  -LR     [place] Level/Cues Needed (Stairs Goal 1, PT)  contact guard assist  -LR     Number of Stairs (Stairs Goal 1, PT)  1  -LR     Time Frame (Stairs Goal 1, PT)  long term goal (LTG);3 days  -LR     Progress/Outcome (Stairs Goal 1, PT)  goal ongoing  -LR     Row Name 02/20/19 1424          Positioning and Restraints    Pre-Treatment Position  in bed  -LR     Post Treatment Position  chair  -LR     In Chair  notified nsg;reclined;sitting;call light within reach;encouraged to call for assist;exit alarm on;compression device;legs elevated  -LR     Row Name 02/20/19 1424          Living Environment    Home Accessibility  tub/shower is not walk in;stairs to enter home  -LR       User Key  (r) = Recorded By, (t) = Taken By, (c) = Cosigned By    Initials Name Provider Type    LR Beena Adler, PT Physical Therapist    Raisa Jeronimo, RN Registered Nurse        Physical Therapy Education     Title: PT OT SLP Therapies (Done)     Topic: Physical Therapy (Done)     Point: Mobility training (Done)     Learning Progress Summary           Patient Acceptance, E,D, VU,NR by LR at 2/20/2019  2:24 PM    Comment:  Educated on precautions, weight bearing status, correct supine to sit t/f technique, correct sit<->stand t/f technique, correct gait mechanics, and progression of POC.                   Point: Home exercise program (Done)     Learning Progress Summary           Patient Acceptance, E,D, VU,NR by LR at  2/20/2019  2:24 PM    Comment:  Educated on precautions, weight bearing status, correct supine to sit t/f technique, correct sit<->stand t/f technique, correct gait mechanics, and progression of POC.                   Point: Body mechanics (Done)     Learning Progress Summary           Patient Acceptance, E,D, VU,NR by LR at 2/20/2019  2:24 PM    Comment:  Educated on precautions, weight bearing status, correct supine to sit t/f technique, correct sit<->stand t/f technique, correct gait mechanics, and progression of POC.                   Point: Precautions (Done)     Learning Progress Summary           Patient Acceptance, E,D, VU,NR by LR at 2/20/2019  2:24 PM    Comment:  Educated on precautions, weight bearing status, correct supine to sit t/f technique, correct sit<->stand t/f technique, correct gait mechanics, and progression of POC.                               User Key     Initials Effective Dates Name Provider Type Discipline    LR 06/19/15 -  Beena Adler, PT Physical Therapist PT              PT Recommendation and Plan  Anticipated Discharge Disposition (PT): home with assist, home with home health  Planned Therapy Interventions (PT Eval): balance training, bed mobility training, gait training, home exercise program, patient/family education, ROM (range of motion), stair training, strengthening, transfer training  Therapy Frequency (PT Clinical Impression): 2 times/day  Outcome Summary/Treatment Plan (PT)  Anticipated Equipment Needs at Discharge (PT): (none)  Anticipated Discharge Disposition (PT): home with assist, home with home health  Plan of Care Reviewed With: patient  Progress: improving  Outcome Summary: Patient ambulated 250 feet with RW and step through gait pattern, limited by pain. ROM to be initiated POD#1. Encouraged patient to ambulate again with nursing tonight. Plan is d/c home with family and HHPT. Will continue to progress mobility training, strengthening, and ROM as able.  PADD score of 9.   Outcome Measures     Row Name 02/20/19 1424             How much help from another person do you currently need...    Turning from your back to your side while in flat bed without using bedrails?  3  -LR      Moving from lying on back to sitting on the side of a flat bed without bedrails?  3  -LR      Moving to and from a bed to a chair (including a wheelchair)?  3  -LR      Standing up from a chair using your arms (e.g., wheelchair, bedside chair)?  3  -LR      Climbing 3-5 steps with a railing?  2  -LR      To walk in hospital room?  3  -LR      AM-PAC 6 Clicks Score  17  -LR         Functional Assessment    Outcome Measure Options  AM-PAC 6 Clicks Basic Mobility (PT)  -LR        User Key  (r) = Recorded By, (t) = Taken By, (c) = Cosigned By    Initials Name Provider Type    LR Beena Adler, PT Physical Therapist         Time Calculation:   PT Charges     Row Name 02/20/19 1424             Time Calculation    Start Time  1424  -LR      PT Received On  02/20/19  -LR      PT Goal Re-Cert Due Date  03/02/19  -LR         Time Calculation- PT    Total Timed Code Minutes- PT  10 minute(s)  -LR         Timed Charges    31714 - PT Therapeutic Exercise Minutes  2  -LR      56560 - Gait Training Minutes   8  -LR        User Key  (r) = Recorded By, (t) = Taken By, (c) = Cosigned By    Initials Name Provider Type    LR Beena Adler, PT Physical Therapist        Therapy Suggested Charges     Code   Minutes Charges    75094 (CPT®) Hc Pt Neuromusc Re Education Ea 15 Min      16150 (CPT®) Hc Pt Ther Proc Ea 15 Min 2     52327 (CPT®) Hc Gait Training Ea 15 Min 8 1    18698 (CPT®) Hc Pt Therapeutic Act Ea 15 Min      58789 (CPT®) Hc Pt Manual Therapy Ea 15 Min      14096 (CPT®) Hc Pt Iontophoresis Ea 15 Min      88510 (CPT®) Hc Pt Elec Stim Ea-Per 15 Min      48484 (CPT®) Hc Pt Ultrasound Ea 15 Min      44462 (CPT®) Hc Pt Self Care/Mgmt/Train Ea 15 Min      50805 (CPT®) Hc Pt Prosthetic (S)  Train Initial Encounter, Each 15 Min      26397 (CPT®) Hc Pt Orthotic(S)/Prosthetic(S) Encounter, Each 15 Min      19396 (CPT®) Hc Orthotic(S) Mgmt/Train Initial Encounter, Each 15min      Total  10 1        Therapy Charges for Today     Code Description Service Date Service Provider Modifiers Qty    64765867537 HC GAIT TRAINING EA 15 MIN 2/20/2019 Beena Adler, PT GP 1    66957587102 HC PT THER SUPP EA 15 MIN 2/20/2019 Beena Adler, PT GP 3    29683427841 HC PT EVAL LOW COMPLEXITY 3 2/20/2019 Beena Adler, PT GP 1          PT G-Codes  Outcome Measure Options: AM-PAC 6 Clicks Basic Mobility (PT)  AM-PAC 6 Clicks Score: 17      Beena Adler, PT  2/20/2019

## 2019-02-20 NOTE — BRIEF OP NOTE
TOTAL KNEE ARTHROPLASTY  Progress Note    Lesli Leon  2/20/2019    Pre-op Diagnosis:   Osteoarthritis of left knee [M17.12]       Post-Op Diagnosis Codes:     * Osteoarthritis of left knee [M17.12]    Procedure/CPT® Codes:  CT TOTAL KNEE ARTHROPLASTY [50858]    Procedure(s):  LEFT TOTAL KNEE ARTHROPLASTY    Surgeon(s):  Stephon Garcia MD    Anesthesia: Spinal    Staff:   Circulator: Raisa Lares RN  Scrub Person: Micheal Andersen  Vendor Representative: Isael Alvarez  Nursing Assistant: Haylee Huggins  Assistant: Aneudy Villatoro PA    Estimated Blood Loss: 100ml    Urine Voided: * No values recorded between 2/20/2019  7:21 AM and 2/20/2019  9:44 AM *    Specimens:                None      Drains:      Findings: Advanced tricompartmental osteoarthritis with genu varum alignment    Complications: None apparent      Stephon Garcia MD     Date: 2/20/2019  Time: 9:44 AM

## 2019-02-20 NOTE — H&P
Pre-Op H&P  Lesli Leon  [id number]  1955    Chief complaint: left knee pain    HPI:    Patient is a 63 y.o.female who presents with history of pain in the left knee joint due to arthritis.     Review of Systems:  General ROS: negative for chills, fever or skin lesions;  No changes since last office visit  Cardiovascular ROS: no chest pain or dyspnea on exertion  Respiratory ROS: no cough, shortness of breath, or wheezing    Allergies:   Allergies   Allergen Reactions   • Bactrim [Sulfamethoxazole-Trimethoprim] Hives       Home Meds:    No current facility-administered medications on file prior to encounter.      Current Outpatient Medications on File Prior to Encounter   Medication Sig Dispense Refill   • ALPRAZolam (XANAX) 1 MG tablet Take 1 mg by mouth Daily As Needed.     • busPIRone (BUSPAR) 15 MG tablet Take 15 mg by mouth 2 (Two) Times a Day.  0   • CELEBREX 200 MG capsule Take 200 mg by mouth Daily.  0   • chlorzoxazone (PARAFON FORTE) 500 MG tablet Take 500 mg by mouth 3 (Three) Times a Day As Needed for Muscle Spasms.     • clotrimazole-betamethasone (LOTRISONE) 1-0.05 % cream apply to affected area once daily  0   • DULoxetine (CYMBALTA) 30 MG capsule Take 30 mg by mouth 3 (Three) Times a Day.     • furosemide (LASIX) 20 MG tablet Take 20 mg by mouth 2 (Two) Times a Day.  0   • gabapentin (NEURONTIN) 800 MG tablet Take 800 mg by mouth 4 (Four) Times a Day.     • levothyroxine (SYNTHROID, LEVOTHROID) 100 MCG tablet Take 100 mcg by mouth Daily. Brand name.     • lisinopril (PRINIVIL,ZESTRIL) 20 MG tablet Take 20 mg by mouth Daily.     • SUBOXONE 8-2 MG film film take 2 FILMs under the tongue once daily- instructed to stop taking 2 days before surgery  0   • aspirin 81 MG EC tablet Take 81 mg by mouth Daily.         PMH:   Past Medical History:   Diagnosis Date   • Anxiety and depression    • Arthritis    • Breast cancer (CMS/HCC) 2015    RIGHT   • Disease of thyroid gland    • History of  constipation    • History of IBS    • History of panic attacks    • Hx of radiation therapy     RT BREAST   • Hypertension    • Neuropathy    • Plantar fasciitis    • Scoliosis    • Septic joint of right knee joint (CMS/HCC) 2017   • Wears dentures     top   • Wears glasses      PSH:    Past Surgical History:   Procedure Laterality Date   • BREAST BIOPSY Right 2015   • BREAST LUMPECTOMY Right 2015   • BREAST LUMPECTOMY     • ENDOSCOPY     • JOINT REPLACEMENT Right     knee   • KNEE ARTHROTOMY Right 12/10/2017    Procedure: INCISION AND DRAINAGE, POLY EXCHANGE RIGHT KNEE;  Surgeon: Stephon Garcia MD;  Location:  [facility];  Service:    • [place] FIX QUAD/HAMSTR MUSC RUPT,PRIMARY Right 2018    Procedure: PRIMARY REPAIR OF RIGHT QUADRICEPS TENDON ;  Surgeon: Stephon Garcia MD;  Location:  [facility];  Service: Orthopedics       Immunization History:  Influenza:   Pneumococcal: denies  Tetanus: denies    Social History:   Tobacco:   Social History     Tobacco Use   Smoking Status Former Smoker   • Years: 5.00   • Types: Electronic Cigarette   • Last attempt to quit: 2018   • Years since quittin.2   Smokeless Tobacco Never Used      Alcohol:     Social History     Substance and Sexual Activity   Alcohol Use No       Vitals:           /70 (BP Location: Right arm, Patient Position: Lying)   Pulse 80   Temp 99.6 °F (37.6 °C) (Temporal)   Resp 16   SpO2 96%   Breastfeeding? No     Physical Exam:  General Appearance:    Alert, cooperative, no distress, appears stated age   Head:    Normocephalic, without obvious abnormality, atraumatic   Lungs:     Clear to auscultation bilaterally, respirations unlabored    Heart:   Regular rate and rhythm, S1 and S2 normal, no murmur, rub    or gallop    Abdomen:    Soft, non-tender.  +bowel sounds   Breast Exam:    deferred   Genitalia:    deferred   Extremities:   Extremities normal, atraumatic, no cyanosis or edema   Skin:   Skin color, texture, turgor  normal, no rashes or lesions   Neurologic:   Grossly intact     Cancer Staging (if applicable)  Cancer Patient: __ yes _x_no __unknown; If yes, clinical stage T:__ N:__M:__, stage group or __N/A    Impression: Primary osteoarthritis of left knee     Plan: Left total knee arthroplasty    KEN Wilson   2/20/2019   6:43 AM

## 2019-02-20 NOTE — H&P
Patient Name: Lesli Leon  MRN: [id number]  : 1955  DOS: 2019    Attending: Stephon Garcia MD    Primary Care Provider: Darren Manning MD      Chief complaint:  Left knee pain    Subjective   Patient is a 63 y.o. female presented for left total knee arthroplasty by Dr. Garcia.    She underwent surgery under spinal anesthesia. She tolerated surgery well and is admitted for further medical management.     She is known to us from previous admissions, most recently right quadriceps repair in 2018. She was also seen previously with sepsis related to knee infection and was seen by [facility].She is on long term suppressive antibiotics.     When seen postop she is drowsy. Her pain is well controlled. She denies nausea, shortness of breath or chest pain. No hx of DVT or PE.    She is on suboxone. She is followed by a suboxone clinic in [place] [facility], she cannot recall who she sees at this time. She stopped suboxone 2 days ago.    ( Above is noted, agree.  Seen in her room afterwards, doing quite well.  Good pain control.  No nausea vomiting or shortness breath.  Already ambulated with physical therapy 250 feet with a rolling walker and step through gait pattern limited by pain.  She is usually followed by Dr. Smith and is on chronic Keflex suppression.)wy    Allergies:  Allergies   Allergen Reactions   • Bactrim [Sulfamethoxazole-Trimethoprim] Hives       Meds:  Medications Prior to Admission   Medication Sig Dispense Refill Last Dose   • ALPRAZolam (XANAX) 1 MG tablet Take 1 mg by mouth Daily As Needed.   2019 at 0430   • busPIRone (BUSPAR) 15 MG tablet Take 15 mg by mouth 2 (Two) Times a Day.  0 2019 at 2100   • CELEBREX 200 MG capsule Take 200 mg by mouth Daily.  0 Past Week at Unknown time   • cephalexin (KEFLEX) 500 MG capsule Take 500 mg by mouth 2 (Two) Times a Day. Due to septic infection 2019 at 2100   • chlorzoxazone (PARAFON FORTE) 500 MG tablet  Take 500 mg by mouth 3 (Three) Times a Day As Needed for Muscle Spasms.   2/19/2019 at 2100   • clotrimazole-betamethasone (LOTRISONE) 1-0.05 % cream apply to affected area once daily  0 Past Month at Unknown time   • DULoxetine (CYMBALTA) 30 MG capsule Take 30 mg by mouth 3 (Three) Times a Day.   2/19/2019 at 2100   • furosemide (LASIX) 20 MG tablet Take 20 mg by mouth 2 (Two) Times a Day.  0 2/19/2019 at 2100   • gabapentin (NEURONTIN) 800 MG tablet Take 800 mg by mouth 4 (Four) Times a Day.   2/19/2019 at 2100   • levothyroxine (SYNTHROID, LEVOTHROID) 100 MCG tablet Take 100 mcg by mouth Daily. Brand name.   2/20/2019 at 0430   • lisinopril (PRINIVIL,ZESTRIL) 20 MG tablet Take 20 mg by mouth Daily.   2/20/2019 at 0430   • SUBOXONE 8-2 MG film film take 2 FILMs under the tongue once daily- instructed to stop taking 2 days before surgery  0 2/19/2019 at 0800   • aspirin 81 MG EC tablet Take 81 mg by mouth Daily.   2/13/2019       History:   Past Medical History:   Diagnosis Date   • Anxiety and depression    • Arthritis    • Breast cancer (CMS/[facility]) 2015    RIGHT   • Disease of thyroid gland    • History of constipation    • History of IBS    • History of panic attacks    • Hx of radiation therapy 2015    RT BREAST   • Hypertension    • Neuropathy    • Plantar fasciitis    • Scoliosis    • Septic joint of right knee joint (CMS/[facility]) 12/2017   • Wears dentures     top   • Wears glasses      Past Surgical History:   Procedure Laterality Date   • BREAST BIOPSY Right 2015   • BREAST LUMPECTOMY Right 2015   • BREAST LUMPECTOMY     • ENDOSCOPY     • JOINT REPLACEMENT Right     knee   • KNEE ARTHROTOMY Right 12/10/2017    Procedure: INCISION AND DRAINAGE, POLY EXCHANGE RIGHT KNEE;  Surgeon: Stephon Garcia MD;  Location:  [facility];  Service:    • [place] FIX QUAD/HAMSTR MUSC RUPT,PRIMARY Right 1/8/2018    Procedure: PRIMARY REPAIR OF RIGHT QUADRICEPS TENDON ;  Surgeon: Stephon Garcia MD;  Location:  [facility];  Service:  "Orthopedics     Family History   Problem Relation Age of Onset   • Breast cancer Mother         DX AGE UNKNOWN   • Breast cancer Sister         DX AGE UNKNOWN   • Ovarian cancer Neg Hx      Social History     Tobacco Use   • Smoking status: Former Smoker     Years: 5.00     Types: Electronic Cigarette     Last attempt to quit: 2018     Years since quittin.2   • Smokeless tobacco: Never Used   Substance Use Topics   • Alcohol use: No   • Drug use: No   .  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    Review of Systems  Pertinent items are noted in HPI, all other systems reviewed and negative    Vital Signs  Visit Vitals  /61 (BP Location: Right arm, Patient Position: Lying)   Pulse 96   Temp 98 °F (36.7 °C) (Oral)   Resp 18   Ht 162.6 cm (64.02\")   Wt 87.7 kg (193 lb 5.5 oz)   SpO2 95%   Breastfeeding? No   BMI 33.17 kg/m²       Physical Exam:    General Appearance:    Drowsy, cooperative, in no acute distress   Head:    Normocephalic, without obvious abnormality, atraumatic   Eyes:            Lids and lashes normal, conjunctivae and sclerae normal, no   icterus, no pallor, corneas clear   Ears:    Ears appear intact with no abnormalities noted   Neck:   No adenopathy, supple, trachea midline, no thyromegaly   Lungs:     Clear to auscultation,respirations regular, even and                   unlabored    Heart:    Regular rhythm and normal rate, normal S1 and S2, no            murmur, no gallop   Abdomen:     Normal bowel sounds, no masses, no organomegaly, soft        non-tender, non-distended, no guarding, no rebound                 tenderness   Genitalia:    Deferred   Extremities:   Left knee ACE wrap CDI. Nerve block present   Pulses:   Pulses palpable and equal bilaterally   Skin:   No bleeding, bruising or rash   Neurologic:   Cranial nerves 2 - 12 grossly intact, sensation intact. Flexion and dorsiflexion intact bilateral feet.        I reviewed the patient's new clinical results. "       Results from last 7 days   Lab Units 02/14/19  1558   WBC 10*3/mm3 5.67   HEMOGLOBIN g/dL 12.3   HEMATOCRIT % 39.1   PLATELETS 10*3/mm3 272     Results from last 7 days   Lab Units 02/14/19  1558   SODIUM mmol/L 142   POTASSIUM mmol/L 3.9   CHLORIDE mmol/L 102   CO2 mmol/L 33.0*   BUN mg/dL 15   CREATININE mg/dL 1.04   CALCIUM mg/dL 9.2   GLUCOSE mg/dL 90     Lab Results   Component Value Date    HGBA1C 5.00 02/14/2019       Assessment and Plan:     Status post total left knee replacement    Osteoarthritis of left knee    Arthritis of knee    Hypertension    Anxiety and depression    Hypothyroid      Plan  1. PT/OT- early ambulation post op  2. Pain control-prns, AC nerve block   3. IS-encourage  4. DVT proph- mechs/ASA  5. Bowel regimen  6. Resume home medications as appropriate  7. Monitor post-op labs  8. DC planning for home    Consider [facility] consult, Dr. Landrum?/ resume Keflex and f/u with ID !    HTN  - Continue home lisinopril and lasix  - Monitor BP   - Holding parameters for BP meds  - Labetalol PRN for SBP>170     Hypothyroid  - Continue home Synthroid     Anxiety and depression  - Continue home cymbalta, xanax and buspar      BECKY Newton  02/20/19  12:38 PM     I have personally performed the evaluation on this patient. My history is consistent  with HPI obtained. My exam findings are listed above. I have personally reviewed and discussed the above formulated treatment plan with  Pt and [email or web address].

## 2019-02-20 NOTE — PLAN OF CARE
Problem: Patient Care Overview  Goal: Plan of Care Review  Outcome: Ongoing (interventions implemented as appropriate)   02/20/19 1424   Plan of Care Review   Progress improving   OTHER   Outcome Summary Patient ambulated 250 feet with RW and step through gait pattern, limited by pain. ROM to be initiated POD#1. Encouraged patient to ambulate again with nursing tonight. Plan is d/c home with family and HHPT. Will continue to progress mobility training, strengthening, and ROM as able. PADD score of 9.    Coping/Psychosocial   Plan of Care Reviewed With patient       Problem: Knee Arthroplasty (Total, Partial) (Adult)  Goal: Signs and Symptoms of Listed Potential Problems Will be Absent, Minimized or Managed (Knee Arthroplasty)  Outcome: Ongoing (interventions implemented as appropriate)   02/20/19 1424   Goal/Outcome Evaluation   Problems Assessed (Knee Arthroplasty) functional deficit;pain;range of motion decreased   Problems Present (Knee Arthroplasty) functional deficit;pain;range of motion decreased

## 2019-02-20 NOTE — DISCHARGE INSTRUCTIONS
"DISCHARGE INSTRUCTIONS   Dr. Garcia     Total Knee Replacement/ Partial (Uni) Knee Replacement     Wound Care   1) Keep wound / incision area clean and dry.   2) Dressing to remain in place until post-operative day 7. Upon dressing removal, assess for wound drainage. If no drainage is present, keep wound / incision area open to air as much as possible. If drainage is present, place sterile dressing to cover wound and assess daily. If drainage continues to occur after post-operative day 14, call the office for an urgent appointment. (You should be seen in the clinic within 1-2 days of calling).   3) No baths or swimming until otherwise instructed. The wound must remain dry for 10 days after surgery. After 10 days, you may begin to shower only if no drainage is present. No submerging the wound under standing water until cleared by your physician (no baths, hot tubs, swimming pools, etc). Sponge baths are the best way to perform personal hygiene while at the same time protecting the wound from moisture.   4) Prior to showering, the wound must remain dry for 72 consecutive hours (no drainage whatsoever) prior to showering. If the wound drains or spots, the clock \"resets\" - make sure the wound has been drainage-free for 72 consecutive hours.   5) Once you are allowed to get the wound wet, please use gentle soap to wash the wound area. DO NOT aggressively scrub the wound with a washcloth or bath sponge. Please visually inspect your wound(s) at least once daily. If the wound(s) are in a difficult to see location, please use a mirror or have someone else assist with visual inspection.   6) No scrubbing the wound. You may \"pad dry\" the wound, but do not rub, as this may open up the wound and pre-dispose to wound infection.   7) Do not apply lotions or creams to incision site, unless instructed otherwise.   8) Observe for redness, swelling, or drainage. Please call the clinic immediately if you have fevers, chills with " "warmth/redness surrounding wound site or if you notice pus drainage from the wound site     Activity   No heavy lifting objects greater than 10 pounds.   No driving while on narcotic pain medication.   No submerging wound under standing water (pool, bath tub, etc.) until otherwise instructed.   You may be protected weightbearing as tolerated on your operative (left lower) extremity   Use crutches or a walker for ambulation.   Wean as appropriate per physical therapist's discretion.   Do not sleep with a pillow behind your knee. You may sleep with a pillow behind your Achilles or foot. This will prevent your knee from getting stiff in the flexed (\"bent\") position and will encourage full extension (leg straightening).   Be vigilant in terms of working on full knee extension and flexion. Your goal should be 0 to 90 degrees by 2 weeks post-op - MINIMUM!   Knee range of motion as tolerated.    Blood Clot Prophylaxis   (Aspirin vs. Lovenox administration is determined by your surgeon and tailored to your specific risk profile. You will be discharged with either of these medications, but not both.) You will need to complete a total 4 week course of enteric coated aspirin 325 mg twice daily, in order to minimize your risk of blood clots following surgery. You will be supplied with a prescription to obtain this. You will need to compete a total 2 week course of Lovenox after surgery, in order to minimize the risk of blood clots following surgery. This requires a single shot in the abdomen, to be taken once daily. You will be supplied with the prescription to obtain this. Prior to your discharge from the hospital, the nursing staff will instruct you on self-administration of the Lovenox, if you will be returning directly home from the hospital.     Discharge Pain Medications   You will be given a prescription for pain medication. You should start taking this the same day after your surgery. Wean off as tolerated. Do not wait " "to take the pain medication until the pain is severe, as it will be difficult to \"catch up\" once this occurs. The pain medication usually reaches its full effect ~1 hour after ingesting. If you have been sent home on Valium, this medication works well for muscle spasms. If you have been sent home on Colace, this medication should be taken until you are off all narcotic (i.e. Norco, Percocet, Oxycodone, etc) pain medications, in order to prevent constipation. Percocet or Norco have Tylenol in their ingredient lists. You must be careful not to exceed 4,000mg (4 grams) of Tylenol, from all sources, within a single 24-hr period. This means that you may not take more than 12 Percocets or Norcos within a 24-hr period. Do NOT take Regular or Extra Strength Tylenol when taking your Percocet or Norco medications. Some common side effects of the narcotic pain medications (Percocet, Oxycodone, Norco, etc) include nausea and itching. Benadryl is a great over the counter medication that helps calm your stomach, decreases your anxiety levels, and minimizes the itching. You can easily purchase this at your local pharmacy as an over-the-counter medication. Please abide by the instructions as printed on the bottle. If your nausea persists, make sure to take small amounts of crackers or other lighter foods.     Follow-Up   Follow-up with Dr. Garcia's office in 3 weeks from the surgery date for a post-operative evaluation. Have the following xrays done upon arrival to the follow-up appointment: AP and lateral of operative knee. Please call Dr. Garcia's office at [phone] for orthopaedic appointments or questions.  "

## 2019-02-20 NOTE — ANESTHESIA PROCEDURE NOTES
Spinal Block      Patient reassessed immediately prior to procedure    Patient location during procedure: OR  Indication:at surgeon's request  Performed By  CRNA: Jordy Marti CRNA  Preanesthetic Checklist  Completed: patient identified, site marked, surgical consent, pre-op evaluation, timeout performed, IV checked, risks and benefits discussed and monitors and equipment checked  Spinal Block Prep:  Patient Position:sitting  Sterile Tech:cap, gloves, sterile barriers and mask  Prep:Chloraprep  Patient Monitoring:blood pressure monitoring, continuous pulse oximetry and EKG  Spinal Block Procedure  Approach:midline  Guidance:landmark technique and palpation technique  Location:L4-L5  Needle Type:Jyotsna  Needle Gauge:25 G  Placement of Spinal needle event:cerebrospinal fluid aspirated  Paresthesia: no  Fluid Appearance:clear  Medications: bupivacaine (MARCAINE) injection 0.5%, 2.5 mL  Med Administered at 2/20/2019 7:27 AM   Post Assessment  Patient Tolerance:patient tolerated the procedure well with no apparent complications  Complications no  Additional Notes  Procedure:  Pt assisted to sitting position, with legs in position of comfort over side of bed.  Pt. instructed in optimal spine presentation, the spine was prepped/ Draped and the skin at insertion site was anesthetized with 1% Lidocaine 2 ml.  The spinal needle was then advanced until CSF flow was obtained and LA was injected:

## 2019-02-20 NOTE — ANESTHESIA PROCEDURE NOTES
Peripheral Block      Patient reassessed immediately prior to procedure    Patient location during procedure: post-op  Reason for block: at surgeon's request and post-op pain management  Performed by  CRNA: Jordy Marti CRNA  Assisted by: Juliann Vaca RN  Preanesthetic Checklist  Completed: patient identified, site marked, surgical consent, pre-op evaluation, timeout performed, IV checked, risks and benefits discussed and monitors and equipment checked  Prep:  Sterile barriers:cap, gloves, mask and sterile barriers  Prep: ChloraPrep  Patient monitoring: blood pressure monitoring, continuous pulse oximetry and EKG  Procedure    Guidance:ultrasound guided  Images:still images obtained    Block Type:adductor canal block  Injection Technique:catheter  Needle Type:Tuohy and echogenic  Needle Gauge:18 G    Catheter Size:20 G (20g)  Cath Depth at skin: 12 cm  Medications Used: bupivacaine PF (MARCAINE) 0.25 % injection, 20 mL  Med admintered at 2/20/2019 10:14 AM  Post Assessment  Injection Assessment: negative aspiration for heme, incremental injection and no paresthesia on injection  Patient Tolerance:comfortable throughout block  Complications:no  Additional Notes  Procedure:             The pt was placed in the Supine position.  The Insertion site was  prepped and Draped in sterile fashion.  The pt was anesthetized with  IV Sedation( see meds).  Skin and cutaneous tissue was infiltrated and anesthetized with 1% Lidocaine 3 mls via a 25g needle.  A BBraun 4 inch 18g echogenic needle was then  inserted approximately midline, mid-thigh and advanced In-plane with Ultrasound guidance.  Normal Saline PSF was utilized for hydrodissection of tissue.  The Vastus medialis and Sartorius muscle where visualized and the needle tip was placed in the adductor canal,  lateral to the femoral artery.  LA injection spread was visualized, injection was incremental 1-5ml, injection pressure was normal or little, no intraneural  injection, no vascular injection.  LA dose was injected thru the needle(see dose above).  A BBraun 20g wire stylet catheter was placed via the needle with ultrasound visualization and confirmation with NS fluid bolus. The labeled Catheter was then secured to skin at insertion site with skin afix and steristrips to curled catheter and CHG transparent dressing.  Thank you.

## 2019-02-20 NOTE — PLAN OF CARE
Problem: Patient Care Overview  Goal: Plan of Care Review  Outcome: Ongoing (interventions implemented as appropriate)   02/20/19 1716   Plan of Care Review   Progress improving   OTHER   Outcome Summary Pt. admitted from PACU today after a LTK replacement. Alert and oriented. Pt. ambulated with PT and sat up in the chair today. Pt. c/o pain and meds given. Pt. is pleasant. VSS.    Coping/Psychosocial   Plan of Care Reviewed With patient       Problem: Fall Risk (Adult)  Goal: Identify Related Risk Factors and Signs and Symptoms  Outcome: Ongoing (interventions implemented as appropriate)   02/20/19 1716   Fall Risk (Adult)   Related Risk Factors (Fall Risk) gait/mobility problems;fear of falling;polypharmacy   Signs and Symptoms (Fall Risk) presence of risk factors       Problem: Knee Arthroplasty (Total, Partial) (Adult)  Goal: Signs and Symptoms of Listed Potential Problems Will be Absent, Minimized or Managed (Knee Arthroplasty)  Outcome: Ongoing (interventions implemented as appropriate)   02/20/19 1716   Goal/Outcome Evaluation   Problems Assessed (Knee Arthroplasty) all   Problems Present (Knee Arthroplasty) pain;range of motion decreased

## 2019-02-20 NOTE — OP NOTE
OPERATIVE REPORT     DATE OF PROCEDURE: 2/20/2019    SURGEON: Stephon Garcia M.D.     ASSISTANT(S): Circulator: Raisa Lares RN  Scrub Person: Micheal Andersen  Vendor Representative: Isael Alvarez  Nursing Assistant: Haylee Huggins  Assistant: Aneudy Villatoro PA     PREOPERATIVE DIAGNOSIS: Advanced degenerative joint disease of the left knee secondary to osteoarthritis    POSTOPERATIVE DIAGNOSIS: same     PROCEDURE: Left total Knee Arthroplasty     SURGICAL DETAILS:     APPROACH: Medial parapatellar     ANESTHESIA: Spinal plus local periarticular block    PREOPERATIVE ANTIBIOTICS: Ancef 2 g IV    TRANEXAMIC ACID: IV    TOURNIQUET TIME: 80 min @300 mmHg     ESTIMATED BLOOD LOSS: 100 cc     SPECIMENS: None    IMPLANTS:   /Brand: Gayatri triathlon  Tibial component size: 3 universal baseplate   Femoral component size: 3 posterior stabilizing   Tibial polyethylene insert: 11 mm posterior stabilizing   Patellar component: 32 mm asymmetric   Cement: 2 bags Simplex P low-viscosity without antibiotics    DRAINS: None    LOCAL INJECTION: 1 cc Toradol 30mg/ml, 4 cc duramorph 2mg/ml, 20 cc 0.5% ropivicaine, 20 cc 0.5% lidocaine with 1:200,000 epinephrine, 15 cc preservative free normal saline     MODIFIER(S): None    COMPLICATIONS: None apparent    INDICATIONS FOR PROCEDURE: The patient has a long history of progressive knee pain, arthritis, and degeneration resulting in deformity in the left knee from predominantly medial wear and bone loss. Non-operative treatment and conservative therapeutic measures have been attempted, but have not improved or controlled the symptoms and pain that occurs during normal daily activities. Knee motion has also become limited and is restricting the patient. Total knee arthroplasty was recommended. The risks, benefits, alternatives, and potential complications of the arthroplasty surgery were discussed with the patient in detail to include but not be limited to infection,  bleeding, anesthesia risks, damage to neurovascular structures, osteolysis, aseptic loosening, instability, anterior knee pain, continued pain, iatrogenic fracture, dislocation, need for future surgery including the potential for amputation, blood clots, myocardial infarction, stroke, and death. Specific details of the surgical procedure, hospitalization, recovery, rehabilitation, and long-term precautions were also presented. Pre-operative teaching was provided. Implant/prosthesis selection was outlined, and the many options available were explained; the final choice will be made at the time of the procedure to match the anatomy and condition of the bone, ligaments, tendons, and muscles. The patient completed preoperative medical optimization and risk assessment, joint arthroplasty education, and MRSA decolonization using a universal decolonization protocol. Perioperative blood management and the potential for blood transfusion were discussed with risks and options clearly outlined.     INTRAOPERATIVE FINDINGS: Advanced tricompartmental osteoarthritis with genu varum alignment    PROCEDURE: The patient was identified in the preoperative holding area. The operative site was confirmed and marked. REGGIE hose and a sequential compression device were placed on the nonoperative leg. The risks, benefits, and alternatives to surgery were again confirmed with the patient and the patient wished to proceed. The patient was brought to the operating room and placed on the operating room table in the supine position. All bony prominences were padded. A huddle was performed with the patient and all vital surgical team members to confirm the correct operative site, procedure, anesthesia type, and operative plan with the patient. After anesthesia was performed, a tourniquet was applied to the upper thigh of the operative leg. A full knee exam was performed once anesthesia was in full effect. Intravenous antibiotic prophylaxis was  given and confirmed with the anesthesia team.     The operative leg was prepped and draped in the usual sterile fashion. A surgical time out was performed immediately preceding the incision with all personnel in the operating room to confirm patient identity, the correct operative site and extremity, correct radiographic studies, availability of appropriate surgical equipment and agreement on the planned procedure. The operative knee was elevated and exsanguinated using an esmarch and the tourniquet was inflated. The knee was exposed using a limited anterior-midline skin incision. Dissection was carried down through skin and subcutaneous tissue to the extensor mechanism with a scalpel. A medial parapatellar arthrotomy was made to enter the knee space sharply. A large amount of normal appearing joint fluid was encountered and suctioned. The synovium was thickened, hypertrophic, and inflamed. A partial synovectomy was performed for exposure, and the medial and lateral gutters were cleared of scar and synovial reflections. The superficial medial collateral ligament was carefully elevated off osteophytes which were then removed with a rongeur and osteotome. Degenerative meniscal remnants were excised medially and laterally. The patellar synovial reflections were released and the patella exposed to reveal complete wear through the articular surface. The trochlea demonstrated similar severe wear. The patella was then subluxed laterally. The knee was then flexed up to 90 degrees.     Assessment of the knee joint revealed severe end-stage articular damage with no remaining medial weight bearing cartilage. The medial compartment was severely eburnated with bone loss on the medial tibia and medial femoral condyle, resulting in the varus deformity. Osteophytes were removed from the notch. The ACL and PCL were resected. Osteophytes were then further debrided from the femur and the tibia.     Attention was then turned to the  femur. The medullary cavity of the femur was entered anterior-medial to the intra-condylar notch above the PCL with a 5/6th inch step drill. The femoral canal was over-reamed, irrigated, and suctioned to prevent fat embolization. An intramedullary alignment system was then placed, fixed to the femur with pins, and the distal femoral osteotomy was made in 5 degrees of valgus with an 8 mm resection. Attention was then turned to the tibia. Retractors were placed medially and laterally to protect the collateral ligaments and a Davie retractor was placed at the location of the PCL footprint in the intra-condylar notch. The tibia was then subluxed anteriorly for wide exposure. An extramedullary alignment system was then placed and the proximal tibial osteotomy was made measuring 1-2 mm off the most affected side and 9-10 mm off the unaffected side with neutral to slight posterior slope. The guide was also confirmed to be perpendicular to the tibial axis prior to the osteotomy. A sizing guide was then used to select the tibial component size. The knee was then brought to extension and the appropriate sized spacer block was placed. Select soft tissue releases were performed to create a symmetric extension gap. This brought the knee to full extension with excellent medial and lateral balance.     The flexion gap was then inspected and measured both visually and with a tensioner device to assess the medial and lateral flexion balance. A femur posterior reference guide was placed in 6 degrees of external rotation in accordance with the soft tissue balance. This resulted in symmetric flexion and extension gaps and was verified against the epicondylar axis and Lucia's line. The femur was sized using a posterior referencing guide and, using the previously determined degrees of rotation, drill holes were made for the cutting block. The 4 in 1 cutting block was impacted into place and secured. Cuts were completed using a saw  with the collaterals protected by Z-retractors. A lamina  was placed with the knee in 90 degrees of flexion and a large curved osteotome, rongeur, and curettes were utilized to clear posterior osteophytes, loose bodies and meniscal remnants. The box cut for the posterior stabilizing femur component was then performed.    A femoral trial implant was placed; excellent fit was confirmed. The medial-lateral and anterior-posterior dimensions were checked; anatomic fit and coverage were achieved.The proximal tibia baseplate trial was placed with its mid-point at the junction of medial one-third and lateral two-thirds of the tibial tubercle and pinned to this fixed position. A trial reduction was then performed. Trial reduction demonstrated the knee achieved full extension with excellent stability and range of motion, and no tendency toward instability with varus-valgus stress at full extension, mid-flexion, or 90 degrees of flexion. Symmetric anterior-posterior excursion of the tibia was also demonstrated with the knee at 90 degrees of flexion.    Next, attention was turned to the patella. It was measured and the posterior 9-10 mm was resected leaving a healthy remnant with greater than 11mm thickness. The patella was sized with the asymmetric guide, and drill holes were made. A trial button was placed and tracking of the patella and the entire knee trial was tested. The patella tracking was excellent throughout range of motion with no instability. Punches and drills were then placed through the trials to accommodate the final implants. All trials were removed.     The wound was copiously lavaged with a pulse irrigation/suction system. The posterior recess of the knee and areas of known bleeding were treated with the electrocautery to reduce post-operative bleeding. A pain cocktail was injected into the yanick-articular tissues. The cut bone surfaces were then irrigated again, suctioned, and dried. A double batch of  Polymethylmethacrylate (PMMA) bone cement was mixed, and the final implants were cemented into place, tibia followed by femur followed by patella. The cement was compressed using a non-constrained poly trial which was removed so the excess cement could be curetted free. The final polyethylene was impacted into place, and the knee was held in extension until the cement cured. The tourniquet was released and no excessive bleeding was encountered. Synovial bleeding was further treated with the electrocautery until adequate hemostasis was obtained.     The wound was again irrigated with dilute betadine solution followed by saline. The extensor mechanism and capsule was then anatomically closed with interrupted #1 Vicryl suture and a running #2 Stratafix stitch. Knee stability and range of motion with the capsule closed was excellent, and range of motion was 0 to 135 without excessive stress on the repair. Instrument and sponge count was completed and confirmed correct. Deep and superficial subcutaneous tissue was closed with interrupted 2-0 Vicryl suture. A running 3-0 Monocryl subcuticular stitch was used to re-approximate the skin edges followed by skin glue adhesive to seal the wound. A silver impregnated dressing was then placed, followed by REGGIE hose, and a sequential compression device to the operative limb. The patient was sufficiently recovered from anesthesia, transferred to a hospital bed and taken to the PACU in stable condition.     One gram (1000 mg) of intravenous tranexamic acid was administered prior to incision. A second one gram (1000 mg) intravenous dose was given prior to wound closure.    No apparent complications occurred during the procedure. Instrument, sponge and needle counts were correct x 2.     The patient underwent risk stratification preoperatively and aspirin was chosen for DVT prophylaxis. Delay in starting chemical prophylaxis for 23 hours from surgical incision was over concerns for  hematoma formation and wound related issues.     POST OPERATIVE PLAN:   Weight bearing as tolerated with knee range of motion as tolerated   Pain control with PO/IV meds   Adductor canal catheter placement by Anesthesia Pain Management Team in PACU.   23 hours perioperative antibiotic prophylaxis   PT/OT for mobilization and medical equipment needs   Keep silver dressing in place for 7 days post op. Change dressing only if saturated.   REGGIE hose and SCDs to bilateral lower extremities   Social work for discharge planning needs   Follow up in 3 weeks for post operative wound check with XR AP and lateral of operative knee.

## 2019-02-20 NOTE — ANESTHESIA PREPROCEDURE EVALUATION
Anesthesia Evaluation     Patient summary reviewed and Nursing notes reviewed   no history of anesthetic complications:  NPO Solid Status: > 8 hours  NPO Liquid Status: > 8 hours           Airway   Mallampati: II  TM distance: >3 FB  Neck ROM: full  No difficulty expected  Dental - normal exam     Pulmonary - negative pulmonary ROS and normal exam    breath sounds clear to auscultation  Cardiovascular - normal exam    ECG reviewed  Rhythm: regular  Rate: normal    (+) hypertension,     ROS comment: EF 60%, mildly elevated RVSP 35-45 mmHg    Neuro/Psych- negative ROS  GI/Hepatic/Renal/Endo    (+)   hypothyroidism,     Musculoskeletal     (+) arthralgias,   Abdominal    Substance History       Comment: On suboxone, last dose 2/19 am   OB/GYN          Other   (+) arthritis                     Anesthesia Plan    ASA 3     MAC and spinal   (Left adductor canal block/catheter with Arrow pump for post-operative analgesia per request of Dr. Garcia)  intravenous induction   Anesthetic plan, all risks, benefits, and alternatives have been provided, discussed and informed consent has been obtained with: patient.    Plan discussed with CRNA.

## 2019-02-21 VITALS
HEIGHT: 64 IN | TEMPERATURE: 98.4 F | BODY MASS INDEX: 33.01 KG/M2 | DIASTOLIC BLOOD PRESSURE: 54 MMHG | OXYGEN SATURATION: 93 % | RESPIRATION RATE: 16 BRPM | SYSTOLIC BLOOD PRESSURE: 103 MMHG | HEART RATE: 70 BPM | WEIGHT: 193.34 LBS

## 2019-02-21 PROBLEM — D62 ACUTE BLOOD LOSS ANEMIA: Status: ACTIVE | Noted: 2019-02-21

## 2019-02-21 LAB
ANION GAP SERPL CALCULATED.3IONS-SCNC: 2 MMOL/L (ref 3–11)
BUN BLD-MCNC: 15 MG/DL (ref 9–23)
BUN/CREAT SERPL: 15.8 (ref 7–25)
CALCIUM SPEC-SCNC: 8.1 MG/DL (ref 8.7–10.4)
CHLORIDE SERPL-SCNC: 108 MMOL/L (ref 99–109)
CO2 SERPL-SCNC: 31 MMOL/L (ref 20–31)
CREAT BLD-MCNC: 0.95 MG/DL (ref 0.6–1.3)
DEPRECATED RDW RBC AUTO: 47 FL (ref 37–54)
ERYTHROCYTE [DISTWIDTH] IN BLOOD BY AUTOMATED COUNT: 13.5 % (ref 11.3–14.5)
GFR SERPL CREATININE-BSD FRML MDRD: 59 ML/MIN/1.73
GLUCOSE BLD-MCNC: 85 MG/DL (ref 70–100)
HCT VFR BLD AUTO: 31.4 % (ref 34.5–44)
HGB BLD-MCNC: 9.7 G/DL (ref 11.5–15.5)
MCH RBC QN AUTO: 29.6 PG (ref 27–31)
MCHC RBC AUTO-ENTMCNC: 30.9 G/DL (ref 32–36)
MCV RBC AUTO: 95.7 FL (ref 80–99)
PLATELET # BLD AUTO: 249 10*3/MM3 (ref 150–450)
PMV BLD AUTO: 10 FL (ref 6–12)
POTASSIUM BLD-SCNC: 4.4 MMOL/L (ref 3.5–5.5)
RBC # BLD AUTO: 3.28 10*6/MM3 (ref 3.89–5.14)
SODIUM BLD-SCNC: 141 MMOL/L (ref 132–146)
WBC NRBC COR # BLD: 7.76 10*3/MM3 (ref 3.5–10.8)

## 2019-02-21 PROCEDURE — 97535 SELF CARE MNGMENT TRAINING: CPT | Performed by: OCCUPATIONAL THERAPIST

## 2019-02-21 PROCEDURE — 85027 COMPLETE CBC AUTOMATED: CPT | Performed by: NURSE PRACTITIONER

## 2019-02-21 PROCEDURE — 25010000003 CEFAZOLIN IN DEXTROSE 2-4 GM/100ML-% SOLUTION: Performed by: ORTHOPAEDIC SURGERY

## 2019-02-21 PROCEDURE — 97110 THERAPEUTIC EXERCISES: CPT

## 2019-02-21 PROCEDURE — 80048 BASIC METABOLIC PNL TOTAL CA: CPT | Performed by: ORTHOPAEDIC SURGERY

## 2019-02-21 PROCEDURE — 99024 POSTOP FOLLOW-UP VISIT: CPT | Performed by: ORTHOPAEDIC SURGERY

## 2019-02-21 PROCEDURE — 97116 GAIT TRAINING THERAPY: CPT

## 2019-02-21 PROCEDURE — 97166 OT EVAL MOD COMPLEX 45 MIN: CPT | Performed by: OCCUPATIONAL THERAPIST

## 2019-02-21 RX ORDER — OXYCODONE HYDROCHLORIDE AND ACETAMINOPHEN 5; 325 MG/1; MG/1
1 TABLET ORAL EVERY 4 HOURS PRN
Qty: 40 TABLET | Refills: 0 | Status: SHIPPED | OUTPATIENT
Start: 2019-02-21 | End: 2019-03-01 | Stop reason: SDUPTHER

## 2019-02-21 RX ORDER — ASPIRIN 81 MG/1
81 TABLET ORAL DAILY
Start: 2019-02-21 | End: 2019-02-21 | Stop reason: SDUPTHER

## 2019-02-21 RX ORDER — PSEUDOEPHEDRINE HCL 30 MG
100 TABLET ORAL 2 TIMES DAILY PRN
Qty: 60 EACH | Refills: 0 | Status: SHIPPED | OUTPATIENT
Start: 2019-02-21 | End: 2019-12-06

## 2019-02-21 RX ORDER — ASPIRIN 81 MG/1
81 TABLET ORAL DAILY
Start: 2019-02-21

## 2019-02-21 RX ORDER — PSEUDOEPHEDRINE HCL 30 MG
100 TABLET ORAL 2 TIMES DAILY PRN
Qty: 60 EACH | Refills: 0 | Status: SHIPPED | OUTPATIENT
Start: 2019-02-21 | End: 2019-02-21

## 2019-02-21 RX ORDER — BUPRENORPHINE HYDROCHLORIDE, NALOXONE HYDROCHLORIDE 8; 2 MG/1; MG/1
1 FILM, SOLUBLE BUCCAL; SUBLINGUAL DAILY
Qty: 30 EACH | Refills: 0
Start: 2019-02-21

## 2019-02-21 RX ADMIN — MELOXICAM 15 MG: 7.5 TABLET ORAL at 08:43

## 2019-02-21 RX ADMIN — SODIUM CHLORIDE, PRESERVATIVE FREE 3 ML: 5 INJECTION INTRAVENOUS at 08:44

## 2019-02-21 RX ADMIN — FUROSEMIDE 20 MG: 20 TABLET ORAL at 08:43

## 2019-02-21 RX ADMIN — DULOXETINE 30 MG: 30 CAPSULE, DELAYED RELEASE ORAL at 08:43

## 2019-02-21 RX ADMIN — OXYCODONE AND ACETAMINOPHEN 2 TABLET: 5; 325 TABLET ORAL at 12:28

## 2019-02-21 RX ADMIN — LISINOPRIL 20 MG: 20 TABLET ORAL at 08:41

## 2019-02-21 RX ADMIN — OXYCODONE AND ACETAMINOPHEN 2 TABLET: 5; 325 TABLET ORAL at 02:17

## 2019-02-21 RX ADMIN — OXYCODONE HYDROCHLORIDE AND ACETAMINOPHEN 1 TABLET: 5; 325 TABLET ORAL at 08:49

## 2019-02-21 RX ADMIN — CEFAZOLIN SODIUM 2 G: 2 INJECTION, SOLUTION INTRAVENOUS at 00:30

## 2019-02-21 RX ADMIN — ASPIRIN 325 MG: 325 TABLET, DELAYED RELEASE ORAL at 08:43

## 2019-02-21 RX ADMIN — LEVOTHYROXINE SODIUM 100 MCG: 100 TABLET ORAL at 05:53

## 2019-02-21 RX ADMIN — GABAPENTIN 800 MG: 400 CAPSULE ORAL at 08:41

## 2019-02-21 RX ADMIN — BUSPIRONE HYDROCHLORIDE 15 MG: 10 TABLET ORAL at 08:41

## 2019-02-21 NOTE — PROGRESS NOTES
Discharge Planning Assessment  [facility]     Patient Name: Lesli Leon  MRN: [id number]  Today's Date: 2/21/2019    Admit Date: 2/20/2019    Discharge Needs Assessment    No documentation.       Discharge Plan     Row Name 02/21/19 1250       Plan    Plan  final    Plan Comments  CM spoke with pt and her spouse in the room. Pt states she has all needed DME at home for her post-op knee replacement. CM inquired again about additional needs and pt declines any further assistance. She will d/c home with her  by car    Final Discharge Disposition Code  01 - home or self-care        Destination      No service coordination in this encounter.      Durable Medical Equipment      No service coordination in this encounter.      Dialysis/Infusion      No service coordination in this encounter.      Home Medical Care      No service coordination in this encounter.      Community Resources      No service coordination in this encounter.        Expected Discharge Date and Time     Expected Discharge Date Expected Discharge Time    Feb 21, 2019         Demographic Summary    No documentation.       Functional Status    No documentation.       Psychosocial    No documentation.       Abuse/Neglect    No documentation.       Legal    No documentation.       Substance Abuse    No documentation.       Patient Forms    No documentation.           Rosey Parmar

## 2019-02-21 NOTE — DISCHARGE SUMMARY
Patient Name: Lesli Leon  MRN: [id number]  : 1955  DOS: 2019    Attending: No att. providers found    Primary Care Provider: Darren Manning MD    Date of Admission:.2019  5:13 AM    Date of Discharge:  2019    Discharge Diagnosis:   Status post total left knee replacement    Osteoarthritis of left knee    Arthritis of knee    Hypertension    Anxiety and depression    Hypothyroid    Acute postoperative pain    Impaired functional mobility, balance, gait, and endurance    Acute blood loss anemia, mild, asymptomatic      Hospital Course  Patient is a 63 y.o. female presented for left total knee arthroplasty by Dr. Garcia.     She underwent surgery under spinal anesthesia. She tolerated surgery well and was admitted for further medical management.     She is known to us from previous admissions, most recently right quadriceps repair in 2018. She was also seen previously with sepsis related to knee infection and was seen by [facility]. She is on long term suppressive antibiotics.    She is on suboxone. She is followed by a suboxone clinic in [place] off [facility], she cannot recall who she sees at this time. She stopped suboxone 2 days ago and will resume when her prescription for pain medication is complete.    Patient was provided pain medications as needed for pain control, along with adductor canal nerve block infusion of Ropivacaine.    Adjustments were made to pain medications to optimize postop pain management. Risks and benefits of opiate medications discussed with patient.    She was seen by PT and OT and has progressed well over her stay.  She used an IS for atelectasis prophylaxis and aspirin along with mechanicals for DVT prophylaxis.  Home medications were resumed as appropriate, and labs were monitored and remained fairly stable.     With the progress she has made, she is ready for DC home today.    She will have an Arrow pump ( instructed on it during this  "admit).  Discussed with patient regarding plan and she shows understanding and agreement.    Patient will have HHPT following discharge.        Procedures Performed  DATE OF PROCEDURE: 2019     SURGEON: Stephon Garcia M.D.      PREOPERATIVE DIAGNOSIS: Advanced degenerative joint disease of the left knee secondary to osteoarthritis     POSTOPERATIVE DIAGNOSIS: same      PROCEDURE: Left total Knee Arthroplasty     Pertinent Test Results:    I reviewed the patient's new clinical results.   Results from last 7 days   Lab Units 19  0718   WBC 10*3/mm3 7.76   HEMOGLOBIN g/dL 9.7*   HEMATOCRIT % 31.4*   PLATELETS 10*3/mm3 249   Results for YOSEF HOOVER (MRN [id number]) as of 2019 17:50   Ref. Range 2019 15:58   Hemoglobin Latest Ref Range: 11.5 - 15.5 g/dL 12.3   Hematocrit Latest Ref Range: 34.5 - 44.0 % 39.1     Results from last 7 days   Lab Units 19  0718   SODIUM mmol/L 141   POTASSIUM mmol/L 4.4   CHLORIDE mmol/L 108   CO2 mmol/L 31.0   BUN mg/dL 15   CREATININE mg/dL 0.95   CALCIUM mg/dL 8.1*   GLUCOSE mg/dL 85     I reviewed the patient's new imaging including images and reports.      Physical therapy: Patient ambulated 250 feet with RW and step through gait pattern, limited by pain. ROM progressing well, 11-76 degrees. Patient climbed 1 step with RW backwards with no difficulty. Patient has been d/c home with family and HHPT.     Discharge Assessment:    Vital Signs  Visit Vitals  /54 (BP Location: Right arm, Patient Position: Sitting)   Pulse 70   Temp 98.4 °F (36.9 °C) (Oral)   Resp 16   Ht 162.6 cm (64.02\")   Wt 87.7 kg (193 lb 5.5 oz)   SpO2 93%   Breastfeeding? No   BMI 33.17 kg/m²     Temp (24hrs), Av.2 °F (36.8 °C), Min:98 °F (36.7 °C), Max:98.4 °F (36.9 °C)      General Appearance:    Alert, cooperative, in no acute distress   Lungs:     Clear to auscultation,respirations regular, even and                   unlabored    Heart:    Regular rhythm and normal rate, " normal S1 and S2   Abdomen:     Normal bowel sounds, no masses, no organomegaly, soft        non-tender, non-distended, no guarding, no rebound                 tenderness   Extremities:   Moves all extremities well, no edema, no cyanosis, no              Redness. Left knee ACE wrap CDI. Nerve block present   Pulses:   Pulses palpable and equal bilaterally   Skin:   No bleeding, bruising or rash   Neurologic:   Cranial nerves 2 - 12 grossly intact, sensation intact. Flexion and dorsiflexion intact bilateral feet.       Discharge Disposition: Home    Discharge Medications     Discharge Medications      New Medications      Instructions Start Date   oxyCODONE-acetaminophen 5-325 MG per tablet  Commonly known as:  PERCOCET   1 tablet, Oral, Every 4 Hours PRN      STOOL SOFTENER 100 MG capsule  Generic drug:  docusate sodium   100 mg, Oral, 2 Times Daily PRN         Changes to Medications      Instructions Start Date   aspirin  MG tablet  What changed:    · medication strength  · how much to take  · when to take this  · additional instructions   325 mg, Oral, Every 12 Hours Scheduled, For 1 month      aspirin 81 MG EC tablet  What changed:  You were already taking a medication with the same name, and this prescription was added. Make sure you understand how and when to take each.   81 mg, Oral, Daily, Resume in 1 month      CELEBREX 200 MG capsule  Generic drug:  celecoxib  What changed:  additional instructions   200 mg, Oral, Daily, Must take an hour after aspirin if needed.      SUBOXONE 8-2 MG film film  Generic drug:  buprenorphine-naloxone  What changed:  See the new instructions.   1 film, Sublingual, Daily, Resume when pain prescription complete         Continue These Medications      Instructions Start Date   ALPRAZolam 1 MG tablet  Commonly known as:  XANAX   1 mg, Oral, Daily PRN      busPIRone 15 MG tablet  Commonly known as:  BUSPAR   15 mg, Oral, 2 Times Daily      chlorzoxazone 500 MG  tablet  Commonly known as:  PARAFON FORTE   500 mg, Oral, 3 Times Daily PRN      clotrimazole-betamethasone 1-0.05 % cream  Commonly known as:  LOTRISONE   apply to affected area once daily      DULoxetine 30 MG capsule  Commonly known as:  CYMBALTA   30 mg, Oral, 3 Times Daily      furosemide 20 MG tablet  Commonly known as:  LASIX   20 mg, Oral, 2 Times Daily      gabapentin 800 MG tablet  Commonly known as:  NEURONTIN   800 mg, Oral, 4 Times Daily      KEFLEX 500 MG capsule  Generic drug:  cephalexin   500 mg, Oral, 2 Times Daily, Due to septic infection        levothyroxine 100 MCG tablet  Commonly known as:  SYNTHROID, LEVOTHROID   100 mcg, Oral, Daily, Brand name.      lisinopril 20 MG tablet  Commonly known as:  PRINIVIL,ZESTRIL   20 mg, Oral, Daily             Discharge Diet: Regular diet    Activity at Discharge: WBAT LLE    Follow-up Appointments  Dr. Garcia per his orders  Dr. Landrum as scheduled      BECKY Newton  02/21/19  5:50 PM     I have personally performed the evaluation on this patient. My history is consistant  with above documentation . My exam finding are listed above. I have personally reviewed and discussed the above formulated discharge plan with patient and [email or web address].

## 2019-02-21 NOTE — PROGRESS NOTES
[facility]    Acute pain service Inpatient Progress Note    Patient Name: Lesli Leon  :  1955  MRN:  [id number]        Acute Pain  Service Inpatient Progress Note:    Analgesia:Excellent  Pain Score:2/10  LOC: alert and awake  Resp Status: room air  Cardiac: VS stable  Side Effects:None  Catheter Site:clean, dressing intact and dry  Cath type: peripheral nerve cath with ON Q  Infusion rate: 10ml/hr  Catheter Plan:Catheter to remain Insitu and Continue catheter infusion rate unchanged                Pain 6 after ambulation

## 2019-02-21 NOTE — PLAN OF CARE
Problem: Patient Care Overview  Goal: Plan of Care Review  Outcome: Ongoing (interventions implemented as appropriate)   02/21/19 1043   Plan of Care Review   Progress improving   OTHER   Outcome Summary Patient ambulated 250 feet with RW and step through gait pattern, limited by pain. ROM progressing well, 11-76 degrees. Patient climbed 1 step with RW backwards with no difficulty. Patient has been d/c home with family and HHPT.    Coping/Psychosocial   Plan of Care Reviewed With patient;spouse       Problem: Knee Arthroplasty (Total, Partial) (Adult)  Goal: Signs and Symptoms of Listed Potential Problems Will be Absent, Minimized or Managed (Knee Arthroplasty)  Outcome: Ongoing (interventions implemented as appropriate)   02/21/19 1043   Goal/Outcome Evaluation   Problems Assessed (Knee Arthroplasty) functional deficit;pain;range of motion decreased   Problems Present (Knee Arthroplasty) functional deficit;pain;range of motion decreased

## 2019-02-21 NOTE — THERAPY DISCHARGE NOTE
Acute Care - Occupational Therapy Initial Eval/Discharge  [facility]     Patient Name: Lesli Leon  : 1955  MRN: [id number]  Today's Date: 2019  Onset of Illness/Injury or Date of Surgery: 19  Date of Referral to OT: 19  Referring Physician: MD Jose      Admit Date: 2019       ICD-10-CM ICD-9-CM   1. Impaired functional mobility, balance, gait, and endurance Z74.09 V49.89   2. Osteoarthritis of left knee M17.12 715.96   3. Impaired mobility and ADLs Z74.09 799.89     Patient Active Problem List   Diagnosis   • Sepsis, probable right septic knee as source   • Hypertension   • Disease of thyroid gland   • Expressive aphasia   • Heart murmur   • Quadriceps tendon rupture, right, initial encounter   • S/P primary repair right knee quadriceps tendon tear   • Anxiety and depression   • Hypothyroid   • Tobacco abuse   • Acute postoperative pain   • Primary osteoarthritis of left knee   • Osteoarthritis of left knee   • Arthritis of knee   • Status post total left knee replacement   • Impaired functional mobility, balance, gait, and endurance     Past Medical History:   Diagnosis Date   • Anxiety and depression    • Arthritis    • Breast cancer (CMS/HCC) 2015    RIGHT   • Disease of thyroid gland    • History of constipation    • History of IBS    • History of panic attacks    • Hx of radiation therapy 2015    RT BREAST   • Hypertension    • Neuropathy    • Plantar fasciitis    • Scoliosis    • Septic joint of right knee joint (CMS/HCC) 2017   • Wears dentures     top   • Wears glasses      Past Surgical History:   Procedure Laterality Date   • BREAST BIOPSY Right 2015   • BREAST LUMPECTOMY Right 2015   • BREAST LUMPECTOMY     • ENDOSCOPY     • JOINT REPLACEMENT Right     knee   • KNEE ARTHROTOMY Right 12/10/2017    Procedure: INCISION AND DRAINAGE, POLY EXCHANGE RIGHT KNEE;  Surgeon: Stephon Garcia MD;  Location: [facility];  Service:    • VA FIX QUAD/HAMSTR MUSC RUPT,PRIMARY  Right 1/8/2018    Procedure: PRIMARY REPAIR OF RIGHT QUADRICEPS TENDON ;  Surgeon: Stephon Garcia MD;  Location:  [facility];  Service: Orthopedics   • TOTAL KNEE ARTHROPLASTY Left 2/20/2019    Procedure: TOTAL KNEE ARTHROPLASTY LEFT;  Surgeon: Stephon Garcia MD;  Location:  [facility];  Service: Orthopedics          OT ASSESSMENT FLOWSHEET (last 72 hours)      Occupational Therapy Evaluation     Row Name 02/21/19 0924                   OT Evaluation Time/Intention    Subjective Information  complains of;pain  -ST        Document Type  evaluation;therapy note (daily note);discharge evaluation/summary  -ST        Mode of Treatment  individual therapy;occupational therapy  -ST        Patient Effort  good  -ST        Symptoms Noted During/After Treatment  increased pain  -ST        Comment  RN notified of pt's posterior pain-RN tx'ed by increasing nerve cath  -ST           General Information    Patient Profile Reviewed?  yes  -ST        Onset of Illness/Injury or Date of Surgery  02/20/19  -ST        Referring Physician  MD Jose  -ST        Patient Observations  alert;cooperative;agree to therapy  -ST        Patient/Family Observations   present  -ST        General Observations of Patient  Pt sitting UIB at arrival; adductor canal cath intact  -ST        Prior Level of Function  independent:;all household mobility;transfer;bed mobility;feeding;grooming;min assist:;community mobility;dressing;bathing;home management pain/difficulty with LBD/HM and mobility   -ST        Equipment Currently Used at Home  grab bar;commode, bedside;raised toilet;walker, rolling;cane, straight used rwx AAT PTA  -ST        Pertinent History of Current Functional Problem  Pt presents for sx management of long-standing L knee pain and dysfuntion that failed to improve with conservative measures and impacted pt's ability to perform ADLs and mobility. Pt is now POD 1 and s/p L TKA. Pt previously admitted in 1/18 for quad repair   -ST        Existing Precautions/Restrictions  fall;other (see comments) arrow adductor canal cath  -ST        Equipment Issued to Patient  -- pt owns all AE from previous sx  -ST        Risks Reviewed  patient and family:;LOB;nausea/vomiting;dizziness;increased discomfort;change in vital signs  -ST        Benefits Reviewed  patient and family:;improve function;increase independence;increase strength;increase balance;decrease pain;increase knowledge  -ST        Barriers to Rehab  previous functional deficit  -ST           Relationship/Environment    Primary Source of Support/Comfort  spouse  -ST        Lives With  spouse  -ST        Concerns About Impact on Relationships   to assist 24/7 as needed at d/c  -ST           Resource/Environmental Concerns    Current Living Arrangements  home/apartment/condo  -ST           Cognitive Assessment/Interventions    Additional Documentation  Cognitive Assessment/Intervention (Group)  -ST           Cognitive Assessment/Intervention- PT/OT    Affect/Mental Status (Cognitive)  WNL  -ST        Orientation Status (Cognition)  oriented x 4  -ST        Follows Commands (Cognition)  WNL  -ST        Cognitive Function (Cognitive)  WNL  -ST        Personal Safety Interventions  fall prevention program maintained;gait belt;nonskid shoes/slippers when out of bed  -ST           Safety Issues, Functional Mobility    Impairments Affecting Function (Mobility)  pain;range of motion (ROM)  -ST           Mobility Assessment/Treatment    Extremity Weight-bearing Status  left lower extremity  -ST        Left Lower Extremity (Weight-bearing Status)  weight-bearing as tolerated (WBAT)  -ST           Bed Mobility Assessment/Treatment    Supine-Sit [place] (Bed Mobility)  conditional independence  -ST        Bed Mobility, Safety Issues  decreased use of legs for bridging/pushing  -ST        Assistive Device (Bed Mobility)  head of bed elevated  -ST        Comment (Bed Mobility)  increased  time to move sx LE toward EOB however no assist needed  -ST           Functional Mobility    Functional Mobility- Ind. Level  supervision required  -ST        Functional Mobility- Device  rolling walker  -ST        Functional Mobility-Distance (Feet)  7  -ST        Functional Mobility- Safety Issues  step length decreased  -ST           Transfer Assessment/Treatment    Transfer Assessment/Treatment  sit-stand transfer;stand-sit transfer  -ST        Comment (Transfers)  vc'ing for hand placement and extension of sx LE to aid with pain and safety   -ST           Sit-Stand Transfer    Sit-Stand [place] (Transfers)  verbal cues;supervision  -ST        Assistive Device (Sit-Stand Transfers)  walker, front-wheeled  -ST           Stand-Sit Transfer    Stand-Sit [place] (Transfers)  supervision  -ST        Assistive Device (Stand-Sit Transfers)  walker, front-wheeled  -ST           ADL Assessment/Intervention    77494 - OT Self Care/Mgmt Minutes  16  -ST        BADL Assessment/Intervention  bathing;lower body dressing  -ST           Bathing Assessment/Intervention    Bathing [place] Level  bathing skills;lower body  -ST        Bathing Position  edge of bed sitting  -ST        Comment (Bathing)  d/t pain and dec. ROM/flexibility, pt using LH sponge to simulate; has at home all needed items   -ST           Lower Body Dressing Assessment/Training    Assistive Devices (Lower Body Dressing)  reacher;sock-aid  -ST        Lower Body Dressing Position  edge of bed sitting  -ST        Comment (Lower Body Dressing)  pt demonstrates appropriate understanding of AE use for improving independence and comfort d/t pain and dec. ROM   -ST           BADL Safety/Performance    Impairments, BADL Safety/Performance  pain;range of motion  -ST        Skilled BADL Treatment/Intervention  adaptive equipment training;compensatory training  -ST        Progress in BADL Status  improvement noted;independence level  -ST           General  ROM    GENERAL ROM COMMENTS  BUE's fxnl for ADLs  -ST           MMT (Manual Muscle Testing)    General MMT Comments  BUE's fxnl for ADLs  -ST           Vision Assessment/Intervention    Visual Impairment/Limitations  WNL  -ST           Pain Scale: Numbers Pre/Post-Treatment    Pain Scale: Numbers, Pretreatment  7/10  -ST        Pain Scale: Numbers, Post-Treatment  7/10  -ST        Pain Location - Side  Left  -ST        Pain Location - Orientation  posterior  -ST        Pain Location  knee  -ST        Pain Intervention(s)  Cold applied;Repositioned RN increased nerve cath from 10 to 14 for posterior pain  -ST           Wound 02/20/19 0755 Left knee incision    Wound - Properties Group Date first assessed: 02/20/19  -MR Time first assessed: 0755  -MR Side: Left  -MR Location: knee  -MR Type: incision  -MR       Clinical Impression (OT)    Date of Referral to OT  02/21/19  -ST        OT Diagnosis  impaired ADLs  -ST        Prognosis (OT Eval)  good  -ST        Patient/Family Goals Statement (OT Eval)  return home  -ST        Therapy Frequency (OT Eval)  evaluation only  -ST        Care Plan Review (OT)  evaluation/treatment results reviewed;care plan/treatment goals reviewed;risks/benefits reviewed;current/potential barriers reviewed;patient/other agree to care plan  -ST        Care Plan Review, Other Participant (OT Eval)  family  -ST        Anticipated Discharge Disposition (OT)  home with OP services;home with 24/7 care;home with home health  -ST           Discharge Summary (Occupational Therapy)    Additional Documentation  Discharge Summary, OT Eval (Group)  -ST           Discharge Summary, OT Eval    Reason for Discharge (OT Discharge Summary)  patient discharged from this facility  -ST        Outcomes Achieved Upon Discharge (OT Discharge Summary)  discharge from facility occurred on same date as evaluation  -ST           Living Environment    Home Accessibility  tub/shower is not walk in walk-in shower  -ST           User Key  (r) = Recorded By, (t) = Taken By, (c) = Cosigned By    Initials Name Effective Dates     DenverDemi levine OTR 06/10/18 -     MR Raisa Lares RN 09/07/18 -           Occupational Therapy Education     Title: PT OT SLP Therapies (Done)     Topic: Occupational Therapy (Done)     Point: ADL training (Done)     Description: Instruct learner(s) on proper safety adaptation and remediation techniques during self care or transfers.   Instruct in proper use of assistive devices.    Learning Progress Summary           Patient Acceptance, E,TB,D, VU,DU by ST at 2/21/2019  9:24 AM    Comment:  see flow sheet   Family Acceptance, E,TB,D, VU,DU by ST at 2/21/2019  9:24 AM    Comment:  see flow sheet                   Point: Home exercise program (Done)     Description: Instruct learner(s) on appropriate technique for monitoring, assisting and/or progressing therapeutic exercises/activities.    Learning Progress Summary           Patient Acceptance, E,TB,D, VU,DU by ST at 2/21/2019  9:24 AM    Comment:  see flow sheet   Family Acceptance, E,TB,D, VU,DU by ST at 2/21/2019  9:24 AM    Comment:  see flow sheet                   Point: Precautions (Done)     Description: Instruct learner(s) on prescribed precautions during self-care and functional transfers.    Learning Progress Summary           Patient Acceptance, E,TB,D, VU,DU by ST at 2/21/2019  9:24 AM    Comment:  see flow sheet   Family Acceptance, E,TB,D, VU,DU by ST at 2/21/2019  9:24 AM    Comment:  see flow sheet                   Point: Body mechanics (Done)     Description: Instruct learner(s) on proper positioning and spine alignment during self-care, functional mobility activities and/or exercises.    Learning Progress Summary           Patient Acceptance, E,TB,D, VU,DU by ST at 2/21/2019  9:24 AM    Comment:  see flow sheet   Family Acceptance, E,TB,D, VU,DU by  at 2/21/2019  9:24 AM    Comment:  see flow sheet                               User  Key     Initials Effective Dates Name Provider Type Discipline     06/10/18 -  Demi Yost OTR Occupational Therapist OT                OT Recommendation and Plan  Outcome Summary/Treatment Plan (OT)  Anticipated Discharge Disposition (OT): home with OP services, home with 24/7 care, home with home health  Reason for Discharge (OT Discharge Summary): patient discharged from this facility  Therapy Frequency (OT Eval): evaluation only  Plan of Care Review  Plan of Care Reviewed With: patient  Plan of Care Reviewed With: patient  Outcome Summary: Pt s/p TKA and now with moderate posterior knee pain. Pt demonstrates good overall safety and ability to utilize AD and AE for improving independence and safety. Pt required to use AE d/t pain and dec. flexibility with knee flexion. Pt has all needed DME and AE from previous sx. Plan to d/c home this p.m. with family assist and HHPT.         Outcome Measures     Row Name 02/21/19 0924 02/20/19 1420          How much help from another person do you currently need...    Turning from your back to your side while in flat bed without using bedrails?  --  3  -LR     Moving from lying on back to sitting on the side of a flat bed without bedrails?  --  3  -LR     Moving to and from a bed to a chair (including a wheelchair)?  --  3  -LR     Standing up from a chair using your arms (e.g., wheelchair, bedside chair)?  --  3  -LR     Climbing 3-5 steps with a railing?  --  2  -LR     To walk in hospital room?  --  3  -LR     AM-PAC 6 Clicks Score  --  17  -LR        How much help from another is currently needed...    Putting on and taking off regular lower body clothing?  3  -ST  --     Bathing (including washing, rinsing, and drying)  3  -ST  --     Toileting (which includes using toilet bed pan or urinal)  3  -ST  --     Putting on and taking off regular upper body clothing  4  -ST  --     Taking care of personal grooming (such as brushing teeth)  4  -ST  --     Eating meals   4  -ST  --     Score  21 -ST  --        Functional Assessment    Outcome Measure Options  AM-PAC 6 Clicks Daily Activity (OT)  -ST  AM-PAC 6 Clicks Basic Mobility (PT)  -LR       User Key  (r) = Recorded By, (t) = Taken By, (c) = Cosigned By    Initials Name Provider Type    Demi Lang OTR Occupational Therapist    LR Beena Adler, PT Physical Therapist          Time Calculation:   Time Calculation- OT     Row Name 02/21/19 0924             Time Calculation- OT    OT Start Time  0924  -ST      OT Received On  02/21/19  -ST         Timed Charges    49834 - OT Self Care/Mgmt Minutes  16  -ST        User Key  (r) = Recorded By, (t) = Taken By, (c) = Cosigned By    Initials Name Provider Type     Demi Yost OTR Occupational Therapist        Therapy Suggested Charges     Code   Minutes Charges    05446 (CPT®) Hc Ot Neuromusc Re Education Ea 15 Min      93968 (CPT®) Hc Ot Ther Proc Ea 15 Min      43726 (CPT®) Hc Ot Therapeutic Act Ea 15 Min      19472 (CPT®) Hc Ot Manual Therapy Ea 15 Min      55573 (CPT®) Hc Ot Iontophoresis Ea 15 Min      25303 (CPT®) Hc Ot Elec Stim Ea-Per 15 Min      33675 (CPT®) Hc Ot Ultrasound Ea 15 Min      93456 (CPT®) Hc Ot Self Care/Mgmt/Train Ea 15 Min 16 1    Total  16 1        Therapy Charges for Today     Code Description Service Date Service Provider Modifiers Qty    75122468365 HC OT SELF CARE/MGMT/TRAIN EA 15 MIN 2/21/2019 Demi Yost OTR GO 1    10694836145 HC OT EVAL MOD COMPLEXITY 3 2/21/2019 Demi Yost OTR GO 1               OT Discharge Summary  Anticipated Discharge Disposition (OT): home with OP services, home with 24/7 care, home with home health  Reason for Discharge: Discharge from facility  Outcomes Achieved: Discharge from facility occurred on same date as evluation  Discharge Destination: Home with home health, Home with assist, Home with outpatient services    JEREMÍAS Rey  2/21/2019

## 2019-02-21 NOTE — PROGRESS NOTES
"  SUBJECTIVE  Patient resting comfortably.  Pain well controlled.  Ambulated 250 feet with therapy yesterday.    OBJECTIVE  Temp (24hrs), Av.1 °F (36.7 °C), Min:97.9 °F (36.6 °C), Max:98.3 °F (36.8 °C)    Blood pressure 100/56, pulse 70, temperature 98 °F (36.7 °C), temperature source Oral, resp. rate 16, height 162.6 cm (64.02\"), weight 87.7 kg (193 lb 5.5 oz), SpO2 99 %, not currently breastfeeding.    Lab Results (last 24 hours)     ** No results found for the last 24 hours. **            PHYSICAL EXAM  Left lower extremity: Dressing clean, dry and intact. Intact EHL, FHL, tibialis anterior, and gastrocsoleus. Sensation intact to light touch to deep peroneal, superficial peroneal, sural, saphenous, tibial nerves. 2+ palpable DP and PT pulses.         Status post total left knee replacement    Hypertension    Anxiety and depression    Hypothyroid    Osteoarthritis of left knee    Arthritis of knee    Impaired functional mobility, balance, gait, and endurance      PLAN / DISPOSITION:  1 Day Post-Op left total knee arthroplasty    Protected weight bearing as tolerated left lower extremity, knee range of motion as tolerated  Pain control  PT/OT for post op mobilization and medical equipment needs   23 hours perioperative antibiotic prophylaxis   SCD's bilateral lower extremities   Aspirin for DVT prophylaxis   Social work for discharge planning.  Anticipate discharge home today.  Dressing to remain in place for 7 days. May remove on POD#7. If no drainage, may shower on POD#10. No submerging wound in water. If drainage is noted, sterile dressing should be placed and wound checked daily. No showering until wound has remained dry for 72 consecutive hours.   Follow up in 3 weeks for re-assessment.      Stephon Garcia MD  19  7:13 AM        "

## 2019-02-21 NOTE — THERAPY DISCHARGE NOTE
Acute Care - Physical Therapy Treatment Note/Discharge  [facility]     Patient Name: Lesli Leon  : 1955  MRN: [id number]  Today's Date: 2019  Onset of Illness/Injury or Date of Surgery: 19  Date of Referral to PT: 19  Referring Physician: MD Jose    Admit Date: 2019    Visit Dx:    ICD-10-CM ICD-9-CM   1. Impaired functional mobility, balance, gait, and endurance Z74.09 V49.89   2. Osteoarthritis of left knee M17.12 715.96   3. Impaired mobility and ADLs Z74.09 799.89     Patient Active Problem List   Diagnosis   • Sepsis, probable right septic knee as source   • Hypertension   • Disease of thyroid gland   • Expressive aphasia   • Heart murmur   • Quadriceps tendon rupture, right, initial encounter   • S/P primary repair right knee quadriceps tendon tear   • Anxiety and depression   • Hypothyroid   • Tobacco abuse   • Acute postoperative pain   • Primary osteoarthritis of left knee   • Osteoarthritis of left knee   • Arthritis of knee   • Status post total left knee replacement   • Impaired functional mobility, balance, gait, and endurance       Physical Therapy Education     Title: PT OT SLP Therapies (Done)     Topic: Physical Therapy (Done)     Point: Mobility training (Done)     Learning Progress Summary           Patient Acceptance, E,JESSICA PAVON VU, DU by LR at 2019 10:43 AM    Comment:  Issued and reviewed written/illustrated HEP. Educated on precautions, weight bearing status, correct sit<->stand t/f technique, correct gait mechanics, correct stair training technique, and correct car t/f technique.    Acceptance, LIBRADO GARG VU, NR by LR at 2019  2:24 PM    Comment:  Educated on precautions, weight bearing status, correct supine to sit t/f technique, correct sit<->stand t/f technique, correct gait mechanics, and progression of POC.   Significant Other Acceptance, E,JESSICA PAVON VU, DU by LR at 2019 10:43 AM    Comment:  Issued and reviewed written/illustrated HEP. Educated on  precautions, weight bearing status, correct sit<->stand t/f technique, correct gait mechanics, correct stair training technique, and correct car t/f technique.                   Point: Home exercise program (Done)     Learning Progress Summary           Patient Acceptance, E,D,H, VU,DU by LR at 2/21/2019 10:43 AM    Comment:  Issued and reviewed written/illustrated HEP. Educated on precautions, weight bearing status, correct sit<->stand t/f technique, correct gait mechanics, correct stair training technique, and correct car t/f technique.    Acceptance, E,D, VU,NR by LR at 2/20/2019  2:24 PM    Comment:  Educated on precautions, weight bearing status, correct supine to sit t/f technique, correct sit<->stand t/f technique, correct gait mechanics, and progression of POC.   Significant Other Acceptance, E,D,H, VU,DU by LR at 2/21/2019 10:43 AM    Comment:  Issued and reviewed written/illustrated HEP. Educated on precautions, weight bearing status, correct sit<->stand t/f technique, correct gait mechanics, correct stair training technique, and correct car t/f technique.                   Point: Body mechanics (Done)     Learning Progress Summary           Patient Acceptance, E,D,H, VU,DU by LR at 2/21/2019 10:43 AM    Comment:  Issued and reviewed written/illustrated HEP. Educated on precautions, weight bearing status, correct sit<->stand t/f technique, correct gait mechanics, correct stair training technique, and correct car t/f technique.    Acceptance, E,D, VU,NR by LR at 2/20/2019  2:24 PM    Comment:  Educated on precautions, weight bearing status, correct supine to sit t/f technique, correct sit<->stand t/f technique, correct gait mechanics, and progression of POC.   Significant Other Acceptance, E,D,H, VU,DU by LR at 2/21/2019 10:43 AM    Comment:  Issued and reviewed written/illustrated HEP. Educated on precautions, weight bearing status, correct sit<->stand t/f technique, correct gait mechanics, correct stair  training technique, and correct car t/f technique.                   Point: Precautions (Done)     Learning Progress Summary           Patient Acceptance, E,D,H, CAMILA,DU by LR at 2/21/2019 10:43 AM    Comment:  Issued and reviewed written/illustrated HEP. Educated on precautions, weight bearing status, correct sit<->stand t/f technique, correct gait mechanics, correct stair training technique, and correct car t/f technique.    Acceptance, E,LIBRADO, CAMILA,NR by LR at 2/20/2019  2:24 PM    Comment:  Educated on precautions, weight bearing status, correct supine to sit t/f technique, correct sit<->stand t/f technique, correct gait mechanics, and progression of POC.   Significant Other Acceptance, E,D,JESSICA, CAMILA,NEVAEH by LR at 2/21/2019 10:43 AM    Comment:  Issued and reviewed written/illustrated HEP. Educated on precautions, weight bearing status, correct sit<->stand t/f technique, correct gait mechanics, correct stair training technique, and correct car t/f technique.                               User Key     Initials Effective Dates Name Provider Type Discipline    LR 06/19/15 -  Beena Adler, PT Physical Therapist PT              Rehab Goal Summary     Row Name 02/21/19 1043             Physical Therapy Goals    Bed Mobility Goal Selection (PT)  bed mobility, PT goal 1  -LR      Transfer Goal Selection (PT)  transfer, PT goal 1  -LR      Gait Training Goal Selection (PT)  gait training, PT goal 1  -LR      ROM Goal Selection (PT)  ROM, PT goal 1  -LR      Stairs Goal Selection (PT)  stairs, PT goal 1  -LR         Bed Mobility Goal 1 (PT)    Activity/Assistive Device (Bed Mobility Goal 1, PT)  sit to supine/supine to sit  -LR      [place] Level/Cues Needed (Bed Mobility Goal 1, PT)  conditional independence  -LR      Time Frame (Bed Mobility Goal 1, PT)  long term goal (LTG);3 days  -LR      Progress/Outcomes (Bed Mobility Goal 1, PT)  goal not met;discharged from facility  -LR         Transfer Goal 1 (PT)     Activity/Assistive Device (Transfer Goal 1, PT)  sit-to-stand/stand-to-sit;walker, rolling  -LR      [place] Level/Cues Needed (Transfer Goal 1, PT)  conditional independence  -LR      Time Frame (Transfer Goal 1, PT)  long term goal (LTG);3 days  -LR      Progress/Outcome (Transfer Goal 1, PT)  goal not met;discharged from facility  -LR         Gait Training Goal 1 (PT)    Activity/Assistive Device (Gait Training Goal 1, PT)  gait (walking locomotion);walker, rolling  -LR      [place] Level (Gait Training Goal 1, PT)  conditional independence  -LR      Distance (Gait Goal 1, PT)  500 feet  -LR      Time Frame (Gait Training Goal 1, PT)  long term goal (LTG);3 days  -LR      Progress/Outcome (Gait Training Goal 1, PT)  goal not met;discharged from facility  -LR         ROM Goal 1 (PT)    ROM Goal 1 (PT)  0-90 degrees AAROM L knee  -LR      Time Frame (ROM Goal 1, PT)  long term goal (LTG);3 days  -LR      Progress/Outcome (ROM Goal 1, PT)  goal not met;discharged from facility  -LR         Stairs Goal 1 (PT)    Activity/Assistive Device (Stairs Goal 1, PT)  ascending stairs;descending stairs;step-to-step;walker, rolling;other (see comments) backwards  -LR      [place] Level/Cues Needed (Stairs Goal 1, PT)  contact guard assist  -LR      Number of Stairs (Stairs Goal 1, PT)  1  -LR      Time Frame (Stairs Goal 1, PT)  long term goal (LTG);3 days  -LR      Progress/Outcome (Stairs Goal 1, PT)  goal met  -LR        User Key  (r) = Recorded By, (t) = Taken By, (c) = Cosigned By    Initials Name Provider Type Discipline    LR Beena Adler, PT Physical Therapist PT        Therapy Treatment  Rehabilitation Treatment Summary     Row Name 02/21/19 1043             Treatment Time/Intention    Discipline  physical therapist  -LR      Document Type  therapy note (daily note);discharge evaluation/summary  -LR      Subjective Information  complains of;pain  -LR      Mode of Treatment  physical  therapy;individual therapy  -LR      Patient/Family Observations  Patient sitting reclined UIC on arrival. L adductor canal nerve catheter, L knee ace bandaged.   -LR      Care Plan Review  care plan/treatment goals reviewed;risks/benefits reviewed;current/potential barriers reviewed;patient/other agree to care plan  -LR      Care Plan Review, Other Participant(s)  spouse  -LR      Patient Effort  excellent  -LR      Existing Precautions/Restrictions  fall;other (see comments) L adductor canal nerve catheter  -LR      Recorded by [LR] Beena Adler, PT 02/21/19 1120      Row Name 02/21/19 1043             Cognitive Assessment/Intervention- PT/OT    Affect/Mental Status (Cognitive)  WFL  -LR      Orientation Status (Cognition)  oriented x 4  -LR      Follows Commands (Cognition)  WFL;follows one step commands;over 90% accuracy;verbal cues/prompting required;repetition of directions required  -LR      Safety Deficit (Cognitive)  safety precautions awareness;safety precautions follow-through/compliance  -LR      Recorded by [LR] Beena Adler, PT 02/21/19 1120      Row Name 02/21/19 1043             Safety Issues, Functional Mobility    Safety Issues Affecting Function (Mobility)  safety precaution awareness;safety precautions follow-through/compliance;sequencing abilities  -LR      Recorded by [LR] Beena Adler, PT 02/21/19 1120      Row Name 02/21/19 1043             Mobility Assessment/Intervention    Extremity Weight-bearing Status  left lower extremity  -LR      Left Lower Extremity (Weight-bearing Status)  weight-bearing as tolerated (WBAT)  -LR      Recorded by [LR] Beena Adler, PT 02/21/19 1120      Row Name 02/21/19 1043             Bed Mobility Assessment/Treatment    Supine-Sit [place] (Bed Mobility)  not tested UIC on arrival.   -LR      Comment (Bed Mobility)  UIC at end of treatment.   -LR      Recorded by [LR] Beena Adler, PT 02/21/19 1120      Row  Name 02/21/19 1043             Transfer Assessment/Treatment    Transfer Assessment/Treatment  sit-stand transfer;stand-sit transfer  -LR      Comment (Transfers)  Verbal cues for correct hand placement with t/f. Verbal cues to step L LE out before t/f for comfort.   -LR      Recorded by [LR] Beena Adler, PT 02/21/19 1120      Row Name 02/21/19 1043             Sit-Stand Transfer    Sit-Stand [place] (Transfers)  verbal cues;stand by assist  -LR      Assistive Device (Sit-Stand Transfers)  walker, front-wheeled  -LR      Recorded by [LR] Beena Adler, PT 02/21/19 1120      Row Name 02/21/19 1043             Stand-Sit Transfer    Stand-Sit [place] (Transfers)  verbal cues;stand by assist  -LR      Assistive Device (Stand-Sit Transfers)  walker, front-wheeled  -LR      Recorded by [LR] Beena Adler, PT 02/21/19 1120      Row Name 02/21/19 1043             Gait/Stairs Assessment/Training    31149 - Gait Training Minutes   15  -LR      [place] Level (Gait)  verbal cues;contact guard;2 person assist  -LR      Assistive Device (Gait)  walker, front-wheeled  -LR      Distance in Feet (Gait)  250  -LR      Pattern (Gait)  step-through  -LR      Deviations/Abnormal Patterns (Gait)  left sided deviations;antalgic;bilateral deviations;[place] decreased;gait speed decreased;stride length decreased  -LR      Bilateral Gait Deviations  forward flexed posture;heel strike decreased  -LR      Left Sided Gait Deviations  weight shift ability decreased  -LR      Negotiation (Stairs)  stairs independence;stairs assistive device;handrail location;number of steps;ascending technique;descending technique  -LR      [place] Level (Stairs)  verbal cues;contact guard  -LR      Assistive Device (Stairs)  walker, front-wheeled  -LR      Handrail Location (Stairs)  none  -LR      Number of Steps (Stairs)  1  -LR      Ascending Technique (Stairs)  step-to-step  -LR      Descending Technique  (Stairs)  step-to-step  -LR      Stairs, Safety Issues  sequencing ability decreased;weight-shifting ability decreased  -LR      Stairs, Impairments  ROM decreased;strength decreased;pain  -LR      Comment (Gait/Stairs)  Patient ambulated with step through gait pattern at slow pace. Verbal cues for correct sequencing of steps, increased L LE weight bearing/stance phase, decreased UE weight bearing, and increased L knee flexion during swing phase. Improved with cues for correction. Gait limited by pain. Verbal cues to back up to step with RW for UE support. Verbal cues to step up backwards with strong LE first and down with weak LE first.   -LR      Recorded by [LR] Beena Adler, PT 02/21/19 1120      Row Name 02/21/19 1043             Left Lower Ext    LT Lower Extremity Comments  11-76 degrees; lacking 11 degrees extension AROM, 76 degrees AAROM in sitting.   -LR      Recorded by [LR] Beena Adler, PT 02/21/19 1120      Row Name 02/21/19 1043             Therapeutic Exercise    74503 - PT Therapeutic Exercise Minutes  8  -LR      Recorded by [LR] Beena Adler, PT 02/21/19 1120      Row Name 02/21/19 1043             Therapeutic Exercise    Lower Extremity (Therapeutic Exercise)  heel slides, left;LAQ (long arc quad), left;quad sets, left;SAQ (short arc quad), left;SLR (straight leg raise), left;other (see comments) heel slides in sitting/supine;standing calf raises  -LR      Lower Extremity Range of Motion (Therapeutic Exercise)  ankle dorsiflexion/plantar flexion, left  -LR      Exercise Type (Therapeutic Exercise)  AROM (active range of motion);AAROM (active assistive range of motion);isometric contraction, static;isotonic contraction, concentric  -LR      Position (Therapeutic Exercise)  seated;standing  -LR      Sets/Reps (Therapeutic Exercise)  x15 reps each  -LR      Comment (Therapeutic Exercise)  cues for technique; no assist required with ther ex, could only tolerate 10 reps  of standing calf raises  -LR      Recorded by [LR] Beena Adler, PT 02/21/19 1120      Row Name 02/21/19 1043             Positioning and Restraints    Pre-Treatment Position  sitting in chair/recliner  -LR      Post Treatment Position  chair  -LR      In Chair  notified nsg;reclined;sitting;call light within reach;encouraged to call for assist;exit alarm on;with family/caregiver;legs elevated  -LR      Recorded by [LR] Beena Adler, PT 02/21/19 1120      Row Name 02/21/19 1043             Pain Assessment    Additional Documentation  Pain Scale: Numbers Pre/Post-Treatment (Group)  -LR      Recorded by [LR] Beena Adler, PT 02/21/19 1120      Row Name 02/21/19 1043             Pain Scale: Numbers Pre/Post-Treatment    Pain Scale: Numbers, Pretreatment  8/10  -LR      Pain Scale: Numbers, Post-Treatment  7/10  -LR      Pain Location - Side  Left  -LR      Pain Location - Orientation  posterior  -LR      Pain Location  knee  -LR      Pain Intervention(s)  Repositioned;Ambulation/increased activity;Cold pack  -LR      Recorded by [LR] Benea Adler, PT 02/21/19 1120      Row Name                Wound 02/20/19 0755 Left knee incision    Wound - Properties Group Date first assessed: 02/20/19 [MR] Time first assessed: 0755 [MR] Side: Left [MR] Location: knee [MR] Type: incision [MR] Recorded by:  [MR] Raisa Lares RN 02/20/19 0755    Row Name 02/21/19 1043             Coping    Observed Emotional State  accepting;cooperative  -LR      Verbalized Emotional State  acceptance  -LR      Recorded by [LR] Beena Adler, PT 02/21/19 1120      Row Name 02/21/19 1043             Plan of Care Review    Plan of Care Reviewed With  patient;spouse  -LR      Recorded by [LR] Beena Adler, PT 02/21/19 1120      Row Name 02/21/19 1043             Outcome Summary/Treatment Plan (PT)    Daily Summary of Progress (PT)  progress toward functional goals as expected  -LR      Recorded by  [LR] Beena Adler, PT 02/21/19 1120        User Key  (r) = Recorded By, (t) = Taken By, (c) = Cosigned By    Initials Name Effective Dates Discipline    LR Beena Adler Kori, PT 06/19/15 -  PT     Raisa Lares, RN 09/07/18 -  Nurse        Wound 02/20/19 9613 Left knee incision (Active)   Dressing Appearance dry;intact;no drainage 2/21/2019  8:00 AM   Closure PANCHITO 2/21/2019  8:00 AM   Drainage Amount none 2/21/2019  8:00 AM       PT Recommendation and Plan  Anticipated Discharge Disposition (PT): home with assist, home with home health  Planned Therapy Interventions (PT Eval): balance training, bed mobility training, gait training, home exercise program, patient/family education, ROM (range of motion), stair training, strengthening, transfer training  Therapy Frequency (PT Clinical Impression): 2 times/day  Outcome Summary/Treatment Plan (PT)  Daily Summary of Progress (PT): progress toward functional goals as expected  Anticipated Equipment Needs at Discharge (PT): (none)  Anticipated Discharge Disposition (PT): home with assist, home with home health  Plan of Care Reviewed With: patient, spouse  Progress: improving  Outcome Summary: Patient ambulated 250 feet with RW and step through gait pattern, limited by pain. ROM progressing well, 11-76 degrees. Patient climbed 1 step with RW backwards with no difficulty. Patient has been d/c home with family and HHPT.     Outcome Measures     Row Name 02/21/19 1043 02/21/19 0924 02/20/19 1424       How much help from another person do you currently need...    Turning from your back to your side while in flat bed without using bedrails?  3  -LR  --  3  -LR    Moving from lying on back to sitting on the side of a flat bed without bedrails?  3  -LR  --  3  -LR    Moving to and from a bed to a chair (including a wheelchair)?  3  -LR  --  3  -LR    Standing up from a chair using your arms (e.g., wheelchair, bedside chair)?  3  -LR  --  3  -LR    Climbing 3-5 steps  with a railing?  3  -LR  --  2  -LR    To walk in hospital room?  3  -LR  --  3  -LR    AM-PAC 6 Clicks Score  18  -LR  --  17  -LR       How much help from another is currently needed...    Putting on and taking off regular lower body clothing?  --  3  -ST  --    Bathing (including washing, rinsing, and drying)  --  3  -ST  --    Toileting (which includes using toilet bed pan or urinal)  --  3  -ST  --    Putting on and taking off regular upper body clothing  --  4  -ST  --    Taking care of personal grooming (such as brushing teeth)  --  4  -ST  --    Eating meals  --  4  -ST  --    Score  --  21  -ST  --       Functional Assessment    Outcome Measure Options  AM-PAC 6 Clicks Basic Mobility (PT)  -LR  AM-PAC 6 Clicks Daily Activity (OT)  -ST  AM-PAC 6 Clicks Basic Mobility (PT)  -LR      User Key  (r) = Recorded By, (t) = Taken By, (c) = Cosigned By    Initials Name Provider Type     Demi Yost, OTR Occupational Therapist    LR Beena Adler, PT Physical Therapist           Time Calculation:   PT Charges     Row Name 02/21/19 1043             Time Calculation    Start Time  1043  -LR      PT Received On  02/21/19  -LR      PT Goal Re-Cert Due Date  03/02/19  -LR         Time Calculation- PT    Total Timed Code Minutes- PT  23 minute(s)  -LR         Timed Charges    54058 - PT Therapeutic Exercise Minutes  8  -LR      10408 - Gait Training Minutes   15  -LR        User Key  (r) = Recorded By, (t) = Taken By, (c) = Cosigned By    Initials Name Provider Type    Beena Meléndez, PT Physical Therapist        Therapy Suggested Charges     Code   Minutes Charges    37465 (CPT®) Hc Pt Neuromusc Re Education Ea 15 Min      35708 (CPT®) Hc Pt Ther Proc Ea 15 Min 8 1    40745 (CPT®) Hc Gait Training Ea 15 Min 15 1    23012 (CPT®) Hc Pt Therapeutic Act Ea 15 Min      44999 (CPT®) Hc Pt Manual Therapy Ea 15 Min      50833 (CPT®) Hc Pt Iontophoresis Ea 15 Min      70039 (CPT®) Hc Pt Elec Stim Ea-Per  15 Min      20843 (CPT®) Hc Pt Ultrasound Ea 15 Min      18147 (CPT®) Hc Pt Self Care/Mgmt/Train Ea 15 Min      92572 (CPT®) Hc Pt Prosthetic (S) Train Initial Encounter, Each 15 Min      15327 (CPT®) Hc Pt Orthotic(S)/Prosthetic(S) Encounter, Each 15 Min      60402 (CPT®) Hc Orthotic(S) Mgmt/Train Initial Encounter, Each 15min      Total  23 2          Therapy Charges for Today     Code Description Service Date Service Provider Modifiers Qty    52288166429 HC GAIT TRAINING EA 15 MIN 2/20/2019 Beena Adler, PT GP 1    72394533228 HC PT THER SUPP EA 15 MIN 2/20/2019 Beena Adler, PT GP 3    19340558538 HC PT EVAL LOW COMPLEXITY 3 2/20/2019 Beena Adler, PT GP 1    01896020490 HC PT THER PROC EA 15 MIN 2/21/2019 Beena Adler, PT GP 1    26859576848 HC GAIT TRAINING EA 15 MIN 2/21/2019 Beena Adler, PT GP 1          PT G-Codes  Outcome Measure Options: AM-PAC 6 Clicks Basic Mobility (PT)  AM-PAC 6 Clicks Score: 18  Score: 21    PT Discharge Summary  Anticipated Discharge Disposition (PT): home with assist, home with home health  Reason for Discharge: Discharge from facility  Outcomes Achieved: Patient able to partially acheive established goals  Discharge Destination: Home with assist, Home with home health    Beena Adler, PT  2/21/2019

## 2019-02-21 NOTE — PLAN OF CARE
Problem: Patient Care Overview  Goal: Plan of Care Review  Outcome: Outcome(s) achieved Date Met: 02/21/19 02/21/19 0924   OTHER   Outcome Summary Pt s/p TKA and now with moderate posterior knee pain. Pt demonstrates good overall safety and ability to utilize AD and AE for improving independence and safety. Pt required to use AE d/t pain and dec. flexibility with knee flexion. Pt has all needed DME and AE from previous sx. Plan to d/c home this p.m. with family assist and HHPT.   Coping/Psychosocial   Plan of Care Reviewed With patient       Problem: Knee Arthroplasty (Total, Partial) (Adult)  Goal: Signs and Symptoms of Listed Potential Problems Will be Absent, Minimized or Managed (Knee Arthroplasty)  Outcome: Outcome(s) achieved Date Met: 02/21/19 02/21/19 0924   Goal/Outcome Evaluation   Problems Assessed (Knee Arthroplasty) functional deficit   Problems Present (Knee Arthroplasty) functional deficit

## 2019-02-21 NOTE — NURSING NOTE
Acute Pain Service:  Peripheral nerve catheter and disposable infusion device teaching completed with patient.  Discussion, handout and bracelet provided with [facility] on call central phone number.  Instructed to call with any questions or concerns.  Patient verbalized understanding.  Service will continue to follow until catheter DC'd.  Please contact patient at [phone] if needed.       Dressing CDI, infusion at 10ml/hr

## 2019-02-21 NOTE — PLAN OF CARE
Problem: Patient Care Overview  Goal: Plan of Care Review  Outcome: Ongoing (interventions implemented as appropriate)   02/21/19 0511   Plan of Care Review   Progress no change   OTHER   Outcome Summary pt alert and oriented, VSS, voids well, pt ambulates with 1 assist, pain managed with PRN medications, will continue to monitor for changes, arrow pump @ 10 ml/hr.   Coping/Psychosocial   Plan of Care Reviewed With patient       Problem: Fall Risk (Adult)  Goal: Identify Related Risk Factors and Signs and Symptoms  Outcome: Ongoing (interventions implemented as appropriate)      Problem: Knee Arthroplasty (Total, Partial) (Adult)  Goal: Signs and Symptoms of Listed Potential Problems Will be Absent, Minimized or Managed (Knee Arthroplasty)  Outcome: Ongoing (interventions implemented as appropriate)

## 2019-02-22 NOTE — PROGRESS NOTES
ARPAN Ryan    Nerve Cath Post Op Call    Patient Name: Lesli Leon  :  1955  MRN:  [id number]  Date of Discharge: 2019    Nerve Cath Post Op Call:    Analgesia:Excellent  Pain Score:6/10  Side Effects:None  Catheter Site:clean  Patient Controlled ON Q pump infusion rate: 10ml/hr  Catheter Plan:Will continue with plan at home without changes and The patient was instructed to call ON CALL Anesthesia provider for any questions or problems      Patient has bolused self 12ml/hr to relieve some pain and states 6 is an ok pain number for her - will turn back up if she feels she needs the bolus. Happy with pain service at this time.

## 2019-02-24 NOTE — PROGRESS NOTES
[facility]AN Ryan    Nerve Cath Post Op Call    Patient Name: Lesli Leon  :  1955  MRN:  [id number]  Date of Discharge: 2019    Nerve Cath Post Op Call:    Catheter Plan:Patient/Family member report nerve catheter previously discontinued, tip intact

## 2019-03-01 ENCOUNTER — TELEPHONE (OUTPATIENT)
Dept: ORTHOPEDIC SURGERY | Facility: CLINIC | Age: 64
End: 2019-03-01

## 2019-03-01 RX ORDER — OXYCODONE HYDROCHLORIDE AND ACETAMINOPHEN 5; 325 MG/1; MG/1
1 TABLET ORAL EVERY 4 HOURS PRN
Qty: 40 TABLET | Refills: 0 | Status: SHIPPED | OUTPATIENT
Start: 2019-03-01 | End: 2019-03-31

## 2019-03-01 NOTE — TELEPHONE ENCOUNTER
I called the patient to let her know that Dr. Garcia has left for the day and he may not answer until Monday.    She is out of Percet.    She would like the Rx sent to [facility] in [facility]

## 2019-03-12 ENCOUNTER — TELEPHONE (OUTPATIENT)
Dept: ORTHOPEDIC SURGERY | Facility: CLINIC | Age: 64
End: 2019-03-12

## 2019-03-14 ENCOUNTER — OFFICE VISIT (OUTPATIENT)
Dept: ORTHOPEDIC SURGERY | Facility: CLINIC | Age: 64
End: 2019-03-14

## 2019-03-14 DIAGNOSIS — Z96.652 TOTAL KNEE REPLACEMENT STATUS, LEFT: Primary | ICD-10-CM

## 2019-03-14 PROCEDURE — 99024 POSTOP FOLLOW-UP VISIT: CPT | Performed by: ORTHOPAEDIC SURGERY

## 2019-03-14 NOTE — PROGRESS NOTES
Orthopaedic Clinic Note:  Knee Post Op    Chief Complaint   Patient presents with   • Post-op     3 weeks status post total knee arthroplasty, left 02/20/19        HPI    Ms. Leon is 3  week(s) s/p left total knee arthroplasty. Rates pain 6/10. She is ambulating with no assistive device and is taking Oxycodone for pain control. She denies fevers, chills, or constitutional symptoms.  She is continuing outpatient PT. Patient is improving overall.  She denies any complications.  She is happy with her progress to date.    Past Medical History:   Diagnosis Date   • Anxiety and depression    • Arthritis    • Breast cancer (CMS/[facility]) 2015    RIGHT   • Disease of thyroid gland    • History of constipation    • History of IBS    • History of panic attacks    • Hx of radiation therapy 2015    RT BREAST   • Hypertension    • Neuropathy    • Plantar fasciitis    • Scoliosis    • Septic joint of right knee joint (CMS/[facility]) 12/2017   • Wears dentures     top   • Wears glasses       Past Surgical History:   Procedure Laterality Date   • BREAST BIOPSY Right 2015   • BREAST LUMPECTOMY Right 2015   • BREAST LUMPECTOMY     • ENDOSCOPY     • JOINT REPLACEMENT Right     knee   • KNEE ARTHROTOMY Right 12/10/2017    Procedure: INCISION AND DRAINAGE, POLY EXCHANGE RIGHT KNEE;  Surgeon: Stephon Garcia MD;  Location:  [facility];  Service:    • OH FIX QUAD/HAMSTR MUSC RUPT,PRIMARY Right 1/8/2018    Procedure: PRIMARY REPAIR OF RIGHT QUADRICEPS TENDON ;  Surgeon: Stephon Garcia MD;  Location:  [facility];  Service: Orthopedics   • TOTAL KNEE ARTHROPLASTY Left 2/20/2019    Procedure: TOTAL KNEE ARTHROPLASTY LEFT;  Surgeon: Stephon Garcia MD;  Location:  [facility];  Service: Orthopedics     Family History   Problem Relation Age of Onset   • Breast cancer Mother         DX AGE UNKNOWN   • Breast cancer Sister         DX AGE UNKNOWN   • Ovarian cancer Neg Hx       Social History     Socioeconomic History   • Marital status:       Spouse name: Not on file   • Number of children: Not on file   • Years of education: Not on file   • Highest education level: Not on file   Social Needs   • Financial resource strain: Not on file   • Food insecurity - worry: Not on file   • Food insecurity - inability: Not on file   • Transportation needs - medical: Not on file   • Transportation needs - non-medical: Not on file   Occupational History   • Not on file   Tobacco Use   • Smoking status: Former Smoker     Years: 5.00     Types: Electronic Cigarette     Last attempt to quit: 2018     Years since quittin.3   • Smokeless tobacco: Never Used   Substance and Sexual Activity   • Alcohol use: No   • Drug use: No   • Sexual activity: Defer   Other Topics Concern   • Not on file   Social History Narrative   • Not on file      Current Outpatient Medications on File Prior to Visit   Medication Sig Dispense Refill   • ALPRAZolam (XANAX) 1 MG tablet Take 1 mg by mouth Daily As Needed.     • aspirin 325 MG EC tablet Take 1 tablet by mouth Every 12 (Twelve) Hours. For 1 month 60 tablet 0   • aspirin 81 MG EC tablet Take 1 tablet by mouth Daily. Resume in 1 month     • busPIRone (BUSPAR) 15 MG tablet Take 15 mg by mouth 2 (Two) Times a Day.  0   • CELEBREX 200 MG capsule Take 1 capsule by mouth Daily. Must take an hour after aspirin if needed.  0   • cephalexin (KEFLEX) 500 MG capsule Take 500 mg by mouth 2 (Two) Times a Day. Due to septic infection      • chlorzoxazone (PARAFON FORTE) 500 MG tablet Take 500 mg by mouth 3 (Three) Times a Day As Needed for Muscle Spasms.     • clotrimazole-betamethasone (LOTRISONE) 1-0.05 % cream apply to affected area once daily  0   • docusate sodium 100 MG capsule Take 100 mg by mouth 2 (Two) Times a Day As Needed (constipation). 60 each 0   • DULoxetine (CYMBALTA) 30 MG capsule Take 30 mg by mouth 3 (Three) Times a Day.     • furosemide (LASIX) 20 MG tablet Take 20 mg by mouth 2 (Two) Times a Day.  0   • gabapentin  (NEURONTIN) 800 MG tablet Take 800 mg by mouth 4 (Four) Times a Day.     • levothyroxine (SYNTHROID, LEVOTHROID) 100 MCG tablet Take 100 mcg by mouth Daily. Brand name.     • lisinopril (PRINIVIL,ZESTRIL) 20 MG tablet Take 20 mg by mouth Daily.     • LYRICA 75 MG capsule   0   • oxyCODONE-acetaminophen (PERCOCET) 5-325 MG per tablet Take 1 tablet by mouth Every 4 (Four) Hours As Needed for Moderate Pain  for up to 30 days. 40 tablet 0   • SUBOXONE 8-2 MG film film Place 1 film under the tongue Daily. Resume when pain prescription complete 30 each 0     No current facility-administered medications on file prior to visit.       Allergies   Allergen Reactions   • Bactrim [Sulfamethoxazole-Trimethoprim] Hives        Review of Systems     Physical Exam  not currently breastfeeding.    There is no height or weight on file to calculate BMI.    GENERAL APPEARANCE: awake, alert, oriented, in no acute distress and well developed, well nourished  LUNGS:  breathing nonlabored  EXTREMITIES: no clubbing, cyanosis  PERIPHERAL PULSES: palpable dorsalis pedis and posterior tibial pulses bilaterally.    GAIT:  Normal          Left Knee Exam:  ----------  ALIGNMENT: neutral  ----------  RANGE OF MOTION:  Decreased (3 - 115 degrees) with no extensor lag  LIGAMENTOUS STABILITY:   stable to varus and valgus stress at terminal extension and 30 degrees without any evidence of laxity  ----------  STRENGTH:  KNEE FLEXION 5/5  KNEE EXTENSION  5/5  ANKLE DORSIFLEXION  5/5  ANKLE PLANTARFLEXION  5/5  ----------  PAIN WITH PALPATION:denies tenderness to palpation about the knee  KNEE EFFUSION: yes, trace effusion  PAIN WITH KNEE ROM: no  PATELLAR CREPITUS:  no  ----------  SENSATION TO LIGHT TOUCH:  DEEP PERONEAL/SUPERFICIAL PERONEAL/SURAL/SAPHENOUS/TIBIAL:    intact  ----------  EDEMA:  no  ERYTHEMA:    no  WOUNDS/INCISIONS:  yes, well-healed anterior knee  incision  _____________________________________________________________________  _____________________________________________________________________    RADIOGRAPHIC FINDINGS:   Indication: Status post left total knee arthroplasty    Comparison: Todays xrays were compared to previous xrays from 2/20/2019    Knee films: Demonstrate well positioned knee arthroplasty components in satisfactory alignment without evidence of wear, loosening, subsidence, fracture, or osteolysis and No significant changes compared to prior radiographs.       Assessment/Plan:   Diagnosis Plan   1. Total knee replacement status, left  XR Knee 1 or 2 View Left     Patient is doing well 3 weeks status post left total knee arthroplasty.  I recommended continued activity as tolerated with knee range of motion and edema control exercises.  I will see her back in 3 weeks with repeat x-ray 2 views left knee.    Stephon Garcia MD  03/14/19  2:01 PM

## 2019-03-18 ENCOUNTER — TELEPHONE (OUTPATIENT)
Dept: ORTHOPEDIC SURGERY | Facility: CLINIC | Age: 64
End: 2019-03-18

## 2019-03-18 NOTE — TELEPHONE ENCOUNTER
PT'S SON PAID FOR [facility] PAPERWORK. PLEASE FAX TO THE NUMBER PROVIDED ON PAPERWORK.

## 2019-03-21 ENCOUNTER — LAB (OUTPATIENT)
Dept: LAB | Facility: HOSPITAL | Age: 64
End: 2019-03-21

## 2019-03-21 ENCOUNTER — TRANSCRIBE ORDERS (OUTPATIENT)
Dept: LAB | Facility: HOSPITAL | Age: 64
End: 2019-03-21

## 2019-03-21 DIAGNOSIS — B95.4 BACTERIAL INFECTION DUE TO STREPTOCOCCUS, GROUP G: ICD-10-CM

## 2019-03-21 DIAGNOSIS — T84.53XD INFECTION OF TOTAL RIGHT KNEE REPLACEMENT, SUBSEQUENT ENCOUNTER: ICD-10-CM

## 2019-03-21 DIAGNOSIS — T84.53XD INFECTION OF TOTAL RIGHT KNEE REPLACEMENT, SUBSEQUENT ENCOUNTER: Primary | ICD-10-CM

## 2019-03-21 LAB
ALBUMIN SERPL-MCNC: 4.07 G/DL (ref 3.2–4.8)
ALBUMIN/GLOB SERPL: 2.1 G/DL (ref 1.5–2.5)
ALP SERPL-CCNC: 132 U/L (ref 25–100)
ALT SERPL W P-5'-P-CCNC: 10 U/L (ref 7–40)
ANION GAP SERPL CALCULATED.3IONS-SCNC: 6 MMOL/L (ref 3–11)
AST SERPL-CCNC: 19 U/L (ref 0–33)
BASOPHILS # BLD AUTO: 0.07 10*3/MM3 (ref 0–0.2)
BASOPHILS NFR BLD AUTO: 1 % (ref 0–1)
BILIRUB SERPL-MCNC: 0.3 MG/DL (ref 0.3–1.2)
BUN BLD-MCNC: 12 MG/DL (ref 9–23)
BUN/CREAT SERPL: 13.8 (ref 7–25)
CALCIUM SPEC-SCNC: 8.9 MG/DL (ref 8.7–10.4)
CHLORIDE SERPL-SCNC: 102 MMOL/L (ref 99–109)
CO2 SERPL-SCNC: 31 MMOL/L (ref 20–31)
CREAT BLD-MCNC: 0.87 MG/DL (ref 0.6–1.3)
CRP SERPL-MCNC: 0.65 MG/DL (ref 0–1)
DEPRECATED RDW RBC AUTO: 49.9 FL (ref 37–54)
EOSINOPHIL # BLD AUTO: 0.74 10*3/MM3 (ref 0–0.3)
EOSINOPHIL NFR BLD AUTO: 11.1 % (ref 0–3)
ERYTHROCYTE [DISTWIDTH] IN BLOOD BY AUTOMATED COUNT: 14.2 % (ref 11.3–14.5)
ERYTHROCYTE [SEDIMENTATION RATE] IN BLOOD: 28 MM/HR (ref 0–30)
GFR SERPL CREATININE-BSD FRML MDRD: 66 ML/MIN/1.73
GLOBULIN UR ELPH-MCNC: 1.9 GM/DL
GLUCOSE BLD-MCNC: 93 MG/DL (ref 70–100)
HCT VFR BLD AUTO: 37.6 % (ref 34.5–44)
HGB BLD-MCNC: 11.5 G/DL (ref 11.5–15.5)
IMM GRANULOCYTES # BLD AUTO: 0.01 10*3/MM3 (ref 0–0.05)
IMM GRANULOCYTES NFR BLD AUTO: 0.1 % (ref 0–0.6)
LYMPHOCYTES # BLD AUTO: 1.24 10*3/MM3 (ref 0.6–4.8)
LYMPHOCYTES NFR BLD AUTO: 18.5 % (ref 24–44)
MCH RBC QN AUTO: 29.2 PG (ref 27–31)
MCHC RBC AUTO-ENTMCNC: 30.6 G/DL (ref 32–36)
MCV RBC AUTO: 95.4 FL (ref 80–99)
MONOCYTES # BLD AUTO: 0.42 10*3/MM3 (ref 0–1)
MONOCYTES NFR BLD AUTO: 6.3 % (ref 0–12)
NEUTROPHILS # BLD AUTO: 4.22 10*3/MM3 (ref 1.5–8.3)
NEUTROPHILS NFR BLD AUTO: 63.1 % (ref 41–71)
PLATELET # BLD AUTO: 312 10*3/MM3 (ref 150–450)
PMV BLD AUTO: 10 FL (ref 6–12)
POTASSIUM BLD-SCNC: 4.4 MMOL/L (ref 3.5–5.5)
PROT SERPL-MCNC: 6 G/DL (ref 5.7–8.2)
RBC # BLD AUTO: 3.94 10*6/MM3 (ref 3.89–5.14)
SODIUM BLD-SCNC: 139 MMOL/L (ref 132–146)
WBC NRBC COR # BLD: 6.69 10*3/MM3 (ref 3.5–10.8)

## 2019-03-21 PROCEDURE — 86140 C-REACTIVE PROTEIN: CPT

## 2019-03-21 PROCEDURE — 36415 COLL VENOUS BLD VENIPUNCTURE: CPT

## 2019-03-21 PROCEDURE — 85652 RBC SED RATE AUTOMATED: CPT

## 2019-03-21 PROCEDURE — 85025 COMPLETE CBC W/AUTO DIFF WBC: CPT

## 2019-03-21 PROCEDURE — 80053 COMPREHEN METABOLIC PANEL: CPT

## 2019-04-23 ENCOUNTER — OFFICE VISIT (OUTPATIENT)
Dept: ORTHOPEDIC SURGERY | Facility: CLINIC | Age: 64
End: 2019-04-23

## 2019-04-23 DIAGNOSIS — Z96.652 TOTAL KNEE REPLACEMENT STATUS, LEFT: Primary | ICD-10-CM

## 2019-04-23 PROCEDURE — 99024 POSTOP FOLLOW-UP VISIT: CPT | Performed by: ORTHOPAEDIC SURGERY

## 2019-04-23 NOTE — PROGRESS NOTES
Orthopaedic Clinic Note:  Knee Post Op    Chief Complaint   Patient presents with   • Left Knee - Post-op     3 week follow up. Status post total knee arthroplasty 2/20/19        HPI    Ms. Leon is 6  week(s) s/p left total knee arthroplasty. Rates pain 1/10. She is ambulating with no assistive device and is taking nothing for pain control. She denies fevers, chills, or constitutional symptoms.  She has completed outpatient PT. Patient is improving overall.  She denies any complications.  Overall she is happy with her outcome..    Past Medical History:   Diagnosis Date   • Anxiety and depression    • Arthritis    • Breast cancer (CMS/[facility]) 2015    RIGHT   • Disease of thyroid gland    • History of constipation    • History of IBS    • History of panic attacks    • Hx of radiation therapy 2015    RT BREAST   • Hypertension    • Neuropathy    • Plantar fasciitis    • Scoliosis    • Septic joint of right knee joint (CMS/[facility]) 12/2017   • Wears dentures     top   • Wears glasses       Past Surgical History:   Procedure Laterality Date   • BREAST BIOPSY Right 2015   • BREAST LUMPECTOMY Right 2015   • BREAST LUMPECTOMY     • ENDOSCOPY     • JOINT REPLACEMENT Right     knee   • KNEE ARTHROTOMY Right 12/10/2017    Procedure: INCISION AND DRAINAGE, POLY EXCHANGE RIGHT KNEE;  Surgeon: Stephon Garcia MD;  Location:  [facility];  Service:    • [place] FIX QUAD/HAMSTR MUSC RUPT,PRIMARY Right 1/8/2018    Procedure: PRIMARY REPAIR OF RIGHT QUADRICEPS TENDON ;  Surgeon: Stephon Garcia MD;  Location:  [facility];  Service: Orthopedics   • TOTAL KNEE ARTHROPLASTY Left 2/20/2019    Procedure: TOTAL KNEE ARTHROPLASTY LEFT;  Surgeon: Stephon Garcia MD;  Location:  [facility];  Service: Orthopedics     Family History   Problem Relation Age of Onset   • Breast cancer Mother         DX AGE UNKNOWN   • Breast cancer Sister         DX AGE UNKNOWN   • Ovarian cancer Neg Hx       Social History     Socioeconomic History   • Marital status:       Spouse name: Not on file   • Number of children: Not on file   • Years of education: Not on file   • Highest education level: Not on file   Tobacco Use   • Smoking status: Former Smoker     Years: 5.00     Types: Electronic Cigarette     Last attempt to quit: 2018     Years since quittin.4   • Smokeless tobacco: Never Used   Substance and Sexual Activity   • Alcohol use: No   • Drug use: No   • Sexual activity: Defer      Current Outpatient Medications on File Prior to Visit   Medication Sig Dispense Refill   • ALPRAZolam (XANAX) 1 MG tablet Take 1 mg by mouth Daily As Needed.     • aspirin 325 MG EC tablet Take 1 tablet by mouth Every 12 (Twelve) Hours. For 1 month 60 tablet 0   • aspirin 81 MG EC tablet Take 1 tablet by mouth Daily. Resume in 1 month     • busPIRone (BUSPAR) 15 MG tablet Take 15 mg by mouth 2 (Two) Times a Day.  0   • CELEBREX 200 MG capsule Take 1 capsule by mouth Daily. Must take an hour after aspirin if needed.  0   • cephalexin (KEFLEX) 500 MG capsule Take 500 mg by mouth 2 (Two) Times a Day. Due to septic infection      • chlorzoxazone (PARAFON FORTE) 500 MG tablet Take 500 mg by mouth 3 (Three) Times a Day As Needed for Muscle Spasms.     • clotrimazole-betamethasone (LOTRISONE) 1-0.05 % cream apply to affected area once daily  0   • docusate sodium 100 MG capsule Take 100 mg by mouth 2 (Two) Times a Day As Needed (constipation). 60 each 0   • DULoxetine (CYMBALTA) 30 MG capsule Take 30 mg by mouth 3 (Three) Times a Day.     • furosemide (LASIX) 20 MG tablet Take 20 mg by mouth 2 (Two) Times a Day.  0   • gabapentin (NEURONTIN) 800 MG tablet Take 800 mg by mouth 4 (Four) Times a Day.     • levothyroxine (SYNTHROID, LEVOTHROID) 100 MCG tablet Take 100 mcg by mouth Daily. Brand name.     • lisinopril (PRINIVIL,ZESTRIL) 20 MG tablet Take 20 mg by mouth Daily.     • LYRICA 75 MG capsule   0   • SUBOXONE 8-2 MG film film Place 1 film under the tongue Daily. Resume  when pain prescription complete 30 each 0     No current facility-administered medications on file prior to visit.       Allergies   Allergen Reactions   • Bactrim [Sulfamethoxazole-Trimethoprim] Hives        Review of Systems   Constitutional: Positive for activity change.   HENT: Negative.    Eyes: Negative.    Respiratory: Negative.    Cardiovascular: Negative.    Gastrointestinal: Negative.    Endocrine: Negative.    Genitourinary: Negative.    Musculoskeletal: Positive for arthralgias.   Skin: Negative.    Allergic/Immunologic: Negative.    Neurological: Negative.    Hematological: Negative.    Psychiatric/Behavioral: Negative.         Physical Exam  not currently breastfeeding.    There is no height or weight on file to calculate BMI.    GENERAL APPEARANCE: awake, alert, oriented, in no acute distress and well developed, well nourished  LUNGS:  breathing nonlabored  EXTREMITIES: no clubbing, cyanosis  PERIPHERAL PULSES: palpable dorsalis pedis and posterior tibial pulses bilaterally.    GAIT:  Normal          Left Knee Exam:  ----------  ALIGNMENT: neutral  ----------  RANGE OF MOTION:  Normal (0-120 degrees) with no extensor lag or flexion contracture  LIGAMENTOUS STABILITY:   stable to varus and valgus stress at terminal extension and 30 degrees without any evidence of laxity  ----------  STRENGTH:  KNEE FLEXION 5/5  KNEE EXTENSION  5/5  ANKLE DORSIFLEXION  5/5  ANKLE PLANTARFLEXION  5/5  ----------  PAIN WITH PALPATION:denies tenderness to palpation about the knee  KNEE EFFUSION: no  PAIN WITH KNEE ROM: no  PATELLAR CREPITUS:  no  ----------  SENSATION TO LIGHT TOUCH:  DEEP PERONEAL/SUPERFICIAL PERONEAL/SURAL/SAPHENOUS/TIBIAL:    intact  ----------  EDEMA:  no  ERYTHEMA:    no  WOUNDS/INCISIONS:  yes, well-healed anterior knee incision  _____________________________________________________________________  _____________________________________________________________________    RADIOGRAPHIC FINDINGS:    Indication: Status post left total knee arthroplasty    Comparison: Todays xrays were compared to previous xrays from 3/14/2019    Knee films: Demonstrate well positioned knee arthroplasty components in satisfactory alignment without evidence of wear, loosening, subsidence, fracture, or osteolysis and No significant changes compared to prior radiographs.       Assessment/Plan:   Diagnosis Plan   1. Total knee replacement status, left  XR Knee 1 or 2 View Left     Patient is doing well 6 weeks status post left total knee arthroplasty.  I recommend the patient continue activity as tolerated without restrictions.  I will see her back in 2 months with repeat assessment and x-ray 2 views right knee    Stephon Garcia MD  04/23/19  3:09 PM

## 2019-06-25 ENCOUNTER — TELEPHONE (OUTPATIENT)
Dept: ORTHOPEDIC SURGERY | Facility: CLINIC | Age: 64
End: 2019-06-25

## 2019-12-06 ENCOUNTER — OFFICE VISIT (OUTPATIENT)
Dept: ORTHOPEDIC SURGERY | Facility: CLINIC | Age: 64
End: 2019-12-06

## 2019-12-06 VITALS — WEIGHT: 185 LBS | HEART RATE: 78 BPM | BODY MASS INDEX: 31.58 KG/M2 | HEIGHT: 64 IN | OXYGEN SATURATION: 97 %

## 2019-12-06 DIAGNOSIS — Z96.652 TOTAL KNEE REPLACEMENT STATUS, LEFT: Primary | ICD-10-CM

## 2019-12-06 PROCEDURE — 99212 OFFICE O/P EST SF 10 MIN: CPT | Performed by: ORTHOPAEDIC SURGERY

## 2019-12-06 NOTE — PROGRESS NOTES
Orthopaedic Clinic Note: Knee Established Patient    Chief Complaint   Patient presents with   • Follow-up     7.5 month f/u- 9 months s//p (L) TKA 02/20/2019        HPI    It has been 7  month(s) since Ms. Leon's last visit. She returns to clinic today for Follow-up left total knee arthroplasty.  She is probably 9 months out from surgery.  She is having no pain in the left knee but states that she is having some radicular type pain that has been ongoing.  She has known back problems.  The pain is a 3/10 on the pain scale.  She is ambulating with no assistive device.  She denies any pain or swelling in the knee.  She is able ambulating with no assistive device.  Overall she is doing extremely well.  She is happy with her outcome.    Past Medical History:   Diagnosis Date   • Anxiety and depression    • Arthritis    • Breast cancer (CMS/[facility]) 2015    RIGHT   • Disease of thyroid gland    • History of constipation    • History of IBS    • History of panic attacks    • Hx of radiation therapy 2015    RT BREAST   • Hypertension    • Neuropathy    • Plantar fasciitis    • Scoliosis    • Septic joint of right knee joint (CMS/[facility]) 12/2017   • Wears dentures     top   • Wears glasses       Past Surgical History:   Procedure Laterality Date   • BREAST BIOPSY Right 2015   • BREAST LUMPECTOMY Right 2015   • BREAST LUMPECTOMY     • ENDOSCOPY     • JOINT REPLACEMENT Right     knee   • KNEE ARTHROTOMY Right 12/10/2017    Procedure: INCISION AND DRAINAGE, POLY EXCHANGE RIGHT KNEE;  Surgeon: Stephon Garcia MD;  Location:  [facility];  Service:    • [place] FIX QUAD/HAMSTR MUSC RUPT,PRIMARY Right 1/8/2018    Procedure: PRIMARY REPAIR OF RIGHT QUADRICEPS TENDON ;  Surgeon: Stephon Garcia MD;  Location:  [facility];  Service: Orthopedics   • TOTAL KNEE ARTHROPLASTY Left 2/20/2019    Procedure: TOTAL KNEE ARTHROPLASTY LEFT;  Surgeon: Stephon Garcia MD;  Location:  [facility];  Service: Orthopedics      Family History   Problem  Relation Age of Onset   • Breast cancer Mother         DX AGE UNKNOWN   • Breast cancer Sister         DX AGE UNKNOWN   • Ovarian cancer Neg Hx      Social History     Socioeconomic History   • Marital status:      Spouse name: Not on file   • Number of children: Not on file   • Years of education: Not on file   • Highest education level: Not on file   Tobacco Use   • Smoking status: Former Smoker     Years: 5.00     Types: Electronic Cigarette     Last attempt to quit: 2018     Years since quittin.0   • Smokeless tobacco: Never Used   Substance and Sexual Activity   • Alcohol use: No   • Drug use: No   • Sexual activity: Defer      Current Outpatient Medications on File Prior to Visit   Medication Sig Dispense Refill   • ALPRAZolam (XANAX) 1 MG tablet Take 1 mg by mouth Daily As Needed.     • aspirin 81 MG EC tablet Take 1 tablet by mouth Daily. Resume in 1 month     • busPIRone (BUSPAR) 15 MG tablet Take 15 mg by mouth 2 (Two) Times a Day.  0   • CELEBREX 200 MG capsule Take 1 capsule by mouth Daily. Must take an hour after aspirin if needed.  0   • cephalexin (KEFLEX) 500 MG capsule Take 500 mg by mouth 2 (Two) Times a Day. Due to septic infection      • chlorzoxazone (PARAFON FORTE) 500 MG tablet Take 500 mg by mouth 3 (Three) Times a Day As Needed for Muscle Spasms.     • clotrimazole-betamethasone (LOTRISONE) 1-0.05 % cream apply to affected area once daily  0   • DULoxetine (CYMBALTA) 30 MG capsule Take 30 mg by mouth 3 (Three) Times a Day.     • furosemide (LASIX) 20 MG tablet Take 20 mg by mouth 2 (Two) Times a Day.  0   • gabapentin (NEURONTIN) 800 MG tablet Take 800 mg by mouth 4 (Four) Times a Day.     • levothyroxine (SYNTHROID, LEVOTHROID) 100 MCG tablet Take 100 mcg by mouth Daily. Brand name.     • lisinopril (PRINIVIL,ZESTRIL) 20 MG tablet Take 20 mg by mouth Daily.     • LYRICA 75 MG capsule   0   • SUBOXONE 8-2 MG film film Place 1 film under the tongue Daily. Resume when  "pain prescription complete 30 each 0   • [DISCONTINUED] aspirin 325 MG EC tablet Take 1 tablet by mouth Every 12 (Twelve) Hours. For 1 month 60 tablet 0   • [DISCONTINUED] docusate sodium 100 MG capsule Take 100 mg by mouth 2 (Two) Times a Day As Needed (constipation). 60 each 0     No current facility-administered medications on file prior to visit.       Allergies   Allergen Reactions   • Bactrim [Sulfamethoxazole-Trimethoprim] Hives        Review of Systems   Constitutional: Negative.    HENT: Negative.    Eyes: Negative.    Respiratory: Negative.    Cardiovascular: Negative.    Gastrointestinal: Negative.    Endocrine: Negative.    Genitourinary: Negative.    Musculoskeletal: Positive for arthralgias.   Skin: Negative.    Allergic/Immunologic: Negative.    Neurological: Negative.    Hematological: Negative.    Psychiatric/Behavioral: Negative.         The patient's Review of Systems was personally reviewed and confirmed as accurate.    Physical Exam  Pulse 78, height 162.6 cm (64.02\"), weight 83.9 kg (185 lb), SpO2 97 %, not currently breastfeeding.    Body mass index is 31.74 kg/m².    GENERAL APPEARANCE: awake, alert, oriented, in no acute distress and well developed, well nourished  LUNGS:  breathing nonlabored  EXTREMITIES: no clubbing, cyanosis  PERIPHERAL PULSES: palpable dorsalis pedis and posterior tibial pulses bilaterally.    GAIT:  Normal        ----------  Left Knee Exam:  ----------  ALIGNMENT: neutral  ----------  RANGE OF MOTION:  Normal (0-120 degrees) with no extensor lag or flexion contracture  LIGAMENTOUS STABILITY:   stable to varus and valgus stress at terminal extension and 30 degrees without any evidence of laxity  ----------  STRENGTH:  KNEE FLEXION 5/5  KNEE EXTENSION  5/5  ANKLE DORSIFLEXION  5/5  ANKLE PLANTARFLEXION  5/5  ----------  PAIN WITH PALPATION:denies tenderness to palpation about the knee  KNEE EFFUSION: no  PAIN WITH KNEE ROM: no  PATELLAR CREPITUS:  " no  ----------  SENSATION TO LIGHT TOUCH:  DEEP PERONEAL/SUPERFICIAL PERONEAL/SURAL/SAPHENOUS/TIBIAL:    intact  ----------  EDEMA:  no  ERYTHEMA:    no  WOUNDS/INCISIONS:   yes, well healed surgical incision without evidence of erythema or drainage  _____________________________________________________________________  _____________________________________________________________________    RADIOGRAPHIC FINDINGS:   Indication: Status post left total knee arthroplasty    Comparison: Todays xrays were compared to previous xrays from 4/23/2019    Knee films: Demonstrate well positioned knee arthroplasty components in satisfactory alignment without evidence of wear, loosening, subsidence, fracture, or osteolysis and No significant changes compared to prior radiographs.    Assessment/Plan:   Diagnosis Plan   1. Total knee replacement status, left  XR Knee 3+ View With [place] Left     Patient is doing well 9-month status post left total knee arthroplasty.  She is able to continue activity as tolerated without restrictions.  At this point, she can follow-up with me in 2 years as it is significantly difficult for her to return to clinic.  I am happy to see her sooner should any other problems arise.      Stephon Garcia MD  12/06/19  11:56 AM

## 2020-08-06 ENCOUNTER — LAB (OUTPATIENT)
Dept: LAB | Facility: HOSPITAL | Age: 65
End: 2020-08-06

## 2020-08-06 ENCOUNTER — TRANSCRIBE ORDERS (OUTPATIENT)
Dept: LAB | Facility: HOSPITAL | Age: 65
End: 2020-08-06

## 2020-08-06 DIAGNOSIS — R60.9 EDEMA, UNSPECIFIED TYPE: ICD-10-CM

## 2020-08-06 DIAGNOSIS — R21 RASH AND OTHER NONSPECIFIC SKIN ERUPTION: ICD-10-CM

## 2020-08-06 DIAGNOSIS — R21 RASH AND OTHER NONSPECIFIC SKIN ERUPTION: Primary | ICD-10-CM

## 2020-08-06 LAB
ALBUMIN SERPL-MCNC: 4.2 G/DL (ref 3.5–5.2)
ALBUMIN/GLOB SERPL: 1.6 G/DL
ALP SERPL-CCNC: 107 U/L (ref 39–117)
ALT SERPL W P-5'-P-CCNC: 16 U/L (ref 1–33)
ANION GAP SERPL CALCULATED.3IONS-SCNC: 10 MMOL/L (ref 5–15)
AST SERPL-CCNC: 28 U/L (ref 1–32)
BASOPHILS # BLD AUTO: 0.06 10*3/MM3 (ref 0–0.2)
BASOPHILS NFR BLD AUTO: 1.2 % (ref 0–1.5)
BILIRUB SERPL-MCNC: 0.2 MG/DL (ref 0–1.2)
BUN SERPL-MCNC: 23 MG/DL (ref 8–23)
BUN/CREAT SERPL: 28 (ref 7–25)
CALCIUM SPEC-SCNC: 9.2 MG/DL (ref 8.6–10.5)
CHLORIDE SERPL-SCNC: 101 MMOL/L (ref 98–107)
CO2 SERPL-SCNC: 29 MMOL/L (ref 22–29)
CREAT SERPL-MCNC: 0.82 MG/DL (ref 0.57–1)
DEPRECATED RDW RBC AUTO: 47.5 FL (ref 37–54)
EOSINOPHIL # BLD AUTO: 0.4 10*3/MM3 (ref 0–0.4)
EOSINOPHIL NFR BLD AUTO: 7.8 % (ref 0.3–6.2)
ERYTHROCYTE [DISTWIDTH] IN BLOOD BY AUTOMATED COUNT: 13.2 % (ref 12.3–15.4)
ERYTHROCYTE [SEDIMENTATION RATE] IN BLOOD: 19 MM/HR (ref 0–30)
GFR SERPL CREATININE-BSD FRML MDRD: 70 ML/MIN/1.73
GLOBULIN UR ELPH-MCNC: 2.6 GM/DL
GLUCOSE SERPL-MCNC: 101 MG/DL (ref 65–99)
HCT VFR BLD AUTO: 38.5 % (ref 34–46.6)
HGB BLD-MCNC: 12 G/DL (ref 12–15.9)
IMM GRANULOCYTES # BLD AUTO: 0.01 10*3/MM3 (ref 0–0.05)
IMM GRANULOCYTES NFR BLD AUTO: 0.2 % (ref 0–0.5)
LYMPHOCYTES # BLD AUTO: 0.7 10*3/MM3 (ref 0.7–3.1)
LYMPHOCYTES NFR BLD AUTO: 13.7 % (ref 19.6–45.3)
MCH RBC QN AUTO: 30.7 PG (ref 26.6–33)
MCHC RBC AUTO-ENTMCNC: 31.2 G/DL (ref 31.5–35.7)
MCV RBC AUTO: 98.5 FL (ref 79–97)
MONOCYTES # BLD AUTO: 0.43 10*3/MM3 (ref 0.1–0.9)
MONOCYTES NFR BLD AUTO: 8.4 % (ref 5–12)
NEUTROPHILS NFR BLD AUTO: 3.52 10*3/MM3 (ref 1.7–7)
NEUTROPHILS NFR BLD AUTO: 68.7 % (ref 42.7–76)
NRBC BLD AUTO-RTO: 0 /100 WBC (ref 0–0.2)
PLATELET # BLD AUTO: 258 10*3/MM3 (ref 140–450)
PMV BLD AUTO: 10.2 FL (ref 6–12)
POTASSIUM SERPL-SCNC: 5.1 MMOL/L (ref 3.5–5.2)
PROT SERPL-MCNC: 6.8 G/DL (ref 6–8.5)
RBC # BLD AUTO: 3.91 10*6/MM3 (ref 3.77–5.28)
SODIUM SERPL-SCNC: 140 MMOL/L (ref 136–145)
WBC # BLD AUTO: 5.12 10*3/MM3 (ref 3.4–10.8)

## 2020-08-06 PROCEDURE — 80053 COMPREHEN METABOLIC PANEL: CPT | Performed by: INTERNAL MEDICINE

## 2020-08-06 PROCEDURE — 85025 COMPLETE CBC W/AUTO DIFF WBC: CPT | Performed by: INTERNAL MEDICINE

## 2020-08-06 PROCEDURE — 85652 RBC SED RATE AUTOMATED: CPT | Performed by: INTERNAL MEDICINE

## 2020-08-06 PROCEDURE — 36415 COLL VENOUS BLD VENIPUNCTURE: CPT | Performed by: INTERNAL MEDICINE

## 2021-06-15 ENCOUNTER — LAB (OUTPATIENT)
Dept: LAB | Facility: HOSPITAL | Age: 66
End: 2021-06-15

## 2021-06-15 ENCOUNTER — TRANSCRIBE ORDERS (OUTPATIENT)
Dept: LAB | Facility: HOSPITAL | Age: 66
End: 2021-06-15

## 2021-06-15 DIAGNOSIS — B95.4 BACTERIAL INFECTION DUE TO STREPTOCOCCUS, GROUP G: ICD-10-CM

## 2021-06-15 DIAGNOSIS — M14.679 CHARCOT'S JOINT OF FOOT, UNSPECIFIED LATERALITY: Primary | ICD-10-CM

## 2021-06-15 DIAGNOSIS — G60.9 IDIOPATHIC PERIPHERAL NEUROPATHY: ICD-10-CM

## 2021-06-15 DIAGNOSIS — T84.53XD INFECTION OF TOTAL RIGHT KNEE REPLACEMENT, SUBSEQUENT ENCOUNTER: ICD-10-CM

## 2023-01-05 ENCOUNTER — LAB (OUTPATIENT)
Dept: LAB | Facility: HOSPITAL | Age: 68
End: 2023-01-05
Payer: MEDICARE

## 2023-01-05 ENCOUNTER — TRANSCRIBE ORDERS (OUTPATIENT)
Dept: LAB | Facility: HOSPITAL | Age: 68
End: 2023-01-05
Payer: MEDICARE

## 2023-01-05 DIAGNOSIS — M00.861 PYOGENIC BACTERIAL ARTHRITIS OF PATELLA, RIGHT: ICD-10-CM

## 2023-01-05 DIAGNOSIS — Z96.651 PRESENCE OF RIGHT ARTIFICIAL KNEE JOINT: ICD-10-CM

## 2023-01-05 DIAGNOSIS — T84.53XD INFECTION OF TOTAL RIGHT KNEE REPLACEMENT, SUBSEQUENT ENCOUNTER: ICD-10-CM

## 2023-01-05 DIAGNOSIS — Z86.19 PERSONAL HISTORY OF UNSPECIFIED INFECTIOUS AND PARASITIC DISEASE: ICD-10-CM

## 2023-01-05 DIAGNOSIS — T84.53XD INFECTION OF TOTAL RIGHT KNEE REPLACEMENT, SUBSEQUENT ENCOUNTER: Primary | ICD-10-CM

## 2023-01-05 LAB
ALBUMIN SERPL-MCNC: 4.1 G/DL (ref 3.5–5.2)
ALBUMIN/GLOB SERPL: 1.4 G/DL
ALP SERPL-CCNC: 100 U/L (ref 39–117)
ALT SERPL W P-5'-P-CCNC: 16 U/L (ref 1–33)
ANION GAP SERPL CALCULATED.3IONS-SCNC: 8 MMOL/L (ref 5–15)
AST SERPL-CCNC: 23 U/L (ref 1–32)
BASOPHILS # BLD AUTO: 0.05 10*3/MM3 (ref 0–0.2)
BASOPHILS NFR BLD AUTO: 0.8 % (ref 0–1.5)
BILIRUB SERPL-MCNC: 0.4 MG/DL (ref 0–1.2)
BUN SERPL-MCNC: 16 MG/DL (ref 8–23)
BUN/CREAT SERPL: 20.8 (ref 7–25)
CALCIUM SPEC-SCNC: 9.3 MG/DL (ref 8.6–10.5)
CHLORIDE SERPL-SCNC: 102 MMOL/L (ref 98–107)
CO2 SERPL-SCNC: 31 MMOL/L (ref 22–29)
CREAT SERPL-MCNC: 0.77 MG/DL (ref 0.57–1)
CRP SERPL-MCNC: 0.42 MG/DL (ref 0–0.5)
DEPRECATED RDW RBC AUTO: 46.9 FL (ref 37–54)
EGFRCR SERPLBLD CKD-EPI 2021: 84.7 ML/MIN/1.73
EOSINOPHIL # BLD AUTO: 0.21 10*3/MM3 (ref 0–0.4)
EOSINOPHIL NFR BLD AUTO: 3.5 % (ref 0.3–6.2)
ERYTHROCYTE [DISTWIDTH] IN BLOOD BY AUTOMATED COUNT: 13.3 % (ref 12.3–15.4)
ERYTHROCYTE [SEDIMENTATION RATE] IN BLOOD: 39 MM/HR (ref 0–30)
GLOBULIN UR ELPH-MCNC: 3 GM/DL
GLUCOSE SERPL-MCNC: 120 MG/DL (ref 65–99)
HCT VFR BLD AUTO: 40.7 % (ref 34–46.6)
HGB BLD-MCNC: 13.2 G/DL (ref 12–15.9)
IMM GRANULOCYTES # BLD AUTO: 0.01 10*3/MM3 (ref 0–0.05)
IMM GRANULOCYTES NFR BLD AUTO: 0.2 % (ref 0–0.5)
LYMPHOCYTES # BLD AUTO: 0.86 10*3/MM3 (ref 0.7–3.1)
LYMPHOCYTES NFR BLD AUTO: 14.3 % (ref 19.6–45.3)
MCH RBC QN AUTO: 31.1 PG (ref 26.6–33)
MCHC RBC AUTO-ENTMCNC: 32.4 G/DL (ref 31.5–35.7)
MCV RBC AUTO: 95.8 FL (ref 79–97)
MONOCYTES # BLD AUTO: 0.43 10*3/MM3 (ref 0.1–0.9)
MONOCYTES NFR BLD AUTO: 7.2 % (ref 5–12)
NEUTROPHILS NFR BLD AUTO: 4.44 10*3/MM3 (ref 1.7–7)
NEUTROPHILS NFR BLD AUTO: 74 % (ref 42.7–76)
NRBC BLD AUTO-RTO: 0 /100 WBC (ref 0–0.2)
PLATELET # BLD AUTO: 255 10*3/MM3 (ref 140–450)
PMV BLD AUTO: 10 FL (ref 6–12)
POTASSIUM SERPL-SCNC: 4.3 MMOL/L (ref 3.5–5.2)
PROT SERPL-MCNC: 7.1 G/DL (ref 6–8.5)
RBC # BLD AUTO: 4.25 10*6/MM3 (ref 3.77–5.28)
SODIUM SERPL-SCNC: 141 MMOL/L (ref 136–145)
WBC NRBC COR # BLD: 6 10*3/MM3 (ref 3.4–10.8)

## 2023-01-05 PROCEDURE — 80053 COMPREHEN METABOLIC PANEL: CPT

## 2023-01-05 PROCEDURE — 85652 RBC SED RATE AUTOMATED: CPT

## 2023-01-05 PROCEDURE — 85025 COMPLETE CBC W/AUTO DIFF WBC: CPT

## 2023-01-05 PROCEDURE — 86140 C-REACTIVE PROTEIN: CPT

## 2023-01-05 PROCEDURE — 36415 COLL VENOUS BLD VENIPUNCTURE: CPT

## (undated) DEVICE — TB SXN FRAZIER 12F STRL

## (undated) DEVICE — SUT PDS 2/0 CT2 27IN VIL PDP333H

## (undated) DEVICE — DRSNG SURG AQUACEL AG 9X25CM

## (undated) DEVICE — TRY EPID SFTY 18G 3.5IN 1T7680

## (undated) DEVICE — CVR HNDL LT SURG ACCSSRY BLU STRL

## (undated) DEVICE — DRSNG SURG AQUACEL AG 9X30CM

## (undated) DEVICE — SUT ETHLN 3/0 PC5 18IN 1893G

## (undated) DEVICE — BNDG ELAS W/CLIP 6IN 10YD LF STRL

## (undated) DEVICE — Device

## (undated) DEVICE — SKIN AFFIX SURG ADHESIVE 72/CS 0.55ML: Brand: MEDLINE

## (undated) DEVICE — SUT MONOCRYL PLS ANTIB UND 3/0  PS1 27IN

## (undated) DEVICE — GLV SURG TRIUMPH ORTHO W/ALOE PF LTX 8 STRL

## (undated) DEVICE — GOWN,REINF,POLY,ECL,PP SLV,XL: Brand: MEDLINE

## (undated) DEVICE — ST NERV BLCK CONT CONTIPLEX ECHO CLSD 18G 4IN

## (undated) DEVICE — PUMP PAIN AUTOFUSER AUTO SELCT NOBOLUS 1TO14ML/HR 550ML DISP

## (undated) DEVICE — ANTIBACTERIAL UNDYED BRAIDED (POLYGLACTIN 910), SYNTHETIC ABSORBABLE SUTURE: Brand: COATED VICRYL

## (undated) DEVICE — 3M™ BAIR HUGGER® UNDERBODY BLANKET, SPINAL, 10 PER CASE 57501: Brand: BAIR HUGGER™

## (undated) DEVICE — SUT PDS 1 CTX 36IN VIO PDP371T

## (undated) DEVICE — PK KN TOTL 10

## (undated) DEVICE — 3M™ STERI-DRAPE™ INSTRUMENT POUCH 1018: Brand: STERI-DRAPE™

## (undated) DEVICE — GLV SURG SIGNATURE TOUCH PF LTX 8 STRL BX/50

## (undated) DEVICE — NDL SPINE 22G 31/2IN BLK

## (undated) DEVICE — HEWSON SUTURE RETRIEVER: Brand: HEWSON SUTURE RETRIEVER

## (undated) DEVICE — NERVE BLOCK SUPPORT KIT/BLUE: Brand: MEDLINE INDUSTRIES, INC.

## (undated) DEVICE — AIRWY 90MM NO9

## (undated) DEVICE — MEDI-VAC NON-CONDUCTIVE SUCTION TUBING: Brand: CARDINAL HEALTH

## (undated) DEVICE — SYR LUERLOK 20CC

## (undated) DEVICE — SUT VIC 1 CTX 36IN OBGYN VCP977H

## (undated) DEVICE — INTENDED USE FOR SURGICAL MARKING ON INTACT SKIN, ALSO PROVIDES A PERMANENT METHOD OF IDENTIFYING OBJECTS IN THE OPERATING ROOM: Brand: WRITESITE® REGULAR TIP SKIN MARKER

## (undated) DEVICE — PK EXTREM LOWR 10

## (undated) DEVICE — SPNG GZ WOVN 4X4IN 12PLY 10/BX STRL

## (undated) DEVICE — CANNULA,OXY,ADULT,SUPERSOFT,W/7'TUB,UC: Brand: MEDLINE

## (undated) DEVICE — STERILE PVP: Brand: MEDLINE INDUSTRIES, INC.

## (undated) DEVICE — 1010 S-DRAPE TOWEL DRAPE 10/BX: Brand: STERI-DRAPE™

## (undated) DEVICE — PIN HOLD TEMP NOHEAD FLUT 1/8X3.5IN

## (undated) DEVICE — BNDG ELAS CO-FLEX SLF ADHR 4IN5YD LF STRL

## (undated) DEVICE — INTENDED FOR TISSUE SEPARATION, AND OTHER PROCEDURES THAT REQUIRE A SHARP SURGICAL BLADE TO PUNCTURE OR CUT.: Brand: BARD-PARKER ® SAFETYLOCK CARBON RIB-BACK BLADES

## (undated) DEVICE — ENCORE® LATEX MICRO SIZE 8.5, STERILE LATEX POWDER-FREE SURGICAL GLOVE: Brand: ENCORE

## (undated) DEVICE — SUT PROLN 1 CTX 30IN 8455H

## (undated) DEVICE — UNDERCAST PADDING: Brand: DEROYAL

## (undated) DEVICE — MEDI-VAC YANKAUER SUCTION HANDLE W/BULBOUS TIP: Brand: CARDINAL HEALTH

## (undated) DEVICE — SST TWIST DRILL, STANDARD, 2MM DIA. X 127MM: Brand: MICROAIRE®

## (undated) DEVICE — SYR LUERLOK 30CC

## (undated) DEVICE — STRYKER PERFORMANCE SERIES SAGITTAL BLADE: Brand: STRYKER PERFORMANCE SERIES

## (undated) DEVICE — COATED BRAIDED POLYESTER: Brand: TI-CRON

## (undated) DEVICE — DISPOSABLE TOURNIQUET CUFF SINGLE BLADDER, DUAL PORT AND QUICK CONNECT CONNECTOR: Brand: COLOR CUFF

## (undated) DEVICE — T4 PULLOVER TOGA, LARGE

## (undated) DEVICE — ADAPT ST INFUS ADMIN SYR 70IN